# Patient Record
Sex: MALE | Race: WHITE | NOT HISPANIC OR LATINO | Employment: FULL TIME | URBAN - METROPOLITAN AREA
[De-identification: names, ages, dates, MRNs, and addresses within clinical notes are randomized per-mention and may not be internally consistent; named-entity substitution may affect disease eponyms.]

---

## 2018-07-18 ENCOUNTER — TRANSCRIBE ORDERS (OUTPATIENT)
Dept: PHYSICAL THERAPY | Facility: REHABILITATION | Age: 48
End: 2018-07-18

## 2018-07-18 ENCOUNTER — EVALUATION (OUTPATIENT)
Dept: PHYSICAL THERAPY | Facility: REHABILITATION | Age: 48
End: 2018-07-18
Payer: OTHER MISCELLANEOUS

## 2018-07-18 DIAGNOSIS — S93.492D SPRAIN OF ANTERIOR TALOFIBULAR LIGAMENT OF LEFT ANKLE, SUBSEQUENT ENCOUNTER: ICD-10-CM

## 2018-07-18 DIAGNOSIS — M75.102 ROTATOR CUFF SYNDROME OF LEFT SHOULDER: Primary | ICD-10-CM

## 2018-07-18 PROCEDURE — 97110 THERAPEUTIC EXERCISES: CPT | Performed by: PHYSICAL THERAPIST

## 2018-07-18 PROCEDURE — G8979 MOBILITY GOAL STATUS: HCPCS | Performed by: PHYSICAL THERAPIST

## 2018-07-18 PROCEDURE — G8985 CARRY GOAL STATUS: HCPCS | Performed by: PHYSICAL THERAPIST

## 2018-07-18 PROCEDURE — G8978 MOBILITY CURRENT STATUS: HCPCS | Performed by: PHYSICAL THERAPIST

## 2018-07-18 PROCEDURE — G8984 CARRY CURRENT STATUS: HCPCS | Performed by: PHYSICAL THERAPIST

## 2018-07-18 PROCEDURE — 97162 PT EVAL MOD COMPLEX 30 MIN: CPT | Performed by: PHYSICAL THERAPIST

## 2018-07-18 NOTE — PROGRESS NOTES
PT Evaluation     Today's date: 2018  Patient name: Anamaria Caal  : 1970  MRN: [id number]  Referring provider: Dejah Rivers DO  Dx:   Encounter Diagnosis     ICD-10-CM    1  Rotator cuff syndrome of left shoulder M75 102    2  Sprain of anterior talofibular ligament of left ankle, subsequent encounter S93 492D        Start Time: 1030  Stop Time: 1120  Total time in clinic (min): 50 minutes    Assessment  Impairments: abnormal gait, abnormal muscle tone, abnormal or restricted ROM, abnormal movement, activity intolerance, impaired balance, impaired physical strength, lacks appropriate home exercise program, pain with function and weight-bearing intolerance    Assessment details: Anamaria Caal is a 46 y o  male presenting to physical therapy with pain, decreased range of motion, decreased strength, gait/balance dysfunction and decreased activity tolerance  Assessment reveals reduced left ankle AROM and inflammation with painful palpation of ATFL ligament secondary to inversion sprain  Patients left shoulder demonstrates sings/symptoms of mild impingement as per special testing  Secondary to these impairments, patient has increased difficulty performing ADL's, household chores and  work related tasks  Chloe Curet would benefit from skilled PT to address these issues and maximize function  Thank you for the referral     Understanding of Dx/Px/POC: excellent  Goals  STG (4 weeks)  1  Patient will be independent with HEP  2  Decrease pain at worst by 2 points on NPRS  3  Increase left ankle dorsiflexion active range of motion by 5 degrees  4  Patient will demonstrate ability to ambulate without a CAM boot and normalized mechanics  LTG (8 weeks)  1  Decrease pain at worst from 4 points on NPRS  2  Increase left ankle active range of motion to WNL  3  Decrease fig  8 ROM from equal to R LE  4  Patient will demonstrate ability to navigate stairs reciprocally   5   Increase FOTO from 69/100 to > or equal to 85/100 for the ankle  6  Increase FOTO from 75/100 to > or equal to 80/100 for the shoulder    Plan  Patient would benefit from: skilled PT  Planned therapy interventions: joint mobilization, manual therapy, neuromuscular re-education, patient education, strengthening, stretching, therapeutic exercise, home exercise program, ADL training and balance  Frequency: 2x week  Duration in weeks: 6  Treatment plan discussed with: patient        Subjective Evaluation    History of Present Illness  Mechanism of injury: 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   Patient states that on 07/12/2018 he suffering a left ankle and shoulder injury while pulling a rope and stepping into a hole when the rope released  Patient states that the same day of his injury he received x-rays which came back negative his left shoulder and ankle  Patient came to outpatient PT on 07/18/2018 ambulating with a CAM boot for his left ankle  Patient states that his inflammation with associated lateral ankle pain limits his current function but states that his left shoulder has subsided significantly  Patient reports left shoulder end range pain and left ankle pain with ambulation/WBing  Patients goals for PT are to decrease pain, improve ambulation (d/c CAM boot), and return to work     Pain  Current pain rating: 3  At best pain rating: 3  At worst pain rating: 3  Location: Ankle pain: Current - 4/10   Quality: dull ache  Relieving factors: ice, relaxation and rest  Aggravating factors: standing, walking and overhead activity  Progression: improved    Social Support  Steps to enter house: yes  Stairs in house: no   Lives in: [facility]  Lives with: spouse    Employment status: working (Will return to work on 7/23/2018)  Hand dominance: right      Diagnostic Tests  X-ray: normal        Objective     Tenderness   Left Ankle/Foot   Tenderness in the anterior talofibular ligament  No tenderness in the fifth metatarsal base, lateral malleolus, medial malleolus, navicular and posterior talofibular ligament  Cervical/Thoracic Screen   Cervical range of motion within normal limits with the following exceptions: (-) UQS including (-) compression and Spurling B/L    Neurological Testing     Sensation     Shoulder   Left Shoulder   Intact: light touch    Right Shoulder   Intact: light touch    Ankle/Foot   Left Ankle/Foot   Intact: light touch    Right Ankle/Foot   Intact: light touch     Reflexes   Left   Biceps (C5/C6): normal (2+)  Patellar (L4): trace (1+)  Achilles (S1): trace (1+)    Right   Biceps (C5/C6): normal (2+)  Patellar (L4): trace (1+)  Achilles (S1): trace (1+)    Active Range of Motion   Left Shoulder   Flexion: WFL  Abduction: 152 degrees with pain  Left Ankle/Foot   Dorsiflexion (ke): 13 degrees   Plantar flexion: 30 degrees   Inversion: 13 degrees   Eversion: 5 degrees     Right Ankle/Foot   Dorsiflexion (ke): 18 degrees   Plantar flexion: 42 degrees     Strength/Myotome Testing     Left Shoulder     Planes of Motion   Flexion: 4   Abduction: 4   External rotation at 0°: 4-     Right Shoulder     Planes of Motion   Flexion: 4   Abduction: 4   External rotation at 0°: 4     Left Ankle/Foot   Dorsiflexion: 3+  Plantar flexion: 3+    Right Ankle/Foot   Dorsiflexion: 4+  Plantar flexion: 4+    Tests     Left Shoulder   Positive Hawkin's and painful arc  Negative belly press  Left Ankle/Foot   Positive for anterior drawer and navicular drop       Additional Tests Details  (+) Infraspinatus    Swelling   Left Ankle/Foot   Figure 8: 63 cm    Right Ankle/Foot   Figure 8: 62 cm      Flowsheet Rows      Most Recent Value   PT/OT G-Codes   Current Score  75   Projected Score  80   FOTO information reviewed  Yes   Assessment Type  Evaluation   G code set  Carrying, Moving & Handling Objects   Mobility: Walking and Moving Around Current Status ()  CJ   Mobility: Walking and Moving Around Goal Status ()  CI   Carrying, Moving and Handling Objects Current Status ()  CJ   Carrying, Moving and Handling Objects Goal Status ()  CI          Precautions:  Work related injury, Obesity    Daily Treatment Diary     Manual  7/18            Talocrural JM - grade IV Grade V            Subtalar eversion JM - grade IV             Shoulder end range PROM                                           Exercise Diary  7/18            Recumbent bike             Slant board stretch 4x30"            Ankle alphabet x4            Scap retractions BTB  2x10x3"            Serratus wall slides             Fitter board balance                                                                                                                                                                                                       Modalities

## 2018-07-18 NOTE — LETTER
2018    Guanaco Pedraza DO  [address] [facility] [address]    Patient: Darrin Pineda   YOB: 1970   Date of Visit: 2018     Encounter Diagnosis     ICD-10-CM    1  Rotator cuff syndrome of left shoulder M75 102    2  Sprain of anterior talofibular ligament of left ankle, subsequent encounter S93 492D        Dear Dr Cheyenne Montgomery:    Please review the attached Plan of Care from [facility] recent visit  Please verify that you agree therapy should continue by signing the attached document and sending it back to our office  If you have any questions or concerns, please don't hesitate to call  Sincerely,    Alessia Abdi, PT      Referring Provider:      I certify that I have read the below Plan of Care and certify the need for these services furnished under this plan of treatment while under my care  Guanaco Pedraza DO  [address] [place] [address]  VIA Facsimile: [phone]          PT Evaluation     Today's date: 2018  Patient name: Darrin Pineda  : 1970  MRN: [id number]  Referring provider: Ángel Chung DO  Dx:   Encounter Diagnosis     ICD-10-CM    1  Rotator cuff syndrome of left shoulder M75 102    2  Sprain of anterior talofibular ligament of left ankle, subsequent encounter S93 492D        Start Time: 1030  Stop Time: 1120  Total time in clinic (min): 50 minutes    Assessment  Impairments: abnormal gait, abnormal muscle tone, abnormal or restricted ROM, abnormal movement, activity intolerance, impaired balance, impaired physical strength, lacks appropriate home exercise program, pain with function and weight-bearing intolerance    Assessment details: Darrin Pineda is a 46 y o  male presenting to physical therapy with pain, decreased range of motion, decreased strength, gait/balance dysfunction and decreased activity tolerance    Assessment reveals reduced left ankle AROM and inflammation with painful palpation of ATFL ligament secondary to inversion sprain  Patients left shoulder demonstrates sings/symptoms of mild impingement as per special testing  Secondary to these impairments, patient has increased difficulty performing ADL's, household chores and  work related tasks  Maricruz Wolf would benefit from skilled PT to address these issues and maximize function  Thank you for the referral     Understanding of Dx/Px/POC: excellent  Goals  STG (4 weeks)  1  Patient will be independent with HEP  2  Decrease pain at worst by 2 points on NPRS  3  Increase left ankle dorsiflexion active range of motion by 5 degrees  4  Patient will demonstrate ability to ambulate without a CAM boot and normalized mechanics  LTG (8 weeks)  1  Decrease pain at worst from 4 points on NPRS  2  Increase left ankle active range of motion to WNL  3  Decrease fig  8 ROM from equal to R LE  4  Patient will demonstrate ability to navigate stairs reciprocally   5  Increase FOTO from 69/100 to > or equal to 85/100 for the ankle  6  Increase FOTO from 75/100 to > or equal to 80/100 for the shoulder    Plan  Patient would benefit from: skilled PT  Planned therapy interventions: joint mobilization, manual therapy, neuromuscular re-education, patient education, strengthening, stretching, therapeutic exercise, home exercise program, ADL training and balance  Frequency: 2x week  Duration in weeks: 6  Treatment plan discussed with: patient        Subjective Evaluation    History of Present Illness  Mechanism of injury: 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   Patient states that on 07/12/2018 he suffering a left ankle and shoulder injury while pulling a rope and stepping into a hole when the rope released  Patient states that the same day of his injury he received x-rays which came back negative his left shoulder and ankle    Patient came to outpatient PT on 07/18/2018 ambulating with a CAM boot for his left ankle  Patient states that his inflammation with associated lateral ankle pain limits his current function but states that his left shoulder has subsided significantly  Patient reports left shoulder end range pain and left ankle pain with ambulation/WBing  Patients goals for PT are to decrease pain, improve ambulation (d/c CAM boot), and return to work  Pain  Current pain rating: 3  At best pain rating: 3  At worst pain rating: 3  Location: Ankle pain: Current - 4/10   Quality: dull ache  Relieving factors: ice, relaxation and rest  Aggravating factors: standing, walking and overhead activity  Progression: improved    Social Support  Steps to enter house: yes  Stairs in house: no   Lives in: [place] house  Lives with: spouse    Employment status: working (Will return to work on 7/23/2018)  Hand dominance: right      Diagnostic Tests  X-ray: normal        Objective     Tenderness   Left Ankle/Foot   Tenderness in the anterior talofibular ligament  No tenderness in the fifth metatarsal base, lateral malleolus, medial malleolus, navicular and posterior talofibular ligament       Cervical/Thoracic Screen   Cervical range of motion within normal limits with the following exceptions: (-) UQS including (-) compression and Spurling B/L    Neurological Testing     Sensation     Shoulder   Left Shoulder   Intact: light touch    Right Shoulder   Intact: light touch    Ankle/Foot   Left Ankle/Foot   Intact: light touch    Right Ankle/Foot   Intact: light touch     Reflexes   Left   Biceps (C5/C6): normal (2+)  Patellar (L4): trace (1+)  Achilles (S1): trace (1+)    Right   Biceps (C5/C6): normal (2+)  Patellar (L4): trace (1+)  Achilles (S1): trace (1+)    Active Range of Motion   Left Shoulder   Flexion: WFL  Abduction: 152 degrees with pain  Left Ankle/Foot   Dorsiflexion (ke): 13 degrees   Plantar flexion: 30 degrees   Inversion: 13 degrees   Eversion: 5 degrees     Right Ankle/Foot   Dorsiflexion (ke): 18 degrees Plantar flexion: 42 degrees     Strength/Myotome Testing     Left Shoulder     Planes of Motion   Flexion: 4   Abduction: 4   External rotation at 0°:  4-     Right Shoulder     Planes of Motion   Flexion: 4   Abduction: 4   External rotation at 0°:  4     Left Ankle/Foot   Dorsiflexion: 3+  Plantar flexion: 3+    Right Ankle/Foot   Dorsiflexion: 4+  Plantar flexion: 4+    Tests     Left Shoulder   Positive Hawkin's and painful arc  Negative belly press  Left Ankle/Foot   Positive for anterior drawer and navicular drop  Additional Tests Details  (+) Infraspinatus    Swelling   Left Ankle/Foot   Figure 8: 63 cm    Right Ankle/Foot   Figure 8: 62 cm      Flowsheet Rows      Most Recent Value   PT/OT G-Codes   Current Score  75   Projected Score  80   FOTO information reviewed  Yes   Assessment Type  Evaluation   G code set  Carrying, Moving & Handling Objects   Mobility: Walking and Moving Around Current Status ()  CJ   Mobility: Walking and Moving Around Goal Status ()  CI   Carrying, Moving and Handling Objects Current Status ()  CJ   Carrying, Moving and Handling Objects Goal Status ()  CI          Precautions:  Work related injury, Obesity    Daily Treatment Diary     Manual  7/18            Talocrural JM - grade IV Grade V            Subtalar eversion JM - grade IV             Shoulder end range PROM                                           Exercise Diary  7/18            Recumbent bike             Slant board stretch 4x30"            Ankle alphabet x4            Scap retractions BTB  2x10x3"            Serratus wall slides             Fitter board balance                                                                                                                                                                                                       Modalities

## 2018-07-20 ENCOUNTER — OFFICE VISIT (OUTPATIENT)
Dept: PHYSICAL THERAPY | Facility: REHABILITATION | Age: 48
End: 2018-07-20
Payer: OTHER MISCELLANEOUS

## 2018-07-20 DIAGNOSIS — M75.102 ROTATOR CUFF SYNDROME OF LEFT SHOULDER: Primary | ICD-10-CM

## 2018-07-20 DIAGNOSIS — S93.492D SPRAIN OF ANTERIOR TALOFIBULAR LIGAMENT OF LEFT ANKLE, SUBSEQUENT ENCOUNTER: ICD-10-CM

## 2018-07-20 PROCEDURE — 97112 NEUROMUSCULAR REEDUCATION: CPT | Performed by: PHYSICAL THERAPIST

## 2018-07-20 PROCEDURE — 97140 MANUAL THERAPY 1/> REGIONS: CPT | Performed by: PHYSICAL THERAPIST

## 2018-07-20 PROCEDURE — 97110 THERAPEUTIC EXERCISES: CPT | Performed by: PHYSICAL THERAPIST

## 2018-07-20 NOTE — PROGRESS NOTES
Daily Note     Today's date: 2018  Patient name: Lolis Edwards  : 1970  MRN: [id number]  Referring provider: Lizbeth Keith DO  Dx:   Encounter Diagnosis     ICD-10-CM    1  Rotator cuff syndrome of left shoulder M75 102    2  Sprain of anterior talofibular ligament of left ankle, subsequent encounter S93 252D                   Subjective: Patient reports he has returned to walking and is able to do so with minimal pain and swelling in the ankle  Patient reports he is feeling pretty good today and is going to try and workout the shoulder later today  Patient inquired about yard work and was educated regarding load reduction and importance of high top sneakers with ankle support secondary to instability  Objective: See treatment diary below      Assessment: Tolerated treatment well  Patient demonstrated fatigue post treatment and exhibited good technique with therapeutic exercises  Improved DF PROM/AROM but inflammation remains on lateral ankle consistent with ATFL sprain  With shoulder TE patient demonstrated ability to perform without discomfort but weakness present  With fitterboard stability patient also demonstrated continued ankle instability but improved neuro control noted  Plan: Continue per plan of care  Precautions:  Work related injury, Obesity    Daily Treatment Diary     Manual             Talocrural JM - grade IV Grade V Grade IV - 6'           Subtalar eversion JM - grade IV  Grade IV = 4'           Shoulder end range PROM  3'                                         Exercise Diary             Recumbent bike             Slant board stretch 4x30" 4x30"           Ankle alphabet x4            Scap retractions BTB  2x10x3"            Serratus wall slides  YTB  x3 fatigue           Fitter board balance  5' A/P           VG warmup  7' +BTB ext's           KB press  15#  3x8 Modalities

## 2018-07-25 ENCOUNTER — OFFICE VISIT (OUTPATIENT)
Dept: PHYSICAL THERAPY | Facility: REHABILITATION | Age: 48
End: 2018-07-25
Payer: OTHER MISCELLANEOUS

## 2018-07-25 DIAGNOSIS — M75.102 ROTATOR CUFF SYNDROME OF LEFT SHOULDER: Primary | ICD-10-CM

## 2018-07-25 DIAGNOSIS — S93.492D SPRAIN OF ANTERIOR TALOFIBULAR LIGAMENT OF LEFT ANKLE, SUBSEQUENT ENCOUNTER: ICD-10-CM

## 2018-07-25 PROCEDURE — 97112 NEUROMUSCULAR REEDUCATION: CPT | Performed by: PHYSICAL THERAPIST

## 2018-07-25 PROCEDURE — 97140 MANUAL THERAPY 1/> REGIONS: CPT | Performed by: PHYSICAL THERAPIST

## 2018-07-25 PROCEDURE — G8979 MOBILITY GOAL STATUS: HCPCS | Performed by: PHYSICAL THERAPIST

## 2018-07-25 PROCEDURE — 97110 THERAPEUTIC EXERCISES: CPT | Performed by: PHYSICAL THERAPIST

## 2018-07-25 PROCEDURE — G8978 MOBILITY CURRENT STATUS: HCPCS | Performed by: PHYSICAL THERAPIST

## 2018-07-25 NOTE — PROGRESS NOTES
Daily Note     Today's date: 2018  Patient name: Sophia Rm  : 1970  MRN: [id number]  Referring provider: Nohemi Augustin DO  Dx:   Encounter Diagnosis     ICD-10-CM    1  Rotator cuff syndrome of left shoulder M75 102    2  Sprain of anterior talofibular ligament of left ankle, subsequent encounter L74 179D                   Subjective: Patient reports having 0/10 pain pre-tx but states that his ankle was "throbbing" on his way home following last tx session  Patient reports resolution of symptoms since  Objective: See treatment diary below      Assessment: Tolerated treatment well  Patient demonstrated fatigue post treatment and exhibited good technique with therapeutic exercises  Shoulder PROM/AROM currently WNL but weakness remains  Ankle instability also remains present as per balance testing but improved DF noted  Mild inflammation persists over the ATFL  Plan: Continue per plan of care  Precautions: Work related injury, Obesity    Daily Treatment Diary     Manual            Talocrural JM - grade IV Grade V Grade IV - 6' Grade IV - 4'          Subtalar eversion JM - grade IV  Grade IV = 4' Grade IV - 4'          Shoulder end range PROM  3' 3'                                        Exercise Diary            Recumbent bike             Slant board stretch 4x30" 4x30" 4x30"          Ankle alphabet x4            Scap retractions BTB  2x10x3"  BTB  3x10          Serratus wall slides  YTB  x3 fatigue YTB  x3 fatigue          Fitter board balance  5' A/P 5' A/P          VG warmup  7' +BTB ext's 7'  +BTB ext  KB press  15#  3x8 15#3x10          Heel raises/Toe raises   3x12 ea                                                                                                                                                                Modalities

## 2018-07-27 ENCOUNTER — OFFICE VISIT (OUTPATIENT)
Dept: PHYSICAL THERAPY | Facility: REHABILITATION | Age: 48
End: 2018-07-27
Payer: OTHER MISCELLANEOUS

## 2018-07-27 DIAGNOSIS — S93.492D SPRAIN OF ANTERIOR TALOFIBULAR LIGAMENT OF LEFT ANKLE, SUBSEQUENT ENCOUNTER: ICD-10-CM

## 2018-07-27 DIAGNOSIS — M75.102 ROTATOR CUFF SYNDROME OF LEFT SHOULDER: Primary | ICD-10-CM

## 2018-07-27 PROCEDURE — 97112 NEUROMUSCULAR REEDUCATION: CPT | Performed by: PHYSICAL MEDICINE & REHABILITATION

## 2018-07-27 PROCEDURE — 97140 MANUAL THERAPY 1/> REGIONS: CPT | Performed by: PHYSICAL MEDICINE & REHABILITATION

## 2018-07-27 NOTE — PROGRESS NOTES
Daily Note     Today's date: 2018  Patient name: Greg Villarreal  : 1970  MRN: [id number]  Referring provider: Jewel Morton DO  Dx:   Encounter Diagnosis     ICD-10-CM    1  Rotator cuff syndrome of left shoulder M75 102    2  Sprain of anterior talofibular ligament of left ankle, subsequent encounter D51 860Y                   Subjective: Patient Patient presents without complaints of pain, notes some tenderness in ankle following last session but otherwise reports he has returned to full activity without issue  Patient requests treatment of ankle only this session  Objective: See treatment diary below      Assessment: Tolerated treatment well  Mild eversion restriction persists  Patient demonstrated fatigue post treatment and exhibited good technique with therapeutic exercises  Plan: Continue per plan of care  Precautions: Work related injury, Obesity    Daily Treatment Diary     Manual           Talocrural JM - grade IV Grade V Grade IV - 6' Grade IV - 4' LH         Subtalar eversion JM - grade IV  Grade IV = 4' Grade IV - 4' LH         Shoulder end range PROM  3' 3' np                                       Exercise Diary           Recumbent bike             Slant board stretch 4x30" 4x30" 4x30" 4x30"         Ankle alphabet x4            Scap retractions BTB  2x10x3"  BTB  3x10 np         Serratus wall slides  YTB  x3 fatigue YTB  x3 fatigue np         Fitter board balance  5' A/P 5' A/P 5' AP         VG warmup  7' +BTB ext's 7'  +BTB ext  7' BTB ext  KB press  15#  3x8 15#3x10 np         Heel raises/Toe raises   3x12 ea    3x12 ea                                                                                                                                                            Modalities

## 2018-08-29 NOTE — PROGRESS NOTES
Darrin Pineda attended physical therapy from 07/18/2018 until 07/27/2018 for 4 treatment sessions regarding left shoulder and ankle pain  Patient made improvement throughout treatment but am unable to achieve discharge information secondary to patient not returning for follow up appointments  Patient will be discharged from PT at this time  Thank you

## 2019-04-22 ENCOUNTER — APPOINTMENT (EMERGENCY)
Dept: CT IMAGING | Facility: HOSPITAL | Age: 49
DRG: 181 | End: 2019-04-22
Payer: COMMERCIAL

## 2019-04-22 ENCOUNTER — APPOINTMENT (EMERGENCY)
Dept: RADIOLOGY | Facility: HOSPITAL | Age: 49
DRG: 181 | End: 2019-04-22
Payer: COMMERCIAL

## 2019-04-22 ENCOUNTER — HOSPITAL ENCOUNTER (INPATIENT)
Facility: HOSPITAL | Age: 49
LOS: 2 days | Discharge: HOME | DRG: 181 | End: 2019-04-25
Attending: EMERGENCY MEDICINE | Admitting: INTERNAL MEDICINE
Payer: COMMERCIAL

## 2019-04-22 DIAGNOSIS — R55 SYNCOPE: Primary | ICD-10-CM

## 2019-04-22 DIAGNOSIS — J18.9 POSTOBSTRUCTIVE PNEUMONIA: ICD-10-CM

## 2019-04-22 DIAGNOSIS — R91.8 LUNG MASS: ICD-10-CM

## 2019-04-22 DIAGNOSIS — R93.89 ABNORMAL CT OF THE CHEST: ICD-10-CM

## 2019-04-22 LAB
ALBUMIN SERPL BCP-MCNC: 2.9 G/DL (ref 3.5–5)
ALP SERPL-CCNC: 92 U/L (ref 46–116)
ALT SERPL W P-5'-P-CCNC: 41 U/L (ref 12–78)
ANION GAP BLD CALC-SCNC: 18 MMOL/L (ref 4–13)
ANION GAP SERPL CALCULATED.3IONS-SCNC: 11 MMOL/L (ref 4–13)
APTT PPP: 31 SECONDS (ref 26–38)
AST SERPL W P-5'-P-CCNC: 26 U/L (ref 5–45)
BASOPHILS # BLD AUTO: 0.06 THOUSANDS/ΜL (ref 0–0.1)
BASOPHILS NFR BLD AUTO: 1 % (ref 0–1)
BILIRUB SERPL-MCNC: 0.4 MG/DL (ref 0.2–1)
BUN BLD-MCNC: 12 MG/DL (ref 5–25)
BUN SERPL-MCNC: 13 MG/DL (ref 5–25)
CA-I BLD-SCNC: 1.13 MMOL/L (ref 1.12–1.32)
CALCIUM SERPL-MCNC: 8.7 MG/DL (ref 8.3–10.1)
CHLORIDE BLD-SCNC: 105 MMOL/L (ref 100–108)
CHLORIDE SERPL-SCNC: 104 MMOL/L (ref 100–108)
CO2 SERPL-SCNC: 24 MMOL/L (ref 21–32)
CREAT BLD-MCNC: 1 MG/DL (ref 0.6–1.3)
CREAT SERPL-MCNC: 1.21 MG/DL (ref 0.6–1.3)
EOSINOPHIL # BLD AUTO: 0.35 THOUSAND/ΜL (ref 0–0.61)
EOSINOPHIL NFR BLD AUTO: 3 % (ref 0–6)
ERYTHROCYTE [DISTWIDTH] IN BLOOD BY AUTOMATED COUNT: 13 % (ref 11.6–15.1)
GFR SERPL CREATININE-BSD FRML MDRD: 69 ML/MIN/1.73SQ M
GFR SERPL CREATININE-BSD FRML MDRD: 87 ML/MIN/1.73SQ M
GLUCOSE SERPL-MCNC: 128 MG/DL (ref 65–140)
GLUCOSE SERPL-MCNC: 163 MG/DL (ref 65–140)
HCT VFR BLD AUTO: 46.1 % (ref 36.5–49.3)
HCT VFR BLD CALC: 43 % (ref 36.5–49.3)
HGB BLD-MCNC: 15.5 G/DL (ref 12–17)
HGB BLDA-MCNC: 14.6 G/DL (ref 12–17)
IMM GRANULOCYTES # BLD AUTO: 0.03 THOUSAND/UL (ref 0–0.2)
IMM GRANULOCYTES NFR BLD AUTO: 0 % (ref 0–2)
INR PPP: 1.09 (ref 0.86–1.17)
LACTATE SERPL-SCNC: 1.3 MMOL/L (ref 0.5–2)
LACTATE SERPL-SCNC: 2.1 MMOL/L (ref 0.5–2)
LYMPHOCYTES # BLD AUTO: 2.19 THOUSANDS/ΜL (ref 0.6–4.47)
LYMPHOCYTES NFR BLD AUTO: 22 % (ref 14–44)
MCH RBC QN AUTO: 31 PG (ref 26.8–34.3)
MCHC RBC AUTO-ENTMCNC: 33.6 G/DL (ref 31.4–37.4)
MCV RBC AUTO: 92 FL (ref 82–98)
MONOCYTES # BLD AUTO: 0.8 THOUSAND/ΜL (ref 0.17–1.22)
MONOCYTES NFR BLD AUTO: 8 % (ref 4–12)
NEUTROPHILS # BLD AUTO: 6.74 THOUSANDS/ΜL (ref 1.85–7.62)
NEUTS SEG NFR BLD AUTO: 66 % (ref 43–75)
NRBC BLD AUTO-RTO: 0 /100 WBCS
PCO2 BLD: 23 MMOL/L (ref 21–32)
PLATELET # BLD AUTO: 253 THOUSANDS/UL (ref 149–390)
PMV BLD AUTO: 9.3 FL (ref 8.9–12.7)
POTASSIUM BLD-SCNC: 3.4 MMOL/L (ref 3.5–5.3)
POTASSIUM SERPL-SCNC: 3.6 MMOL/L (ref 3.5–5.3)
PROT SERPL-MCNC: 6.9 G/DL (ref 6.4–8.2)
PROTHROMBIN TIME: 13.8 SECONDS (ref 11.8–14.2)
RBC # BLD AUTO: 5 MILLION/UL (ref 3.88–5.62)
SODIUM BLD-SCNC: 141 MMOL/L (ref 136–145)
SODIUM SERPL-SCNC: 139 MMOL/L (ref 136–145)
SPECIMEN SOURCE: ABNORMAL
TROPONIN I SERPL-MCNC: <0.02 NG/ML
WBC # BLD AUTO: 10.17 THOUSAND/UL (ref 4.31–10.16)

## 2019-04-22 PROCEDURE — 71045 X-RAY EXAM CHEST 1 VIEW: CPT

## 2019-04-22 PROCEDURE — 85610 PROTHROMBIN TIME: CPT | Performed by: EMERGENCY MEDICINE

## 2019-04-22 PROCEDURE — 74177 CT ABD & PELVIS W/CONTRAST: CPT

## 2019-04-22 PROCEDURE — 70450 CT HEAD/BRAIN W/O DYE: CPT

## 2019-04-22 PROCEDURE — 80053 COMPREHEN METABOLIC PANEL: CPT | Performed by: EMERGENCY MEDICINE

## 2019-04-22 PROCEDURE — 85014 HEMATOCRIT: CPT

## 2019-04-22 PROCEDURE — 85730 THROMBOPLASTIN TIME PARTIAL: CPT | Performed by: EMERGENCY MEDICINE

## 2019-04-22 PROCEDURE — 85025 COMPLETE CBC W/AUTO DIFF WBC: CPT | Performed by: EMERGENCY MEDICINE

## 2019-04-22 PROCEDURE — 96361 HYDRATE IV INFUSION ADD-ON: CPT

## 2019-04-22 PROCEDURE — 99284 EMERGENCY DEPT VISIT MOD MDM: CPT | Performed by: EMERGENCY MEDICINE

## 2019-04-22 PROCEDURE — 83605 ASSAY OF LACTIC ACID: CPT | Performed by: EMERGENCY MEDICINE

## 2019-04-22 PROCEDURE — 36415 COLL VENOUS BLD VENIPUNCTURE: CPT | Performed by: EMERGENCY MEDICINE

## 2019-04-22 PROCEDURE — 96365 THER/PROPH/DIAG IV INF INIT: CPT

## 2019-04-22 PROCEDURE — 99285 EMERGENCY DEPT VISIT HI MDM: CPT

## 2019-04-22 PROCEDURE — 71260 CT THORAX DX C+: CPT

## 2019-04-22 PROCEDURE — 87040 BLOOD CULTURE FOR BACTERIA: CPT | Performed by: EMERGENCY MEDICINE

## 2019-04-22 PROCEDURE — 84484 ASSAY OF TROPONIN QUANT: CPT | Performed by: EMERGENCY MEDICINE

## 2019-04-22 PROCEDURE — 80047 BASIC METABLC PNL IONIZED CA: CPT

## 2019-04-22 PROCEDURE — 93005 ELECTROCARDIOGRAM TRACING: CPT

## 2019-04-22 PROCEDURE — 84145 PROCALCITONIN (PCT): CPT | Performed by: EMERGENCY MEDICINE

## 2019-04-22 PROCEDURE — 96375 TX/PRO/DX INJ NEW DRUG ADDON: CPT

## 2019-04-22 RX ORDER — ALBUTEROL SULFATE 2.5 MG/3ML
5 SOLUTION RESPIRATORY (INHALATION) ONCE
Status: COMPLETED | OUTPATIENT
Start: 2019-04-22 | End: 2019-04-22

## 2019-04-22 RX ORDER — ONDANSETRON 2 MG/ML
4 INJECTION INTRAMUSCULAR; INTRAVENOUS ONCE
Status: COMPLETED | OUTPATIENT
Start: 2019-04-22 | End: 2019-04-22

## 2019-04-22 RX ORDER — 0.9 % SODIUM CHLORIDE 0.9 %
3 VIAL (ML) INJECTION AS NEEDED
Status: DISCONTINUED | OUTPATIENT
Start: 2019-04-22 | End: 2019-04-25 | Stop reason: HOSPADM

## 2019-04-22 RX ORDER — FENTANYL CITRATE 50 UG/ML
100 INJECTION, SOLUTION INTRAMUSCULAR; INTRAVENOUS ONCE
Status: COMPLETED | OUTPATIENT
Start: 2019-04-22 | End: 2019-04-22

## 2019-04-22 RX ADMIN — ONDANSETRON 4 MG: 2 INJECTION INTRAMUSCULAR; INTRAVENOUS at 21:34

## 2019-04-22 RX ADMIN — IOHEXOL 100 ML: 350 INJECTION, SOLUTION INTRAVENOUS at 22:13

## 2019-04-22 RX ADMIN — SODIUM CHLORIDE 1000 ML: 0.9 INJECTION, SOLUTION INTRAVENOUS at 21:34

## 2019-04-22 RX ADMIN — FENTANYL CITRATE 100 MCG: 50 INJECTION, SOLUTION INTRAMUSCULAR; INTRAVENOUS at 21:35

## 2019-04-22 RX ADMIN — CEFTRIAXONE 1000 MG: 2 INJECTION, POWDER, FOR SOLUTION INTRAMUSCULAR; INTRAVENOUS at 23:28

## 2019-04-22 RX ADMIN — IPRATROPIUM BROMIDE 0.5 MG: 0.5 SOLUTION RESPIRATORY (INHALATION) at 21:40

## 2019-04-22 RX ADMIN — ALBUTEROL SULFATE 5 MG: 2.5 SOLUTION RESPIRATORY (INHALATION) at 21:39

## 2019-04-23 PROBLEM — Z72.0 TOBACCO ABUSE: Status: ACTIVE | Noted: 2019-04-23

## 2019-04-23 PROBLEM — R55 SYNCOPE: Status: ACTIVE | Noted: 2019-04-23

## 2019-04-23 PROBLEM — J18.9 POSTOBSTRUCTIVE PNEUMONIA: Status: ACTIVE | Noted: 2019-04-23

## 2019-04-23 PROBLEM — R91.8 LUNG MASS: Status: ACTIVE | Noted: 2019-04-23

## 2019-04-23 LAB
ANION GAP SERPL CALCULATED.3IONS-SCNC: 11 MMOL/L (ref 4–13)
ATRIAL RATE: 83 BPM
BILIRUB UR QL STRIP: NEGATIVE
BUN SERPL-MCNC: 11 MG/DL (ref 5–25)
CALCIUM SERPL-MCNC: 8.2 MG/DL (ref 8.3–10.1)
CHLORIDE SERPL-SCNC: 105 MMOL/L (ref 100–108)
CLARITY UR: CLEAR
CO2 SERPL-SCNC: 23 MMOL/L (ref 21–32)
COLOR UR: YELLOW
CREAT SERPL-MCNC: 1.03 MG/DL (ref 0.6–1.3)
ERYTHROCYTE [DISTWIDTH] IN BLOOD BY AUTOMATED COUNT: 13.1 % (ref 11.6–15.1)
GFR SERPL CREATININE-BSD FRML MDRD: 84 ML/MIN/1.73SQ M
GLUCOSE SERPL-MCNC: 94 MG/DL (ref 65–140)
GLUCOSE UR STRIP-MCNC: NEGATIVE MG/DL
HCT VFR BLD AUTO: 43.1 % (ref 36.5–49.3)
HGB BLD-MCNC: 14.3 G/DL (ref 12–17)
HGB UR QL STRIP.AUTO: NEGATIVE
KETONES UR STRIP-MCNC: NEGATIVE MG/DL
LEUKOCYTE ESTERASE UR QL STRIP: NEGATIVE
MAGNESIUM SERPL-MCNC: 2.3 MG/DL (ref 1.6–2.6)
MCH RBC QN AUTO: 31 PG (ref 26.8–34.3)
MCHC RBC AUTO-ENTMCNC: 33.2 G/DL (ref 31.4–37.4)
MCV RBC AUTO: 94 FL (ref 82–98)
NITRITE UR QL STRIP: NEGATIVE
P AXIS: 48 DEGREES
PH UR STRIP.AUTO: 7.5 [PH]
PLATELET # BLD AUTO: 229 THOUSANDS/UL (ref 149–390)
PMV BLD AUTO: 9.2 FL (ref 8.9–12.7)
POTASSIUM SERPL-SCNC: 3.7 MMOL/L (ref 3.5–5.3)
PR INTERVAL: 152 MS
PROCALCITONIN SERPL-MCNC: 0.07 NG/ML
PROT UR STRIP-MCNC: NEGATIVE MG/DL
QRS AXIS: 75 DEGREES
QRSD INTERVAL: 82 MS
QT INTERVAL: 358 MS
QTC INTERVAL: 420 MS
RBC # BLD AUTO: 4.61 MILLION/UL (ref 3.88–5.62)
SODIUM SERPL-SCNC: 139 MMOL/L (ref 136–145)
SP GR UR STRIP.AUTO: 1.01 (ref 1–1.03)
T WAVE AXIS: -19 DEGREES
TROPONIN I SERPL-MCNC: <0.02 NG/ML
TROPONIN I SERPL-MCNC: <0.02 NG/ML
UROBILINOGEN UR QL STRIP.AUTO: 1 E.U./DL
VENTRICULAR RATE: 83 BPM
WBC # BLD AUTO: 9.56 THOUSAND/UL (ref 4.31–10.16)

## 2019-04-23 PROCEDURE — 81003 URINALYSIS AUTO W/O SCOPE: CPT | Performed by: PHYSICIAN ASSISTANT

## 2019-04-23 PROCEDURE — 84484 ASSAY OF TROPONIN QUANT: CPT | Performed by: PHYSICIAN ASSISTANT

## 2019-04-23 PROCEDURE — NC001 PR NO CHARGE: Performed by: RADIOLOGY

## 2019-04-23 PROCEDURE — 87205 SMEAR GRAM STAIN: CPT | Performed by: PHYSICIAN ASSISTANT

## 2019-04-23 PROCEDURE — 83735 ASSAY OF MAGNESIUM: CPT | Performed by: PHYSICIAN ASSISTANT

## 2019-04-23 PROCEDURE — 94664 DEMO&/EVAL PT USE INHALER: CPT

## 2019-04-23 PROCEDURE — 85027 COMPLETE CBC AUTOMATED: CPT | Performed by: PHYSICIAN ASSISTANT

## 2019-04-23 PROCEDURE — 93010 ELECTROCARDIOGRAM REPORT: CPT | Performed by: INTERNAL MEDICINE

## 2019-04-23 PROCEDURE — 87070 CULTURE OTHR SPECIMN AEROBIC: CPT | Performed by: PHYSICIAN ASSISTANT

## 2019-04-23 PROCEDURE — 87118 MYCOBACTERIC IDENTIFICATION: CPT | Performed by: NURSE PRACTITIONER

## 2019-04-23 PROCEDURE — 99254 IP/OBS CNSLTJ NEW/EST MOD 60: CPT | Performed by: INTERNAL MEDICINE

## 2019-04-23 PROCEDURE — 94760 N-INVAS EAR/PLS OXIMETRY 1: CPT

## 2019-04-23 PROCEDURE — 87116 MYCOBACTERIA CULTURE: CPT | Performed by: NURSE PRACTITIONER

## 2019-04-23 PROCEDURE — 94640 AIRWAY INHALATION TREATMENT: CPT

## 2019-04-23 PROCEDURE — 87206 SMEAR FLUORESCENT/ACID STAI: CPT | Performed by: NURSE PRACTITIONER

## 2019-04-23 PROCEDURE — 80048 BASIC METABOLIC PNL TOTAL CA: CPT | Performed by: PHYSICIAN ASSISTANT

## 2019-04-23 PROCEDURE — 99223 1ST HOSP IP/OBS HIGH 75: CPT | Performed by: INTERNAL MEDICINE

## 2019-04-23 RX ORDER — ACETAMINOPHEN 325 MG/1
650 TABLET ORAL EVERY 6 HOURS PRN
Status: DISCONTINUED | OUTPATIENT
Start: 2019-04-23 | End: 2019-04-25 | Stop reason: HOSPADM

## 2019-04-23 RX ORDER — NICOTINE 21 MG/24HR
1 PATCH, TRANSDERMAL 24 HOURS TRANSDERMAL DAILY
Status: DISCONTINUED | OUTPATIENT
Start: 2019-04-24 | End: 2019-04-25 | Stop reason: HOSPADM

## 2019-04-23 RX ORDER — NICOTINE 21 MG/24HR
1 PATCH, TRANSDERMAL 24 HOURS TRANSDERMAL DAILY
Status: DISCONTINUED | OUTPATIENT
Start: 2019-04-23 | End: 2019-04-23

## 2019-04-23 RX ORDER — BENZONATATE 100 MG/1
100 CAPSULE ORAL 3 TIMES DAILY PRN
Status: DISCONTINUED | OUTPATIENT
Start: 2019-04-23 | End: 2019-04-25 | Stop reason: HOSPADM

## 2019-04-23 RX ORDER — ONDANSETRON 2 MG/ML
4 INJECTION INTRAMUSCULAR; INTRAVENOUS EVERY 6 HOURS PRN
Status: DISCONTINUED | OUTPATIENT
Start: 2019-04-23 | End: 2019-04-25 | Stop reason: HOSPADM

## 2019-04-23 RX ORDER — GUAIFENESIN 600 MG
1200 TABLET, EXTENDED RELEASE 12 HR ORAL EVERY 12 HOURS SCHEDULED
Status: DISCONTINUED | OUTPATIENT
Start: 2019-04-23 | End: 2019-04-25 | Stop reason: HOSPADM

## 2019-04-23 RX ORDER — SODIUM CHLORIDE FOR INHALATION 0.9 %
3 VIAL, NEBULIZER (ML) INHALATION
Status: DISCONTINUED | OUTPATIENT
Start: 2019-04-23 | End: 2019-04-25 | Stop reason: HOSPADM

## 2019-04-23 RX ORDER — LORAZEPAM 2 MG/ML
1 INJECTION INTRAMUSCULAR ONCE
Status: DISCONTINUED | OUTPATIENT
Start: 2019-04-23 | End: 2019-04-23

## 2019-04-23 RX ORDER — LEVALBUTEROL 1.25 MG/.5ML
1.25 SOLUTION, CONCENTRATE RESPIRATORY (INHALATION)
Status: DISCONTINUED | OUTPATIENT
Start: 2019-04-23 | End: 2019-04-25 | Stop reason: HOSPADM

## 2019-04-23 RX ORDER — NICOTINE 21 MG/24HR
21 PATCH, TRANSDERMAL 24 HOURS TRANSDERMAL ONCE
Status: COMPLETED | OUTPATIENT
Start: 2019-04-23 | End: 2019-04-24

## 2019-04-23 RX ORDER — METRONIDAZOLE 500 MG/1
500 TABLET ORAL EVERY 8 HOURS SCHEDULED
Status: DISCONTINUED | OUTPATIENT
Start: 2019-04-23 | End: 2019-04-25

## 2019-04-23 RX ORDER — LEVALBUTEROL 1.25 MG/.5ML
1.25 SOLUTION, CONCENTRATE RESPIRATORY (INHALATION)
Status: DISCONTINUED | OUTPATIENT
Start: 2019-04-23 | End: 2019-04-23

## 2019-04-23 RX ORDER — SODIUM CHLORIDE 9 MG/ML
125 INJECTION, SOLUTION INTRAVENOUS CONTINUOUS
Status: DISCONTINUED | OUTPATIENT
Start: 2019-04-23 | End: 2019-04-23

## 2019-04-23 RX ORDER — HEPARIN SODIUM 5000 [USP'U]/ML
5000 INJECTION, SOLUTION INTRAVENOUS; SUBCUTANEOUS EVERY 8 HOURS SCHEDULED
Status: DISCONTINUED | OUTPATIENT
Start: 2019-04-23 | End: 2019-04-25 | Stop reason: HOSPADM

## 2019-04-23 RX ADMIN — CEFTRIAXONE 1000 MG: 2 INJECTION, POWDER, FOR SOLUTION INTRAMUSCULAR; INTRAVENOUS at 22:52

## 2019-04-23 RX ADMIN — ISODIUM CHLORIDE 3 ML: 0.03 SOLUTION RESPIRATORY (INHALATION) at 14:05

## 2019-04-23 RX ADMIN — ISODIUM CHLORIDE 3 ML: 0.03 SOLUTION RESPIRATORY (INHALATION) at 19:50

## 2019-04-23 RX ADMIN — NICOTINE 21 MG: 21 PATCH, EXTENDED RELEASE TRANSDERMAL at 01:33

## 2019-04-23 RX ADMIN — LEVALBUTEROL HYDROCHLORIDE 1.25 MG: 1.25 SOLUTION, CONCENTRATE RESPIRATORY (INHALATION) at 14:05

## 2019-04-23 RX ADMIN — SODIUM CHLORIDE 125 ML/HR: 0.9 INJECTION, SOLUTION INTRAVENOUS at 01:34

## 2019-04-23 RX ADMIN — GUAIFENESIN 1200 MG: 600 TABLET, EXTENDED RELEASE ORAL at 22:31

## 2019-04-23 RX ADMIN — GUAIFENESIN 1200 MG: 600 TABLET, EXTENDED RELEASE ORAL at 11:01

## 2019-04-23 RX ADMIN — METRONIDAZOLE 500 MG: 500 TABLET ORAL at 22:33

## 2019-04-23 RX ADMIN — HEPARIN SODIUM 5000 UNITS: 5000 INJECTION INTRAVENOUS; SUBCUTANEOUS at 22:33

## 2019-04-23 RX ADMIN — HEPARIN SODIUM 5000 UNITS: 5000 INJECTION INTRAVENOUS; SUBCUTANEOUS at 06:12

## 2019-04-23 RX ADMIN — METRONIDAZOLE 500 MG: 500 TABLET ORAL at 16:30

## 2019-04-23 RX ADMIN — AZITHROMYCIN MONOHYDRATE 500 MG: 500 INJECTION, POWDER, LYOPHILIZED, FOR SOLUTION INTRAVENOUS at 00:24

## 2019-04-23 RX ADMIN — HEPARIN SODIUM 5000 UNITS: 5000 INJECTION INTRAVENOUS; SUBCUTANEOUS at 13:48

## 2019-04-23 RX ADMIN — LEVALBUTEROL HYDROCHLORIDE 1.25 MG: 1.25 SOLUTION, CONCENTRATE RESPIRATORY (INHALATION) at 19:50

## 2019-04-24 PROCEDURE — 87206 SMEAR FLUORESCENT/ACID STAI: CPT | Performed by: NURSE PRACTITIONER

## 2019-04-24 PROCEDURE — 94640 AIRWAY INHALATION TREATMENT: CPT

## 2019-04-24 PROCEDURE — 87116 MYCOBACTERIA CULTURE: CPT | Performed by: NURSE PRACTITIONER

## 2019-04-24 PROCEDURE — 94760 N-INVAS EAR/PLS OXIMETRY 1: CPT

## 2019-04-24 PROCEDURE — 99232 SBSQ HOSP IP/OBS MODERATE 35: CPT | Performed by: INTERNAL MEDICINE

## 2019-04-24 RX ADMIN — NICOTINE 1 PATCH: 14 PATCH TRANSDERMAL at 09:44

## 2019-04-24 RX ADMIN — NICOTINE 1 PATCH: 14 PATCH TRANSDERMAL at 00:54

## 2019-04-24 RX ADMIN — ISODIUM CHLORIDE 3 ML: 0.03 SOLUTION RESPIRATORY (INHALATION) at 14:25

## 2019-04-24 RX ADMIN — METRONIDAZOLE 500 MG: 500 TABLET ORAL at 14:16

## 2019-04-24 RX ADMIN — AZITHROMYCIN MONOHYDRATE 500 MG: 500 INJECTION, POWDER, LYOPHILIZED, FOR SOLUTION INTRAVENOUS at 23:59

## 2019-04-24 RX ADMIN — GUAIFENESIN 1200 MG: 600 TABLET, EXTENDED RELEASE ORAL at 09:43

## 2019-04-24 RX ADMIN — HEPARIN SODIUM 5000 UNITS: 5000 INJECTION INTRAVENOUS; SUBCUTANEOUS at 14:16

## 2019-04-24 RX ADMIN — ISODIUM CHLORIDE 3 ML: 0.03 SOLUTION RESPIRATORY (INHALATION) at 07:27

## 2019-04-24 RX ADMIN — ISODIUM CHLORIDE 3 ML: 0.03 SOLUTION RESPIRATORY (INHALATION) at 20:01

## 2019-04-24 RX ADMIN — AZITHROMYCIN MONOHYDRATE 500 MG: 500 INJECTION, POWDER, LYOPHILIZED, FOR SOLUTION INTRAVENOUS at 00:43

## 2019-04-24 RX ADMIN — CEFTRIAXONE 1000 MG: 2 INJECTION, POWDER, FOR SOLUTION INTRAMUSCULAR; INTRAVENOUS at 22:48

## 2019-04-24 RX ADMIN — GUAIFENESIN 1200 MG: 600 TABLET, EXTENDED RELEASE ORAL at 20:57

## 2019-04-24 RX ADMIN — LEVALBUTEROL HYDROCHLORIDE 1.25 MG: 1.25 SOLUTION, CONCENTRATE RESPIRATORY (INHALATION) at 20:01

## 2019-04-24 RX ADMIN — LEVALBUTEROL HYDROCHLORIDE 1.25 MG: 1.25 SOLUTION, CONCENTRATE RESPIRATORY (INHALATION) at 14:25

## 2019-04-24 RX ADMIN — METRONIDAZOLE 500 MG: 500 TABLET ORAL at 20:57

## 2019-04-24 RX ADMIN — METRONIDAZOLE 500 MG: 500 TABLET ORAL at 05:48

## 2019-04-24 RX ADMIN — HEPARIN SODIUM 5000 UNITS: 5000 INJECTION INTRAVENOUS; SUBCUTANEOUS at 21:30

## 2019-04-24 RX ADMIN — HEPARIN SODIUM 5000 UNITS: 5000 INJECTION INTRAVENOUS; SUBCUTANEOUS at 05:48

## 2019-04-24 RX ADMIN — LEVALBUTEROL HYDROCHLORIDE 1.25 MG: 1.25 SOLUTION, CONCENTRATE RESPIRATORY (INHALATION) at 07:27

## 2019-04-25 VITALS
SYSTOLIC BLOOD PRESSURE: 118 MMHG | DIASTOLIC BLOOD PRESSURE: 68 MMHG | RESPIRATION RATE: 18 BRPM | WEIGHT: 315 LBS | TEMPERATURE: 98.6 F | OXYGEN SATURATION: 96 % | HEART RATE: 67 BPM

## 2019-04-25 PROBLEM — R55 SYNCOPE: Status: RESOLVED | Noted: 2019-04-23 | Resolved: 2019-04-25

## 2019-04-25 PROBLEM — R93.89 ABNORMAL CT OF THE CHEST: Status: ACTIVE | Noted: 2019-04-23

## 2019-04-25 LAB
ANION GAP SERPL CALCULATED.3IONS-SCNC: 10 MMOL/L (ref 4–13)
BACTERIA SPT RESP CULT: ABNORMAL
BUN SERPL-MCNC: 11 MG/DL (ref 5–25)
CALCIUM SERPL-MCNC: 8.7 MG/DL (ref 8.3–10.1)
CHLORIDE SERPL-SCNC: 105 MMOL/L (ref 100–108)
CO2 SERPL-SCNC: 22 MMOL/L (ref 21–32)
CREAT SERPL-MCNC: 1.04 MG/DL (ref 0.6–1.3)
ERYTHROCYTE [DISTWIDTH] IN BLOOD BY AUTOMATED COUNT: 13.2 % (ref 11.6–15.1)
GFR SERPL CREATININE-BSD FRML MDRD: 83 ML/MIN/1.73SQ M
GLUCOSE SERPL-MCNC: 105 MG/DL (ref 65–140)
GRAM STN SPEC: ABNORMAL
HCT VFR BLD AUTO: 43 % (ref 36.5–49.3)
HGB BLD-MCNC: 14.2 G/DL (ref 12–17)
MAGNESIUM SERPL-MCNC: 2.3 MG/DL (ref 1.6–2.6)
MCH RBC QN AUTO: 30.3 PG (ref 26.8–34.3)
MCHC RBC AUTO-ENTMCNC: 33 G/DL (ref 31.4–37.4)
MCV RBC AUTO: 92 FL (ref 82–98)
PLATELET # BLD AUTO: 261 THOUSANDS/UL (ref 149–390)
PMV BLD AUTO: 9.2 FL (ref 8.9–12.7)
POTASSIUM SERPL-SCNC: 3.8 MMOL/L (ref 3.5–5.3)
PROCALCITONIN SERPL-MCNC: 0.06 NG/ML
RBC # BLD AUTO: 4.69 MILLION/UL (ref 3.88–5.62)
SODIUM SERPL-SCNC: 137 MMOL/L (ref 136–145)
WBC # BLD AUTO: 8.74 THOUSAND/UL (ref 4.31–10.16)

## 2019-04-25 PROCEDURE — 94760 N-INVAS EAR/PLS OXIMETRY 1: CPT

## 2019-04-25 PROCEDURE — 94640 AIRWAY INHALATION TREATMENT: CPT

## 2019-04-25 PROCEDURE — 99239 HOSP IP/OBS DSCHRG MGMT >30: CPT | Performed by: INTERNAL MEDICINE

## 2019-04-25 PROCEDURE — 84145 PROCALCITONIN (PCT): CPT | Performed by: PHYSICIAN ASSISTANT

## 2019-04-25 PROCEDURE — 99231 SBSQ HOSP IP/OBS SF/LOW 25: CPT | Performed by: INTERNAL MEDICINE

## 2019-04-25 PROCEDURE — 80048 BASIC METABOLIC PNL TOTAL CA: CPT | Performed by: PHYSICIAN ASSISTANT

## 2019-04-25 PROCEDURE — 87206 SMEAR FLUORESCENT/ACID STAI: CPT | Performed by: NURSE PRACTITIONER

## 2019-04-25 PROCEDURE — 85027 COMPLETE CBC AUTOMATED: CPT | Performed by: PHYSICIAN ASSISTANT

## 2019-04-25 PROCEDURE — 87116 MYCOBACTERIA CULTURE: CPT | Performed by: NURSE PRACTITIONER

## 2019-04-25 PROCEDURE — 99254 IP/OBS CNSLTJ NEW/EST MOD 60: CPT | Performed by: INTERNAL MEDICINE

## 2019-04-25 PROCEDURE — 83735 ASSAY OF MAGNESIUM: CPT | Performed by: PHYSICIAN ASSISTANT

## 2019-04-25 RX ADMIN — NICOTINE 1 PATCH: 14 PATCH TRANSDERMAL at 10:19

## 2019-04-25 RX ADMIN — LEVALBUTEROL HYDROCHLORIDE 1.25 MG: 1.25 SOLUTION, CONCENTRATE RESPIRATORY (INHALATION) at 07:29

## 2019-04-25 RX ADMIN — GUAIFENESIN 1200 MG: 600 TABLET, EXTENDED RELEASE ORAL at 10:18

## 2019-04-25 RX ADMIN — HEPARIN SODIUM 5000 UNITS: 5000 INJECTION INTRAVENOUS; SUBCUTANEOUS at 05:45

## 2019-04-25 RX ADMIN — ISODIUM CHLORIDE 3 ML: 0.03 SOLUTION RESPIRATORY (INHALATION) at 13:16

## 2019-04-25 RX ADMIN — ISODIUM CHLORIDE 3 ML: 0.03 SOLUTION RESPIRATORY (INHALATION) at 07:29

## 2019-04-25 RX ADMIN — HEPARIN SODIUM 5000 UNITS: 5000 INJECTION INTRAVENOUS; SUBCUTANEOUS at 13:47

## 2019-04-25 RX ADMIN — LEVALBUTEROL HYDROCHLORIDE 1.25 MG: 1.25 SOLUTION, CONCENTRATE RESPIRATORY (INHALATION) at 13:16

## 2019-04-25 RX ADMIN — METRONIDAZOLE 500 MG: 500 TABLET ORAL at 13:47

## 2019-04-25 RX ADMIN — METRONIDAZOLE 500 MG: 500 TABLET ORAL at 05:45

## 2019-04-28 LAB
BACTERIA BLD CULT: NORMAL
BACTERIA BLD CULT: NORMAL

## 2019-05-02 ENCOUNTER — TELEPHONE (OUTPATIENT)
Dept: INFECTIOUS DISEASES | Facility: CLINIC | Age: 49
End: 2019-05-02

## 2019-05-03 LAB — MISCELLANEOUS LAB TEST RESULT: NORMAL

## 2019-05-06 RX ORDER — ALBUTEROL SULFATE 90 UG/1
AEROSOL, METERED RESPIRATORY (INHALATION)
Refills: 0 | COMMUNITY
Start: 2019-04-01 | End: 2021-04-30 | Stop reason: SDUPTHER

## 2019-05-06 RX ORDER — ALBUTEROL SULFATE 2.5 MG/3ML
SOLUTION RESPIRATORY (INHALATION)
Refills: 0 | COMMUNITY
Start: 2019-02-27 | End: 2021-04-30 | Stop reason: SDUPTHER

## 2019-05-06 RX ORDER — DOXYCYCLINE 100 MG/1
CAPSULE ORAL
Refills: 0 | COMMUNITY
Start: 2019-02-27 | End: 2019-05-10 | Stop reason: ALTCHOICE

## 2019-05-06 RX ORDER — CEFUROXIME AXETIL 500 MG/1
500 TABLET ORAL 2 TIMES DAILY
Refills: 0 | COMMUNITY
Start: 2019-04-01 | End: 2019-05-10 | Stop reason: ALTCHOICE

## 2019-05-06 RX ORDER — PREDNISONE 20 MG/1
TABLET ORAL
Refills: 0 | COMMUNITY
Start: 2019-04-01 | End: 2019-05-10 | Stop reason: ALTCHOICE

## 2019-05-10 ENCOUNTER — OFFICE VISIT (OUTPATIENT)
Dept: PULMONOLOGY | Facility: CLINIC | Age: 49
End: 2019-05-10
Payer: COMMERCIAL

## 2019-05-10 VITALS
SYSTOLIC BLOOD PRESSURE: 134 MMHG | WEIGHT: 315 LBS | HEIGHT: 74 IN | OXYGEN SATURATION: 92 % | BODY MASS INDEX: 40.43 KG/M2 | HEART RATE: 73 BPM | DIASTOLIC BLOOD PRESSURE: 94 MMHG | TEMPERATURE: 98 F

## 2019-05-10 DIAGNOSIS — R91.8 LUNG MASS: Primary | ICD-10-CM

## 2019-05-10 DIAGNOSIS — Z72.0 TOBACCO ABUSE: ICD-10-CM

## 2019-05-10 PROCEDURE — 99215 OFFICE O/P EST HI 40 MIN: CPT | Performed by: PHYSICIAN ASSISTANT

## 2019-05-10 PROCEDURE — 1111F DSCHRG MED/CURRENT MED MERGE: CPT | Performed by: PHYSICIAN ASSISTANT

## 2019-05-10 RX ORDER — VARENICLINE TARTRATE 1 MG/1
1 TABLET, FILM COATED ORAL 2 TIMES DAILY
Qty: 60 TABLET | Refills: 2 | Status: SHIPPED | OUTPATIENT
Start: 2019-05-10 | End: 2019-08-30 | Stop reason: ALTCHOICE

## 2019-05-10 RX ORDER — VARENICLINE TARTRATE 25 MG
KIT ORAL
Qty: 53 TABLET | Refills: 0 | Status: SHIPPED | OUTPATIENT
Start: 2019-05-10 | End: 2019-08-30 | Stop reason: ALTCHOICE

## 2019-05-14 ENCOUNTER — OFFICE VISIT (OUTPATIENT)
Dept: INTERNAL MEDICINE CLINIC | Facility: CLINIC | Age: 49
End: 2019-05-14
Payer: COMMERCIAL

## 2019-05-14 ENCOUNTER — TELEPHONE (OUTPATIENT)
Dept: INFECTIOUS DISEASES | Facility: CLINIC | Age: 49
End: 2019-05-14

## 2019-05-14 VITALS
SYSTOLIC BLOOD PRESSURE: 152 MMHG | OXYGEN SATURATION: 98 % | TEMPERATURE: 98.4 F | HEIGHT: 74 IN | DIASTOLIC BLOOD PRESSURE: 88 MMHG | BODY MASS INDEX: 40.43 KG/M2 | RESPIRATION RATE: 18 BRPM | HEART RATE: 78 BPM | WEIGHT: 315 LBS

## 2019-05-14 DIAGNOSIS — Z72.0 TOBACCO USE: ICD-10-CM

## 2019-05-14 DIAGNOSIS — Z13.220 ENCOUNTER FOR LIPID SCREENING FOR CARDIOVASCULAR DISEASE: Primary | ICD-10-CM

## 2019-05-14 DIAGNOSIS — Z13.6 ENCOUNTER FOR LIPID SCREENING FOR CARDIOVASCULAR DISEASE: Primary | ICD-10-CM

## 2019-05-14 LAB
MYCOBACTERIUM SPEC CULT: ABNORMAL
RHODAMINE-AURAMINE STN SPEC: ABNORMAL

## 2019-05-14 PROCEDURE — 99203 OFFICE O/P NEW LOW 30 MIN: CPT | Performed by: INTERNAL MEDICINE

## 2019-05-14 PROCEDURE — 3008F BODY MASS INDEX DOCD: CPT | Performed by: INTERNAL MEDICINE

## 2019-05-16 ENCOUNTER — TELEPHONE (OUTPATIENT)
Dept: PULMONOLOGY | Facility: CLINIC | Age: 49
End: 2019-05-16

## 2019-05-16 ENCOUNTER — HOSPITAL ENCOUNTER (OUTPATIENT)
Dept: RADIOLOGY | Age: 49
Discharge: HOME | End: 2019-05-16
Payer: COMMERCIAL

## 2019-05-16 DIAGNOSIS — R91.8 LUNG MASS: ICD-10-CM

## 2019-05-16 LAB — GLUCOSE SERPL-MCNC: 118 MG/DL (ref 65–140)

## 2019-05-16 PROCEDURE — 78815 PET IMAGE W/CT SKULL-THIGH: CPT

## 2019-05-16 PROCEDURE — 82948 REAGENT STRIP/BLOOD GLUCOSE: CPT

## 2019-05-16 PROCEDURE — A9552 F18 FDG: HCPCS

## 2019-05-17 LAB
MYCOBACTERIUM SPEC CULT: ABNORMAL
MYCOBACTERIUM SPEC CULT: ABNORMAL
RHODAMINE-AURAMINE STN SPEC: ABNORMAL

## 2019-05-20 ENCOUNTER — TELEPHONE (OUTPATIENT)
Dept: OTHER | Facility: HOSPITAL | Age: 49
End: 2019-05-20

## 2019-05-21 ENCOUNTER — TELEPHONE (OUTPATIENT)
Dept: OTHER | Facility: HOSPITAL | Age: 49
End: 2019-05-21

## 2019-05-22 ENCOUNTER — TELEPHONE (OUTPATIENT)
Dept: PULMONOLOGY | Facility: CLINIC | Age: 49
End: 2019-05-22

## 2019-05-23 DIAGNOSIS — R91.8 LUNG MASS: Primary | ICD-10-CM

## 2019-05-28 ENCOUNTER — TELEPHONE (OUTPATIENT)
Dept: PULMONOLOGY | Facility: CLINIC | Age: 49
End: 2019-05-28

## 2019-05-31 ENCOUNTER — HOSPITAL ENCOUNTER (OUTPATIENT)
Dept: RADIOLOGY | Age: 49
Discharge: HOME | End: 2019-05-31
Attending: INTERNAL MEDICINE
Payer: COMMERCIAL

## 2019-05-31 DIAGNOSIS — R91.8 LUNG MASS: ICD-10-CM

## 2019-05-31 PROCEDURE — 71250 CT THORAX DX C-: CPT

## 2019-06-04 ENCOUNTER — ANESTHESIA EVENT (OUTPATIENT)
Dept: PERIOP | Facility: HOSPITAL | Age: 49
End: 2019-06-04

## 2019-06-04 ENCOUNTER — HOSPITAL ENCOUNTER (OUTPATIENT)
Dept: RADIOLOGY | Facility: HOSPITAL | Age: 49
Discharge: HOME | End: 2019-06-04
Attending: INTERNAL MEDICINE
Payer: COMMERCIAL

## 2019-06-04 ENCOUNTER — HOSPITAL ENCOUNTER (OUTPATIENT)
Dept: RADIOLOGY | Facility: HOSPITAL | Age: 49
Discharge: HOME | End: 2019-06-04

## 2019-06-04 ENCOUNTER — ANESTHESIA (OUTPATIENT)
Dept: PERIOP | Facility: HOSPITAL | Age: 49
End: 2019-06-04

## 2019-06-04 ENCOUNTER — HOSPITAL ENCOUNTER (OUTPATIENT)
Dept: PERIOP | Facility: HOSPITAL | Age: 49
Setting detail: OUTPATIENT SURGERY
Discharge: HOME | End: 2019-06-04
Attending: INTERNAL MEDICINE | Admitting: INTERNAL MEDICINE
Payer: COMMERCIAL

## 2019-06-04 VITALS
SYSTOLIC BLOOD PRESSURE: 105 MMHG | TEMPERATURE: 97 F | DIASTOLIC BLOOD PRESSURE: 69 MMHG | OXYGEN SATURATION: 99 % | WEIGHT: 315 LBS | BODY MASS INDEX: 40.43 KG/M2 | RESPIRATION RATE: 16 BRPM | HEIGHT: 74 IN | HEART RATE: 50 BPM

## 2019-06-04 DIAGNOSIS — R91.8 LUNG MASS: ICD-10-CM

## 2019-06-04 LAB
APTT PPP: 28 SECONDS (ref 26–38)
ERYTHROCYTE [DISTWIDTH] IN BLOOD BY AUTOMATED COUNT: 13.5 % (ref 11.6–15.1)
HCT VFR BLD AUTO: 46.9 % (ref 36.5–49.3)
HGB BLD-MCNC: 15.5 G/DL (ref 12–17)
INR PPP: 1.03 (ref 0.86–1.17)
MCH RBC QN AUTO: 30.8 PG (ref 26.8–34.3)
MCHC RBC AUTO-ENTMCNC: 33 G/DL (ref 31.4–37.4)
MCV RBC AUTO: 93 FL (ref 82–98)
PLATELET # BLD AUTO: 198 THOUSANDS/UL (ref 149–390)
PMV BLD AUTO: 9.7 FL (ref 8.9–12.7)
PROTHROMBIN TIME: 13.6 SECONDS (ref 11.8–14.2)
RBC # BLD AUTO: 5.04 MILLION/UL (ref 3.88–5.62)
WBC # BLD AUTO: 8.44 THOUSAND/UL (ref 4.31–10.16)

## 2019-06-04 PROCEDURE — 31625 BRONCHOSCOPY W/BIOPSY(S): CPT | Performed by: INTERNAL MEDICINE

## 2019-06-04 PROCEDURE — 88305 TISSUE EXAM BY PATHOLOGIST: CPT | Performed by: PATHOLOGY

## 2019-06-04 PROCEDURE — 88342 IMHCHEM/IMCYTCHM 1ST ANTB: CPT | Performed by: PATHOLOGY

## 2019-06-04 PROCEDURE — 71045 X-RAY EXAM CHEST 1 VIEW: CPT

## 2019-06-04 PROCEDURE — 31624 DX BRONCHOSCOPE/LAVAGE: CPT | Performed by: INTERNAL MEDICINE

## 2019-06-04 PROCEDURE — 88172 CYTP DX EVAL FNA 1ST EA SITE: CPT | Performed by: PATHOLOGY

## 2019-06-04 PROCEDURE — 85730 THROMBOPLASTIN TIME PARTIAL: CPT | Performed by: INTERNAL MEDICINE

## 2019-06-04 PROCEDURE — 85027 COMPLETE CBC AUTOMATED: CPT | Performed by: INTERNAL MEDICINE

## 2019-06-04 PROCEDURE — 88112 CYTOPATH CELL ENHANCE TECH: CPT | Performed by: PATHOLOGY

## 2019-06-04 PROCEDURE — 88173 CYTOPATH EVAL FNA REPORT: CPT | Performed by: PATHOLOGY

## 2019-06-04 PROCEDURE — 31652 BRONCH EBUS SAMPLNG 1/2 NODE: CPT | Performed by: INTERNAL MEDICINE

## 2019-06-04 PROCEDURE — 85610 PROTHROMBIN TIME: CPT | Performed by: INTERNAL MEDICINE

## 2019-06-04 PROCEDURE — 88341 IMHCHEM/IMCYTCHM EA ADD ANTB: CPT | Performed by: PATHOLOGY

## 2019-06-04 RX ORDER — GLYCOPYRROLATE 0.2 MG/ML
INJECTION INTRAMUSCULAR; INTRAVENOUS AS NEEDED
Status: DISCONTINUED | OUTPATIENT
Start: 2019-06-04 | End: 2019-06-04 | Stop reason: SURG

## 2019-06-04 RX ORDER — MIDAZOLAM HYDROCHLORIDE 1 MG/ML
INJECTION INTRAMUSCULAR; INTRAVENOUS AS NEEDED
Status: DISCONTINUED | OUTPATIENT
Start: 2019-06-04 | End: 2019-06-04 | Stop reason: SURG

## 2019-06-04 RX ORDER — LIDOCAINE HYDROCHLORIDE 20 MG/ML
INJECTION, SOLUTION EPIDURAL; INFILTRATION; INTRACAUDAL; PERINEURAL CODE/TRAUMA/SEDATION MEDICATION
Status: COMPLETED | OUTPATIENT
Start: 2019-06-04 | End: 2019-06-04

## 2019-06-04 RX ORDER — FENTANYL CITRATE/PF 50 MCG/ML
25 SYRINGE (ML) INJECTION
Status: DISCONTINUED | OUTPATIENT
Start: 2019-06-04 | End: 2019-06-04 | Stop reason: HOSPADM

## 2019-06-04 RX ORDER — ALBUTEROL SULFATE 2.5 MG/3ML
2.5 SOLUTION RESPIRATORY (INHALATION) ONCE
Status: DISCONTINUED | OUTPATIENT
Start: 2019-06-04 | End: 2019-06-04 | Stop reason: HOSPADM

## 2019-06-04 RX ORDER — FENTANYL CITRATE 50 UG/ML
INJECTION, SOLUTION INTRAMUSCULAR; INTRAVENOUS AS NEEDED
Status: DISCONTINUED | OUTPATIENT
Start: 2019-06-04 | End: 2019-06-04 | Stop reason: SURG

## 2019-06-04 RX ORDER — PROPOFOL 10 MG/ML
INJECTION, EMULSION INTRAVENOUS AS NEEDED
Status: DISCONTINUED | OUTPATIENT
Start: 2019-06-04 | End: 2019-06-04 | Stop reason: SURG

## 2019-06-04 RX ORDER — SODIUM CHLORIDE, SODIUM LACTATE, POTASSIUM CHLORIDE, CALCIUM CHLORIDE 600; 310; 30; 20 MG/100ML; MG/100ML; MG/100ML; MG/100ML
INJECTION, SOLUTION INTRAVENOUS CONTINUOUS PRN
Status: DISCONTINUED | OUTPATIENT
Start: 2019-06-04 | End: 2019-06-04 | Stop reason: SURG

## 2019-06-04 RX ORDER — ONDANSETRON 2 MG/ML
INJECTION INTRAMUSCULAR; INTRAVENOUS AS NEEDED
Status: DISCONTINUED | OUTPATIENT
Start: 2019-06-04 | End: 2019-06-04 | Stop reason: SURG

## 2019-06-04 RX ORDER — EPHEDRINE SULFATE 50 MG/ML
INJECTION INTRAVENOUS AS NEEDED
Status: DISCONTINUED | OUTPATIENT
Start: 2019-06-04 | End: 2019-06-04 | Stop reason: SURG

## 2019-06-04 RX ORDER — LIDOCAINE HYDROCHLORIDE 10 MG/ML
INJECTION, SOLUTION INFILTRATION; PERINEURAL AS NEEDED
Status: DISCONTINUED | OUTPATIENT
Start: 2019-06-04 | End: 2019-06-04 | Stop reason: SURG

## 2019-06-04 RX ORDER — SUCCINYLCHOLINE/SOD CL,ISO/PF 100 MG/5ML
SYRINGE (ML) INTRAVENOUS AS NEEDED
Status: DISCONTINUED | OUTPATIENT
Start: 2019-06-04 | End: 2019-06-04 | Stop reason: SURG

## 2019-06-04 RX ORDER — ONDANSETRON 2 MG/ML
4 INJECTION INTRAMUSCULAR; INTRAVENOUS ONCE
Status: DISCONTINUED | OUTPATIENT
Start: 2019-06-04 | End: 2019-06-04 | Stop reason: HOSPADM

## 2019-06-04 RX ORDER — PROPOFOL 10 MG/ML
INJECTION, EMULSION INTRAVENOUS CONTINUOUS PRN
Status: DISCONTINUED | OUTPATIENT
Start: 2019-06-04 | End: 2019-06-04 | Stop reason: SURG

## 2019-06-04 RX ORDER — ROCURONIUM BROMIDE 10 MG/ML
INJECTION, SOLUTION INTRAVENOUS AS NEEDED
Status: DISCONTINUED | OUTPATIENT
Start: 2019-06-04 | End: 2019-06-04 | Stop reason: SURG

## 2019-06-04 RX ORDER — DEXAMETHASONE SODIUM PHOSPHATE 10 MG/ML
INJECTION, SOLUTION INTRAMUSCULAR; INTRAVENOUS AS NEEDED
Status: DISCONTINUED | OUTPATIENT
Start: 2019-06-04 | End: 2019-06-04 | Stop reason: SURG

## 2019-06-04 RX ORDER — NEOSTIGMINE METHYLSULFATE 1 MG/ML
INJECTION INTRAVENOUS AS NEEDED
Status: DISCONTINUED | OUTPATIENT
Start: 2019-06-04 | End: 2019-06-04 | Stop reason: SURG

## 2019-06-04 RX ADMIN — EPHEDRINE SULFATE 10 MG: 50 INJECTION, SOLUTION INTRAVENOUS at 10:15

## 2019-06-04 RX ADMIN — FENTANYL CITRATE 50 MCG: 50 INJECTION, SOLUTION INTRAMUSCULAR; INTRAVENOUS at 10:01

## 2019-06-04 RX ADMIN — LIDOCAINE HYDROCHLORIDE 20 ML: 20 INJECTION, SOLUTION EPIDURAL; INFILTRATION; INTRACAUDAL; PERINEURAL at 09:29

## 2019-06-04 RX ADMIN — FENTANYL CITRATE 50 MCG: 50 INJECTION, SOLUTION INTRAMUSCULAR; INTRAVENOUS at 09:45

## 2019-06-04 RX ADMIN — SODIUM CHLORIDE, SODIUM LACTATE, POTASSIUM CHLORIDE, AND CALCIUM CHLORIDE: .6; .31; .03; .02 INJECTION, SOLUTION INTRAVENOUS at 07:45

## 2019-06-04 RX ADMIN — GLYCOPYRROLATE 0.4 MG: 0.2 INJECTION, SOLUTION INTRAMUSCULAR; INTRAVENOUS at 11:15

## 2019-06-04 RX ADMIN — PHENYLEPHRINE HYDROCHLORIDE 100 MCG: 10 INJECTION INTRAVENOUS at 10:45

## 2019-06-04 RX ADMIN — ROCURONIUM BROMIDE 20 MG: 10 INJECTION, SOLUTION INTRAVENOUS at 10:53

## 2019-06-04 RX ADMIN — LIDOCAINE HYDROCHLORIDE 100 MG: 10 INJECTION, SOLUTION INFILTRATION; PERINEURAL at 09:24

## 2019-06-04 RX ADMIN — FENTANYL CITRATE 50 MCG: 50 INJECTION, SOLUTION INTRAMUSCULAR; INTRAVENOUS at 10:36

## 2019-06-04 RX ADMIN — PHENYLEPHRINE HYDROCHLORIDE 100 MCG: 10 INJECTION INTRAVENOUS at 10:04

## 2019-06-04 RX ADMIN — ROCURONIUM BROMIDE 20 MG: 10 INJECTION, SOLUTION INTRAVENOUS at 10:44

## 2019-06-04 RX ADMIN — NEOSTIGMINE METHYLSULFATE 4 MG: 1 INJECTION, SOLUTION INTRAVENOUS at 11:15

## 2019-06-04 RX ADMIN — PHENYLEPHRINE HYDROCHLORIDE 100 MCG: 10 INJECTION INTRAVENOUS at 10:40

## 2019-06-04 RX ADMIN — FENTANYL CITRATE 50 MCG: 50 INJECTION, SOLUTION INTRAMUSCULAR; INTRAVENOUS at 09:31

## 2019-06-04 RX ADMIN — SUGAMMADEX 320 MG: 100 INJECTION, SOLUTION INTRAVENOUS at 11:30

## 2019-06-04 RX ADMIN — GLYCOPYRROLATE 0.1 MG: 0.2 INJECTION, SOLUTION INTRAMUSCULAR; INTRAVENOUS at 08:11

## 2019-06-04 RX ADMIN — PROPOFOL 400 MG: 10 INJECTION, EMULSION INTRAVENOUS at 09:24

## 2019-06-04 RX ADMIN — PHENYLEPHRINE HYDROCHLORIDE 100 MCG: 10 INJECTION INTRAVENOUS at 10:10

## 2019-06-04 RX ADMIN — DEXAMETHASONE SODIUM PHOSPHATE 15 MG: 10 INJECTION, SOLUTION INTRAMUSCULAR; INTRAVENOUS at 10:18

## 2019-06-04 RX ADMIN — ONDANSETRON 4 MG: 2 INJECTION INTRAMUSCULAR; INTRAVENOUS at 11:15

## 2019-06-04 RX ADMIN — PROPOFOL 140 MCG/KG/MIN: 10 INJECTION, EMULSION INTRAVENOUS at 09:24

## 2019-06-04 RX ADMIN — Medication 160 MG: at 10:36

## 2019-06-04 RX ADMIN — PROPOFOL 100 MG: 10 INJECTION, EMULSION INTRAVENOUS at 10:36

## 2019-06-04 RX ADMIN — MIDAZOLAM 2 MG: 1 INJECTION INTRAMUSCULAR; INTRAVENOUS at 08:11

## 2019-06-10 ENCOUNTER — TELEPHONE (OUTPATIENT)
Dept: PULMONOLOGY | Facility: CLINIC | Age: 49
End: 2019-06-10

## 2019-06-10 ENCOUNTER — TRANSCRIBE ORDERS (OUTPATIENT)
Dept: ADMISSIONS | Facility: HOSPITAL | Age: 49
End: 2019-06-10

## 2019-06-10 DIAGNOSIS — R91.8 LUNG MASS: Primary | ICD-10-CM

## 2019-06-11 LAB
MYCOBACTERIUM SPEC CULT: NORMAL
RHODAMINE-AURAMINE STN SPEC: NORMAL

## 2019-07-02 LAB — MYCOBACTERIUM SPEC CULT: NORMAL

## 2019-08-12 ENCOUNTER — HOSPITAL ENCOUNTER (OUTPATIENT)
Dept: CT IMAGING | Facility: HOSPITAL | Age: 49
Discharge: HOME | End: 2019-08-12
Attending: INTERNAL MEDICINE
Payer: COMMERCIAL

## 2019-08-12 DIAGNOSIS — R91.8 LUNG MASS: ICD-10-CM

## 2019-08-12 PROCEDURE — 71250 CT THORAX DX C-: CPT

## 2019-08-15 ENCOUNTER — TELEPHONE (OUTPATIENT)
Dept: PULMONOLOGY | Facility: CLINIC | Age: 49
End: 2019-08-15

## 2019-08-30 ENCOUNTER — OFFICE VISIT (OUTPATIENT)
Dept: INTERNAL MEDICINE CLINIC | Facility: CLINIC | Age: 49
End: 2019-08-30
Payer: COMMERCIAL

## 2019-08-30 VITALS
HEIGHT: 74 IN | DIASTOLIC BLOOD PRESSURE: 70 MMHG | RESPIRATION RATE: 18 BRPM | WEIGHT: 315 LBS | OXYGEN SATURATION: 98 % | TEMPERATURE: 98.1 F | HEART RATE: 70 BPM | BODY MASS INDEX: 40.43 KG/M2 | SYSTOLIC BLOOD PRESSURE: 128 MMHG

## 2019-08-30 DIAGNOSIS — Z00.00 HEALTH MAINTENANCE EXAMINATION: Primary | ICD-10-CM

## 2019-08-30 DIAGNOSIS — Z00.00 LABORATORY EXAMINATION ORDERED AS PART OF A ROUTINE GENERAL MEDICAL EXAMINATION: ICD-10-CM

## 2019-08-30 DIAGNOSIS — E66.01 CLASS 3 SEVERE OBESITY DUE TO EXCESS CALORIES WITHOUT SERIOUS COMORBIDITY WITH BODY MASS INDEX (BMI) OF 40.0 TO 44.9 IN ADULT (HCC): ICD-10-CM

## 2019-08-30 DIAGNOSIS — Z13.220 SCREENING, LIPID: ICD-10-CM

## 2019-08-30 DIAGNOSIS — R91.8 LUNG MASS: ICD-10-CM

## 2019-08-30 PROBLEM — E66.813 CLASS 3 SEVERE OBESITY DUE TO EXCESS CALORIES WITHOUT SERIOUS COMORBIDITY IN ADULT (HCC): Status: ACTIVE | Noted: 2019-08-30

## 2019-08-30 PROBLEM — K21.9 GERD (GASTROESOPHAGEAL REFLUX DISEASE): Status: ACTIVE | Noted: 2019-08-30

## 2019-08-30 PROCEDURE — 99396 PREV VISIT EST AGE 40-64: CPT | Performed by: INTERNAL MEDICINE

## 2019-08-30 NOTE — ASSESSMENT & PLAN NOTE
Follow up with Pulmo next month  Briefly reviewed chest CT scan, lung mass has decreased in size  Tissue biopsy was negative for malignancy

## 2019-08-30 NOTE — PATIENT INSTRUCTIONS
Plan for 1600 to 1800 calories per day  Calorie Counting Diet   WHAT YOU NEED TO KNOW:   What is a calorie counting diet? It is a meal plan based on counting calories each day to reach a healthy body weight  You will need to eat fewer calories if you are trying to lose weight  Weight loss may decrease your risk for certain health problems or improve your health if you have health problems  Some of these health problems include heart disease, high blood pressure, and diabetes  What foods should I avoid? Your dietitian will tell you if you need to avoid certain foods based on your body weight and health condition  You may need to avoid high-fat foods if you are at risk for or have heart disease  You may need to eat fewer foods from the breads and starches food group if you have diabetes  How many calories are in foods? The following is a list of foods and drinks with the approximate number of calories in each  Check the food label to find the exact number of calories  A dietitian can tell you how many calories you should have from each food group each day    · Carbohydrate:      ¨ ½ of a 3-inch bagel, 1 slice of bread, or ½ of a hamburger bun or hot dog bun (80)    ¨ 1 (8-inch) flour tortilla or ½ cup of cooked rice (100)    ¨ 1 (6-inch) corn tortilla (80)    ¨ 1 (6-inch) pancake or 1 cup of bran flakes cereal (110)    ¨ ½ cup of cooked cereal (80)    ¨ ½ cup of cooked pasta (85)    ¨ 1 ounce of pretzels (100)    ¨ 3 cups of air-popped popcorn without butter or oil (80)    · Dairy:      ¨ 1 cup of skim or 1% milk (90)    ¨ 1 cup of 2% milk (120)    ¨ 1 cup of whole milk (160)    ¨ 1 cup of 2% chocolate milk (220)    ¨ 1 ounce of low-fat cheese with 3 grams of fat per ounce (70)    ¨ 1 ounce of cheddar cheese (114)    ¨ ½ cup of 1% fat cottage cheese (80)    ¨ 1 cup of plain or sugar-free, fat-free yogurt (90)    · Protein foods:      ¨ 3 ounces of fish (not breaded or fried) (95)    ¨ 3 ounces of breaded, fried fish (195)    ¨ ¾ cup of tuna canned in water (105)    ¨ 3 ounces of chicken breast without skin (105)    ¨ 1 fried chicken breast with skin (350)    ¨ ¼ cup of fat free egg substitute (40)    ¨ 1 large egg (75)    ¨ 3 ounces of lean beef or pork (165)    ¨ 3 ounces of fried pork chop or ham (185)    ¨ ½ cup of cooked dried beans, such as kidney, magana, lentils, or navy (115)    ¨ 3 ounces of bologna or lunch meat (225)    ¨ 2 links of breakfast sausage (140)    · Vegetables:      ¨ ½ cup of sliced mushrooms (10)    ¨ 1 cup of salad greens, such as lettuce, spinach, or ravinder (15)    ¨ ½ cup of steamed asparagus (20)    ¨ ½ cup of cooked summer squash, zucchini squash, or green or wax beans (25)    ¨ 1 cup of broccoli or cauliflower florets, or 1 medium tomato (25)    ¨ 1 large raw carrot or ½ cup of cooked carrots (40)    ¨ ? of a medium cucumber or 1 stalk of celery (5)    ¨ 1 small baked potato (160)    ¨ 1 cup of breaded, fried vegetables (230)    · Fruit:      ¨ 1 (6-inch) banana (55)     ¨ ½ of a 4-inch grapefruit (55)    ¨ 15 grapes (60)    ¨ 1 medium orange or apple (70)    ¨ 1 large peach (65)    ¨ 1 cup of fresh pineapple chunks (75)    ¨ 1 cup of melon cubes (50)    ¨ 1¼ cups of whole strawberries (45)    ¨ ½ cup of fruit canned in juice (55)    ¨ ½ cup of fruit canned in heavy syrup (110)    ¨ ?  cup of raisins (130)    ¨ ½ cup of unsweetened fruit juice (60)    ¨ ½ cup of grape, cranberry, or prune juice (90)    · Fat:      ¨ 10 peanuts or 2 teaspoons of peanut butter (55)    ¨ 2 tablespoons of avocado or 1 tablespoon of regular salad dressing (45)    ¨ 2 slices of shah (90)    ¨ 1 teaspoon of oil, such as safflower, canola, corn, or olive oil (45)    ¨ 2 teaspoons of low-fat margarine, or 1 tablespoon of low-fat mayonnaise (50)    ¨ 1 teaspoon of regular margarine (40)    ¨ 1 tablespoon of regular mayonnaise (135)    ¨ 1 tablespoon of cream cheese or 2 tablespoons of low-fat cream cheese (45)    ¨ 2 tablespoons of vegetable shortening (215)    · Dessert and sweets:      ¨ 8 animal crackers or 5 vanilla wafers (80)    ¨ 1 frozen fruit juice bar (80)    ¨ ½ cup of ice milk or low-fat frozen yogurt (90)    ¨ ½ cup of sherbet or sorbet (125)    ¨ ½ cup of sugar-free pudding or custard (60)    ¨ ½ cup of ice cream (140)    ¨ ½ cup of pudding or custard (175)    ¨ 1 (2-inch) square chocolate brownie (185)    · Combination foods:      ¨ Bean burrito made with an 8-inch tortilla, without cheese (275)    ¨ Chicken breast sandwich with lettuce and tomato (325)    ¨ 1 cup of chicken noodle soup (60)    ¨ 1 beef taco (175)    ¨ Regular hamburger with lettuce and tomato (310)    ¨ Regular cheeseburger with lettuce and tomato (410)     ¨ ¼ of a 12-inch cheese pizza (280)    ¨ Fried fish sandwich with lettuce and tomato (425)    ¨ Hot dog and bun (275)    ¨ 1½ cups of macaroni and cheese (310)    ¨ Taco salad with a fried tortilla shell (870)    · Low-calorie foods:      ¨ 1 tablespoon of ketchup or 1 tablespoon of fat free sour cream (15)    ¨ 1 teaspoon of mustard (5)    ¨ ¼ cup of salsa (20)    ¨ 1 large dill pickle (15)    ¨ 1 tablespoon of fat free salad dressing (10)    ¨ 2 teaspoons of low-sugar, light jam or jelly, or 1 tablespoon of sugar-free syrup (15)    ¨ 1 sugar-free popsicle (15)    ¨ 1 cup of club soda, seltzer water, or diet soda (0)  CARE AGREEMENT:   You have the right to help plan your care  Discuss treatment options with your caregivers to decide what care you want to receive  You always have the right to refuse treatment  The above information is an  only  It is not intended as medical advice for individual conditions or treatments  Talk to your doctor, nurse or pharmacist before following any medical regimen to see if it is safe and effective for you    © 2017 [employer] Information is for End User's use only and may not be sold, redistributed or otherwise used for commercial purposes  All illustrations and images included in CareNotes® are the copyrighted property of A D A M , Inc  or Alejandro Clark

## 2019-08-30 NOTE — PROGRESS NOTES
Assessment/Plan:    Lung mass  Follow up with Pulmo next month  Briefly reviewed chest CT scan, lung mass has decreased in size  Tissue biopsy was negative for malignancy  Tobacco abuse  Congratulated with complete smoking cessation, took Chantix for a week only  Class 3 severe obesity due to excess calories without serious comorbidity in adult ([facility])  Gained 16 lbs since 3 months ago, attributes this to smoking cessation  Discussed diet, portion control  Plan for 1600 to 1800 calorie diet  He has started going to the gym  GERD (gastroesophageal reflux disease)  Symptoms recur with certain foods only  Instructed to take ranitidine prn, previously on Nexium  Diagnoses and all orders for this visit:    Health maintenance examination  Comments:  Eye exam updated, will do colonoscopy in the future  Due for routine labs  Orders:  -     Multiple Vitamins-Minerals (MULTIVITAMIN MEN 50+) TABS; Take 1 tablet by mouth daily    Lung mass    Class 3 severe obesity due to excess calories without serious comorbidity with body mass index (BMI) of 40 0 to 44 9 in adult [facility])    Screening, lipid  -     Lipid panel    Laboratory examination ordered as part of a routine general medical examination  -     Comprehensive metabolic panel  -     Lipid panel  -     TSH, 3rd generation with Free T4 reflex      Follow up in 4 months or as needed  Subjective:      Patient ID: Fidel Reece is a 44 y o  male  Mr  Michael Stallings is feeling well  He quit smoking after taking Chantix for a week  He reports he in his wife quit smoking together but has gained weight since  They started going to the gym, has a competition to lose weight the most   He only eats 2 meals a day usually  His heaviest meal is in the evening before he goes to work  He has an appointment with pulmonology to discuss plan for the lung mass found a few months ago  She denies any shortness of breath, wheezing, chest pain or other symptoms      She reports experiencing reflux symptoms occasionally, usually if he eats tomatoes  He used to take Nexium daily  He started taking vitamin D and a multivitamin recently, takes vitamin-C every winter  He reports his work as change, now works 12 hours regularly  The following portions of the patient's history were reviewed and updated as appropriate: allergies, current medications, past medical history, past social history and problem list     Review of Systems   Constitutional: Negative for activity change, appetite change and fatigue  HENT: Negative for congestion  Eyes: Negative  Negative for visual disturbance  Respiratory: Negative for cough, chest tightness and shortness of breath  Cardiovascular: Negative for chest pain, palpitations and leg swelling  Gastrointestinal: Negative for abdominal pain and constipation  Genitourinary: Negative for dysuria and frequency  Musculoskeletal: Negative for arthralgias  Skin: Negative for rash and wound  Neurological: Negative for dizziness and headaches  Psychiatric/Behavioral: Negative for sleep disturbance  The patient is not nervous/anxious  Objective:      /70   Pulse 70   Temp 98 1 °F (36 7 °C)   Resp 18   Ht 6' 2" (1 88 m)   Wt (!) 166 kg (366 lb 9 6 oz)   SpO2 98%   BMI 47 07 kg/m²          Physical Exam   Constitutional: He is oriented to person, place, and time  He appears well-developed and well-nourished  HENT:   Head: Normocephalic and atraumatic  Mouth/Throat: Mucous membranes are normal    Eyes: Pupils are equal, round, and reactive to light  Conjunctivae are normal    Neck: Neck supple  Cardiovascular: Normal rate, regular rhythm and normal heart sounds  Pulmonary/Chest: Effort normal  He has no wheezes  He has no rhonchi  Abdominal: Soft  Bowel sounds are normal    Musculoskeletal: He exhibits no edema  Neurological: He is alert and oriented to person, place, and time  Skin: Skin is warm   No rash noted  Psychiatric: He has a normal mood and affect  His behavior is normal    Nursing note and vitals reviewed  Lab &/or imaging results reviewed with patient  BMI Counseling: Body mass index is 47 07 kg/m²  Discussed the patient's BMI with him  The BMI is above average  BMI counseling and education was provided to the patient  Nutrition recommendations include reducing portion sizes, decreasing overall calorie intake, 3-5 servings of fruits/vegetables daily and increasing intake of lean protein  Exercise recommendations include moderate aerobic physical activity for 150 minutes/week

## 2019-08-30 NOTE — ASSESSMENT & PLAN NOTE
Gained 16 lbs since 3 months ago, attributes this to smoking cessation  Discussed diet, portion control  Plan for 1600 to 1800 calorie diet  He has started going to the gym

## 2019-09-17 ENCOUNTER — OFFICE VISIT (OUTPATIENT)
Dept: PULMONOLOGY | Facility: CLINIC | Age: 49
End: 2019-09-17
Payer: COMMERCIAL

## 2019-09-17 VITALS
HEART RATE: 75 BPM | TEMPERATURE: 97.8 F | RESPIRATION RATE: 18 BRPM | OXYGEN SATURATION: 95 % | WEIGHT: 315 LBS | HEIGHT: 74 IN | SYSTOLIC BLOOD PRESSURE: 136 MMHG | BODY MASS INDEX: 40.43 KG/M2 | DIASTOLIC BLOOD PRESSURE: 84 MMHG

## 2019-09-17 DIAGNOSIS — R91.8 LUNG MASS: Primary | ICD-10-CM

## 2019-09-17 DIAGNOSIS — R05.9 COUGH: ICD-10-CM

## 2019-09-17 PROBLEM — Z72.0 TOBACCO ABUSE: Status: RESOLVED | Noted: 2019-04-23 | Resolved: 2019-09-17

## 2019-09-17 PROBLEM — J43.8 OTHER EMPHYSEMA (HCC): Status: ACTIVE | Noted: 2019-09-17

## 2019-09-17 PROCEDURE — 99214 OFFICE O/P EST MOD 30 MIN: CPT | Performed by: INTERNAL MEDICINE

## 2019-09-17 NOTE — ASSESSMENT & PLAN NOTE
The right upper lobe lung mass continues to show improvement  I suspect this is resolving area of infection in pre-existing cavity  Given his prior longstanding smoking history, we will continue to monitor closely  Repeat chest CT in December

## 2019-09-17 NOTE — PROGRESS NOTES
Pulmonary Follow Up Note   Lolis Edwards 44 y o  male MRN: [id number]  9/17/2019      Assessment/Plan:     Lung mass    The right upper lobe lung mass continues to show improvement  I suspect this is resolving area of infection in pre-existing cavity  Given his prior longstanding smoking history, we will continue to monitor closely  Repeat chest CT in December  Other emphysema ([facility] [address] )   Patient has a 100+  Pack year smoking history  There are emphysematous changes on CT  He has a chronic, daily cough and would likely benefit from daily inhaler use  I would like him to have PFTs and will discuss whether he would be open to using inhalers  For now, he declines  He also declines vaccinations  Visit orders:    Diagnoses and all orders for this visit:    Lung mass  -     CT chest without contrast; Future    Cough  -     Complete pulmonary function test; Future        No follow-ups on file  History of Present Illness   HPI:  Lolis Edwards is a 44 y o  male who  Is here today for follow-up regarding abnormal chest CT findings  He remains abstinent from cigarettes  After he quit smoking 5 months ago, he had significant congestion which ultimately cleared up  Over the past few weeks, he is again coughing up brownish gray sputum  He denies hemoptysis  He denies fevers or chills  He denies shortness of breath  He denies wheezing  He is not using any inhalers  He never had PFTs done as ordered  Review of Systems   Constitutional: Negative for chills, fever and unexpected weight change  HENT: Negative for postnasal drip and sore throat  Eyes: Negative for visual disturbance  Respiratory:        As noted in HPI   Cardiovascular: Negative for chest pain  Gastrointestinal: Negative for abdominal pain, diarrhea and vomiting  Genitourinary: Negative for difficulty urinating  Skin: Negative for rash  Neurological: Negative for headaches  Hematological: Negative for adenopathy  Psychiatric/Behavioral: Negative  All other systems reviewed and are negative  Historical Information   Past Medical History:   Diagnosis Date    GERD (gastroesophageal reflux disease)     Lung mass     Obesity      No past surgical history on file  Family History   Problem Relation Age of Onset    Tuberculosis Father     Atrial fibrillation Father 72    Lung cancer Paternal Uncle     Alcohol abuse Neg Hx     Substance Abuse Neg Hx     Mental illness Neg Hx     Depression Neg Hx        Social History     Tobacco Use   Smoking Status Former Smoker    Packs/day: 3 00    Years: 40 00    Pack years: 120 00    Types: Cigarettes    Start date:     Last attempt to quit: 2019    Years since quittin 3   Smokeless Tobacco Never Used         Meds/Allergies     Current Outpatient Medications:     Multiple Vitamins-Minerals (MULTIVITAMIN MEN 50+) TABS, Take 1 tablet by mouth daily, Disp: 90 tablet, Rfl: 0    albuterol (2 5 mg/3 mL) 0 083 % nebulizer solution, INHALE CONTENTS OF 1 VIAL EVERY 6 HOURS AS NEEDED FOR COUGH, WHEEZING, OR SHORTNESS OF BREATH, Disp: , Rfl: 0    albuterol (PROVENTIL HFA,VENTOLIN HFA) 90 mcg/act inhaler, INHALE 2 PUFFS EVERY 4-6 HOURS AS NEEDED FOR COUGH, SHORTNESS OF BREATH,OR WHEEZING, Disp: , Rfl: 0  No Known Allergies    Vitals: Blood pressure 136/84, pulse 75, temperature 97 8 °F (36 6 °C), temperature source Tympanic, resp  rate 18, height 6' 2" (1 88 m), weight (!) 166 kg (366 lb), SpO2 95 %  Body mass index is 46 99 kg/m²  Oxygen Therapy  SpO2: 95 %      Physical Exam   Constitutional: He is oriented to person, place, and time  No distress  HENT:   Head: Normocephalic  Mouth/Throat: No oropharyngeal exudate  Eyes: Pupils are equal, round, and reactive to light  No scleral icterus  Neck: Neck supple  No JVD present  Cardiovascular: Normal rate and regular rhythm  Pulmonary/Chest: He has no wheezes  He has no rales  Abdominal: Soft   There is no tenderness  Musculoskeletal: He exhibits no edema  Lymphadenopathy:     He has no cervical adenopathy  Neurological: He is alert and oriented to person, place, and time  Skin: Skin is warm and dry  Psychiatric: He has a normal mood and affect  Labs: I have personally reviewed pertinent lab results  Lab Results   Component Value Date    WBC 8 44 06/04/2019    HGB 15 5 06/04/2019    HCT 46 9 06/04/2019    MCV 93 06/04/2019     06/04/2019     Lab Results   Component Value Date    GLUCOSE 163 (H) 04/22/2019    CALCIUM 8 7 04/25/2019    K 3 8 04/25/2019    CO2 22 04/25/2019     04/25/2019    BUN 11 04/25/2019    CREATININE 1 04 04/25/2019     No results found for: IGE  Lab Results   Component Value Date    ALT 41 04/22/2019    AST 26 04/22/2019    ALKPHOS 92 04/22/2019       Imaging and other studies: I have personally reviewed pertinent reports  and I have personally reviewed pertinent films in PACS   Chest CT from 8/12/19 shows right upper lobe opacity showing interval decrease in size  Currently measures 1 8 x 1 8 cm  There are mild emphysematous changes noted in the lung fields    =

## 2019-09-17 NOTE — ASSESSMENT & PLAN NOTE
Patient has a 100+  Pack year smoking history  There are emphysematous changes on CT  He has a chronic, daily cough and would likely benefit from daily inhaler use  I would like him to have PFTs and will discuss whether he would be open to using inhalers  For now, he declines  He also declines vaccinations

## 2019-09-24 ENCOUNTER — HOSPITAL ENCOUNTER (OUTPATIENT)
Dept: PULMONOLOGY | Facility: HOSPITAL | Age: 49
Discharge: HOME | End: 2019-09-24
Attending: INTERNAL MEDICINE
Payer: COMMERCIAL

## 2019-09-24 DIAGNOSIS — R05.9 COUGH: ICD-10-CM

## 2019-09-24 PROCEDURE — 94726 PLETHYSMOGRAPHY LUNG VOLUMES: CPT | Performed by: INTERNAL MEDICINE

## 2019-09-24 PROCEDURE — 94060 EVALUATION OF WHEEZING: CPT

## 2019-09-24 PROCEDURE — 94729 DIFFUSING CAPACITY: CPT | Performed by: INTERNAL MEDICINE

## 2019-09-24 PROCEDURE — 94060 EVALUATION OF WHEEZING: CPT | Performed by: INTERNAL MEDICINE

## 2019-09-24 PROCEDURE — 94726 PLETHYSMOGRAPHY LUNG VOLUMES: CPT

## 2019-09-24 PROCEDURE — 94729 DIFFUSING CAPACITY: CPT

## 2019-09-24 PROCEDURE — 94760 N-INVAS EAR/PLS OXIMETRY 1: CPT

## 2019-09-24 RX ORDER — ALBUTEROL SULFATE 2.5 MG/3ML
2.5 SOLUTION RESPIRATORY (INHALATION) ONCE
Status: COMPLETED | OUTPATIENT
Start: 2019-09-24 | End: 2019-09-24

## 2019-09-24 RX ADMIN — ALBUTEROL SULFATE 2.5 MG: 2.5 SOLUTION RESPIRATORY (INHALATION) at 08:44

## 2019-11-11 ENCOUNTER — TELEPHONE (OUTPATIENT)
Dept: PULMONOLOGY | Facility: CLINIC | Age: 49
End: 2019-11-11

## 2019-11-11 NOTE — TELEPHONE ENCOUNTER
Spoke with patient and reviewed PFT results with him  Spirometry was normal as well as lung volumes and DLCO  He expressed understanding  He will follow-up as scheduled in January with a CT scan prior  All his questions were answered  He was instructed to call the office with any questions he may have or if he develops any changes in his breathing

## 2019-12-17 ENCOUNTER — HOSPITAL ENCOUNTER (OUTPATIENT)
Dept: CT IMAGING | Facility: HOSPITAL | Age: 49
Discharge: HOME | End: 2019-12-17
Attending: INTERNAL MEDICINE
Payer: COMMERCIAL

## 2019-12-17 DIAGNOSIS — R91.8 LUNG MASS: ICD-10-CM

## 2019-12-17 PROCEDURE — 71250 CT THORAX DX C-: CPT

## 2020-01-06 ENCOUNTER — OFFICE VISIT (OUTPATIENT)
Dept: INTERNAL MEDICINE CLINIC | Facility: CLINIC | Age: 50
End: 2020-01-06
Payer: COMMERCIAL

## 2020-01-06 ENCOUNTER — APPOINTMENT (OUTPATIENT)
Dept: LAB | Facility: CLINIC | Age: 50
End: 2020-01-06
Payer: COMMERCIAL

## 2020-01-06 VITALS
TEMPERATURE: 97.6 F | HEIGHT: 74 IN | DIASTOLIC BLOOD PRESSURE: 84 MMHG | WEIGHT: 315 LBS | RESPIRATION RATE: 18 BRPM | BODY MASS INDEX: 40.43 KG/M2 | HEART RATE: 109 BPM | SYSTOLIC BLOOD PRESSURE: 130 MMHG | OXYGEN SATURATION: 100 %

## 2020-01-06 DIAGNOSIS — Z71.9 HEALTH COUNSELING: ICD-10-CM

## 2020-01-06 DIAGNOSIS — J43.8 OTHER EMPHYSEMA (HCC): ICD-10-CM

## 2020-01-06 DIAGNOSIS — Z12.11 SCREENING FOR COLON CANCER: ICD-10-CM

## 2020-01-06 DIAGNOSIS — E66.01 CLASS 3 SEVERE OBESITY DUE TO EXCESS CALORIES WITHOUT SERIOUS COMORBIDITY WITH BODY MASS INDEX (BMI) OF 45.0 TO 49.9 IN ADULT (HCC): ICD-10-CM

## 2020-01-06 DIAGNOSIS — R91.8 LUNG MASS: Primary | ICD-10-CM

## 2020-01-06 PROBLEM — R73.01 ABNORMAL FASTING GLUCOSE: Status: ACTIVE | Noted: 2020-01-06

## 2020-01-06 PROBLEM — R55 SYNCOPE: Status: RESOLVED | Noted: 2019-04-23 | Resolved: 2020-01-06

## 2020-01-06 LAB
ALBUMIN SERPL BCP-MCNC: 3.5 G/DL (ref 3.5–5)
ALP SERPL-CCNC: 106 U/L (ref 46–116)
ALT SERPL W P-5'-P-CCNC: 55 U/L (ref 12–78)
ANION GAP SERPL CALCULATED.3IONS-SCNC: 9 MMOL/L (ref 4–13)
AST SERPL W P-5'-P-CCNC: 27 U/L (ref 5–45)
BILIRUB SERPL-MCNC: 0.53 MG/DL (ref 0.2–1)
BUN SERPL-MCNC: 15 MG/DL (ref 5–25)
CALCIUM SERPL-MCNC: 8.6 MG/DL (ref 8.3–10.1)
CHLORIDE SERPL-SCNC: 106 MMOL/L (ref 100–108)
CHOLEST SERPL-MCNC: 196 MG/DL (ref 50–200)
CO2 SERPL-SCNC: 25 MMOL/L (ref 21–32)
CREAT SERPL-MCNC: 0.83 MG/DL (ref 0.6–1.3)
GFR SERPL CREATININE-BSD FRML MDRD: 102 ML/MIN/1.73SQ M
GLUCOSE P FAST SERPL-MCNC: 108 MG/DL (ref 65–99)
HDLC SERPL-MCNC: 31 MG/DL
LDLC SERPL CALC-MCNC: 133 MG/DL (ref 0–100)
NONHDLC SERPL-MCNC: 165 MG/DL
POTASSIUM SERPL-SCNC: 4 MMOL/L (ref 3.5–5.3)
PROT SERPL-MCNC: 7.2 G/DL (ref 6.4–8.2)
SODIUM SERPL-SCNC: 140 MMOL/L (ref 136–145)
TRIGL SERPL-MCNC: 160 MG/DL
TSH SERPL DL<=0.05 MIU/L-ACNC: 2.52 UIU/ML (ref 0.36–3.74)

## 2020-01-06 PROCEDURE — 84443 ASSAY THYROID STIM HORMONE: CPT | Performed by: INTERNAL MEDICINE

## 2020-01-06 PROCEDURE — 80053 COMPREHEN METABOLIC PANEL: CPT | Performed by: INTERNAL MEDICINE

## 2020-01-06 PROCEDURE — 99214 OFFICE O/P EST MOD 30 MIN: CPT | Performed by: INTERNAL MEDICINE

## 2020-01-06 PROCEDURE — 80061 LIPID PANEL: CPT | Performed by: INTERNAL MEDICINE

## 2020-01-06 PROCEDURE — 1036F TOBACCO NON-USER: CPT | Performed by: INTERNAL MEDICINE

## 2020-01-06 PROCEDURE — 36415 COLL VENOUS BLD VENIPUNCTURE: CPT | Performed by: INTERNAL MEDICINE

## 2020-01-06 PROCEDURE — 3008F BODY MASS INDEX DOCD: CPT | Performed by: INTERNAL MEDICINE

## 2020-01-06 NOTE — PROGRESS NOTES
Assessment/Plan:    Lung mass  Lung mass continues to decrease in size  Follow up with Pulmo as scheduled  Class 3 severe obesity due to excess calories without serious comorbidity in adult ([facility])  Gained 20 lbs since last visit  He reports losing 40 lbs but gained 60 lbs during the holiday season  Resume regular aerobic exercise, he will start going to the gym again  He reports Cayetano Abraham has worked for him before, not interested in seeing a dietician at this time  Other emphysema ([facility])  No symptoms, not using any inhaler  GERD (gastroesophageal reflux disease)  Minimal symptoms  Diagnoses and all orders for this visit:    Lung mass    Other emphysema ([address] )    Class 3 severe obesity due to excess calories without serious comorbidity with body mass index (BMI) of 45 0 to 49 9 in adult [facility])    Screening for colon cancer  -     Occult Blood, Fecal Immunochemical; Future    Health counseling  Comments:  Declined vaccinations  Agrees to do stool test       Follow up in 3 months or as needed  Subjective:      Patient ID: Marcelino Chen is a 44 y o  male  Mr Venice Coyle feels well  He reports that he lost 40 lb before the holidays  He then stopped going to the gym and had frequent Trevon party is at work  He has gained 60 lb since 4 months ago  He is motivated to lose weight  He will start going to the gym again, will start [employer] which has worked for him in the past   He admits that portion control is the main problem  He denies any chest pain, shortness of breath, cough or wheezing  He reports that he had a bad cold of during the holidays, went to an urgent care center and was given antibiotics  Symptoms have since completely resolved  He sleeps well, does not think he snores  He feels well rested when he wakes up in the morning  He normally works night shift      The following portions of the patient's history were reviewed and updated as appropriate: allergies, current medications, past medical history, past social history and problem list     Review of Systems   Constitutional: Positive for activity change  Negative for appetite change and fatigue  HENT: Negative for congestion  Eyes: Negative  Respiratory: Negative for cough, chest tightness, shortness of breath and wheezing  Cardiovascular: Negative for chest pain, palpitations and leg swelling  Gastrointestinal: Negative for abdominal pain and constipation  Genitourinary: Negative for dysuria and frequency  Musculoskeletal: Negative for arthralgias  Skin: Negative for rash and wound  Neurological: Negative for dizziness and headaches  Psychiatric/Behavioral: Negative for sleep disturbance  Objective:      /84   Pulse (!) 109   Temp 97 6 °F (36 4 °C) (Oral)   Resp 18   Ht 6' 2" (1 88 m)   Wt (!) 174 kg (384 lb)   SpO2 100%   BMI 49 30 kg/m²          Physical Exam   Constitutional: He is oriented to person, place, and time  He appears well-developed and well-nourished  HENT:   Head: Normocephalic and atraumatic  Mouth/Throat: Mucous membranes are normal    Eyes: Pupils are equal, round, and reactive to light  Conjunctivae are normal    Cardiovascular: Normal rate, regular rhythm and normal heart sounds  Pulmonary/Chest: Effort normal  He has no wheezes  He has no rhonchi  Abdominal: Soft  Bowel sounds are normal    Musculoskeletal: He exhibits no edema  Neurological: He is alert and oriented to person, place, and time  Skin: Skin is warm  Psychiatric: He has a normal mood and affect  His behavior is normal    Nursing note and vitals reviewed  Lab &/or imaging results reviewed with patient

## 2020-01-06 NOTE — ASSESSMENT & PLAN NOTE
Gained 20 lbs since last visit  He reports losing 40 lbs but gained 60 lbs during the holiday season  Resume regular aerobic exercise, he will start going to the gym again  He reports Beth Spaulding has worked for him before, not interested in seeing a dietician at this time

## 2020-05-21 ENCOUNTER — TELEPHONE (OUTPATIENT)
Dept: INTERNAL MEDICINE CLINIC | Facility: CLINIC | Age: 50
End: 2020-05-21

## 2020-05-29 ENCOUNTER — OFFICE VISIT (OUTPATIENT)
Dept: INTERNAL MEDICINE CLINIC | Facility: CLINIC | Age: 50
End: 2020-05-29
Payer: COMMERCIAL

## 2020-05-29 VITALS
OXYGEN SATURATION: 95 % | SYSTOLIC BLOOD PRESSURE: 124 MMHG | HEART RATE: 70 BPM | TEMPERATURE: 97 F | DIASTOLIC BLOOD PRESSURE: 74 MMHG | BODY MASS INDEX: 41.75 KG/M2 | WEIGHT: 315 LBS | HEIGHT: 73 IN

## 2020-05-29 DIAGNOSIS — K21.9 GASTROESOPHAGEAL REFLUX DISEASE, ESOPHAGITIS PRESENCE NOT SPECIFIED: ICD-10-CM

## 2020-05-29 DIAGNOSIS — J43.8 OTHER EMPHYSEMA (HCC): ICD-10-CM

## 2020-05-29 DIAGNOSIS — R73.01 ABNORMAL FASTING GLUCOSE: ICD-10-CM

## 2020-05-29 DIAGNOSIS — E66.01 CLASS 3 SEVERE OBESITY DUE TO EXCESS CALORIES WITHOUT SERIOUS COMORBIDITY WITH BODY MASS INDEX (BMI) OF 50.0 TO 59.9 IN ADULT (HCC): ICD-10-CM

## 2020-05-29 DIAGNOSIS — B35.1 ONYCHOMYCOSIS OF GREAT TOE: ICD-10-CM

## 2020-05-29 DIAGNOSIS — Z71.9 HEALTH COUNSELING: ICD-10-CM

## 2020-05-29 DIAGNOSIS — R91.8 LUNG MASS: Primary | ICD-10-CM

## 2020-05-29 DIAGNOSIS — E78.49 OTHER HYPERLIPIDEMIA: ICD-10-CM

## 2020-05-29 PROCEDURE — 3008F BODY MASS INDEX DOCD: CPT | Performed by: INTERNAL MEDICINE

## 2020-05-29 PROCEDURE — 99214 OFFICE O/P EST MOD 30 MIN: CPT | Performed by: INTERNAL MEDICINE

## 2020-05-29 PROCEDURE — 1036F TOBACCO NON-USER: CPT | Performed by: INTERNAL MEDICINE

## 2021-01-28 ENCOUNTER — TELEPHONE (OUTPATIENT)
Dept: FAMILY MEDICINE CLINIC | Facility: CLINIC | Age: 51
End: 2021-01-28

## 2021-01-28 NOTE — TELEPHONE ENCOUNTER
Patient wife Lucila Heck) called stating that she just found their grandson is positive for COVID  Last time she or her  were in contact with him was 1/15 or 1/16  Patient asking if she should be tested  [employer] that he has to be tested because he called out of work today due to this and he needs a negative test to return  Please advise

## 2021-02-11 ENCOUNTER — TELEPHONE (OUTPATIENT)
Dept: INTERNAL MEDICINE CLINIC | Facility: CLINIC | Age: 51
End: 2021-02-11

## 2021-02-11 ENCOUNTER — OFFICE VISIT (OUTPATIENT)
Dept: INTERNAL MEDICINE CLINIC | Facility: CLINIC | Age: 51
End: 2021-02-11
Payer: COMMERCIAL

## 2021-02-11 ENCOUNTER — TELEPHONE (OUTPATIENT)
Dept: OTHER | Facility: OTHER | Age: 51
End: 2021-02-11

## 2021-02-11 ENCOUNTER — APPOINTMENT (OUTPATIENT)
Dept: LAB | Facility: CLINIC | Age: 51
End: 2021-02-11
Payer: COMMERCIAL

## 2021-02-11 VITALS
BODY MASS INDEX: 41.75 KG/M2 | DIASTOLIC BLOOD PRESSURE: 84 MMHG | HEART RATE: 102 BPM | HEIGHT: 73 IN | SYSTOLIC BLOOD PRESSURE: 122 MMHG | WEIGHT: 315 LBS | TEMPERATURE: 96.7 F | OXYGEN SATURATION: 97 %

## 2021-02-11 DIAGNOSIS — E78.49 OTHER HYPERLIPIDEMIA: ICD-10-CM

## 2021-02-11 DIAGNOSIS — R91.8 LUNG MASS: ICD-10-CM

## 2021-02-11 DIAGNOSIS — E66.01 CLASS 3 SEVERE OBESITY DUE TO EXCESS CALORIES WITHOUT SERIOUS COMORBIDITY WITH BODY MASS INDEX (BMI) OF 50.0 TO 59.9 IN ADULT (HCC): ICD-10-CM

## 2021-02-11 DIAGNOSIS — E66.01 CLASS 3 SEVERE OBESITY DUE TO EXCESS CALORIES WITHOUT SERIOUS COMORBIDITY WITH BODY MASS INDEX (BMI) OF 50.0 TO 59.9 IN ADULT (HCC): Primary | ICD-10-CM

## 2021-02-11 DIAGNOSIS — Z00.00 LABORATORY EXAMINATION ORDERED AS PART OF A ROUTINE GENERAL MEDICAL EXAMINATION: ICD-10-CM

## 2021-02-11 DIAGNOSIS — R73.01 ABNORMAL FASTING GLUCOSE: ICD-10-CM

## 2021-02-11 DIAGNOSIS — J43.8 OTHER EMPHYSEMA (HCC): ICD-10-CM

## 2021-02-11 DIAGNOSIS — R93.89 ABNORMAL CT OF THE CHEST: Primary | ICD-10-CM

## 2021-02-11 PROBLEM — B35.1 ONYCHOMYCOSIS OF GREAT TOE: Status: RESOLVED | Noted: 2020-05-29 | Resolved: 2021-02-11

## 2021-02-11 LAB
ALBUMIN SERPL BCP-MCNC: 3.7 G/DL (ref 3.5–5)
ALP SERPL-CCNC: 103 U/L (ref 46–116)
ALT SERPL W P-5'-P-CCNC: 60 U/L (ref 12–78)
ANION GAP SERPL CALCULATED.3IONS-SCNC: 9 MMOL/L (ref 4–13)
AST SERPL W P-5'-P-CCNC: 26 U/L (ref 5–45)
BASOPHILS # BLD AUTO: 0.05 THOUSANDS/ΜL (ref 0–0.1)
BASOPHILS NFR BLD AUTO: 1 % (ref 0–1)
BILIRUB SERPL-MCNC: 0.39 MG/DL (ref 0.2–1)
BUN SERPL-MCNC: 20 MG/DL (ref 5–25)
CALCIUM SERPL-MCNC: 8.7 MG/DL (ref 8.3–10.1)
CHLORIDE SERPL-SCNC: 108 MMOL/L (ref 100–108)
CHOLEST SERPL-MCNC: 174 MG/DL (ref 50–200)
CO2 SERPL-SCNC: 24 MMOL/L (ref 21–32)
CREAT SERPL-MCNC: 1.01 MG/DL (ref 0.6–1.3)
EOSINOPHIL # BLD AUTO: 0.3 THOUSAND/ΜL (ref 0–0.61)
EOSINOPHIL NFR BLD AUTO: 4 % (ref 0–6)
ERYTHROCYTE [DISTWIDTH] IN BLOOD BY AUTOMATED COUNT: 13.5 % (ref 11.6–15.1)
EST. AVERAGE GLUCOSE BLD GHB EST-MCNC: 114 MG/DL
GFR SERPL CREATININE-BSD FRML MDRD: 85 ML/MIN/1.73SQ M
GLUCOSE P FAST SERPL-MCNC: 90 MG/DL (ref 65–99)
HBA1C MFR BLD: 5.6 %
HCT VFR BLD AUTO: 50.2 % (ref 36.5–49.3)
HDLC SERPL-MCNC: 33 MG/DL
HGB BLD-MCNC: 16.3 G/DL (ref 12–17)
IMM GRANULOCYTES # BLD AUTO: 0.03 THOUSAND/UL (ref 0–0.2)
IMM GRANULOCYTES NFR BLD AUTO: 0 % (ref 0–2)
LDLC SERPL CALC-MCNC: 113 MG/DL (ref 0–100)
LYMPHOCYTES # BLD AUTO: 2.37 THOUSANDS/ΜL (ref 0.6–4.47)
LYMPHOCYTES NFR BLD AUTO: 30 % (ref 14–44)
MCH RBC QN AUTO: 30.6 PG (ref 26.8–34.3)
MCHC RBC AUTO-ENTMCNC: 32.5 G/DL (ref 31.4–37.4)
MCV RBC AUTO: 94 FL (ref 82–98)
MONOCYTES # BLD AUTO: 0.67 THOUSAND/ΜL (ref 0.17–1.22)
MONOCYTES NFR BLD AUTO: 9 % (ref 4–12)
NEUTROPHILS # BLD AUTO: 4.44 THOUSANDS/ΜL (ref 1.85–7.62)
NEUTS SEG NFR BLD AUTO: 56 % (ref 43–75)
NONHDLC SERPL-MCNC: 141 MG/DL
NRBC BLD AUTO-RTO: 0 /100 WBCS
PLATELET # BLD AUTO: 214 THOUSANDS/UL (ref 149–390)
PMV BLD AUTO: 9.8 FL (ref 8.9–12.7)
POTASSIUM SERPL-SCNC: 4.3 MMOL/L (ref 3.5–5.3)
PROT SERPL-MCNC: 7.4 G/DL (ref 6.4–8.2)
RBC # BLD AUTO: 5.32 MILLION/UL (ref 3.88–5.62)
SODIUM SERPL-SCNC: 141 MMOL/L (ref 136–145)
T4 FREE SERPL-MCNC: 0.96 NG/DL (ref 0.76–1.46)
TRIGL SERPL-MCNC: 140 MG/DL
TSH SERPL DL<=0.05 MIU/L-ACNC: 4.36 UIU/ML (ref 0.36–3.74)
WBC # BLD AUTO: 7.86 THOUSAND/UL (ref 4.31–10.16)

## 2021-02-11 PROCEDURE — 85025 COMPLETE CBC W/AUTO DIFF WBC: CPT | Performed by: INTERNAL MEDICINE

## 2021-02-11 PROCEDURE — 83036 HEMOGLOBIN GLYCOSYLATED A1C: CPT | Performed by: INTERNAL MEDICINE

## 2021-02-11 PROCEDURE — 84439 ASSAY OF FREE THYROXINE: CPT

## 2021-02-11 PROCEDURE — 99396 PREV VISIT EST AGE 40-64: CPT | Performed by: INTERNAL MEDICINE

## 2021-02-11 PROCEDURE — 80061 LIPID PANEL: CPT | Performed by: INTERNAL MEDICINE

## 2021-02-11 PROCEDURE — 84443 ASSAY THYROID STIM HORMONE: CPT

## 2021-02-11 PROCEDURE — 36415 COLL VENOUS BLD VENIPUNCTURE: CPT

## 2021-02-11 PROCEDURE — 3008F BODY MASS INDEX DOCD: CPT | Performed by: INTERNAL MEDICINE

## 2021-02-11 PROCEDURE — 1036F TOBACCO NON-USER: CPT | Performed by: INTERNAL MEDICINE

## 2021-02-11 PROCEDURE — 80053 COMPREHEN METABOLIC PANEL: CPT | Performed by: INTERNAL MEDICINE

## 2021-02-11 PROCEDURE — 3725F SCREEN DEPRESSION PERFORMED: CPT | Performed by: INTERNAL MEDICINE

## 2021-02-11 NOTE — TELEPHONE ENCOUNTER
Patient wants to do [place]  Please send to [facility]

## 2021-02-11 NOTE — PROGRESS NOTES
Assessment/Plan:    Lung mass  Schedule CT, reschedule with Pulmo  Class 3 severe obesity due to excess calories without serious comorbidity in adult ([facility] [address] )  Stable weight, started going to the gym again  Discussed portion control, increase fruits and vegetables  Discussed Alfred, he will consider this  Abnormal fasting glucose  Check A1c  Other hyperlipidemia  Lipids due  Other [facility] )  Asymptomatic  Congratulated with continued smoking cessation  GERD (gastroesophageal reflux disease)  No recent issues  Diagnoses and all orders for this visit:    Abnormal CT of the chest  -     CT chest wo contrast; Future    Class 3 severe obesity due to excess calories without serious comorbidity with body mass index (BMI) of 50 0 to 59 9 in adult (HCC)    Other emphysema (HCC)  -     CBC and differential    Other hyperlipidemia  -     Comprehensive metabolic panel  -     Lipid panel  -     TSH, 3rd generation with Free T4 reflex; Future    Abnormal fasting glucose  -     Hemoglobin A1C    Laboratory examination ordered as part of a routine general medical examination  -     CBC and differential  -     Comprehensive metabolic panel  -     Hemoglobin A1C  -     Lipid panel  -     TSH, 3rd generation with Free T4 reflex; Future    Lung mass      Follow up in 4 months or as needed  Subjective:      Patient ID: Gilberto Castro is a 44 y o  male for a physical     He feels well, has no complaints  He started going to the gym again, has lost over 10 lb the last few weeks  He started eating healthier also  He does eat about 1 to 2 lbs of chicken sausage a day  Denies any cough, wheezing or shortness of breath  No chest pains  The following portions of the patient's history were reviewed and updated as appropriate: allergies, current medications, past medical history, past social history and problem list     Review of Systems   Constitutional: Negative for appetite change and fatigue     HENT: Negative for congestion  Eyes: Negative  Negative for visual disturbance  Respiratory: Negative for cough, chest tightness, shortness of breath and wheezing  Cardiovascular: Negative for chest pain, palpitations and leg swelling  Gastrointestinal: Negative for abdominal pain and constipation  Genitourinary: Negative for dysuria and frequency  Musculoskeletal: Negative for arthralgias  Skin: Negative for rash and wound  Neurological: Negative for dizziness and headaches  Hematological: Does not bruise/bleed easily  Psychiatric/Behavioral: Negative for sleep disturbance  The patient is not nervous/anxious  Objective:      /84   Pulse 102   Temp (!) 96 7 °F (35 9 °C)   Ht 6' 1" (1 854 m)   Wt (!) 177 kg (391 lb)   SpO2 97%   BMI 51 59 kg/m²          Physical Exam  Vitals signs and nursing note reviewed  Constitutional:       Appearance: He is well-developed  HENT:      Head: Normocephalic and atraumatic  Eyes:      Conjunctiva/sclera: Conjunctivae normal       Pupils: Pupils are equal, round, and reactive to light  Neck:      Musculoskeletal: Neck supple  Cardiovascular:      Rate and Rhythm: Normal rate and regular rhythm  Heart sounds: Normal heart sounds  Pulmonary:      Effort: Pulmonary effort is normal       Breath sounds: No wheezing or rhonchi  Abdominal:      General: Bowel sounds are normal       Palpations: Abdomen is soft  Musculoskeletal:         General: No swelling  Skin:     General: Skin is warm  Findings: No rash  Neurological:      General: No focal deficit present  Mental Status: He is alert and oriented to person, place, and time  Psychiatric:         Mood and Affect: Mood normal          Behavior: Behavior normal            Labs & imaging results reviewed with patient  BMI Counseling: Body mass index is 51 59 kg/m²   The BMI is above normal  Nutrition recommendations include reducing portion sizes and 3-5 servings of fruits/vegetables daily  Exercise recommendations include moderate aerobic physical activity for 150 minutes/week

## 2021-02-11 NOTE — ASSESSMENT & PLAN NOTE
Stable weight, started going to the gym again  Discussed portion control, increase fruits and vegetables  Discussed Alfred, he will consider this

## 2021-02-11 NOTE — TELEPHONE ENCOUNTER
Lab results:    Cholesterol has improved  Your sugars are in prediabetic range, continue low carb diet  Thyroid test slightly abnormal, will just monitor this  The rest of your labs were normal     Saxenda sent to the pharmacy

## 2021-02-12 ENCOUNTER — TELEPHONE (OUTPATIENT)
Dept: INTERNAL MEDICINE CLINIC | Facility: CLINIC | Age: 51
End: 2021-02-12

## 2021-02-12 DIAGNOSIS — E66.01 CLASS 3 SEVERE OBESITY DUE TO EXCESS CALORIES WITHOUT SERIOUS COMORBIDITY WITH BODY MASS INDEX (BMI) OF 50.0 TO 59.9 IN ADULT (HCC): Primary | ICD-10-CM

## 2021-02-12 DIAGNOSIS — R73.03 PREDIABETES: ICD-10-CM

## 2021-02-12 NOTE — TELEPHONE ENCOUNTER
Patient called Jadiel James would be $1400 and patient can not afford this  Can we do a Prior Auth or change medication to something less expensive?     Advised Pt PCP out of office will reach out Monday

## 2021-02-14 PROBLEM — R73.03 PREDIABETES: Status: ACTIVE | Noted: 2020-01-06

## 2021-02-14 NOTE — TELEPHONE ENCOUNTER
Sent another medication which works similarly, [address]   Check with pharmacy the cost

## 2021-02-17 ENCOUNTER — TELEPHONE (OUTPATIENT)
Dept: INTERNAL MEDICINE CLINIC | Facility: CLINIC | Age: 51
End: 2021-02-17

## 2021-03-12 ENCOUNTER — HOSPITAL ENCOUNTER (OUTPATIENT)
Dept: CT IMAGING | Facility: HOSPITAL | Age: 51
Discharge: HOME | End: 2021-03-12
Payer: COMMERCIAL

## 2021-03-12 DIAGNOSIS — R93.89 ABNORMAL CT OF THE CHEST: ICD-10-CM

## 2021-03-12 PROCEDURE — 71250 CT THORAX DX C-: CPT

## 2021-03-12 PROCEDURE — G1004 CDSM NDSC: HCPCS

## 2021-03-17 ENCOUNTER — TELEPHONE (OUTPATIENT)
Dept: INTERNAL MEDICINE CLINIC | Facility: CLINIC | Age: 51
End: 2021-03-17

## 2021-03-17 NOTE — TELEPHONE ENCOUNTER
CT scan results: The lung nodules continue to decrease in size  Please call Pulmonary to schedule an appointment, if they still want to see you since CT better

## 2021-03-25 ENCOUNTER — TELEPHONE (OUTPATIENT)
Dept: INTERNAL MEDICINE CLINIC | Facility: CLINIC | Age: 51
End: 2021-03-25

## 2021-03-25 DIAGNOSIS — E66.01 CLASS 3 SEVERE OBESITY DUE TO EXCESS CALORIES WITHOUT SERIOUS COMORBIDITY WITH BODY MASS INDEX (BMI) OF 50.0 TO 59.9 IN ADULT (HCC): ICD-10-CM

## 2021-03-25 DIAGNOSIS — R73.03 PREDIABETES: ICD-10-CM

## 2021-04-30 ENCOUNTER — TELEMEDICINE (OUTPATIENT)
Dept: PULMONOLOGY | Facility: CLINIC | Age: 51
End: 2021-04-30
Payer: COMMERCIAL

## 2021-04-30 VITALS
HEART RATE: 79 BPM | SYSTOLIC BLOOD PRESSURE: 120 MMHG | BODY MASS INDEX: 40.43 KG/M2 | HEIGHT: 74 IN | DIASTOLIC BLOOD PRESSURE: 80 MMHG | WEIGHT: 315 LBS

## 2021-04-30 DIAGNOSIS — J43.8 OTHER EMPHYSEMA (HCC): ICD-10-CM

## 2021-04-30 DIAGNOSIS — F17.211 CIGARETTE NICOTINE DEPENDENCE IN REMISSION: ICD-10-CM

## 2021-04-30 DIAGNOSIS — R91.8 PULMONARY NODULES: Primary | ICD-10-CM

## 2021-04-30 PROCEDURE — 99213 OFFICE O/P EST LOW 20 MIN: CPT | Performed by: INTERNAL MEDICINE

## 2021-04-30 RX ORDER — ALBUTEROL SULFATE 2.5 MG/3ML
2.5 SOLUTION RESPIRATORY (INHALATION) EVERY 4 HOURS PRN
Qty: 270 ML | Refills: 5 | Status: SHIPPED | OUTPATIENT
Start: 2021-04-30

## 2021-04-30 RX ORDER — ALBUTEROL SULFATE 90 UG/1
2 AEROSOL, METERED RESPIRATORY (INHALATION) EVERY 4 HOURS PRN
Qty: 8.5 G | Refills: 3 | Status: SHIPPED | OUTPATIENT
Start: 2021-04-30

## 2021-04-30 NOTE — ASSESSMENT & PLAN NOTE
I reviewed his most recent chest CT which shows ongoing improvement in dominant right upper lobe nodule and a satellite nodule that is pleural based  Patient underwent bronchoscopy with endobronchial ultrasound in 2019 which showed no evidence of malignant process  I suspect this is resolving inflammation  We can follow-up at yearly intervals  Given his prior longstanding smoking history, he is certainly at risk for developing lung cancer in the future

## 2021-04-30 NOTE — PROGRESS NOTES
Virtual Regular Visit - Pulmonary Follow up    Assessment/Plan:    Problem List Items Addressed This Visit        Respiratory    Other emphysema (HCC)    Relevant Medications    albuterol (PROVENTIL HFA,VENTOLIN HFA) 90 mcg/act inhaler    albuterol (2 5 mg/3 mL) 0 083 % nebulizer solution       Other    Pulmonary nodules - Primary       I reviewed his most recent chest CT which shows ongoing improvement in dominant right upper lobe nodule and a satellite nodule that is pleural based  Patient underwent bronchoscopy with endobronchial ultrasound in 2019 which showed no evidence of malignant process  I suspect this is resolving inflammation  We can follow-up at yearly intervals  Given his prior longstanding smoking history, he is certainly at risk for developing lung cancer in the future  Cigarette nicotine dependence in remission       Patient has a 120 pack-year smoking history and quit in April 2019  Continue with annual chest CT  Despite his longstanding smoking history, he has no evidence of obstruction on PFTs from 2019  He does use albuterol intermittently for episodes of bronchitis  Reason for visit is   Chief Complaint   Patient presents with    Virtual Regular Visit        Encounter provider Jovany Bailey DO    Provider located at [address] [id number]      Recent Visits  No visits were found meeting these conditions  Showing recent visits within past 7 days and meeting all other requirements     Today's Visits  Date Type Provider Dept   04/30/21 Telemedicine Jovany Bailey DO [facility]   Showing today's visits and meeting all other requirements     Future Appointments  No visits were found meeting these conditions  Showing future appointments within next 150 days and meeting all other requirements        The patient was identified by name and date of birth  Koffi Augustin was informed that this is a telemedicine visit and that the visit is being conducted through [facility] and patient was informed that this is a secure, HIPAA-compliant platform  He agrees to proceed     My office door was closed  No one else was in the room  He acknowledged consent and understanding of privacy and security of the video platform  The patient has agreed to participate and understands they can discontinue the visit at any time  Patient is aware this is a billable service  Subjective  Koffi Augustin is a 46 y o  male   With history of right upper lobe lung nodule, status post biopsy in 2019, negative for malignancy  Subsequent imaging showed improvement in nodular opacities  Patient quit smoking in April 2019, but does have a 120 pack-year history of smoking  He has episodes of bronchitis and finds that he uses his albuterol nebulizer and inhaler, usually in the spring time  He does not use any other inhalers  He denies wheezing  His weight is stable  Is vaccinated for COVID  No recent ER visits or hospitalizations  Past Medical History:   Diagnosis Date    GERD (gastroesophageal reflux disease)     Lung mass     Obesity        History reviewed  No pertinent surgical history  Current Outpatient Medications   Medication Sig Dispense Refill    albuterol (2 5 mg/3 mL) 0 083 % nebulizer solution Take 1 vial (2 5 mg total) by nebulization every 4 (four) hours as needed for wheezing or shortness of breath 270 mL 5    albuterol (PROVENTIL HFA,VENTOLIN HFA) 90 mcg/act inhaler Inhale 2 puffs every 4 (four) hours as needed for wheezing 8 5 g 3    Dulaglutide 0 75 MG/0 5ML SOPN Inject 0 5 mL (0 75 mg total) under the skin once a week 2 mL 1    Multiple Vitamins-Minerals (MULTIVITAMIN MEN 50+) TABS Take 1 tablet by mouth daily 90 tablet 0     No current facility-administered medications for this visit           No Known Allergies    Review of Systems   Constitutional: Negative for chills, fever and unexpected weight change  HENT: Negative for postnasal drip and sore throat  Eyes: Negative for visual disturbance  Respiratory:        As noted in HPI   Cardiovascular: Negative for chest pain  Gastrointestinal: Negative for abdominal pain, diarrhea and vomiting  Musculoskeletal: Negative for arthralgias  Skin: Negative for rash  Neurological: Negative for headaches  Hematological: Negative for adenopathy  Psychiatric/Behavioral: Negative  All other systems reviewed and are negative  Video Exam    Vitals:    04/30/21 0842   BP: 120/80   BP Location: Left arm   Patient Position: Sitting   Cuff Size: Standard   Pulse: 79   Weight: (!) 172 kg (379 lb)   Height: 6' 2" (1 88 m)       Physical Exam  Constitutional:       Appearance: Normal appearance  He is not ill-appearing  HENT:      Head: Normocephalic  Mouth/Throat:      Mouth: Mucous membranes are moist    Eyes:      General: No scleral icterus  Pulmonary:      Effort: Pulmonary effort is normal    Skin:     Coloration: Skin is not jaundiced  Neurological:      Mental Status: He is alert and oriented to person, place, and time  Psychiatric:         Mood and Affect: Mood normal          Behavior: Behavior normal           Data reviewed for this visit:      PFTs from September 2019 show a ratio of 70%, FEV1 84% predicted, FVC 84% of predicted  Normal lung volumes and normal diffusion capacity  Chest CT from 3/12/21 is personally reviewed  There is a nodule in the right upper lobe currently measuring 1 3 x 1 cm, previously 1 6 x 1 4 cm  There is an additional subpleural nodule, also decreased in size, currently measuring 1 2 by 0 5 x 1 5 cm  I spent 15 minutes directly with the patient during this visit      [address] [place] [address] acknowledges that he has consented to an online visit or consultation   He understands that the online visit is based solely on information provided by him, and that, in the absence of a face-to-face physical evaluation by the physician, the diagnosis he receives is both limited and provisional in terms of accuracy and completeness  This is not intended to replace a full medical face-to-face evaluation by the physician  Steffen Retana understands and accepts these terms

## 2021-04-30 NOTE — ASSESSMENT & PLAN NOTE
Patient has a 120 pack-year smoking history and quit in April 2019  Continue with annual chest CT  Despite his longstanding smoking history, he has no evidence of obstruction on PFTs from 2019  He does use albuterol intermittently for episodes of bronchitis

## 2021-05-19 DIAGNOSIS — R73.03 PREDIABETES: ICD-10-CM

## 2021-05-19 DIAGNOSIS — E66.01 CLASS 3 SEVERE OBESITY DUE TO EXCESS CALORIES WITHOUT SERIOUS COMORBIDITY WITH BODY MASS INDEX (BMI) OF 50.0 TO 59.9 IN ADULT (HCC): ICD-10-CM

## 2021-06-16 ENCOUNTER — OFFICE VISIT (OUTPATIENT)
Dept: INTERNAL MEDICINE CLINIC | Facility: CLINIC | Age: 51
End: 2021-06-16
Payer: COMMERCIAL

## 2021-06-16 VITALS
DIASTOLIC BLOOD PRESSURE: 82 MMHG | BODY MASS INDEX: 40.43 KG/M2 | HEIGHT: 74 IN | TEMPERATURE: 97.3 F | OXYGEN SATURATION: 98 % | HEART RATE: 85 BPM | SYSTOLIC BLOOD PRESSURE: 132 MMHG | WEIGHT: 315 LBS

## 2021-06-16 DIAGNOSIS — E78.49 OTHER HYPERLIPIDEMIA: ICD-10-CM

## 2021-06-16 DIAGNOSIS — Z71.9 HEALTH COUNSELING: ICD-10-CM

## 2021-06-16 DIAGNOSIS — R91.8 PULMONARY NODULES: ICD-10-CM

## 2021-06-16 DIAGNOSIS — R73.03 PREDIABETES: ICD-10-CM

## 2021-06-16 DIAGNOSIS — Z12.11 SCREENING FOR COLON CANCER: ICD-10-CM

## 2021-06-16 DIAGNOSIS — E66.01 CLASS 3 SEVERE OBESITY DUE TO EXCESS CALORIES WITHOUT SERIOUS COMORBIDITY WITH BODY MASS INDEX (BMI) OF 50.0 TO 59.9 IN ADULT (HCC): ICD-10-CM

## 2021-06-16 DIAGNOSIS — M54.50 ACUTE RIGHT-SIDED LOW BACK PAIN WITHOUT SCIATICA: Primary | ICD-10-CM

## 2021-06-16 PROCEDURE — 1036F TOBACCO NON-USER: CPT | Performed by: INTERNAL MEDICINE

## 2021-06-16 PROCEDURE — 99214 OFFICE O/P EST MOD 30 MIN: CPT | Performed by: INTERNAL MEDICINE

## 2021-06-16 NOTE — ASSESSMENT & PLAN NOTE
Repeat CT scan next year, lung mass thought to be inflammatory  Saw Lara recently  Plan for annual CT for lung cancer screening

## 2021-06-16 NOTE — ASSESSMENT & PLAN NOTE
Started Trulicity a few weeks ago, no issues  Resume regular aerobic exercise  Discussed portion control, healthy snacks

## 2021-06-16 NOTE — PROGRESS NOTES
Assessment/Plan:    Pulmonary nodules  Repeat CT scan next year, lung mass thought to be inflammatory  Saw Pulmo recently  Plan for annual CT for lung cancer screening  Prediabetes  Recheck B2P, on Trulicity  Class 3 severe obesity due to excess calories without serious comorbidity in adult [facility])  Started Trulicity a few weeks ago, no issues  Resume regular aerobic exercise  Discussed portion control, healthy snacks  Other emphysema (HCC)  Minimal symptoms, uses albuterol prn  Other hyperlipidemia  Repeat lipids next visit  Diagnoses and all orders for this visit:    Acute right-sided low back pain without sciatica  Comments:  May continue with ointment, start stretching exercises  Prediabetes  -     Hemoglobin A1C; Future  -     CBC and differential; Future  -     Dulaglutide 1 5 MG/0 5ML SOPN; Inject 0 5 mL (1 5 mg total) under the skin once a week    Other hyperlipidemia  -     Comprehensive metabolic panel; Future  -     Lipid panel; Future  -     TSH, 3rd generation with Free T4 reflex; Future    Class 3 severe obesity due to excess calories without serious comorbidity with body mass index (BMI) of 50 0 to 59 9 in adult (HCC)  -     Dulaglutide 1 5 MG/0 5ML SOPN; Inject 0 5 mL (1 5 mg total) under the skin once a week    Pulmonary nodules    Screening for colon cancer  -     Cologuard; Future    Health counseling  Comments:  Received COVID vaccine  Follow up in 4 months or as needed  Subjective:      Patient ID: Luis Antonio Easton is a 46 y o  male here for a follow up  He reports that he hurt his back about 3 weeks ago  He thinks he may have pulled the garden hose to hard  He reports pain intermittent and nonradiating  He started to use his friends cream the past few days and reports it has been better  He is not going to the gym during this time  He has been using the Trulicity once a week    He noticed that he has decreased appetite after dinner, has found that he has not been snacking as often  He plans to go back to the gym  He did get his COVID vaccine  The following portions of the patient's history were reviewed and updated as appropriate: allergies, current medications, past medical history, past social history and problem list     Review of Systems   Constitutional: Negative for appetite change and fatigue  HENT: Negative for congestion, hearing loss and postnasal drip  Eyes: Negative  Respiratory: Negative for cough, chest tightness and shortness of breath  Cardiovascular: Negative for chest pain, palpitations and leg swelling  Gastrointestinal: Negative for abdominal pain and constipation  Genitourinary: Negative for dysuria, frequency and urgency  Musculoskeletal: Negative for arthralgias  Neurological: Negative for dizziness, numbness and headaches  Psychiatric/Behavioral: Negative for sleep disturbance  The patient is not nervous/anxious  Objective:      /82   Pulse 85   Temp (!) 97 3 °F (36 3 °C)   Ht 6' 2" (1 88 m)   Wt (!) 177 kg (391 lb)   SpO2 98%   BMI 50 20 kg/m²          Physical Exam  Vitals and nursing note reviewed  Constitutional:       Appearance: He is well-developed  HENT:      Head: Normocephalic and atraumatic  Eyes:      Conjunctiva/sclera: Conjunctivae normal       Pupils: Pupils are equal, round, and reactive to light  Cardiovascular:      Rate and Rhythm: Normal rate and regular rhythm  Heart sounds: Normal heart sounds  Pulmonary:      Effort: Pulmonary effort is normal       Breath sounds: No wheezing or rhonchi  Abdominal:      General: Bowel sounds are normal       Palpations: Abdomen is soft  Musculoskeletal:         General: No swelling  Cervical back: Neck supple  Lumbar back: Spasms present  No tenderness or bony tenderness  Normal range of motion  Skin:     General: Skin is warm  Findings: No rash     Neurological:      General: No focal deficit present  Mental Status: He is alert and oriented to person, place, and time  Psychiatric:         Mood and Affect: Mood normal          Behavior: Behavior normal            Labs & imaging results reviewed with patient

## 2021-06-16 NOTE — PATIENT INSTRUCTIONS
Lower Back Exercises   WHAT YOU NEED TO KNOW:   What do I need to know about lower back exercises? Lower back exercises help heal and strengthen your back muscles to prevent another injury  Ask your healthcare provider if you need to see a physical therapist for more advanced exercises  · Do the exercises on a mat or firm surface  (not on a bed) to support your spine and prevent low back pain  · Move slowly and smoothly  Avoid fast or jerky motions  · Breathe normally  Do not hold your breath  · Stop if you feel pain  It is normal to feel some discomfort at first  Regular exercise will help decrease your discomfort over time  How do I perform lower back exercises safely? Your healthcare provider may recommend that you do back exercises 10 to 30 minutes each day  He may also recommend that you do exercises 1 to 3 times each day  Ask your healthcare provider which exercises are best for you and how often to do them  · Ankle pumps:  Lie on your back  Move your foot up (with your toes pointing toward your head)  Then, move your foot down (with your toes pointing away from you)  Repeat this exercise 10 times on each side  · Heel slides:  Lie on your back  Slowly bend one leg and then straighten it  Next, bend the other leg and then straighten it  Repeat 10 times on each side  · Pelvic tilt:  Lie on your back with your knees bent and feet flat on the floor  Place your arms in a relaxed position beside your body  Tighten the muscles of your abdomen and flatten your back against the floor  Hold for 5 seconds  Repeat 5 times  · Back stretch:  Lie on your back with your hands behind your head  Bend your knees and turn the lower half of your body to one side  Hold this position for 10 seconds  Repeat 3 times on each side  · Straight leg raises:  Lie on your back with one leg straight  Bend the other knee   Tighten your abdomen and then slowly lift the straight leg up about 6 to 12 inches off the floor  Hold for 1 to 5 seconds  Lower your leg slowly  Repeat 10 times on each leg  · Knee-to-chest:  Lie on your back with your knees bent and feet flat on the floor  Pull one of your knees toward your chest and hold it there for 5 seconds  Return your leg to the starting position  Lift the other knee toward your chest and hold for 5 seconds  Do this 5 times on each side  · Cat and camel:  Place your hands and knees on the floor  Arch your back upward toward the ceiling and lower your head  Round out your spine as much as you can  Hold for 5 seconds  Lift your head upward and push your chest downward toward the floor  Hold for 5 seconds  Do 3 sets or as directed  · Wall squats:  Stand with your back against a wall  Tighten the muscles of your abdomen  Slowly lower your body until your knees are bent at a 45 degree angle  Hold this position for 5 seconds  Slowly move back up to a standing position  Repeat 10 times  · Curl up:  Lie on your back with your knees bent and feet flat on the floor  Place your hands, palms down, underneath the curve in your lower back  Next, with your elbows on the floor, lift your shoulders and chest 2 to 3 inches  Keep your head in line with your shoulders  Hold this position for 5 seconds  When you can do this exercise without pain for 10 to 15 seconds, you may add a rotation  While your shoulders and chest are lifted off the ground, turn slightly to the left and hold  Repeat on the other side  · Bird dog:  Place your hands and knees on the floor  Keep your wrists directly below your shoulders and your knees directly below your hips  Pull your belly button in toward your spine  Do not flatten or arch your back  Tighten your abdominal muscles  Raise one arm straight out so that it is aligned with your head  Next, raise the leg opposite your arm  Hold this position for 15 seconds  Lower your arm and leg slowly and change sides  Do 5 sets  When should I seek immediate care? · You have severe pain that prevents you from moving  When should I contact my healthcare provider? · Your pain becomes worse  · You have new pain  · You have questions or concerns about your condition or care  CARE AGREEMENT:   You have the right to help plan your care  Learn about your health condition and how it may be treated  Discuss treatment options with your healthcare providers to decide what care you want to receive  You always have the right to refuse treatment  The above information is an  only  It is not intended as medical advice for individual conditions or treatments  Talk to your doctor, nurse or pharmacist before following any medical regimen to see if it is safe and effective for you  © Copyright [address] Information is for End User's use only and may not be sold, redistributed or otherwise used for commercial purposes   All illustrations and images included in CareNotes® are the copyrighted property of [employer]  or [address] [place] [address] [facility]

## 2021-10-12 ENCOUNTER — DOCUMENTATION (OUTPATIENT)
Dept: URGENT CARE | Facility: CLINIC | Age: 51
End: 2021-10-12

## 2021-12-06 ENCOUNTER — APPOINTMENT (EMERGENCY)
Dept: RADIOLOGY | Facility: HOSPITAL | Age: 51
End: 2021-12-06
Payer: COMMERCIAL

## 2021-12-06 ENCOUNTER — APPOINTMENT (EMERGENCY)
Dept: CT IMAGING | Facility: HOSPITAL | Age: 51
End: 2021-12-06
Payer: COMMERCIAL

## 2021-12-06 ENCOUNTER — HOSPITAL ENCOUNTER (EMERGENCY)
Facility: HOSPITAL | Age: 51
Discharge: HOME | End: 2021-12-06
Attending: EMERGENCY MEDICINE
Payer: COMMERCIAL

## 2021-12-06 VITALS
DIASTOLIC BLOOD PRESSURE: 80 MMHG | SYSTOLIC BLOOD PRESSURE: 136 MMHG | RESPIRATION RATE: 16 BRPM | BODY MASS INDEX: 40.43 KG/M2 | HEIGHT: 74 IN | TEMPERATURE: 97.7 F | HEART RATE: 88 BPM | WEIGHT: 315 LBS | OXYGEN SATURATION: 97 %

## 2021-12-06 DIAGNOSIS — M25.562 ACUTE PAIN OF LEFT KNEE: ICD-10-CM

## 2021-12-06 DIAGNOSIS — M25.512 ACUTE PAIN OF LEFT SHOULDER: ICD-10-CM
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
## 2021-12-06 DIAGNOSIS — S09.90XA INJURY OF HEAD, INITIAL ENCOUNTER: ICD-10-CM

## 2021-12-06 PROCEDURE — 70450 CT HEAD/BRAIN W/O DYE: CPT

## 2021-12-06 PROCEDURE — 72125 CT NECK SPINE W/O DYE: CPT

## 2021-12-06 PROCEDURE — 73564 X-RAY EXAM KNEE 4 OR MORE: CPT

## 2021-12-06 PROCEDURE — 99284 EMERGENCY DEPT VISIT MOD MDM: CPT

## 2021-12-06 PROCEDURE — 99284 EMERGENCY DEPT VISIT MOD MDM: CPT | Performed by: EMERGENCY MEDICINE

## 2021-12-06 PROCEDURE — 73030 X-RAY EXAM OF SHOULDER: CPT

## 2021-12-06 RX ORDER — ACETAMINOPHEN 325 MG/1
650 TABLET ORAL ONCE
Status: COMPLETED | OUTPATIENT
Start: 2021-12-06 | End: 2021-12-06

## 2021-12-06 RX ADMIN — ACETAMINOPHEN 650 MG: 325 TABLET, FILM COATED ORAL at 19:13

## 2021-12-10 ENCOUNTER — TELEPHONE (OUTPATIENT)
Dept: INTERNAL MEDICINE CLINIC | Facility: CLINIC | Age: 51
End: 2021-12-10

## 2022-02-09 ENCOUNTER — OFFICE VISIT (OUTPATIENT)
Dept: INTERNAL MEDICINE CLINIC | Facility: CLINIC | Age: 52
End: 2022-02-09
Payer: COMMERCIAL

## 2022-02-09 VITALS
HEART RATE: 100 BPM | SYSTOLIC BLOOD PRESSURE: 128 MMHG | TEMPERATURE: 96 F | BODY MASS INDEX: 40.43 KG/M2 | DIASTOLIC BLOOD PRESSURE: 82 MMHG | WEIGHT: 315 LBS | HEIGHT: 74 IN | OXYGEN SATURATION: 97 %

## 2022-02-09 DIAGNOSIS — R73.03 PREDIABETES: ICD-10-CM

## 2022-02-09 DIAGNOSIS — E78.49 OTHER HYPERLIPIDEMIA: ICD-10-CM

## 2022-02-09 DIAGNOSIS — Z00.00 LABORATORY EXAMINATION ORDERED AS PART OF A ROUTINE GENERAL MEDICAL EXAMINATION: ICD-10-CM

## 2022-02-09 DIAGNOSIS — E66.01 CLASS 3 SEVERE OBESITY DUE TO EXCESS CALORIES WITHOUT SERIOUS COMORBIDITY WITH BODY MASS INDEX (BMI) OF 50.0 TO 59.9 IN ADULT (HCC): ICD-10-CM

## 2022-02-09 DIAGNOSIS — J43.8 OTHER EMPHYSEMA (HCC): ICD-10-CM

## 2022-02-09 DIAGNOSIS — F17.211 CIGARETTE NICOTINE DEPENDENCE IN REMISSION: ICD-10-CM

## 2022-02-09 DIAGNOSIS — R93.89 ABNORMAL CT OF THE CHEST: ICD-10-CM

## 2022-02-09 DIAGNOSIS — Z00.00 HEALTH MAINTENANCE EXAMINATION: Primary | ICD-10-CM

## 2022-02-09 PROCEDURE — 99396 PREV VISIT EST AGE 40-64: CPT | Performed by: INTERNAL MEDICINE

## 2022-02-09 PROCEDURE — 1036F TOBACCO NON-USER: CPT | Performed by: INTERNAL MEDICINE

## 2022-02-09 PROCEDURE — 3008F BODY MASS INDEX DOCD: CPT | Performed by: INTERNAL MEDICINE

## 2022-02-09 NOTE — PROGRESS NOTES
Assessment/Plan:    Prediabetes  Repeat F6E, on Trulicity  Other hyperlipidemia  Lipids due, not on medication  Class 3 severe obesity due to excess calories without serious comorbidity in adult (HCC)  Gained 11 lbs since 10 months ago  He will return to the gym this week  Cigarette nicotine dependence in remission  Congratulated on continued smoking cessation  Due for annual chest CT  Other emphysema ([facility] [address] )  No symptoms, has not needed albuterol  GERD (gastroesophageal reflux disease)  Minimal symptoms  Diagnoses and all orders for this visit:    Health maintenance examination  Comments:  Declied flu vaccine, COVID booster scheduled  Prediabetes  -     CBC and differential  -     Hemoglobin A1C    Class 3 severe obesity due to excess calories without serious comorbidity with body mass index (BMI) of 50 0 to 59 9 in adult (HCC)    Other emphysema (HCC)    Other hyperlipidemia  -     Comprehensive metabolic panel  -     Lipid panel  -     TSH, 3rd generation with Free T4 reflex    Laboratory examination ordered as part of a routine general medical examination  -     CBC and differential  -     Comprehensive metabolic panel  -     Hemoglobin A1C  -     Lipid panel  -     TSH, 3rd generation with Free T4 reflex    Cigarette nicotine dependence in remission  -     CT chest wo contrast; Future    Abnormal CT of the chest  -     CT chest wo contrast; Future      Follow up in 6 months or as needed  Subjective:      Patient ID: Domi Shah is a 46 y o  male here for a physical     He has been doing alright  Denies any cough, chest pain or shortness of breath  He reports that he as not been working regularly for about 2 months due to [employer] exposure  He works in [place] and has [employer]  He has his COVID booster scheduled later this week  He has been caring for his elderly parents, now lives with them   He has been busy taking them to their doctors, they both had COVID a few weeks ago     He has gained weight since he has been at home only  He has not been back to the gym  He started seeing a chiropractor, also does holistic medicine  He started drinking a shake and taking supplements to help with his weight, fatty liver etc     The following portions of the patient's history were reviewed and updated as appropriate: allergies, current medications, past medical history, past social history and problem list     Review of Systems   Constitutional: Negative for appetite change and fatigue  HENT: Negative for congestion, hearing loss and postnasal drip  Eyes: Negative  Respiratory: Negative for cough, chest tightness and shortness of breath  Cardiovascular: Negative for chest pain, palpitations and leg swelling  Gastrointestinal: Negative for abdominal pain and constipation  Genitourinary: Negative for dysuria, frequency and urgency  Musculoskeletal: Negative for arthralgias  Skin: Negative for rash and wound  Neurological: Negative for dizziness, numbness and headaches  Hematological: Negative for adenopathy  Does not bruise/bleed easily  Psychiatric/Behavioral: Negative for sleep disturbance  The patient is not nervous/anxious  Objective:      /82   Pulse 100   Temp (!) 96 °F (35 6 °C)   Ht 6' 2" (1 88 m)   Wt (!) 182 kg (402 lb)   SpO2 97%   BMI 51 61 kg/m²          Physical Exam  Vitals and nursing note reviewed  Constitutional:       Appearance: He is well-developed  HENT:      Head: Normocephalic and atraumatic  Eyes:      Conjunctiva/sclera: Conjunctivae normal       Pupils: Pupils are equal, round, and reactive to light  Cardiovascular:      Rate and Rhythm: Normal rate and regular rhythm  Heart sounds: Normal heart sounds  Pulmonary:      Effort: Pulmonary effort is normal       Breath sounds: No wheezing or rhonchi  Abdominal:      General: Bowel sounds are normal       Palpations: Abdomen is soft     Musculoskeletal: General: No swelling  Cervical back: Neck supple  Right lower leg: No edema  Left lower leg: No edema  Skin:     General: Skin is warm  Findings: No rash  Neurological:      General: No focal deficit present  Mental Status: He is alert and oriented to person, place, and time  Psychiatric:         Mood and Affect: Mood and affect normal          Behavior: Behavior normal          Labs & imaging results reviewed with patient

## 2022-03-11 ENCOUNTER — HOSPITAL ENCOUNTER (OUTPATIENT)
Dept: RADIOLOGY | Facility: HOSPITAL | Age: 52
Discharge: HOME | End: 2022-03-11
Payer: COMMERCIAL

## 2022-03-11 DIAGNOSIS — F17.211 CIGARETTE NICOTINE DEPENDENCE IN REMISSION: ICD-10-CM

## 2022-03-11 DIAGNOSIS — R93.89 ABNORMAL CT OF THE CHEST: ICD-10-CM

## 2022-03-11 PROCEDURE — G1004 CDSM NDSC: HCPCS

## 2022-03-11 PROCEDURE — 71250 CT THORAX DX C-: CPT

## 2022-03-16 ENCOUNTER — TELEPHONE (OUTPATIENT)
Dept: INTERNAL MEDICINE CLINIC | Facility: CLINIC | Age: 52
End: 2022-03-16

## 2022-05-16 ENCOUNTER — APPOINTMENT (OUTPATIENT)
Dept: LAB | Facility: CLINIC | Age: 52
End: 2022-05-16
Payer: COMMERCIAL

## 2022-05-16 LAB
ALBUMIN SERPL BCP-MCNC: 4.1 G/DL (ref 3.5–5)
ALP SERPL-CCNC: 78 U/L (ref 34–104)
ALT SERPL W P-5'-P-CCNC: 49 U/L (ref 7–52)
ANION GAP SERPL CALCULATED.3IONS-SCNC: 6 MMOL/L (ref 4–13)
AST SERPL W P-5'-P-CCNC: 36 U/L (ref 13–39)
BASOPHILS # BLD AUTO: 0.05 THOUSANDS/ΜL (ref 0–0.1)
BASOPHILS NFR BLD AUTO: 1 % (ref 0–1)
BILIRUB SERPL-MCNC: 0.63 MG/DL (ref 0.2–1)
BUN SERPL-MCNC: 16 MG/DL (ref 5–25)
CALCIUM SERPL-MCNC: 9.1 MG/DL (ref 8.4–10.2)
CHLORIDE SERPL-SCNC: 111 MMOL/L (ref 96–108)
CHOLEST SERPL-MCNC: 185 MG/DL
CO2 SERPL-SCNC: 24 MMOL/L (ref 21–32)
CREAT SERPL-MCNC: 0.96 MG/DL (ref 0.6–1.3)
EOSINOPHIL # BLD AUTO: 0.31 THOUSAND/ΜL (ref 0–0.61)
EOSINOPHIL NFR BLD AUTO: 4 % (ref 0–6)
ERYTHROCYTE [DISTWIDTH] IN BLOOD BY AUTOMATED COUNT: 13.8 % (ref 11.6–15.1)
GFR SERPL CREATININE-BSD FRML MDRD: 89 ML/MIN/1.73SQ M
GLUCOSE P FAST SERPL-MCNC: 89 MG/DL (ref 65–99)
HCT VFR BLD AUTO: 49.9 % (ref 36.5–49.3)
HDLC SERPL-MCNC: 35 MG/DL
HGB BLD-MCNC: 16.9 G/DL (ref 12–17)
IMM GRANULOCYTES # BLD AUTO: 0.05 THOUSAND/UL (ref 0–0.2)
IMM GRANULOCYTES NFR BLD AUTO: 1 % (ref 0–2)
LDLC SERPL CALC-MCNC: 130 MG/DL (ref 0–100)
LYMPHOCYTES # BLD AUTO: 2.5 THOUSANDS/ΜL (ref 0.6–4.47)
LYMPHOCYTES NFR BLD AUTO: 28 % (ref 14–44)
MCH RBC QN AUTO: 31 PG (ref 26.8–34.3)
MCHC RBC AUTO-ENTMCNC: 33.9 G/DL (ref 31.4–37.4)
MCV RBC AUTO: 91 FL (ref 82–98)
MONOCYTES # BLD AUTO: 0.6 THOUSAND/ΜL (ref 0.17–1.22)
MONOCYTES NFR BLD AUTO: 7 % (ref 4–12)
NEUTROPHILS # BLD AUTO: 5.35 THOUSANDS/ΜL (ref 1.85–7.62)
NEUTS SEG NFR BLD AUTO: 59 % (ref 43–75)
NONHDLC SERPL-MCNC: 150 MG/DL
NRBC BLD AUTO-RTO: 0 /100 WBCS
PLATELET # BLD AUTO: 238 THOUSANDS/UL (ref 149–390)
PMV BLD AUTO: 9.7 FL (ref 8.9–12.7)
POTASSIUM SERPL-SCNC: 4.9 MMOL/L (ref 3.5–5.3)
PROT SERPL-MCNC: 7.3 G/DL (ref 6.4–8.4)
RBC # BLD AUTO: 5.46 MILLION/UL (ref 3.88–5.62)
SODIUM SERPL-SCNC: 141 MMOL/L (ref 135–147)
TRIGL SERPL-MCNC: 99 MG/DL
TSH SERPL DL<=0.05 MIU/L-ACNC: 2.32 UIU/ML (ref 0.45–4.5)
WBC # BLD AUTO: 8.86 THOUSAND/UL (ref 4.31–10.16)

## 2022-05-16 PROCEDURE — 83036 HEMOGLOBIN GLYCOSYLATED A1C: CPT | Performed by: INTERNAL MEDICINE

## 2022-05-16 PROCEDURE — 80053 COMPREHEN METABOLIC PANEL: CPT | Performed by: INTERNAL MEDICINE

## 2022-05-16 PROCEDURE — 36415 COLL VENOUS BLD VENIPUNCTURE: CPT | Performed by: INTERNAL MEDICINE

## 2022-05-16 PROCEDURE — 84443 ASSAY THYROID STIM HORMONE: CPT | Performed by: INTERNAL MEDICINE

## 2022-05-16 PROCEDURE — 85025 COMPLETE CBC W/AUTO DIFF WBC: CPT | Performed by: INTERNAL MEDICINE

## 2022-05-16 PROCEDURE — 80061 LIPID PANEL: CPT | Performed by: INTERNAL MEDICINE

## 2022-05-17 LAB
EST. AVERAGE GLUCOSE BLD GHB EST-MCNC: 120 MG/DL
HBA1C MFR BLD: 5.8 %

## 2022-05-18 DIAGNOSIS — R73.03 PREDIABETES: ICD-10-CM

## 2022-05-18 DIAGNOSIS — E66.01 CLASS 3 SEVERE OBESITY DUE TO EXCESS CALORIES WITHOUT SERIOUS COMORBIDITY WITH BODY MASS INDEX (BMI) OF 50.0 TO 59.9 IN ADULT (HCC): ICD-10-CM

## 2022-06-20 ENCOUNTER — APPOINTMENT (EMERGENCY)
Dept: RADIOLOGY | Facility: HOSPITAL | Age: 52
End: 2022-06-20
Payer: COMMERCIAL

## 2022-06-20 ENCOUNTER — HOSPITAL ENCOUNTER (EMERGENCY)
Facility: HOSPITAL | Age: 52
Discharge: HOME | End: 2022-06-20
Attending: EMERGENCY MEDICINE | Admitting: EMERGENCY MEDICINE
Payer: COMMERCIAL

## 2022-06-20 VITALS
HEIGHT: 74 IN | TEMPERATURE: 97.7 F | SYSTOLIC BLOOD PRESSURE: 133 MMHG | OXYGEN SATURATION: 97 % | BODY MASS INDEX: 40.43 KG/M2 | RESPIRATION RATE: 17 BRPM | WEIGHT: 315 LBS | DIASTOLIC BLOOD PRESSURE: 90 MMHG | HEART RATE: 88 BPM

## 2022-06-20 DIAGNOSIS — S89.91XA INJURY OF RIGHT KNEE, INITIAL ENCOUNTER: Primary | ICD-10-CM

## 2022-06-20 PROCEDURE — 99282 EMERGENCY DEPT VISIT SF MDM: CPT | Performed by: PHYSICIAN ASSISTANT

## 2022-06-20 PROCEDURE — 99283 EMERGENCY DEPT VISIT LOW MDM: CPT

## 2022-06-20 PROCEDURE — 73564 X-RAY EXAM KNEE 4 OR MORE: CPT

## 2022-06-20 NOTE — ED PROVIDER NOTES
History  Chief Complaint   Patient presents with    Knee Injury     Left knee injury when he got out of a truck and went into a pothole and injured it on Saturday and now has increase pain      This is a 53-year-old male with past medical history significant for elevated BMI presenting to the emergency department today for right-sided knee pain  Two days ago he was attempting to get out of his truck and stepped into a pothole resulting in him twisting his right knee  He is noting pain specifically to the patellar region of his right knee  He is still able to walk without difficulty  He denies any knee swelling  He has no ecchymosis  He is able to bend his knee with some pain  He has been ambulatory without difficulty  No prior surgeries or injury to that knee  No numbness or tingling  No falls, head strike, or pain to any other portion of his lower extremities  The patient denies other complaints at this time  History provided by:  Patient   used: No    Knee Pain  Location:  Knee  Time since incident:  2 days  Injury: yes    Mechanism of injury comment:  "twisted knee"  Knee location:  R knee  Chronicity:  New  Dislocation: no    Tetanus status:  Unknown  Prior injury to area:  No  Relieved by:  None tried  Worsened by:  Bearing weight (movement)  Ineffective treatments:  None tried  Associated symptoms: stiffness    Associated symptoms: no back pain, no decreased ROM, no fatigue, no fever, no itching, no muscle weakness, no neck pain, no numbness, no swelling and no tingling        Prior to Admission Medications   Prescriptions Last Dose Informant Patient Reported? Taking?    Dulaglutide 1 5 MG/0 5ML SOPN 6/20/2022 at Unknown time  No Yes   Sig: Inject 0 5 mL (1 5 mg total) under the skin once a week   albuterol (2 5 mg/3 mL) 0 083 % nebulizer solution Past Week at Unknown time  No Yes   Sig: Take 1 vial (2 5 mg total) by nebulization every 4 (four) hours as needed for wheezing or shortness of breath   albuterol (PROVENTIL HFA,VENTOLIN HFA) 90 mcg/act inhaler Past Week at Unknown time  No Yes   Sig: Inhale 2 puffs every 4 (four) hours as needed for wheezing      Facility-Administered Medications: None       Past Medical History:   Diagnosis Date    GERD (gastroesophageal reflux disease)     Lung mass     Obesity        History reviewed  No pertinent surgical history  Family History   Problem Relation Age of Onset    Tuberculosis Father     Atrial fibrillation Father 72    Lung cancer Paternal Uncle     Alcohol abuse Neg Hx     Substance Abuse Neg Hx     Mental illness Neg Hx     Depression Neg Hx      I have reviewed and agree with the history as documented  E-Cigarette/Vaping    E-Cigarette Use Never User      E-Cigarette/Vaping Substances    Nicotine No     THC No     CBD No     Flavoring No     Other No     Unknown No      Social History     Tobacco Use    Smoking status: Former Smoker     Packs/day: 3 00     Years: 40 00     Pack years: 120 00     Types: Cigarettes     Start date: 1979     Quit date: 4/30/2019     Years since quitting: 3 1    Smokeless tobacco: Never Used   Vaping Use    Vaping Use: Never used   Substance Use Topics    Alcohol use: Not Currently    Drug use: Never       Review of Systems   Constitutional: Negative for appetite change, chills, diaphoresis, fatigue and fever  Eyes: Negative for visual disturbance  Respiratory: Negative for cough, chest tightness, shortness of breath and wheezing  Cardiovascular: Negative for chest pain, palpitations and leg swelling  Gastrointestinal: Negative for abdominal pain, constipation, diarrhea, nausea and vomiting  Genitourinary: Negative for dysuria  Musculoskeletal: Positive for arthralgias (right knee pain) and stiffness  Negative for back pain, gait problem, joint swelling, myalgias, neck pain and neck stiffness  Skin: Negative for itching, rash and wound     Neurological: Negative for dizziness, seizures, syncope, weakness, light-headedness, numbness and headaches  Psychiatric/Behavioral: Negative for confusion  Physical Exam  Physical Exam  Vitals and nursing note reviewed  Constitutional:       General: He is not in acute distress  Appearance: Normal appearance  He is obese  He is not ill-appearing, toxic-appearing or diaphoretic  HENT:      Head: Normocephalic and atraumatic  Nose: Nose normal  No congestion or rhinorrhea  Mouth/Throat:      Mouth: Mucous membranes are moist       Pharynx: No oropharyngeal exudate or posterior oropharyngeal erythema  Eyes:      General: No scleral icterus  Right eye: No discharge  Left eye: No discharge  Conjunctiva/sclera: Conjunctivae normal    Cardiovascular:      Rate and Rhythm: Normal rate and regular rhythm  Pulses: Normal pulses  Heart sounds: Normal heart sounds  No murmur heard  No friction rub  No gallop  Pulmonary:      Effort: Pulmonary effort is normal  No respiratory distress  Breath sounds: Normal breath sounds  No stridor  No wheezing, rhonchi or rales  Chest:      Chest wall: No tenderness  Musculoskeletal:         General: Normal range of motion  Cervical back: Normal range of motion  No rigidity  Right lower leg: No edema  Left lower leg: No edema  Comments: Tenderness to palpation to the right anterior knee without any tenderness to palpation medially, laterally, or posteriorly  Knees without any ecchymosis or swelling  Negative Bonnie sign bilaterally  Normal range of motion to active and passive range of motion of right knee to flexion and extension; the patient does note pain with this  Skin:     General: Skin is warm and dry  Capillary Refill: Capillary refill takes less than 2 seconds  Coloration: Skin is not jaundiced or pale  Neurological:      General: No focal deficit present        Mental Status: He is alert and oriented to person, place, and time  Mental status is at baseline  Comments: 5/5 strength in bilateral lower extremities  Normal sensation of bilateral lower extremities   Psychiatric:         Mood and Affect: Mood normal          Behavior: Behavior normal          Vital Signs  ED Triage Vitals [06/20/22 1001]   Temperature Pulse Respirations Blood Pressure SpO2   97 7 °F (36 5 °C) 88 17 133/90 97 %      Temp Source Heart Rate Source Patient Position - Orthostatic VS BP Location FiO2 (%)   Oral Monitor Sitting Left arm --      Pain Score       10 - Worst Possible Pain           Vitals:    06/20/22 1001   BP: 133/90   Pulse: 88   Patient Position - Orthostatic VS: Sitting         Visual Acuity      ED Medications  Medications - No data to display    Diagnostic Studies  Results Reviewed     None                 XR knee 4+ vw right injury   Final Result by Girish Pérez MD (06/20 1049)   Mild degenerative changes      No acute osseous abnormality  Workstation performed: [id number]                    Procedures  Procedures         ED Course                                             MDM  Number of Diagnoses or Management Options  Injury of right knee, initial encounter: new and requires workup  Diagnosis management comments: This is a 68-year-old male presenting to the emergency department today for right-sided knee pain  Twisted his knee 2 days ago when getting out of a truck  Denies swelling or ecchymosis  He is still ambulatory without difficulty  He has normal range of motion to flexion and extension  Vital signs are stable  No acute osseous abnormality on knee x-ray  The patient was placed in a knee immobilizer and instructed on how to use crutches here  The patient is stable for discharge at this time  Recommend orthopedic follow-up as soon as possible  RICE  Strict return precautions were given  Recommend PCP follow-up as soon as possible   The patient and/or patient's proxy verify their understanding and agree to the plan at this time  All questions answered to the patient and/or their proxy's satisfaction  All labs reviewed and utilized in the medical decision making process  All radiology studies independently viewed by me and interpreted by the radiologist  Portions of the record may have been created with voice recognition software   Occasional wrong word or "sound a like" substitutions may have occurred due to the inherent limitations of voice recognition software   Read the chart carefully and recognize, using context, where substitutions have occurred  Amount and/or Complexity of Data Reviewed  Tests in the radiology section of CPT®: reviewed and ordered  Independent visualization of images, tracings, or specimens: yes (XR Right Knee)    Patient Progress  Patient progress: stable      Disposition  Final diagnoses:   Injury of right knee, initial encounter     Time reflects when diagnosis was documented in both MDM as applicable and the Disposition within this note     Time User Action Codes Description Comment    6/20/2022 10:54 AM Tonja Servin [[id number] [id number]] Injury of right knee, initial encounter       ED Disposition     ED Disposition   Discharge    Condition   Stable    Date/Time   Mon Jun 20, 2022 10:54 AM    Comment   Adithya Low discharge to home/self care                 Follow-up Information     Follow up With Specialties Details Why Contact Info Additional Information    Niki Jean MD Internal Medicine Schedule an appointment as soon as possible for a visit   [facility]  [id number] [place] [address] [phone]       [address] Emergency Department Emergency Medicine Go to  If symptoms worsen [facility],[address] [id number]  [address] [place] [address] Emergency Department, [address], [address] [place] [address] Orthopedic Surgery Schedule an appointment as soon as possible for a visit   [address] [facility] [address] [address] [place] [address], [address]  Use Entrance A           Discharge Medication List as of 6/20/2022 10:57 AM      CONTINUE these medications which have NOT CHANGED    Details   albuterol (2 5 mg/3 mL) 0 083 % nebulizer solution Take 1 vial (2 5 mg total) by nebulization every 4 (four) hours as needed for wheezing or shortness of breath, Starting Fri 4/30/2021, Normal      albuterol (PROVENTIL HFA,VENTOLIN HFA) 90 mcg/act inhaler Inhale 2 puffs every 4 (four) hours as needed for wheezing, Starting Fri 4/30/2021, Normal      Dulaglutide 1 5 MG/0 5ML SOPN Inject 0 5 mL (1 5 mg total) under the skin once a week, Starting Wed 5/18/2022, Normal             No discharge procedures on file      PDMP Review     None          ED Provider  Electronically Signed by           Astrid Landry PA-C  06/20/22 1972

## 2022-06-20 NOTE — DISCHARGE INSTRUCTIONS
Please return to the emergency department for worsening symptoms including chest pain, shortness of breath, dizziness, lightheadedness, fever greater than 103, severe pain, inability to walk, fainting episodes, etc  Please follow-up with your family practice provider as soon as possible  Please continue wearing the knee immobilizer and using the crutches to help immobilize your right knee  Please follow-up with an orthopedist as soon as possible for the injury to your right knee  You may use Tylenol and Motrin for pain relief  Please see the back of the bottle for dosing instructions

## 2022-06-23 ENCOUNTER — TELEPHONE (OUTPATIENT)
Dept: INTERNAL MEDICINE CLINIC | Facility: CLINIC | Age: 52
End: 2022-06-23

## 2022-06-23 NOTE — TELEPHONE ENCOUNTER
Seen in the ER on Monday with injury to right knee  Pain and non weight bearing  Told to f/u with PCP  Has appt with ortho on 6/30  Unable to come into the office   Can he do virtual?

## 2022-06-24 ENCOUNTER — TELEMEDICINE (OUTPATIENT)
Dept: INTERNAL MEDICINE CLINIC | Facility: CLINIC | Age: 52
End: 2022-06-24
Payer: COMMERCIAL

## 2022-06-24 DIAGNOSIS — M25.561 ACUTE PAIN OF RIGHT KNEE: Primary | ICD-10-CM

## 2022-06-24 PROCEDURE — 99213 OFFICE O/P EST LOW 20 MIN: CPT | Performed by: INTERNAL MEDICINE

## 2022-06-24 PROCEDURE — 1036F TOBACCO NON-USER: CPT | Performed by: INTERNAL MEDICINE

## 2022-06-24 NOTE — PATIENT INSTRUCTIONS
You may take acetaminophen  (Tylenol) 500 mg, 2 tablets every 6 hours, no more that that for the next week only  You can alternate with ibuprofen 200 mg 1 to 3 tablets every 8 hours, please take with food  Please call central scheduling  to schedule MRI

## 2022-06-24 NOTE — ASSESSMENT & PLAN NOTE
May alternate Tylenol and ibuprofen  Apply ice  Use crutches, try to move around the house more often  Ordered MRI  Follow up with ortho as scheduled

## 2022-06-24 NOTE — PROGRESS NOTES
Virtual Regular Visit    Verification of patient location:    Patient is located in the following state in which I hold an active license [place]      Assessment/Plan:    Problem List Items Addressed This Visit        Other    Right knee pain - Primary     May alternate Tylenol and ibuprofen  Apply ice  Use crutches, try to move around the house more often  Ordered MRI  Follow up with ortho as scheduled  Relevant Orders    MRI knee right  wo contrast               Reason for visit is   Chief Complaint   Patient presents with    Follow-up     Er f/u, has pain, ER did not give meds   Virtual Regular Visit        Encounter provider Lucila Panda MD    Provider located at [address] [place] [id number]      Recent Visits  Date Type Provider Dept   06/23/22 Telephone MD Denilson Solano [facility]   Showing recent visits within past 7 days and meeting all other requirements  Today's Visits  Date Type Provider Dept   06/24/22 Telemedicine MD Denilson Solano Internal Med   Showing today's visits and meeting all other requirements  Future Appointments  No visits were found meeting these conditions  Showing future appointments within next 150 days and meeting all other requirements       The patient was identified by name and date of birth  Philip Lim was informed that this is a telemedicine visit and that the visit is being conducted through [address] [place] [address] Now and patient was informed that this is a secure, HIPAA-compliant platform  He agrees to proceed     My office door was closed  No one else was in the room  He acknowledged consent and understanding of privacy and security of the video platform  The patient has agreed to participate and understands they can discontinue the visit at any time  Patient is aware this is a billable service       Subjective  Philip Lim is a 45 y o  male right knee pain   He hurt his knee earlier this week  He reports it is still swollen, unable to put weight on it  He has been using crutches, but has been laying in bed most of the time since  He did got to the ER, was told he strained a ligament  He has an appointment with Orthopedics next week  He has been taking Tylenol every 4 hours which has not really helped  He has not applied any ice or heat  Past Medical History:   Diagnosis Date    GERD (gastroesophageal reflux disease)     Lung mass     Obesity        History reviewed  No pertinent surgical history  Current Outpatient Medications   Medication Sig Dispense Refill    albuterol (2 5 mg/3 mL) 0 083 % nebulizer solution Take 1 vial (2 5 mg total) by nebulization every 4 (four) hours as needed for wheezing or shortness of breath 270 mL 5    albuterol (PROVENTIL HFA,VENTOLIN HFA) 90 mcg/act inhaler Inhale 2 puffs every 4 (four) hours as needed for wheezing 8 5 g 3    Dulaglutide 1 5 MG/0 5ML SOPN Inject 0 5 mL (1 5 mg total) under the skin once a week 6 mL 0     No current facility-administered medications for this visit  No Known Allergies    Review of Systems   Constitutional: Positive for activity change  Negative for appetite change  Musculoskeletal: Positive for arthralgias, gait problem and joint swelling  Neurological: Positive for weakness  Negative for headaches  Video Exam    There were no vitals filed for this visit  Physical Exam  Constitutional:       General: He is not in acute distress  Appearance: Normal appearance  He is not ill-appearing  HENT:      Head: Normocephalic and atraumatic  Pulmonary:      Effort: Pulmonary effort is normal  No respiratory distress  Neurological:      Mental Status: He is alert  I spent 9 minutes directly with the patient during this visit    VIRTUAL VISIT Denilson Hugo verbally agrees to participate in [employer]   Pt is aware that [employer] could be limited without vital signs or the ability to perform a full hands-on physical Author Dereck understands he or the provider may request at any time to terminate the video visit and request the patient to seek care or treatment in person

## 2022-06-27 ENCOUNTER — HOSPITAL ENCOUNTER (OUTPATIENT)
Dept: MRI IMAGING | Facility: HOSPITAL | Age: 52
Discharge: HOME | End: 2022-06-27
Payer: COMMERCIAL

## 2022-06-27 DIAGNOSIS — M25.561 ACUTE PAIN OF RIGHT KNEE: ICD-10-CM

## 2022-06-27 PROCEDURE — 73721 MRI JNT OF LWR EXTRE W/O DYE: CPT

## 2022-06-28 ENCOUNTER — TELEPHONE (OUTPATIENT)
Dept: INTERNAL MEDICINE CLINIC | Facility: CLINIC | Age: 52
End: 2022-06-28

## 2022-06-28 DIAGNOSIS — M25.561 ACUTE PAIN OF RIGHT KNEE: Primary | ICD-10-CM

## 2022-06-28 RX ORDER — OXYCODONE HYDROCHLORIDE 10 MG/1
10 TABLET ORAL EVERY 6 HOURS PRN
Qty: 30 TABLET | Refills: 0 | Status: SHIPPED | OUTPATIENT
Start: 2022-06-28 | End: 2022-08-10 | Stop reason: ALTCHOICE

## 2022-06-28 NOTE — TELEPHONE ENCOUNTER
Have you been taking the ibuprofen and Tylenol alternately? I can give you Percocet for the pain  Confirm pharmacy

## 2022-06-28 NOTE — TELEPHONE ENCOUNTER
Pt notified states he is in a lot of pain- has not been taking anything til he heard from PCP , keeping appt with Ortho for Thursday

## 2022-06-28 NOTE — TELEPHONE ENCOUNTER
Yes he has been doing tylenol and ibuprofen     [facility]

## 2022-06-28 NOTE — TELEPHONE ENCOUNTER
MRI showed a torn meniscus and arthritis of your right knee  Use the crutches for mobility  How is the pain? Please keep scheduled appt with Ortho on Thursday

## 2022-06-28 NOTE — TELEPHONE ENCOUNTER
Oxycodone sent to the pharmacy, you can take it up to 4x a day  You may continue taking Tylenol and ibuprofen

## 2022-06-29 NOTE — TELEPHONE ENCOUNTER
Spoke with pharmacist, medication was picked up yesterday     May have needed PA but patient paid for it

## 2022-06-29 NOTE — TELEPHONE ENCOUNTER
Please call pharmacy if PA really needed  Call patient also, to see if he wants to pay out of pocket to get it sooner

## 2022-06-30 ENCOUNTER — OFFICE VISIT (OUTPATIENT)
Dept: OBGYN CLINIC | Facility: CLINIC | Age: 52
End: 2022-06-30
Payer: COMMERCIAL

## 2022-06-30 VITALS — HEART RATE: 113 BPM | HEIGHT: 74 IN | BODY MASS INDEX: 40.43 KG/M2 | OXYGEN SATURATION: 96 % | WEIGHT: 315 LBS

## 2022-06-30 DIAGNOSIS — S83.241A OTHER TEAR OF MEDIAL MENISCUS, CURRENT INJURY, RIGHT KNEE, INITIAL ENCOUNTER: Primary | ICD-10-CM

## 2022-06-30 DIAGNOSIS — M17.11 PRIMARY OSTEOARTHRITIS OF RIGHT KNEE: ICD-10-CM

## 2022-06-30 PROCEDURE — 20610 DRAIN/INJ JOINT/BURSA W/O US: CPT | Performed by: PHYSICIAN ASSISTANT

## 2022-06-30 PROCEDURE — 3008F BODY MASS INDEX DOCD: CPT | Performed by: INTERNAL MEDICINE

## 2022-06-30 PROCEDURE — 99203 OFFICE O/P NEW LOW 30 MIN: CPT | Performed by: PHYSICIAN ASSISTANT

## 2022-06-30 RX ORDER — BUPIVACAINE HYDROCHLORIDE 5 MG/ML
2 INJECTION, SOLUTION EPIDURAL; INTRACAUDAL
Status: COMPLETED | OUTPATIENT
Start: 2022-06-30 | End: 2022-06-30

## 2022-06-30 RX ORDER — LIDOCAINE HYDROCHLORIDE 10 MG/ML
2 INJECTION, SOLUTION INFILTRATION; PERINEURAL
Status: COMPLETED | OUTPATIENT
Start: 2022-06-30 | End: 2022-06-30

## 2022-06-30 RX ORDER — TRIAMCINOLONE ACETONIDE 40 MG/ML
80 INJECTION, SUSPENSION INTRA-ARTICULAR; INTRAMUSCULAR
Status: COMPLETED | OUTPATIENT
Start: 2022-06-30 | End: 2022-06-30

## 2022-06-30 RX ADMIN — TRIAMCINOLONE ACETONIDE 80 MG: 40 INJECTION, SUSPENSION INTRA-ARTICULAR; INTRAMUSCULAR at 08:39

## 2022-06-30 RX ADMIN — BUPIVACAINE HYDROCHLORIDE 2 ML: 5 INJECTION, SOLUTION EPIDURAL; INTRACAUDAL at 08:39

## 2022-06-30 RX ADMIN — LIDOCAINE HYDROCHLORIDE 2 ML: 10 INJECTION, SOLUTION INFILTRATION; PERINEURAL at 08:39

## 2022-06-30 NOTE — PROGRESS NOTES
Assessment/Plan   Diagnoses and all orders for this visit:    Tear of medial meniscus, current injury, right knee, initial encounter    Primary osteoarthritis of right knee    - Cortisone injection today  - Sedentary duty  - Ice as needed  - Start PT  - Follow up with Dr Jacob Caraballo in 2-3 weeks          Subjective   Patient ID: Luis Edwards is a 45 y o  male  Vitals:    06/30/22 0816   Pulse: (!) 113   SpO2: 96%     51yo male comes in for an evaluation of his right knee  He was injured on 6/20/22 when he stepped in a pothole when getting out of his truck and twisted it  His PCP ordered an MRI which showed tricompartmental osteoarthritis and a medial meniscus tear  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  The pain is dull in character, moderate in severity, pain does not radiate and is not associated with numbness  The following portions of the patient's history were reviewed and updated as appropriate: allergies, current medications, past family history, past medical history, past social history, past surgical history and problem list     Review of Systems  Ortho Exam  Past Medical History:   Diagnosis Date    GERD (gastroesophageal reflux disease)     Lung mass     Obesity      History reviewed  No pertinent surgical history    Family History   Problem Relation Age of Onset    Tuberculosis Father     Atrial fibrillation Father 72    Lung cancer Paternal Uncle     Alcohol abuse Neg Hx     Substance Abuse Neg Hx     Mental illness Neg Hx     Depression Neg Hx      Social History     Occupational History    Not on file   Tobacco Use    Smoking status: Former Smoker     Packs/day: 3 00     Years: 40 00     Pack years: 120 00     Types: Cigarettes     Start date: 1979     Quit date: 4/30/2019     Years since quitting: 3 1    Smokeless tobacco: Never Used   Vaping Use    Vaping Use: Never used   Substance and Sexual Activity    Alcohol use: Not Currently    Drug use: Never    Sexual activity: Not Currently       Review of Systems   Constitutional: Negative  HENT: Negative  Eyes: Negative  Respiratory: Negative  Cardiovascular: Negative  Gastrointestinal: Negative  Endocrine: Negative  Genitourinary: Negative  Musculoskeletal: As below      Allergic/Immunologic: Negative  Neurological: Negative  Hematological: Negative  Psychiatric/Behavioral: Negative  Objective   Physical Exam    · Constitutional: Awake, Alert, Oriented  · Eyes: EOMI  · Psych: Mood and affect appropriate  · Heart: regular rate   · Lungs: No audible wheezing  · Abdomen: No guarding  · Lymph: no lymphedema   right Knee:  - Appearance   Swelling: mild  No discoloration  - Effusion   mild  - Palpation   + Tenderness medial joint line , + Tenderness medial collateral ligament and + Tenderness patella  - ROM   Extension: 5 and Flexion: 110  - Special Tests   Anterior Drawer Test negative, Posterior Drawer Test negative, Valgus Stress Test negative and Varus Stress Test negative  - Motor   normal 5/5 in all planes  - NVI distally    I have personally reviewed pertinent films in PACS and my interpretation is medial joint space narrowing on xray  Large joint arthrocentesis: R knee  Universal Protocol:  Consent: Verbal consent obtained  Risks and benefits: risks, benefits and alternatives were discussed  Consent given by: patient  Time out: Immediately prior to procedure a "time out" was called to verify the correct patient, procedure, equipment, support staff and site/side marked as required  Timeout called at: 6/30/2022 8:38 AM   Patient understanding: patient states understanding of the procedure being performed  Site marked: the operative site was marked  Radiology Images displayed and confirmed   If images not available, report reviewed: imaging studies available  Patient identity confirmed: verbally with patient    Supporting Documentation  Indications: pain   Procedure Details  Location: knee - R knee  Needle size: 22 G  Ultrasound guidance: no  Approach: lateral  Medications administered: 2 mL bupivacaine (PF) 0 5 %; 2 mL lidocaine 1 %; 80 mg triamcinolone acetonide 40 mg/mL    Patient tolerance: patient tolerated the procedure well with no immediate complications  Dressing:  Sterile dressing applied

## 2022-06-30 NOTE — LETTER
June 30, 2022     Patient: Teddy Burrell  YOB: 1970  Date of Visit: 6/30/2022      To Whom it May Concern:    Lore Leigh is under my professional care  Fly Paco was seen in my office on 6/30/2022  Fly Paco may return to work with limitations sedentary (sitting) duty  If work is unable to accommodate this, then remain out until the follow up visit  If you have any questions or concerns, please don't hesitate to call  Sincerely,          Nancy Edmonds PA-C        CC: Glenda Bang   [facility]

## 2022-07-05 ENCOUNTER — EVALUATION (OUTPATIENT)
Dept: PHYSICAL THERAPY | Facility: REHABILITATION | Age: 52
End: 2022-07-05
Payer: COMMERCIAL

## 2022-07-05 DIAGNOSIS — M17.11 PRIMARY OSTEOARTHRITIS OF RIGHT KNEE: Primary | ICD-10-CM

## 2022-07-05 DIAGNOSIS — S83.241A OTHER TEAR OF MEDIAL MENISCUS, CURRENT INJURY, RIGHT KNEE, INITIAL ENCOUNTER: ICD-10-CM

## 2022-07-05 PROCEDURE — 97110 THERAPEUTIC EXERCISES: CPT

## 2022-07-05 PROCEDURE — 97161 PT EVAL LOW COMPLEX 20 MIN: CPT

## 2022-07-05 NOTE — PROGRESS NOTES
PT Evaluation     Today's date: 2022  Patient name: Kapil Mayes  : 1970  MRN: [id number]  Referring provider: Lam Ventura  Dx:   Encounter Diagnosis     ICD-10-CM    1  Primary osteoarthritis of right knee  M17 11 Ambulatory Referral to Physical Therapy   2  Other tear of medial meniscus, current injury, right knee, initial encounter  S83 657A Ambulatory Referral to Physical Therapy       Start Time: 730  Stop Time: 0815  Total time in clinic (min): 45 minutes    Assessment  Assessment details: Kapil Mayes is a pleasant 45 y o  male who presents with acute right knee pain  Maria Fernanda Davenport has tibiofemoral joint hypomobility resulting in pathoanatomical symptoms of anterior knee pain resulting in worry over not knowing what's wrong, concern at no signs of improvement, fear of not being able to keep active and future ill health (and wanting to prevent it)  No further referral appears necessary at this time based upon examination results  I expect he will improve in 6-8 weeks  Positive prognostic indicators include positive attitude toward recovery, good understanding of diagnosis and treatment plan options and acuity of symptoms  Negative prognostic indicators include hypertension, high symptom irritability and obesity  Problem List:  1) Pain with ambulation  2) Knee extension weakness    Comparable signs:  1) Pain with STS transfers  2) Pain with knee flexion  Impairments: abnormal gait, abnormal muscle firing, abnormal muscle tone, abnormal or restricted ROM, activity intolerance, impaired balance, impaired physical strength, lacks appropriate home exercise program, pain with function, weight-bearing intolerance and poor body mechanics    Symptom irritability: highUnderstanding of Dx/Px/POC: good   Prognosis: good    Goals  Short Term Goals (Week 4):  1  Decreased pain by 50%  2  Improve ROM by 10 degrees   3  Improve strength by 1/2 measure  4   Resume full exercise routine at gym without being limited by pain      Long Term Goals (8 weeks):  1  <2/10 pain with functional activities like squatting, descending stairs, and lunging by 8 weeks  2  Exceed FOTO predicted outcome score  3  Fully independent with HEP by discharge  4  Patient will be able to manage symptoms independently  Plan  Patient would benefit from: skilled physical therapy  Planned therapy interventions: home exercise program, graded activity, gait training, flexibility, functional ROM exercises, strengthening, stretching, therapeutic activities, therapeutic exercise, patient education, neuromuscular re-education, joint mobilization, manual therapy, activity modification, balance/weight bearing training and behavior modification  Frequency: 2x week  Duration in weeks: 6  Treatment plan discussed with: patient        Subjective Evaluation    History of Present Illness  Date of onset: 2022  Mechanism of injury: Patient reports stepping down out of a truck reports he twisted his ankle and knee before falling onto the knee  Patient reports some locking in the knee  States anterior and lateral knee pain when moving it around  Slight improvements in pain with CSI  Patient reports improvements with advil, ice, and heat  Pain is aggravated with walking, STS transfers, and mobility  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  Reports he walks 5 miles a day and has to be up and down ladders frequently  Patient reports swelling at times  Patient reports no light duty at work  Pain  Current pain ratin  At worst pain ratin      Diagnostic Tests  X-ray: abnormal  MRI studies: abnormal  Patient Goals  Patient goals for therapy: decreased pain, return to work, increased strength, return to sport/leisure activities, independence with ADLs/IADLs and increased motion  Patient goal: fully weight-bearing, returning to work, lifting leg up and over motorcycle           Objective     Tenderness     Additional Tenderness Details  TTP all around patella, medial joint line, and medial thigh    Passive Range of Motion   Left Knee   Normal passive range of motion    Right Knee   Flexion: 90 degrees with pain  Extension: 0 degrees with pain    Additional Passive Range of Motion Details  Pain at end range with both flexion and extension    Mobility   Patellar Mobility:   Left Knee   Hypomobile: left medial, left lateral, left superior and left inferior    Right Knee   WFL: medial, lateral, superior and inferior    Strength/Myotome Testing     Left Knee   Flexion: 5  Extension: 5  Quadriceps contraction: good    Right Knee   Flexion: 4-  Extension: 3+  Quadriceps contraction: fair             Precautions: standard      Manuals 7/5            Patellar Mobs nv            ISTM/Laser nv                                      Neuro Re-Ed                                                                                                        Ther Ex             LAQ HEP            SLR Flexion HEP            Heel Slides HEP            Minisquats nv            Step Ups nv                         VG nv            Bike nv            Ther Activity                                       Gait Training                                       Modalities

## 2022-07-07 ENCOUNTER — OFFICE VISIT (OUTPATIENT)
Dept: PHYSICAL THERAPY | Facility: REHABILITATION | Age: 52
End: 2022-07-07
Payer: COMMERCIAL

## 2022-07-07 DIAGNOSIS — M17.11 PRIMARY OSTEOARTHRITIS OF RIGHT KNEE: ICD-10-CM

## 2022-07-07 DIAGNOSIS — S83.241A OTHER TEAR OF MEDIAL MENISCUS, CURRENT INJURY, RIGHT KNEE, INITIAL ENCOUNTER: Primary | ICD-10-CM

## 2022-07-07 PROCEDURE — 97140 MANUAL THERAPY 1/> REGIONS: CPT

## 2022-07-07 PROCEDURE — 97110 THERAPEUTIC EXERCISES: CPT

## 2022-07-07 NOTE — PROGRESS NOTES
Daily Note     Today's date: 2022  Patient name: Mart Whittaker  : 1970  MRN: [id number]  Referring provider: Dana Dubois  Dx:   Encounter Diagnosis     ICD-10-CM    1  Other tear of medial meniscus, current injury, right knee, initial encounter  S83 241A    2  Primary osteoarthritis of right knee  M17 11        Start Time: 1530  Stop Time: 1615  Total time in clinic (min): 45 minutes    Subjective: Patient reports compliance with HEP  States no worsening of symptoms since  Objective: See treatment diary below      Assessment: Patient demonstrated high irritability with patellar mobilizations which improved with manuals  Restricted patellar mobility in all directions which improved with patellar mobs  Good response to patellar taping  Will re-assess patient symptoms next visit  Slight improvements in knee pain end of session  Patient would benefit from continued PT      Plan: Continue per plan of care        Precautions: standard      Manuals            Patellar Mobs nv PRR Gr1-3 all directions           ISTM/Laser nv            Medial glide patellar taping  WA                        Neuro Re-Ed                                                                                                        Ther Ex             LAQ HEP            SLR Flexion HEP            Heel Slides HEP            Minisquats nv            Step Ups nv                         VG nv L5 5'           Bike nv 10' recumbent no resistance           Ther Activity                                       Gait Training                                       Modalities

## 2022-07-11 ENCOUNTER — OFFICE VISIT (OUTPATIENT)
Dept: PHYSICAL THERAPY | Facility: REHABILITATION | Age: 52
End: 2022-07-11
Payer: COMMERCIAL

## 2022-07-11 DIAGNOSIS — M17.11 PRIMARY OSTEOARTHRITIS OF RIGHT KNEE: ICD-10-CM

## 2022-07-11 DIAGNOSIS — S83.241A OTHER TEAR OF MEDIAL MENISCUS, CURRENT INJURY, RIGHT KNEE, INITIAL ENCOUNTER: Primary | ICD-10-CM

## 2022-07-11 PROCEDURE — 97110 THERAPEUTIC EXERCISES: CPT

## 2022-07-11 NOTE — PROGRESS NOTES
Daily Note     Today's date: 2022  Patient name: Camilo Clancy  : 1970  MRN: [id number]  Referring provider: Orquidea Sow  Dx:   Encounter Diagnosis     ICD-10-CM    1  Other tear of medial meniscus, current injury, right knee, initial encounter  S83 241A    2  Primary osteoarthritis of right knee  M17 11        Start Time: 0800  Stop Time: 0830  Total time in clinic (min): 30 minutes    Subjective: Patient reports a 25% improvement so far in his symptoms since start of PT  Patient reports medial glide taping of the patella improved his pain levels and tolerance for being on the knee  Objective: See treatment diary below      Assessment: Patient has attended PT for 3 visits and made a 25% improvement in his right knee pain and function  Symptoms are localized to the lateral and anterior patella  Patient does demonstrate TTP at medial joint line  Patient wished to end session early today for personal reasons  Patient has f/u with new ortho , will await the results of this f/u to determine any changes in POC  Patient would benefit from continued PT      Plan: Continue per plan of care        Precautions: standard      Manuals           Patellar Mobs nv PRR Gr1-3 all directions PRR Gr1-3 all directions          ISTM/Laser nv            Medial glide patellar taping  DE DE                       Neuro Re-Ed                                                                                                        Ther Ex             LAQ HEP            SLR Flexion HEP            Heel Slides HEP            Minisquats nv            Step Ups nv                         VG nv L5 5' L7 5'          Bike nv 10' recumbent no resistance 7' recumbent no resist          Ther Activity                                       Gait Training                                       Modalities

## 2022-07-14 ENCOUNTER — APPOINTMENT (OUTPATIENT)
Dept: RADIOLOGY | Facility: CLINIC | Age: 52
End: 2022-07-14
Payer: COMMERCIAL

## 2022-07-14 ENCOUNTER — OFFICE VISIT (OUTPATIENT)
Dept: OBGYN CLINIC | Facility: CLINIC | Age: 52
End: 2022-07-14
Payer: COMMERCIAL

## 2022-07-14 ENCOUNTER — OFFICE VISIT (OUTPATIENT)
Dept: PHYSICAL THERAPY | Facility: REHABILITATION | Age: 52
End: 2022-07-14
Payer: COMMERCIAL

## 2022-07-14 VITALS
SYSTOLIC BLOOD PRESSURE: 144 MMHG | HEIGHT: 74 IN | DIASTOLIC BLOOD PRESSURE: 86 MMHG | BODY MASS INDEX: 40.43 KG/M2 | WEIGHT: 315 LBS

## 2022-07-14 DIAGNOSIS — S83.241A OTHER TEAR OF MEDIAL MENISCUS, CURRENT INJURY, RIGHT KNEE, INITIAL ENCOUNTER: Primary | ICD-10-CM

## 2022-07-14 DIAGNOSIS — M17.11 PRIMARY OSTEOARTHRITIS OF RIGHT KNEE: Primary | ICD-10-CM

## 2022-07-14 DIAGNOSIS — M17.11 PRIMARY OSTEOARTHRITIS OF RIGHT KNEE: ICD-10-CM

## 2022-07-14 DIAGNOSIS — M25.561 RIGHT KNEE PAIN, UNSPECIFIED CHRONICITY: ICD-10-CM

## 2022-07-14 PROCEDURE — 73560 X-RAY EXAM OF KNEE 1 OR 2: CPT

## 2022-07-14 PROCEDURE — 97110 THERAPEUTIC EXERCISES: CPT

## 2022-07-14 PROCEDURE — 99213 OFFICE O/P EST LOW 20 MIN: CPT | Performed by: ORTHOPAEDIC SURGERY

## 2022-07-14 NOTE — LETTER
July 14, 2022     Patient: Camilo Clancy  YOB: 1970  Date of Visit: 7/14/2022      To Whom it May Concern:    Eufemia Argueta is under my professional care  Tammy Rico was seen in my office on 7/14/2022  The patient should nt bend, climb or squat  He can work sedentary desk duty only  If you have any questions or concerns, please don't hesitate to call           Sincerely,          Diana Bhandari MD        CC: No Recipients

## 2022-07-14 NOTE — LETTER
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

## 2022-07-14 NOTE — PROGRESS NOTES
Assessment:     1  Primary osteoarthritis of right knee    2  Right knee pain, unspecified chronicity        Plan:     Problem List Items Addressed This Visit        Musculoskeletal and Integument    Primary osteoarthritis of right knee - Primary       Other    Right knee pain    Relevant Orders    XR knee 1 or 2 vw right       Finding consistent with right knee osteoarthritis  I reviewed patient's right knee x-ray and MRI with him  I discussed prognosis of his knee condition  Patient's symptom is mostly related to the arthritis in his patellofemoral joint  He has no mechanical symptoms from the possible medial meniscus tear on the MRI  Patient will continue physical therapy  He can continue over-the-counter NSAID for pain  He is provided with work restrictions of no bending, squatting or lifting yet can work sedentary duty only  I advised patient to avoid high impact activities, repetitive squatting, kneeling, and climbing  Low-impact exercises with stationary bike or swimming would be beneficial   He should follow up in 6 weeks  We will continue to observe his knee if he has increased mechanical symptoms from the meniscal pathology then he may require surgical interventions  All patient's questions were answered to his satisfaction  This note is created using dictation transcription  It may contain typographical errors, grammatical errors, improperly dictated words, background noise and other errors  Subjective:     Patient ID: Ksenia Galarza is a 45 y o  male  Chief Complaint:  The patient presents for initial evaluation of right knee  He did fall 6/20/2022 while getting out of his truck with immediate pain  He was seen at the ED  Today he complains of right anterior central and anterolateral knee pain  He denies mechanical symptoms  He rates his pain a 0/10 and 10/10 at its worse  Increased activity and prolonged walking aggravates while rest alleviates    He does use heat and ice with benefit  He does use percocet, Aleve and ASA with benefit  He is currently in physical therapy  He did have a steroid injection with [facility] with benefit on 6/30/22  He denies past knee surgery  Information on patient's intake form was reviewed  Allergy:  No Known Allergies  Medications:  all current active meds have been reviewed  Past Medical History:  Past Medical History:   Diagnosis Date    GERD (gastroesophageal reflux disease)     Lung mass     Obesity      Past Surgical History:  History reviewed  No pertinent surgical history  Family History:  Family History   Problem Relation Age of Onset    Tuberculosis Father     Atrial fibrillation Father 72    Lung cancer Paternal Uncle     Alcohol abuse Neg Hx     Substance Abuse Neg Hx     Mental illness Neg Hx     Depression Neg Hx      Social History:  Social History     Substance and Sexual Activity   Alcohol Use Not Currently     Social History     Substance and Sexual Activity   Drug Use Never     Social History     Tobacco Use   Smoking Status Former Smoker    Packs/day: 3 00    Years: 40 00    Pack years: 120 00    Types: Cigarettes    Start date: 5    Quit date: 4/30/2019    Years since quitting: 3 2   Smokeless Tobacco Never Used     Review of Systems   Constitutional: Negative for chills, fever and unexpected weight change  HENT: Negative for hearing loss, nosebleeds and sore throat  Eyes: Negative for pain, redness and visual disturbance  Respiratory: Negative for cough, shortness of breath and wheezing  Cardiovascular: Negative for chest pain, palpitations and leg swelling  Gastrointestinal: Negative for abdominal pain, nausea and vomiting  Endocrine: Negative for polydipsia and polyuria  Genitourinary: Negative for difficulty urinating and hematuria  Musculoskeletal: Positive for arthralgias (right knee)  Skin: Negative for rash and wound  Neurological: Negative      Psychiatric/Behavioral: Negative for decreased concentration and suicidal ideas  The patient is not nervous/anxious  Objective:  BP Readings from Last 1 Encounters:   07/14/22 144/86      Wt Readings from Last 1 Encounters:   07/14/22 (!) 184 kg (405 lb)      BMI:   Estimated body mass index is 52 kg/m² as calculated from the following:    Height as of this encounter: 6' 2" (1 88 m)  Weight as of this encounter: 184 kg (405 lb)  BSA:   Estimated body surface area is 2 94 meters squared as calculated from the following:    Height as of this encounter: 6' 2" (1 88 m)  Weight as of this encounter: 184 kg (405 lb)  Physical Exam  Vitals and nursing note reviewed  Constitutional:       Appearance: Normal appearance  He is well-developed  HENT:      Head: Normocephalic and atraumatic  Right Ear: External ear normal       Left Ear: External ear normal       Nose: Nose normal    Eyes:      Extraocular Movements: Extraocular movements intact  Conjunctiva/sclera: Conjunctivae normal    Pulmonary:      Effort: Pulmonary effort is normal    Musculoskeletal:      Cervical back: Neck supple  Right knee: No effusion  Instability Tests: Medial Nicole test negative and lateral Nicole test negative  Skin:     General: Skin is warm and dry  Neurological:      Mental Status: He is alert and oriented to person, place, and time  Psychiatric:         Mood and Affect: Mood normal          Behavior: Behavior normal        Right Knee Exam     Muscle Strength   The patient has normal right knee strength  Tenderness   Right knee tenderness location: Lateral and patellofemoral     Range of Motion   The patient has normal right knee ROM      Tests   Nicole:  Medial - negative Lateral - negative  Varus: negative Valgus: negative  Patellar apprehension: negative    Other   Erythema: absent  Scars: absent  Sensation: normal  Pulse: present  Swelling: none  Effusion: no effusion present    Comments:  Patellofemoral joint crepitation with knee motion            I have personally reviewed pertinent films in PACS and my interpretation is Right knee x-ray:  Mild-moderate medial and moderate-severe patellofemoral arthritic changes with decreased joint space, subchondral sclerosis and osteophyte formation       Scribe Attestation    I,:  Misael Mendoza am acting as a scribe while in the presence of the attending physician :       I,:  Yumi Mccray MD personally performed the services described in this documentation    as scribed in my presence :

## 2022-07-14 NOTE — PROGRESS NOTES
Daily Note     Today's date: 2022  Patient name: Caroline Perkins  : 1970  MRN: [id number]  Referring provider: Liat Ortiz  Dx:   Encounter Diagnosis     ICD-10-CM    1  Other tear of medial meniscus, current injury, right knee, initial encounter  S83 241A    2  Primary osteoarthritis of right knee  M17 11        Start Time: 1715  Stop Time: 1745  Total time in clinic (min): 30 minutes    Subjective: Patient reports he saw Ortho earlier today, they recommended continuing PT for the next 6 weeks  Patient wished to defer treatment today, states he wanted to rest and ice knee tonight  Objective: See treatment diary below      Assessment: Educated patient on proper return to biking and treadmill walking today  Patient will be resuming gym routine starting tomorrow, discussed with patient he should not perform any activities in gym until cleared by PT, patient verbalized understanding  Will resume program next visit and re-assess symptoms following patient returning the gym next visit  Patient would benefit from continued PT      Plan: Continue per plan of care        Precautions: standard      Manuals          Patellar Mobs nv PRR Gr1-3 all directions PRR Gr1-3 all directions resume nv         ISTM/Laser nv            Medial glide patellar taping  NM NM resume nv         Re-Assessment, Patient Edu, POC     30'         Neuro Re-Ed                                                                                                        Ther Ex             LAQ HEP            SLR Flexion HEP            Heel Slides HEP            Minisquats nv            Step Ups nv                         VG nv L5 5' L7 5' resume nv         Bike nv 10' recumbent no resistance 7' recumbent no resist resume nv         Ther Activity                                       Gait Training                                       Modalities

## 2022-07-18 ENCOUNTER — OFFICE VISIT (OUTPATIENT)
Dept: PHYSICAL THERAPY | Facility: REHABILITATION | Age: 52
End: 2022-07-18
Payer: COMMERCIAL

## 2022-07-18 DIAGNOSIS — M17.11 PRIMARY OSTEOARTHRITIS OF RIGHT KNEE: ICD-10-CM

## 2022-07-18 DIAGNOSIS — S83.241A OTHER TEAR OF MEDIAL MENISCUS, CURRENT INJURY, RIGHT KNEE, INITIAL ENCOUNTER: Primary | ICD-10-CM

## 2022-07-18 PROCEDURE — 97140 MANUAL THERAPY 1/> REGIONS: CPT

## 2022-07-18 PROCEDURE — 97110 THERAPEUTIC EXERCISES: CPT

## 2022-07-18 NOTE — PROGRESS NOTES
Daily Note     Today's date: 2022  Patient name: Esha Castro  : 1970  MRN: [id number]  Referring provider: Hang Suarez  Dx:   Encounter Diagnosis     ICD-10-CM    1  Other tear of medial meniscus, current injury, right knee, initial encounter  S83 241A    2  Primary osteoarthritis of right knee  M17 11                   Subjective: Pt reports he has increased his activity levels since his LV  Has returned to gym riding recumbent bike and walking on treadmill  Has also gone on walks with his dog  Can almost bend knee enough to get socks on; hopes to return to wearing sneakers soon  Objective: See treatment diary below      Assessment: Tolerated treatment well  Continues to present with TTP along lateral joint line of R knee and along lateral border of patella  Increased tone present with soreness present along R ITB that did begin to resolve with TpR and foam rolling to this musculature  Responded well to LAQ with weighted resistance  Patient would benefit from continued PT to further improve strength, decrease pain, and maximize overall function  Plan: Continue per plan of care        Precautions: standard      Manuals         Patellar Mobs nv PRR Gr1-3 all directions PRR Gr1-3 all directions resume nv Prom [employer]        ISTM/Laser nv    foam roll R ITB AFB        Medial glide patellar taping  ND ND resume nv AFB        Re-Assessment, Patient Edu, POC     30'         Neuro Re-Ed             QS     5"x20                                                                                      Ther Ex             LAQ HEP    0#  5"x10    3#  5"x10        SLR Flexion HEP    1x10        Heel Slides HEP            Minisquats nv            Step Ups nv                         VG nv L5 5' L7 5' resume nv resume nv        Bike nv 10' recumbent no resistance 7' recumbent no resist resume nv 8' recumbent no resist        Ther Activity Gait Training                                       Modalities

## 2022-07-21 ENCOUNTER — TELEPHONE (OUTPATIENT)
Dept: INTERNAL MEDICINE CLINIC | Facility: CLINIC | Age: 52
End: 2022-07-21

## 2022-07-21 ENCOUNTER — OFFICE VISIT (OUTPATIENT)
Dept: PHYSICAL THERAPY | Facility: REHABILITATION | Age: 52
End: 2022-07-21
Payer: COMMERCIAL

## 2022-07-21 DIAGNOSIS — S83.241A OTHER TEAR OF MEDIAL MENISCUS, CURRENT INJURY, RIGHT KNEE, INITIAL ENCOUNTER: Primary | ICD-10-CM

## 2022-07-21 DIAGNOSIS — M17.11 PRIMARY OSTEOARTHRITIS OF RIGHT KNEE: ICD-10-CM

## 2022-07-21 PROBLEM — S83.241S: Status: ACTIVE | Noted: 2022-07-21

## 2022-07-21 PROCEDURE — 97110 THERAPEUTIC EXERCISES: CPT

## 2022-07-21 PROCEDURE — 97140 MANUAL THERAPY 1/> REGIONS: CPT

## 2022-07-21 NOTE — PROGRESS NOTES
Daily Note     Today's date: 2022  Patient name: Amy Ordonez  : 1970  MRN: [id number]  Referring provider: Anna Pena  Dx:   Encounter Diagnosis     ICD-10-CM    1  Other tear of medial meniscus, current injury, right knee, initial encounter  S83 241A    2  Primary osteoarthritis of right knee  M17 11        Start Time: 1600  Stop Time: 1650  Total time in clinic (min): 50 minutes    Subjective: Patient reports his knee is doing well  States it feels good after doing exercises in the pool  Patient reports he wants to look into getting a [employer]/gym membership for the next month  Objective: See treatment diary below      Assessment: TTP in superolateral and lateral knee which improved with IASTM  Patient tolerated light strengthening without pain, slight pulling sensation in patella  Instructed patient he may imitate strengthening at gym but he is not to exceed what is performed in PT  Patient would benefit from continued PT      Plan: Continue per plan of care        Precautions: standard      Manuals        Patellar Mobs nv PRR Gr1-3 all directions PRR Gr1-3 all directions resume nv Prom [employer]        ISTM/Laser nv    foam roll R ITB [place]ASTM [place]        Medial glide patellar taping  [place] nv [place]        Re-Assessment, Patient Edu, POC     30'         Neuro Re-Ed             QS     5"x20        Leg Press      60# 3x10  SL       Knee Extension      20# 3x10 SL                                                           Ther Ex             LAQ HEP    0#  5"x10    3#  5"x10        SLR Flexion HEP    1x10        Heel Slides HEP            Minisquats nv            Step Ups nv            Treadmill      1 8 mph 10'       VG nv L5 5' L7 5' resume nv resume nv L7 5'       Bike nv 10' recumbent no resistance 7' recumbent no resist resume nv 8' recumbent no resist 7' L1       Ther Activity                                       Gait Training Modalities

## 2022-07-21 NOTE — TELEPHONE ENCOUNTER
Spoke with patient   Injury did not happen at work     He has not returned because they do not have light duty  As far as return date it is up to Ortho and the surgeon currently in PT

## 2022-07-21 NOTE — TELEPHONE ENCOUNTER
Please call patient  Completing the form he dropped off  Ask if injury occurred at work  Have you returned to work? If not, when do you plan to return?

## 2022-07-22 ENCOUNTER — TELEPHONE (OUTPATIENT)
Dept: INTERNAL MEDICINE CLINIC | Facility: CLINIC | Age: 52
End: 2022-07-22

## 2022-07-22 NOTE — TELEPHONE ENCOUNTER
Pt will no longer be using [facility] in [facility]     All prescriptions will now go to [facility] in [place]

## 2022-07-25 ENCOUNTER — OFFICE VISIT (OUTPATIENT)
Dept: PHYSICAL THERAPY | Facility: REHABILITATION | Age: 52
End: 2022-07-25
Payer: COMMERCIAL

## 2022-07-25 DIAGNOSIS — S83.241A OTHER TEAR OF MEDIAL MENISCUS, CURRENT INJURY, RIGHT KNEE, INITIAL ENCOUNTER: Primary | ICD-10-CM

## 2022-07-25 DIAGNOSIS — M17.11 PRIMARY OSTEOARTHRITIS OF RIGHT KNEE: ICD-10-CM

## 2022-07-25 PROCEDURE — 97140 MANUAL THERAPY 1/> REGIONS: CPT

## 2022-07-25 PROCEDURE — 97110 THERAPEUTIC EXERCISES: CPT

## 2022-07-25 NOTE — PROGRESS NOTES
Daily Note     Today's date: 2022  Patient name: Steffen Retana  : 1970  MRN: [id number]  Referring provider: Igor Adamson  Dx:   Encounter Diagnosis     ICD-10-CM    1  Other tear of medial meniscus, current injury, right knee, initial encounter  S83 241A    2  Primary osteoarthritis of right knee  M17 11        Start Time: 1615  Stop Time: 1700  Total time in clinic (min): 45 minutes    Subjective: Patient reports being sore after last visit, but states ice cleared up the soreness  Patient reports knee is improving with each week  Objective: See treatment diary below      Assessment: Patient making steady progress overall  Patient still demonstrates moderate TTP in superolateral patella that improves with IASTM  Patient tolerating controlled strengthening of quads well  Continue to progress each visit  Patient would benefit from continued PT      Plan: Continue per plan of care        Precautions: standard      Manuals       Patellar Mobs nv PRR Gr1-3 all directions PRR Gr1-3 all directions resume nv Prom [employer]        ISTM/Laser nv    foam roll R ITB AFB IASTM [place]  IASTM [place]      Medial glide patellar taping  [place] resume nv AFB        Re-Assessment, Patient Edu, POC     30'         FOTO       perf      Neuro Re-Ed             QS     5"x20        Leg Press      60# 3x10  SL 80# 3x10 SL      Knee Extension      20# 3x10 SL 20# 3x10 SL                                                          Ther Ex             LAQ HEP    0#  5"x10    3#  5"x10        SLR Flexion HEP    1x10        Heel Slides HEP            Minisquats nv            Step Ups nv            Treadmill      1 8 mph 10' np resume      VG nv L5 5' L7 5' resume nv resume nv L7 5' L7 5'      Bike nv 10' recumbent no resistance 7' recumbent no resist resume nv 8' recumbent no resist 7' L1 10' L1      Ther Activity                                       Gait Training Modalities

## 2022-07-29 ENCOUNTER — OFFICE VISIT (OUTPATIENT)
Dept: PHYSICAL THERAPY | Facility: REHABILITATION | Age: 52
End: 2022-07-29
Payer: COMMERCIAL

## 2022-07-29 DIAGNOSIS — S83.241A OTHER TEAR OF MEDIAL MENISCUS, CURRENT INJURY, RIGHT KNEE, INITIAL ENCOUNTER: Primary | ICD-10-CM

## 2022-07-29 DIAGNOSIS — M17.11 PRIMARY OSTEOARTHRITIS OF RIGHT KNEE: ICD-10-CM

## 2022-07-29 PROCEDURE — 97112 NEUROMUSCULAR REEDUCATION: CPT

## 2022-07-29 PROCEDURE — 97110 THERAPEUTIC EXERCISES: CPT

## 2022-07-29 PROCEDURE — 97140 MANUAL THERAPY 1/> REGIONS: CPT

## 2022-07-29 NOTE — PROGRESS NOTES
Daily Note     Today's date: 2022  Patient name: Gabby Haro  : 1970  MRN: [id number]  Referring provider: Padilla Blackwood  Dx:   Encounter Diagnosis     ICD-10-CM    1  Other tear of medial meniscus, current injury, right knee, initial encounter  S83 241A    2  Primary osteoarthritis of right knee  M17 11                   Subjective: Pt reports he is very sore yet today  States he rode his recumbent bike 10 miles yesterday, which is likely cause for soreness today  Objective: See treatment diary below  Slight swelling present along superior lateral aspect of R patella  Advised pt to reduce volume and intensity of HEP as needed, in effort to avoid further aggravation of symptoms  Also advised to continue with ice to help manage pain and swelling  Assessment: Tolerated treatment well  Program modified today d/t soreness reported at start of treatment  Moderate to significant and TTP along distal quad today during STM/IASTM  Was able to complete all assigned exercise with no significant increase in symptoms  Patient would benefit from continued PT  Plan: Continue per plan of care  Continue to monitor symptoms NV         Precautions: standard      Manuals      Patellar Mobs nv PRR Gr1-3 all directions PRR Gr1-3 all directions resume nv Prom [employer]        ISTM/Laser nv    foam roll R ITB AFB IASTM [place]  IASTM AK STM/IASTM AFB     Medial glide patellar taping  [place] resume nv AFB        Re-Assessment, Patient Edu, POC     30'         FOTO       perf      Neuro Re-Ed             QS     5"x20        Leg Press      60# 3x10  SL 80# 3x10 SL 70# 2x10 SL     Knee Extension      20# 3x10 SL 20# 3x10 SL 35# 2x10DL                                                         Ther Ex             LAQ HEP    0#  5"x10    3#  5"x10        SLR Flexion HEP    1x10        Heel Slides HEP            Minisquats nv            Step Ups nv            Treadmill 1 8 mph 10' np resume np resume     VG nv L5 5' L7 5' resume nv resume nv L7 5' L7 5' L6 - 5'     Bike nv 10' recumbent no resistance 7' recumbent no resist resume nv 8' recumbent no resist 7' L1 10' L1 10' L1     Ther Activity                                       Gait Training                                       Modalities

## 2022-08-01 ENCOUNTER — APPOINTMENT (OUTPATIENT)
Dept: PHYSICAL THERAPY | Facility: REHABILITATION | Age: 52
End: 2022-08-01
Payer: COMMERCIAL

## 2022-08-02 ENCOUNTER — OFFICE VISIT (OUTPATIENT)
Dept: PHYSICAL THERAPY | Facility: REHABILITATION | Age: 52
End: 2022-08-02
Payer: COMMERCIAL

## 2022-08-02 DIAGNOSIS — S83.241A OTHER TEAR OF MEDIAL MENISCUS, CURRENT INJURY, RIGHT KNEE, INITIAL ENCOUNTER: Primary | ICD-10-CM

## 2022-08-02 DIAGNOSIS — M17.11 PRIMARY OSTEOARTHRITIS OF RIGHT KNEE: ICD-10-CM

## 2022-08-02 PROCEDURE — 97140 MANUAL THERAPY 1/> REGIONS: CPT

## 2022-08-02 PROCEDURE — 97112 NEUROMUSCULAR REEDUCATION: CPT

## 2022-08-02 PROCEDURE — 97110 THERAPEUTIC EXERCISES: CPT

## 2022-08-02 NOTE — PROGRESS NOTES
Daily Note     Today's date: 2022  Patient name: Sandeep Isabel  : 1970  MRN: [id number]  Referring provider: Elinor Conklin  Dx:   Encounter Diagnosis     ICD-10-CM    1  Other tear of medial meniscus, current injury, right knee, initial encounter  S83 241A    2  Primary osteoarthritis of right knee  M17 11        Start Time: 0900  Stop Time: 09  Total time in clinic (min): 46 minutes    Subjective: Patient reports soreness in his knee cap over the weekend  Objective: See treatment diary below      Assessment: Increased pain/soreness in superolateral patella today and through the weekend, likely related to increased activity on Friday  Educated patient any modifying activities over the next few days  Patient will apply heat and ice at home as needed as he responded well to LASER today  Patient had improved pain end of session  Patient would benefit from continued PT      Plan: Continue per plan of care        Precautions: standard      Manuals     Patellar Mobs nv PRR Gr1-3 all directions PRR Gr1-3 all directions resume nv Prom [employer]        ISTM/Laser nv    foam roll R ITB AFB IASTM NV  IASTM NV STM/IASTM AFB 14W DC 4'    Medial glide patellar taping  NV NV resume nv AFB    NV    Re-Assessment, Patient Edu, POC     30'         FOTO       perf      Neuro Re-Ed             QS     5"x20        Leg Press      60# 3x10  SL 80# 3x10 SL 70# 2x10 SL 80# 3x10 SL    Knee Extension      20# 3x10 SL 20# 3x10 SL 35# 2x10DL 20# 3x10DL                                                        Ther Ex             LAQ HEP    0#  5"x10    3#  5"x10        SLR Flexion HEP    1x10        Heel Slides HEP            Minisquats nv            Step Ups nv            Treadmill      1 8 mph 10' np resume np resume     VG nv L5 5' L7 5' resume nv resume nv L7 5' L7 5' L6 - 5' L7 5'    Bike nv 10' recumbent no resistance 7' recumbent no resist resume nv 8' recumbent no resist 7' L1 10' L1 10' L1 7' L1    Ther Activity                                       Gait Training                                       Modalities

## 2022-08-05 ENCOUNTER — OFFICE VISIT (OUTPATIENT)
Dept: PHYSICAL THERAPY | Facility: REHABILITATION | Age: 52
End: 2022-08-05
Payer: COMMERCIAL

## 2022-08-05 DIAGNOSIS — M17.11 PRIMARY OSTEOARTHRITIS OF RIGHT KNEE: ICD-10-CM

## 2022-08-05 DIAGNOSIS — S83.241A OTHER TEAR OF MEDIAL MENISCUS, CURRENT INJURY, RIGHT KNEE, INITIAL ENCOUNTER: Primary | ICD-10-CM

## 2022-08-05 PROCEDURE — 97140 MANUAL THERAPY 1/> REGIONS: CPT

## 2022-08-05 PROCEDURE — 97110 THERAPEUTIC EXERCISES: CPT

## 2022-08-05 NOTE — PROGRESS NOTES
Daily Note     Today's date: 2022  Patient name: Liliya Lazo  : 1970  MRN: [id number]  Referring provider: Ila Rao  Dx:   Encounter Diagnosis     ICD-10-CM    1  Other tear of medial meniscus, current injury, right knee, initial encounter  S83 241A    2  Primary osteoarthritis of right knee  M17 11        Start Time: 1238  Stop Time: 1315  Total time in clinic (min): 37 minutes    Subjective: Patient reports improvements in knee pain over last few days with inclusion of heat  Objective: See treatment diary below      Assessment: Patient advised to hold on lower extremity strengthening at gym at home  Patient likely developed a tendonitis due to overuse from all the activities he is performing, patient verbalized understanding  Plan to progress strengthening activities next visit if symptoms continue to reduce  Patient would benefit from continued PT      Plan: Continue per plan of care        Precautions: standard      Manuals    Patellar Mobs nv PRR Gr1-3 all directions PRR Gr1-3 all directions resume nv Prom [employer]        ISTM/Laser nv    foam roll R ITB AFB IASTM NE  IASTM NE STM/IASTM AFB 14W DC 4' 16W DC 4'   Medial glide patellar taping  NE NE resume nv AFB    NE    Re-Assessment, Patient Edu, POC     30'         FOTO       perf      Neuro Re-Ed             QS     5"x20        Leg Press      60# 3x10  SL 80# 3x10 SL 70# 2x10 SL 80# 3x10 SL 80# 3x10 SL   Knee Extension      20# 3x10 SL 20# 3x10 SL 35# 2x10DL 20# 3x10DL 20# 3x10 SL                                                       Ther Ex             LAQ HEP    0#  5"x10    3#  5"x10        SLR Flexion HEP    1x10        Heel Slides HEP            Minisquats nv            Step Ups nv         6" step x10    Treadmill      1 8 mph 10' np resume np resume     VG nv L5 5' L7 5' resume nv resume nv L7 5' L7 5' L6 - 5' L7 5' L7 5'   Bike nv 10' recumbent no resistance 7' recumbent no resist resume nv 8' recumbent no resist 7' L1 10' L1 10' L1 7' L1 L1 10'   Ther Activity                                       Gait Training                                       Modalities

## 2022-08-08 ENCOUNTER — OFFICE VISIT (OUTPATIENT)
Dept: PHYSICAL THERAPY | Facility: REHABILITATION | Age: 52
End: 2022-08-08
Payer: COMMERCIAL

## 2022-08-08 DIAGNOSIS — M17.11 PRIMARY OSTEOARTHRITIS OF RIGHT KNEE: ICD-10-CM

## 2022-08-08 DIAGNOSIS — S83.241A OTHER TEAR OF MEDIAL MENISCUS, CURRENT INJURY, RIGHT KNEE, INITIAL ENCOUNTER: Primary | ICD-10-CM

## 2022-08-08 PROCEDURE — 97140 MANUAL THERAPY 1/> REGIONS: CPT

## 2022-08-08 PROCEDURE — 97112 NEUROMUSCULAR REEDUCATION: CPT

## 2022-08-08 PROCEDURE — 97110 THERAPEUTIC EXERCISES: CPT

## 2022-08-08 NOTE — PROGRESS NOTES
Daily Note     Today's date: 2022  Patient name: Estefania Celeste  : 1970  MRN: [id number]  Referring provider: Britney Olivera  Dx:   Encounter Diagnosis     ICD-10-CM    1  Other tear of medial meniscus, current injury, right knee, initial encounter  S83 241A    2  Primary osteoarthritis of right knee  M17 11        Start Time: 1615  Stop Time: 1655  Total time in clinic (min): 40 minutes    Subjective: Patient reports knee is doing much better than last week  Patient reports he didn't need to take any NSAIDs for pain free while walking over the weekend  Patient reports he still has sensitivity in the right knee  Objective: See treatment diary below      Assessment: Patient continues to have very good response to LASER  Symptoms mildly increased end of session, symptoms most increased by leg extension machine  Patient would benefit from continued PT      Plan: Continue per plan of care        Precautions: standard      Manuals    Patellar Mobs    resume nv Prom [employer]B        ISTM/Laser 16W DC 4 5'    foam roll R ITB AFB IASTM MD  IASTM MD STM/IASTM AFB 14W DC 4' 16W DC 4'   Medial glide patellar taping    resume nv AFB    MD    Re-Assessment, Patient Edu, POC     30'         FOTO       perf      Neuro Re-Ed             QS     5"x20        Leg Press 100# 3x10 SL     60# 3x10  SL 80# 3x10 SL 70# 2x10 SL 80# 3x10 SL 80# 3x10 SL   Knee Extension 20# 1x10 25#  2x10  SL     20# 3x10 SL 20# 3x10 SL 35# 2x10DL 20# 3x10DL 20# 3x10 SL                                                       Ther Ex             LAQ     0#  5"x10    3#  5"x10        SLR Flexion     1x10        Heel Slides             Minisquats             Step Ups 8" step x5         6" step x10    Treadmill      1 8 mph 10' np resume np resume     VG L7 5'   resume nv resume nv L7 5' L7 5' L6 - 5' L7 5' L7 5'   Bike L1 10'   resume nv 8' recumbent no resist 7' L1 10' L1 10' L1 7' L1 L1 10'   Ther Activity Gait Training                                       Modalities

## 2022-08-10 ENCOUNTER — OFFICE VISIT (OUTPATIENT)
Dept: INTERNAL MEDICINE CLINIC | Facility: CLINIC | Age: 52
End: 2022-08-10
Payer: COMMERCIAL

## 2022-08-10 VITALS
BODY MASS INDEX: 40.43 KG/M2 | HEART RATE: 123 BPM | SYSTOLIC BLOOD PRESSURE: 122 MMHG | DIASTOLIC BLOOD PRESSURE: 84 MMHG | HEIGHT: 74 IN | TEMPERATURE: 96.5 F | OXYGEN SATURATION: 96 % | WEIGHT: 315 LBS

## 2022-08-10 DIAGNOSIS — R73.03 PREDIABETES: ICD-10-CM

## 2022-08-10 DIAGNOSIS — Z79.899 ENCOUNTER FOR LONG-TERM CURRENT USE OF MEDICATION: ICD-10-CM

## 2022-08-10 DIAGNOSIS — E78.49 OTHER HYPERLIPIDEMIA: Primary | ICD-10-CM

## 2022-08-10 DIAGNOSIS — F17.211 CIGARETTE NICOTINE DEPENDENCE IN REMISSION: ICD-10-CM

## 2022-08-10 DIAGNOSIS — S83.241S OTHER TEAR OF MEDIAL MENISCUS, CURRENT INJURY, RIGHT KNEE, SEQUELA: ICD-10-CM

## 2022-08-10 DIAGNOSIS — E66.01 CLASS 3 SEVERE OBESITY DUE TO EXCESS CALORIES WITHOUT SERIOUS COMORBIDITY WITH BODY MASS INDEX (BMI) OF 50.0 TO 59.9 IN ADULT (HCC): ICD-10-CM

## 2022-08-10 PROBLEM — M25.561 RIGHT KNEE PAIN: Status: RESOLVED | Noted: 2022-06-24 | Resolved: 2022-08-10

## 2022-08-10 PROCEDURE — 3725F SCREEN DEPRESSION PERFORMED: CPT | Performed by: INTERNAL MEDICINE

## 2022-08-10 PROCEDURE — 99214 OFFICE O/P EST MOD 30 MIN: CPT | Performed by: INTERNAL MEDICINE

## 2022-08-10 NOTE — ASSESSMENT & PLAN NOTE
No weight change even with recent injury  On Trulicity  Restarted shakes  Returned to the gym, also started swimming  Long discussion regarding healthy options, snacks and importance of portion control

## 2022-08-10 NOTE — PROGRESS NOTES
Assessment/Plan:    Other tear of medial meniscus, current injury, right knee, sequela  Ongoing physical therapy  Keep appointment with Ortho  Still not working since no light duty  Prediabetes  Recent A1c was 5 8%  On Trulicity  Class 3 severe obesity due to excess calories without serious comorbidity in adult ([facility] [address] )  No weight change even with recent injury  On Trulicity  Restarted shakes  Returned to the gym, also started swimming  Long discussion regarding healthy options, snacks and importance of portion control  Cigarette nicotine dependence in remission  Still not smoking  Chest CT updated  [facility]  No symptoms, has not used albuterol inhaler/nebulizer  GERD (gastroesophageal reflux disease)  No recent symptoms  Other hyperlipidemia  Lipids worse, not on statin  Discussed low fat diet, limit red meat  Diagnoses and all orders for this visit:    Other hyperlipidemia  -     Comprehensive metabolic panel; Future  -     Lipid panel; Future  -     TSH, 3rd generation with Free T4 reflex; Future    Prediabetes  -     Dulaglutide 1 5 MG/0 5ML SOPN; Inject 0 5 mL (1 5 mg total) under the skin once a week  -     CBC and differential; Future  -     Hemoglobin A1C; Future    Class 3 severe obesity due to excess calories without serious comorbidity with body mass index (BMI) of 50 0 to 59 9 in adult ([facility])  -     Dulaglutide 1 5 MG/0 5ML SOPN; Inject 0 5 mL (1 5 mg total) under the skin once a week    Other tear of medial meniscus, current injury, right knee, sequela    Cigarette nicotine dependence in remission    Encounter for long-term current use of medication  -     Vitamin B12; Future    Follow up in 6 months or as needed  Subjective:      Patient ID: Gabby Haro is a 45 y o  male here for a follow up  His knee is doing better  He has a little bump on the top of his knee, was told it is because his knee cap is starting to move   He is able to put weight on it   He returned to the gym, limiting the treadmill and bicycle as instructed  He started swimming also  He started drinking his shakes again, says he lost 15 lbs when he was drinking it regularly  He is still using Trulicity weekly  Denies any shortness of breath, chest pain or other symptoms  He is still not smoking  The following portions of the patient's history were reviewed and updated as appropriate: allergies, current medications, past medical history, past social history and problem list     Review of Systems   Constitutional: Positive for activity change  Negative for appetite change and fatigue  HENT: Negative for congestion  Eyes: Negative  Negative for visual disturbance  Respiratory: Negative for cough, chest tightness and shortness of breath  Cardiovascular: Negative for chest pain, palpitations and leg swelling  Gastrointestinal: Negative for abdominal pain and constipation  Genitourinary: Negative for dysuria and frequency  Musculoskeletal: Positive for arthralgias  Negative for gait problem and joint swelling  Skin: Negative for wound  Neurological: Negative for dizziness and headaches  Psychiatric/Behavioral: Negative for sleep disturbance  Objective:      /84   Pulse (!) 123   Temp (!) 96 5 °F (35 8 °C)   Ht 6' 2" (1 88 m)   Wt (!) 183 kg (403 lb)   SpO2 96%   BMI 51 74 kg/m²          Physical Exam  Vitals and nursing note reviewed  Constitutional:       Appearance: He is well-developed  HENT:      Head: Normocephalic and atraumatic  Eyes:      Conjunctiva/sclera: Conjunctivae normal       Pupils: Pupils are equal, round, and reactive to light  Cardiovascular:      Rate and Rhythm: Normal rate and regular rhythm  Heart sounds: Normal heart sounds  Pulmonary:      Effort: Pulmonary effort is normal       Breath sounds: No wheezing or rhonchi  Abdominal:      General: Bowel sounds are normal       Palpations: Abdomen is soft  Musculoskeletal:         General: No swelling  Cervical back: Neck supple  Right knee: No swelling or deformity  Right lower leg: No edema  Left lower leg: No edema  Comments: Normal gait   Skin:     General: Skin is warm  Findings: No rash  Neurological:      General: No focal deficit present  Mental Status: He is alert and oriented to person, place, and time  Psychiatric:         Mood and Affect: Mood and affect normal          Behavior: Behavior normal            Lab results reviewed with patient  BMI Counseling: Body mass index is 51 74 kg/m²  The BMI is above normal  Nutrition recommendations include reducing portion sizes, decreasing overall calorie intake, 3-5 servings of fruits/vegetables daily, increasing intake of lean protein and reducing intake of cholesterol  Exercise recommendations include moderate aerobic physical activity for 150 minutes/week and strength training exercises  Pharmacotherapy was ordered for patient to aid in weight loss

## 2022-08-12 ENCOUNTER — OFFICE VISIT (OUTPATIENT)
Dept: PHYSICAL THERAPY | Facility: REHABILITATION | Age: 52
End: 2022-08-12
Payer: COMMERCIAL

## 2022-08-12 DIAGNOSIS — M17.11 PRIMARY OSTEOARTHRITIS OF RIGHT KNEE: Primary | ICD-10-CM

## 2022-08-12 DIAGNOSIS — S83.241A OTHER TEAR OF MEDIAL MENISCUS, CURRENT INJURY, RIGHT KNEE, INITIAL ENCOUNTER: ICD-10-CM

## 2022-08-12 PROCEDURE — 97140 MANUAL THERAPY 1/> REGIONS: CPT

## 2022-08-12 PROCEDURE — 97110 THERAPEUTIC EXERCISES: CPT

## 2022-08-12 PROCEDURE — 97112 NEUROMUSCULAR REEDUCATION: CPT

## 2022-08-12 NOTE — PROGRESS NOTES
Daily Note     Today's date: 2022  Patient name: Itz Hendrickson  : 1970  MRN: [id number]  Referring provider: Mingo Bernabe  Dx:   Encounter Diagnosis     ICD-10-CM    1  Primary osteoarthritis of right knee  M17 11    2  Other tear of medial meniscus, current injury, right knee, initial encounter  S84 875A        Start Time: 1230  Stop Time: 1313  Total time in clinic (min): 43 minutes    Subjective: Patient reports minimal discomfort in lateral knee, states discomfort is still localized to quad tendon      Objective: See treatment diary below      Assessment: Patient had good tolerance for activities today  Leg extension machine still reproduces some symptoms so will continue to avoid for now  Otherwise patient is improving with each week  Patient would benefit from continued PT      Plan: Continue per plan of care        Precautions: standard      Manuals    Patellar Mobs             ISTM/Laser 16W DC 4 5' 16W DC 4 5'      STM/IASTM AFB 14W DC 4' 16W DC 4'   Medial glide patellar taping         ND    Re-Assessment, Patient Edu, POC              FOTO             Neuro Re-Ed             QS             Leg Press 100# 3x10 # 3x10 SL      70# 2x10 SL 80# 3x10 SL 80# 3x10 SL   Knee Extension 20# 1x10 25#  2x10  SL 20# 1x10 25#  2x10  SL      35# 2x10DL 20# 3x10DL 20# 3x10 SL                                                       Ther Ex             LAQ             SLR Flexion             Heel Slides             Minisquats             Step Ups 8" step x5 8" step x5        6" step x10    Treadmill        np resume     VG L7 5' L7 5'      L6 - 5' L7 5' L7 5'   Bike L1 10' L1 10'      10' L1 7' L1 L1 10'   Ther Activity                                       Gait Training                                       Modalities

## 2022-08-15 ENCOUNTER — APPOINTMENT (OUTPATIENT)
Dept: PHYSICAL THERAPY | Facility: REHABILITATION | Age: 52
End: 2022-08-15
Payer: COMMERCIAL

## 2022-08-19 ENCOUNTER — OFFICE VISIT (OUTPATIENT)
Dept: PHYSICAL THERAPY | Facility: REHABILITATION | Age: 52
End: 2022-08-19
Payer: COMMERCIAL

## 2022-08-19 DIAGNOSIS — M17.11 PRIMARY OSTEOARTHRITIS OF RIGHT KNEE: Primary | ICD-10-CM

## 2022-08-19 DIAGNOSIS — S83.241A OTHER TEAR OF MEDIAL MENISCUS, CURRENT INJURY, RIGHT KNEE, INITIAL ENCOUNTER: ICD-10-CM

## 2022-08-19 PROCEDURE — 97112 NEUROMUSCULAR REEDUCATION: CPT

## 2022-08-19 PROCEDURE — 97140 MANUAL THERAPY 1/> REGIONS: CPT

## 2022-08-19 NOTE — PROGRESS NOTES
Daily Note     Today's date: 2022  Patient name: Keyur Stern  : 1970  MRN: [id number]  Referring provider: Mari Savage  Dx:   Encounter Diagnosis     ICD-10-CM    1  Primary osteoarthritis of right knee  M17 11    2  Other tear of medial meniscus, current injury, right knee, initial encounter  S82 043A        Start Time: 1230  Stop Time: 1310  Total time in clinic (min): 40 minutes    Subjective: Patient reports he was able to walk 5-7 miles without any discomfort, but reports his knee is sore the next day  Objective: See treatment diary below      Assessment: Patient overall doing well  Anticipate patient will be ready to RTW by his f/u with ortho next week  Will perform re-evaluation next visit  Patient would benefit from continued PT      Plan: Continue per plan of care        Precautions: standard      Manuals    Patellar Mobs   infero-medial LA Gr3-4 3x30          ISTM/Laser 16W DC 4 5' 16W DC 4 5' IASTM distal quad tendon     STM/IASTM AFB 14W DC 4' 16W DC 4'   Medial glide patellar taping         LA    Re-Assessment, Patient Edu, POC              FOTO             Neuro Re-Ed             QS             Leg Press 100# 3x10 # 3x10 SL np resume     70# 2x10 SL 80# 3x10 SL 80# 3x10 SL   Knee Extension 20# 1x10 25#  2x10  SL 20# 1x10 25#  2x10  SL np resume     35# 2x10DL 20# 3x10DL 20# 3x10 SL                                                       Ther Ex             LAQ             SLR Flexion             Heel Slides             Minisquats             Step Ups 8" step x5 8" step x5 8" step x10       6" step x10    Treadmill        np resume     VG L7 5' L7 5' L7 10'     L6 - 5' L7 5' L7 5'   Bike L1 10' L1 10' L1 10'     10' L1 7' L1 L1 10'   Ther Activity                                       Gait Training                                       Modalities

## 2022-08-22 ENCOUNTER — EVALUATION (OUTPATIENT)
Dept: PHYSICAL THERAPY | Facility: REHABILITATION | Age: 52
End: 2022-08-22
Payer: COMMERCIAL

## 2022-08-22 DIAGNOSIS — M17.11 PRIMARY OSTEOARTHRITIS OF RIGHT KNEE: Primary | ICD-10-CM

## 2022-08-22 DIAGNOSIS — S83.241A OTHER TEAR OF MEDIAL MENISCUS, CURRENT INJURY, RIGHT KNEE, INITIAL ENCOUNTER: ICD-10-CM

## 2022-08-22 PROCEDURE — 97110 THERAPEUTIC EXERCISES: CPT

## 2022-08-22 NOTE — PROGRESS NOTES
Re-Evaluation     Today's date: 2022  Patient name: Caroline Perkins  : 1970  MRN: [id number]  Referring provider: Liat Ortiz  Dx:   Encounter Diagnosis     ICD-10-CM    1  Primary osteoarthritis of right knee  M17 11    2  Other tear of medial meniscus, current injury, right knee, initial encounter  S83 531A        Start Time: 1700  Stop Time: 1740  Total time in clinic (min): 40 minutes    Subjective: Patient reports he was able to do ladders at home without issue  Patient feels he is 100% ready for return to work and feels overall 90% improved since start of PT  Pain Levels: 4/10       Objective: See treatment diary below  Strength:   5/5 throughout    ROM:   WNL in all directions    TTP:  Superolateral knee    Assessment: Patient has achieved 10,000 steps in a day without limitation  Patient has been able to perform ladders without limitations  Patient reports a 90% improvement overall with stable and mild pain levels  Patient continues to be compliant with HEP  Patient discussed he feels ready for RTW, advised patient to bring this up during his f/u with ortho 22  Will await result of the ortho f/u to determine further POC  Patient would benefit from continued PT      Short Term Goals (Week 4): met all  1  Decreased pain by 50%  2  Improve ROM by 10 degrees   3  Improve strength by 1/2 measure  4  Resume full exercise routine at gym without being limited by pain      Long Term Goals (8 weeks): met all  1  <2/10 pain with functional activities like squatting, descending stairs, and lunging by 8 weeks  2  Exceed FOTO predicted outcome score  3  Fully independent with HEP by discharge  4  Patient will be able to manage symptoms independently  Plan: Continue per plan of care        Precautions: standard      Manuals    Patellar Mobs   infero-medial AR Gr3-4 3x30          ISTM/Laser 16W DC 4 5' 16W DC 4 5' IASTM distal quad tendon     STM/IASTM AFB 14W DC 4' 16W DC 4'   Medial glide patellar taping         HI    Re-Assessment, Patient Edu, POC     20'         FOTO             Neuro Re-Ed             QS             Leg Press 100# 3x10 # 3x10 SL np resume     70# 2x10 SL 80# 3x10 SL 80# 3x10 SL   Knee Extension 20# 1x10 25#  2x10  SL 20# 1x10 25#  2x10  SL np resume     35# 2x10DL 20# 3x10DL 20# 3x10 SL                                                       Ther Ex             LAQ             SLR Flexion             Heel Slides             Minisquats             Step Ups 8" step x5 8" step x5 8" step x10       6" step x10    Treadmill        np resume     VG L7 5' L7 5' L7 10'     L6 - 5' L7 5' L7 5'   Bike L1 10' L1 10' L1 10' L1 10'    10' L1 7' L1 L1 10'   Ther Activity                                       Gait Training                                       Modalities

## 2022-08-25 ENCOUNTER — OFFICE VISIT (OUTPATIENT)
Dept: OBGYN CLINIC | Facility: CLINIC | Age: 52
End: 2022-08-25
Payer: COMMERCIAL

## 2022-08-25 VITALS
WEIGHT: 315 LBS | BODY MASS INDEX: 40.43 KG/M2 | SYSTOLIC BLOOD PRESSURE: 140 MMHG | HEIGHT: 74 IN | DIASTOLIC BLOOD PRESSURE: 84 MMHG

## 2022-08-25 DIAGNOSIS — M17.11 PRIMARY OSTEOARTHRITIS OF RIGHT KNEE: Primary | ICD-10-CM

## 2022-08-25 PROCEDURE — 99213 OFFICE O/P EST LOW 20 MIN: CPT | Performed by: ORTHOPAEDIC SURGERY

## 2022-08-25 NOTE — PROGRESS NOTES
Assessment:   No diagnosis found  Plan:     Problem List Items Addressed This Visit    None        Subjective:     Patient ID: Juan Lira is a 45 y o  male  Chief Complaint:  HPI   Patient presents today for a follow up from right knee pain  Patient has been completely physical therapy regularly and feels it has been beneficial  Patient states he has seen improvments  Allergy:  No Known Allergies  Medications:  all current active meds have been reviewed  Past Medical History:  Past Medical History:   Diagnosis Date    GERD (gastroesophageal reflux disease)     Lung mass     Obesity      Past Surgical History:  No past surgical history on file  Family History:  Family History   Problem Relation Age of Onset    Tuberculosis Father     Atrial fibrillation Father 72    Lung cancer Paternal Uncle     Alcohol abuse Neg Hx     Substance Abuse Neg Hx     Mental illness Neg Hx     Depression Neg Hx      Social History:  Social History     Substance and Sexual Activity   Alcohol Use Not Currently     Social History     Substance and Sexual Activity   Drug Use Never     Social History     Tobacco Use   Smoking Status Former Smoker    Packs/day: 3 00    Years: 40 00    Pack years: 120 00    Types: Cigarettes    Start date: 5    Quit date: 4/30/2019    Years since quitting: 3 3   Smokeless Tobacco Never Used     Review of Systems      Objective:  BP Readings from Last 1 Encounters:   08/10/22 122/84      Wt Readings from Last 1 Encounters:   08/10/22 (!) 183 kg (403 lb)      BMI:   Estimated body mass index is 51 74 kg/m² as calculated from the following:    Height as of 8/10/22: 6' 2" (1 88 m)  Weight as of 8/10/22: 183 kg (403 lb)  BSA:   Estimated body surface area is 2 93 meters squared as calculated from the following:    Height as of 8/10/22: 6' 2" (1 88 m)  Weight as of 8/10/22: 183 kg (403 lb)     Physical Exam  Ortho Exam    {Imaging Review Statement:[id number]}     Scribe Attestation    I,:   am acting as a scribe while in the presence of the attending physician :       I,:   personally performed the services described in this documentation    as scribed in my presence :

## 2022-08-25 NOTE — ASSESSMENT & PLAN NOTE
Findings consistent with right knee osteoarthritis affecting patellofemoral joint with possible medial meniscus tear  Findings and treatment options were discussed with the patient  He is not having any mechanical symptoms  He has done very well with formal physical therapy  The only discomfort occurs with leg extension exercises at the gym  Advised patient to stop doing that specific exercise  Continue the other low-impact exercises  He is given a note to return to work on August 29, 2022 with no restrictions  Follow-up as needed if symptoms worsen  All questions were answered to patient's satisfaction

## 2022-08-25 NOTE — LETTER
August 25, 2022     Patient: Steffen Retana  YOB: 1970  Date of Visit: 8/25/2022      To Whom it May Concern:    Laura Mcclendon is under my professional care  Candelaria Mark was seen in my office on 8/25/2022  Candelaria Mark may return to full duty on August 29, 2022  If you have any questions or concerns, please don't hesitate to call           Sincerely,          Yumi Mccray MD        CC: No Recipients

## 2022-08-25 NOTE — PROGRESS NOTES
Assessment:     1  Primary osteoarthritis of right knee        Plan:     Problem List Items Addressed This Visit        Musculoskeletal and Integument    Primary osteoarthritis of right knee - Primary     Findings consistent with right knee osteoarthritis affecting patellofemoral joint with possible medial meniscus tear  Findings and treatment options were discussed with the patient  He is not having any mechanical symptoms  He has done very well with formal physical therapy  The only discomfort occurs with leg extension exercises at the gym  Advised patient to stop doing that specific exercise  Continue the other low-impact exercises  He is given a note to return to work on August 29, 2022 with no restrictions  Follow-up as needed if symptoms worsen  All questions were answered to patient's satisfaction  Subjective:     Patient ID: Gabby Haro is a 45 y o  male  Chief Complaint: This is a 47-year-old white male following up for right knee osteoarthritis affecting the patellofemoral joint with possible medial meniscus tear  He has continue to attend physical therapy since last visit  He denies any mechanical symptoms  The only discomfort he feels is over his distal quadriceps when doing leg extension exercises at the gym  No increased pain with the other exercises  He has been using ladders with no increased pain  He would like to return to work with no restrictions next week  Allergy:  No Known Allergies  Medications:  all current active meds have been reviewed  Past Medical History:  Past Medical History:   Diagnosis Date    GERD (gastroesophageal reflux disease)     Lung mass     Obesity      Past Surgical History:  History reviewed  No pertinent surgical history    Family History:  Family History   Problem Relation Age of Onset    Tuberculosis Father     Atrial fibrillation Father 72    Lung cancer Paternal Uncle     Alcohol abuse Neg Hx     Substance Abuse Neg Hx     Mental illness Neg Hx     Depression Neg Hx      Social History:  Social History     Substance and Sexual Activity   Alcohol Use Not Currently     Social History     Substance and Sexual Activity   Drug Use Never     Social History     Tobacco Use   Smoking Status Former Smoker    Packs/day: 3 00    Years: 40 00    Pack years: 120 00    Types: Cigarettes    Start date: 5    Quit date: 4/30/2019    Years since quitting: 3 3   Smokeless Tobacco Never Used     Review of Systems   Constitutional: Negative for chills, fever and unexpected weight change  HENT: Negative for hearing loss, nosebleeds and sore throat  Eyes: Negative for pain, redness and visual disturbance  Respiratory: Negative for cough, shortness of breath and wheezing  Cardiovascular: Negative for chest pain, palpitations and leg swelling  Gastrointestinal: Negative for abdominal pain, nausea and vomiting  Endocrine: Negative for polydipsia and polyuria  Genitourinary: Negative for difficulty urinating and hematuria  Musculoskeletal: Negative for arthralgias (right knee), gait problem and joint swelling  Skin: Negative for rash and wound  Neurological: Negative  Psychiatric/Behavioral: Negative for decreased concentration and suicidal ideas  The patient is not nervous/anxious  Objective:  BP Readings from Last 1 Encounters:   08/25/22 140/84      Wt Readings from Last 1 Encounters:   08/25/22 (!) 186 kg (410 lb)      BMI:   Estimated body mass index is 52 64 kg/m² as calculated from the following:    Height as of this encounter: 6' 2" (1 88 m)  Weight as of this encounter: 186 kg (410 lb)  BSA:   Estimated body surface area is 2 95 meters squared as calculated from the following:    Height as of this encounter: 6' 2" (1 88 m)  Weight as of this encounter: 186 kg (410 lb)  Physical Exam  Vitals and nursing note reviewed  Constitutional:       Appearance: Normal appearance  He is well-developed  HENT:      Head: Normocephalic and atraumatic  Right Ear: External ear normal       Left Ear: External ear normal       Nose: Nose normal    Eyes:      Extraocular Movements: Extraocular movements intact  Conjunctiva/sclera: Conjunctivae normal    Pulmonary:      Effort: Pulmonary effort is normal    Musculoskeletal:      Cervical back: Neck supple  Right knee: No effusion  Instability Tests: Medial Nicole test negative and lateral Nicole test negative  Skin:     General: Skin is warm and dry  Neurological:      Mental Status: He is alert and oriented to person, place, and time  Psychiatric:         Mood and Affect: Mood normal          Behavior: Behavior normal        Right Knee Exam     Muscle Strength   The patient has normal right knee strength  Tenderness   The patient is experiencing no tenderness  Range of Motion   The patient has normal right knee ROM  Tests   Nicole:  Medial - negative Lateral - negative  Varus: negative Valgus: negative  Patellar apprehension: negative    Other   Erythema: absent  Scars: absent  Sensation: normal  Pulse: present  Swelling: none  Effusion: no effusion present    Comments:  Patellofemoral joint crepitation with knee motion            No new imaging       Scribe Attestation    I,:  Patrick Ozuna PA-C am acting as a scribe while in the presence of the attending physician :       I,:  Sho Caballero MD personally performed the services described in this documentation    as scribed in my presence :

## 2022-10-03 NOTE — PROGRESS NOTES
PT Discharge   Patient has not returned or communicated intent to return to physical therapy for greater than 30 days  POC will be discharged at this time

## 2022-10-06 ENCOUNTER — TELEPHONE (OUTPATIENT)
Dept: INTERNAL MEDICINE CLINIC | Facility: CLINIC | Age: 52
End: 2022-10-06

## 2022-10-06 NOTE — TELEPHONE ENCOUNTER
Recommend to try using Trulicity again next week (skip for 2 weeks)  If it happens again, we will change it

## 2022-10-06 NOTE — TELEPHONE ENCOUNTER
Over the past two to three weeks about an hour after his Trulicity injection he breaks out in itchy hives on both arms  No change in laundry detergent  No injection this week and no hives  He's not sure what's going on since has been on Trulicity for a while

## 2022-10-13 ENCOUNTER — TELEPHONE (OUTPATIENT)
Dept: INTERNAL MEDICINE CLINIC | Facility: CLINIC | Age: 52
End: 2022-10-13

## 2022-10-13 DIAGNOSIS — R06.83 SNORING: Primary | ICD-10-CM

## 2022-10-13 NOTE — TELEPHONE ENCOUNTER
Would you like to schedule a nurse visit to recheck your BP? It was probably high because of the Red Bull  Please avoid energy drinks  Ordered home sleep study, you can call to schedule

## 2022-10-13 NOTE — TELEPHONE ENCOUNTER
Pt  States he would rater have sleep study at a facility and not a home sleep study  Would like order to be changed  Please call pt  back when this is changed

## 2022-10-13 NOTE — TELEPHONE ENCOUNTER
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

## 2022-11-01 ENCOUNTER — TELEPHONE (OUTPATIENT)
Dept: SLEEP CENTER | Facility: CLINIC | Age: 52
End: 2022-11-01

## 2022-11-01 NOTE — TELEPHONE ENCOUNTER
----- Message from Olivia Mcfarland MD sent at 11/1/2022  1:24 PM EDT -----  Approved  ----- Message -----  From: Megan Roth  Sent: 10/17/2022  11:08 AM EDT  To: Sleep Medicine Daniel Provider    This Diagnostic sleep study needs approval      If approved please sign and return to clerical pool  If denied please include reasons why  Also provide alternative testing if warranted  Please sign and return to clerical pool

## 2023-01-09 ENCOUNTER — APPOINTMENT (EMERGENCY)
Dept: RADIOLOGY | Facility: HOSPITAL | Age: 53
End: 2023-01-09

## 2023-01-09 ENCOUNTER — HOSPITAL ENCOUNTER (EMERGENCY)
Facility: HOSPITAL | Age: 53
Discharge: HOME | End: 2023-01-09
Attending: EMERGENCY MEDICINE

## 2023-01-09 VITALS
SYSTOLIC BLOOD PRESSURE: 180 MMHG | RESPIRATION RATE: 20 BRPM | OXYGEN SATURATION: 97 % | TEMPERATURE: 97.8 F | HEART RATE: 94 BPM | DIASTOLIC BLOOD PRESSURE: 112 MMHG

## 2023-01-09 DIAGNOSIS — S89.92XA INJURY OF LEFT KNEE, INITIAL ENCOUNTER: ICD-10-CM
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
## 2023-01-09 RX ORDER — KETOROLAC TROMETHAMINE 30 MG/ML
15 INJECTION, SOLUTION INTRAMUSCULAR; INTRAVENOUS ONCE
Status: COMPLETED | OUTPATIENT
Start: 2023-01-09 | End: 2023-01-09

## 2023-01-09 RX ORDER — KETOROLAC TROMETHAMINE 30 MG/ML
15 INJECTION, SOLUTION INTRAMUSCULAR; INTRAVENOUS ONCE
Status: DISCONTINUED | OUTPATIENT
Start: 2023-01-09 | End: 2023-01-09

## 2023-01-09 RX ADMIN — KETOROLAC TROMETHAMINE 15 MG: 30 INJECTION, SOLUTION INTRAMUSCULAR; INTRAVENOUS at 12:41

## 2023-01-09 NOTE — ED PROVIDER NOTES
History  Chief Complaint   Patient presents with   • Knee Injury     Pt to er with complaints of left knee pain  States he stepped off a stoop at work Saturday morning and he is now having a hard time walking on it  38-year-old male presents for evaluation of left knee pain which began after stepping off of a stool approximately 1 foot high 2 days ago  He states that he felt that his knee twisted  He does not believe dislocated, but is not certain  Pain has been gradually worsening since the incident and he is having difficulty bearing weight today  Patient took NSAIDs for the pain with the last dose taken at 3 AM           Prior to Admission Medications   Prescriptions Last Dose Informant Patient Reported? Taking? Dulaglutide 1 5 MG/0 5ML SOPN   No No   Sig: Inject 0 5 mL (1 5 mg total) under the skin once a week   albuterol (2 5 mg/3 mL) 0 083 % nebulizer solution   No No   Sig: Take 1 vial (2 5 mg total) by nebulization every 4 (four) hours as needed for wheezing or shortness of breath   albuterol (PROVENTIL HFA,VENTOLIN HFA) 90 mcg/act inhaler   No No   Sig: Inhale 2 puffs every 4 (four) hours as needed for wheezing      Facility-Administered Medications: None       Past Medical History:   Diagnosis Date   • GERD (gastroesophageal reflux disease)    • Lung mass    • Obesity        History reviewed  No pertinent surgical history  Family History   Problem Relation Age of Onset   • Tuberculosis Father    • Atrial fibrillation Father 72   • Lung cancer Paternal Uncle    • Alcohol abuse Neg Hx    • Substance Abuse Neg Hx    • Mental illness Neg Hx    • Depression Neg Hx      I have reviewed and agree with the history as documented      E-Cigarette/Vaping   • E-Cigarette Use Never User      E-Cigarette/Vaping Substances   • Nicotine No    • THC No    • CBD No    • Flavoring No    • Other No    • Unknown No      Social History     Tobacco Use   • Smoking status: Former     Packs/day: 3 00     Years: 40 00 Pack years: 120 00     Types: Cigarettes     Start date: 5     Quit date: 4/30/2019     Years since quitting: 3 6   • Smokeless tobacco: Never   Vaping Use   • Vaping Use: Never used   Substance Use Topics   • Alcohol use: Not Currently   • Drug use: Never       Review of Systems    Physical Exam  Physical Exam  Vitals and nursing note reviewed  HENT:      Head: Normocephalic and atraumatic  Eyes:      Conjunctiva/sclera: Conjunctivae normal    Cardiovascular:      Rate and Rhythm: Normal rate and regular rhythm  Pulses:           Dorsalis pedis pulses are 2+ on the left side  Posterior tibial pulses are 1+ on the left side  Pulmonary:      Effort: No respiratory distress  Musculoskeletal:      Comments: Tenderness medial, lateral and anterior left knee  Left hip and ankle WNL  Skin:     General: Skin is warm and dry  Neurological:      Mental Status: He is alert  Vital Signs  ED Triage Vitals   Temperature Pulse Respirations Blood Pressure SpO2   01/09/23 1152 01/09/23 1152 01/09/23 1152 01/09/23 1152 01/09/23 1152   97 8 °F (36 6 °C) 94 20 (!) 180/112 97 %      Temp Source Heart Rate Source Patient Position - Orthostatic VS BP Location FiO2 (%)   01/09/23 1152 01/09/23 1152 01/09/23 1152 01/09/23 1152 --   Temporal Monitor (S) Standing Left arm       Pain Score       01/09/23 1151       10 - Worst Possible Pain           Vitals:    01/09/23 1152   BP: (!) 180/112   Pulse: 94   Patient Position - Orthostatic VS: (S) Standing         Visual Acuity      ED Medications  Medications   ketorolac (TORADOL) injection 15 mg (15 mg Intramuscular Given 1/9/23 1241)       Diagnostic Studies  Results Reviewed     None                 XR knee 4+ views left injury   Final Result by Kit Duarte MD (01/09 1307)      No acute osseous abnormality              Workstation performed: [id number]                    Procedures  Procedures         ED Course  ED Course as of 01/09/23 1404   Mon Jan 09, 2023   1209 NERISSA 1 04                                             Medical Decision Making  49-year-old male presents for evaluation of left knee injury  NERISSA 1 04; therefore, low likelihood of vascular injury  X-ray unremarkable  Cannot exclude ligamentous injury  Knee immobilizer  Crutches  Occupational medicine follow-up as this is a work-related injury  Ortho follow-up for reevaluation for possible ligamentous injury  Return precautions provided  Injury of left knee, initial encounter: acute illness or injury  Work related injury: acute illness or injury  Amount and/or Complexity of Data Reviewed  Radiology: ordered  Risk  Prescription drug management  Disposition  Final diagnoses:   Work related injury   Injury of left knee, initial encounter     Time reflects when diagnosis was documented in both MDM as applicable and the Disposition within this note     Time User Action Codes Description Comment    1/9/2023  1:52 PM Azeb Guerrero Add [Y99 0] Work related injury     1/9/2023  1:52 PM Cassie Arzate Add [G78 [id number]] Injury of left knee, initial encounter       ED Disposition     ED Disposition   Discharge    Condition   Stable    Date/Time   Mon Jan 9, 2023  1:52 PM    Tay Barry discharge to home/self care  Follow-up Information     Follow up With Specialties Details Why Contact Info Additional Information    [facility] Occupational Medicine [place]  Schedule an appointment as soon as possible for a visit in 2 days for re-evaluation of your work related injury [id number] [facility] [id number]  [address] [place] [address] Specialists [facility] Orthopedic Surgery In 1 week for further evaluation of your left knee pain [id number] [facility] [id number]  [address] [place] [address] Specialists [facility], [address] [place] [address] [address] Emergency Department Emergency Medicine Go to  If symptoms worsen,  numbness or discoloration of your foot [address] [place] [address] [phone] [address] Emergency Department, [address] [place] [address] , Umberto Feliz [address]          Patient's Medications   Discharge Prescriptions    No medications on file           PDMP Review       Value Time User    PDMP Reviewed  Yes 6/28/2022  2:25 PM Julianne Staton MD          ED Provider  Electronically Signed by           Belia Loyd MD  01/09/23 6039

## 2023-01-09 NOTE — Clinical Note
Roc Wilcox was seen and treated in our emergency department on 1/9/2023             no bearing weight on left leg until cleared by occupational medicine or othopedics    Diagnosis:     Stevne Sanderson    He may return on this date: 01/12/2023         If you have any questions or concerns, please don't hesitate to call        Shaheed Mcfarland MD    ______________________________           _______________          _______________  Hospital Representative                              Date                                Time

## 2023-01-10 ENCOUNTER — APPOINTMENT (OUTPATIENT)
Dept: URGENT CARE | Facility: CLINIC | Age: 53
End: 2023-01-10

## 2023-01-20 ENCOUNTER — OFFICE VISIT (OUTPATIENT)
Dept: OBGYN CLINIC | Facility: CLINIC | Age: 53
End: 2023-01-20

## 2023-01-20 VITALS
BODY MASS INDEX: 40.43 KG/M2 | HEART RATE: 96 BPM | SYSTOLIC BLOOD PRESSURE: 116 MMHG | WEIGHT: 315 LBS | DIASTOLIC BLOOD PRESSURE: 82 MMHG | HEIGHT: 74 IN

## 2023-01-20 DIAGNOSIS — M25.462 EFFUSION OF LEFT KNEE: ICD-10-CM

## 2023-01-20 DIAGNOSIS — S89.92XA INJURY OF LEFT KNEE, INITIAL ENCOUNTER: ICD-10-CM

## 2023-01-20 DIAGNOSIS — M23.92 INTERNAL DERANGEMENT OF LEFT KNEE: Primary | ICD-10-CM

## 2023-01-20 RX ORDER — DICLOFENAC SODIUM 75 MG/1
75 TABLET, DELAYED RELEASE ORAL 2 TIMES DAILY
Qty: 60 TABLET | Refills: 1 | Status: SHIPPED | OUTPATIENT
Start: 2023-01-20

## 2023-01-20 NOTE — LETTER
January 20, 2023     Patient: Katelyn Moran  YOB: 1970  Date of Visit: 1/20/2023      To Whom it May Concern:    Brady Rivera is under my professional care  Jan Olivier was seen in my office on 1/20/2023  Jan Olivier may return to work with limitations No kneeling, stooping, squatting  MRI being performed early next week and will review with patient next Friday at his follow up appointment       If you have any questions or concerns, please don't hesitate to call           Sincerely,          Fred Simon DO        CC: No Recipients

## 2023-01-20 NOTE — PROGRESS NOTES
Knee New Office Note    Assessment:     1  Internal derangement of left knee    2  Injury of left knee, initial encounter    3  Effusion of left knee        Plan:     Problem List Items Addressed This Visit    None  Visit Diagnoses     Internal derangement of left knee    -  Primary    Relevant Medications    diclofenac (VOLTAREN) 75 mg EC tablet    Injury of left knee, initial encounter        Relevant Medications    diclofenac (VOLTAREN) 75 mg EC tablet    Effusion of left knee        Relevant Medications    diclofenac (VOLTAREN) 75 mg EC tablet          44 y/o male with left knee pain after a work related injury 2 weeks ago  He has significant pain diffusely about the knee with an effusion  His knee is locked in extension and is unable to flex the knee  His symptoms are consistent with a potentially flipped bucket-handle meniscal tear  Discussed pain medication regimen including Diclofenac twice daily as well as tylenol 1000mg three times a day  Continue ice/heat  He has an MRI ordered and scheduled for early next week  We will see him back next Friday to discuss results of the MRI  Subjective:     Patient ID: Donna Finney is a 45 y o  male  Chief Complaint:  HPI:  Patient is a 44 y/o male who presents today for an evaluation of his LEFT knee  He states that 2 weeks ago he sustained a work related injury  He was stepping down from something and when he stepped down he twisted the left knee  He woke up the next morning with increased pain in the knee, and the following day he noticed the knee was very swollen  He was seen in the ER where xrays were performed and he was given shots for pain  He was then advised to see work comp who ordered him an MRI which is being done early next week  He continues with pain and swelling and the inability to bear weight on the knee  He states that his knee is locked and he is unable to flex the knee    He has been taking tylenol and aleve for pain without relief  He is ambulating with crutches  Allergy:  No Known Allergies  Medications:  all current active meds have been reviewed  Past Medical History:  Past Medical History:   Diagnosis Date   • GERD (gastroesophageal reflux disease)    • Lung mass    • Obesity      Past Surgical History:  History reviewed  No pertinent surgical history  Family History:  Family History   Problem Relation Age of Onset   • Tuberculosis Father    • Atrial fibrillation Father 72   • Lung cancer Paternal Uncle    • Alcohol abuse Neg Hx    • Substance Abuse Neg Hx    • Mental illness Neg Hx    • Depression Neg Hx      Social History:  Social History     Substance and Sexual Activity   Alcohol Use Not Currently     Social History     Substance and Sexual Activity   Drug Use Never     Social History     Tobacco Use   Smoking Status Former   • Packs/day: 3 00   • Years: 40 00   • Pack years: 120 00   • Types: Cigarettes   • Start date: 5   • Quit date: 4/30/2019   • Years since quitting: 3 7   Smokeless Tobacco Never         ROS:  General: Per HPI  Skin: Negative, except if noted below  HEENT: Negative  Respiratory: Negative  Cardiovascular: Negative  Gastrointestinal: Negative  Urinary: Negative  Vascular: Negative  Musculoskeletal: Positive per HPI   Neurologic: Positive per HPI  Endocrine: Negative    Objective:  BP Readings from Last 1 Encounters:   01/20/23 116/82      Wt Readings from Last 1 Encounters:   01/20/23 (!) 189 kg (416 lb)        Respiratory:   non-labored respirations    Lymphatics:  no palpable lymph nodes    Gait:   Antalgic, with use of crutches  Neurologic:   Alert and oriented times 3  Patient with normal sensation except as noted below  Deep tendon reflexes 2+ except as noted in MSK exam    Bilateral Lower Extremity:  Left Knee: Inspection:  Skin intact    Overall limb alignment normal    Effusion: moderate    ROM:  Knee locked in extension, unable to flex knee      Palpation: diffuse tenderness to palpation    M/L stability in full extension stable, difficult to assess due to guarding    M/L stability in midflexion stable, difficult to assess due to guarding    Motor: 5/5 IP/Q/HS/TA/GS    Pulses: 2+ DP / 2+ PT    SILT DP/SP/S/S/TN      Imaging:  My interpretation XR AP bilateral knee/lateral left knee: mild joint space narrowing, subchondral sclerosis, subchondral cysts, osteophyte formation  No fracture or dislocation  BMI:   Estimated body mass index is 53 41 kg/m² as calculated from the following:    Height as of this encounter: 6' 2" (1 88 m)  Weight as of this encounter: 189 kg (416 lb)  BSA:   Estimated body surface area is 2 97 meters squared as calculated from the following:    Height as of this encounter: 6' 2" (1 88 m)  Weight as of this encounter: 189 kg (416 lb)             Scribe Attestation    I,:  Demarcus Walter PA-C am acting as a scribe while in the presence of the attending physician :       I,:  Kaitlyn Calloway DO personally performed the services described in this documentation    as scribed in my presence :

## 2023-01-23 ENCOUNTER — TELEPHONE (OUTPATIENT)
Dept: OBGYN CLINIC | Facility: CLINIC | Age: 53
End: 2023-01-23

## 2023-01-23 NOTE — TELEPHONE ENCOUNTER
Reunion from [facility] [employer], [employer], requested Ortho  OVS be faxed to [phone]   Receipt confirmed

## 2023-01-27 ENCOUNTER — OFFICE VISIT (OUTPATIENT)
Dept: OBGYN CLINIC | Facility: CLINIC | Age: 53
End: 2023-01-27

## 2023-01-27 VITALS
DIASTOLIC BLOOD PRESSURE: 92 MMHG | BODY MASS INDEX: 40.43 KG/M2 | HEIGHT: 74 IN | WEIGHT: 315 LBS | SYSTOLIC BLOOD PRESSURE: 150 MMHG

## 2023-01-27 DIAGNOSIS — S83.412A SPRAIN OF MEDIAL COLLATERAL LIGAMENT OF LEFT KNEE, INITIAL ENCOUNTER: Primary | ICD-10-CM

## 2023-01-27 DIAGNOSIS — M70.52 PES ANSERINUS BURSITIS OF LEFT KNEE: ICD-10-CM

## 2023-01-27 DIAGNOSIS — M25.562 ACUTE PAIN OF LEFT KNEE: ICD-10-CM

## 2023-01-27 NOTE — LETTER
January 27, 2023     Patient: Maria Alejandra Posadas  YOB: 1970  Date of Visit: 1/27/2023      To Whom it May Concern:    Richard Gandhi is under my professional care  Saidabaljit Bello was seen in my office on 1/27/2023  Saida Bello may not return to work for 4 weeks  We will see him back in 4 weeks  If you have any questions or concerns, please don't hesitate to call           Sincerely,          Zoe Yip DO        CC: No Recipients

## 2023-01-27 NOTE — PROGRESS NOTES
Knee Follow up Office Note    Assessment:     1  Sprain of medial collateral ligament of left knee, initial encounter    2  Pes anserinus bursitis of left knee    3  Acute pain of left knee        Plan:     Problem List Items Addressed This Visit    None  Visit Diagnoses     Sprain of medial collateral ligament of left knee, initial encounter    -  Primary    Relevant Orders    Brace    Pes anserinus bursitis of left knee        Acute pain of left knee              46 y/o male with left knee pain after a work related injury 3 weeks ago  MRI report of the left knee was reviewed today showing that there is a sprain of the MCL, but no tearing of the menisci  He can continue pain medication regimen including Diclofenac twice daily as well as tylenol 1000mg three times a day  Continue ice/heat  He is also tender over the pes bursa today on exam   Cortisone injection performed into the left knee pes bursa today  Hinged knee brace provided today to allow for the MCL to scar down and heal   Will maintain work restrictions for the next 4 weeks  Follow up in 4 weeks  Subjective:     Patient ID: Aaliyah Storm is a 45 y o  male  Chief Complaint:  HPI:  Patient is a 46 y/o male who presents today for a follow up evaluation of his LEFT knee  He states that 3 weeks ago he sustained a work related injury  He was stepping down from something and when he stepped down he twisted the left knee  He woke up the next morning with increased pain in the knee, and the following day he noticed the knee was very swollen  He was seen in the ER where xrays were performed and he was given shots for pain  He was then advised to see work comp who ordered him an MRI which is being done early next week  He was seen 1 week ago complaining of pain, swelling, locking, and the inability to bear weight on the knee  He has been taking tylenol and aleve for pain without relief  He is ambulating with crutches again today    He states that the pain in the left knee is improving and he is able to flex the knee more than last visit  He had an MRI performed earlier this week and brought the report with him  Allergy:  No Known Allergies  Medications:  all current active meds have been reviewed  Past Medical History:  Past Medical History:   Diagnosis Date   • GERD (gastroesophageal reflux disease)    • Lung mass    • Obesity      Past Surgical History:  History reviewed  No pertinent surgical history  Family History:  Family History   Problem Relation Age of Onset   • Tuberculosis Father    • Atrial fibrillation Father 72   • Lung cancer Paternal Uncle    • Alcohol abuse Neg Hx    • Substance Abuse Neg Hx    • Mental illness Neg Hx    • Depression Neg Hx      Social History:  Social History     Substance and Sexual Activity   Alcohol Use Not Currently     Social History     Substance and Sexual Activity   Drug Use Never     Social History     Tobacco Use   Smoking Status Former   • Packs/day: 3 00   • Years: 40 00   • Pack years: 120 00   • Types: Cigarettes   • Start date: 5   • Quit date: 4/30/2019   • Years since quitting: 3 7   Smokeless Tobacco Never         ROS:  General: Per HPI  Skin: Negative, except if noted below  HEENT: Negative  Respiratory: Negative  Cardiovascular: Negative  Gastrointestinal: Negative  Urinary: Negative  Vascular: Negative  Musculoskeletal: Positive per HPI   Neurologic: Positive per HPI  Endocrine: Negative    Objective:  BP Readings from Last 1 Encounters:   01/27/23 150/92      Wt Readings from Last 1 Encounters:   01/27/23 (!) 189 kg (416 lb)        Respiratory:   non-labored respirations    Lymphatics:  no palpable lymph nodes    Gait:   Antalgic, with use of crutches  Neurologic:   Alert and oriented times 3  Patient with normal sensation except as noted below  Deep tendon reflexes 2+ except as noted in MSK exam    Bilateral Lower Extremity:  Left Knee:       Inspection:  Skin intact    Overall limb alignment normal    Effusion: moderate    ROM:  0-90*    Palpation: TTP MCL and pes bursa  M/L stability in full extension stable, pain at [facility] with valgus stress    M/L stability in midflexion stable, pain at [facility] with valgus stress    Motor: 5/5 IP/Q/HS/TA/GS    Pulses: 2+ DP / 2+ PT    SILT DP/SP/S/S/TN      Imaging:  MRI report:   Mild MCL sprain, PF arthritis  No meniscal tears  BMI:   Estimated body mass index is 53 41 kg/m² as calculated from the following:    Height as of this encounter: 6' 2" (1 88 m)  Weight as of this encounter: 189 kg (416 lb)  BSA:   Estimated body surface area is 2 97 meters squared as calculated from the following:    Height as of this encounter: 6' 2" (1 88 m)  Weight as of this encounter: 189 kg (416 lb)             Scribe Attestation    I,:  Kia Caballero PA-C am acting as a scribe while in the presence of the attending physician :       I,:  Shreyas Sánchez DO personally performed the services described in this documentation    as scribed in my presence :

## 2023-02-10 ENCOUNTER — APPOINTMENT (OUTPATIENT)
Dept: LAB | Facility: CLINIC | Age: 53
End: 2023-02-10

## 2023-02-10 ENCOUNTER — OFFICE VISIT (OUTPATIENT)
Dept: INTERNAL MEDICINE CLINIC | Facility: CLINIC | Age: 53
End: 2023-02-10

## 2023-02-10 VITALS
BODY MASS INDEX: 40.43 KG/M2 | TEMPERATURE: 96.2 F | HEART RATE: 105 BPM | OXYGEN SATURATION: 97 % | SYSTOLIC BLOOD PRESSURE: 116 MMHG | WEIGHT: 315 LBS | HEIGHT: 74 IN | DIASTOLIC BLOOD PRESSURE: 80 MMHG

## 2023-02-10 DIAGNOSIS — S83.412S SPRAIN OF MEDIAL COLLATERAL LIGAMENT OF LEFT KNEE, SEQUELA: ICD-10-CM

## 2023-02-10 DIAGNOSIS — Z79.899 ENCOUNTER FOR LONG-TERM CURRENT USE OF MEDICATION: ICD-10-CM

## 2023-02-10 DIAGNOSIS — E78.49 OTHER HYPERLIPIDEMIA: ICD-10-CM

## 2023-02-10 DIAGNOSIS — R73.03 PREDIABETES: ICD-10-CM

## 2023-02-10 DIAGNOSIS — J43.8 OTHER EMPHYSEMA (HCC): ICD-10-CM

## 2023-02-10 DIAGNOSIS — R91.8 PULMONARY NODULES: ICD-10-CM

## 2023-02-10 DIAGNOSIS — E66.01 CLASS 3 SEVERE OBESITY DUE TO EXCESS CALORIES WITHOUT SERIOUS COMORBIDITY WITH BODY MASS INDEX (BMI) OF 50.0 TO 59.9 IN ADULT (HCC): ICD-10-CM

## 2023-02-10 DIAGNOSIS — Z00.00 HEALTH MAINTENANCE EXAMINATION: Primary | ICD-10-CM

## 2023-02-10 PROBLEM — S83.412A SPRAIN OF MEDIAL COLLATERAL LIGAMENT OF LEFT KNEE: Status: ACTIVE | Noted: 2023-02-10

## 2023-02-10 PROBLEM — S83.241S: Status: RESOLVED | Noted: 2022-07-21 | Resolved: 2023-02-10

## 2023-02-10 LAB
ALBUMIN SERPL BCP-MCNC: 4.4 G/DL (ref 3.5–5)
ALP SERPL-CCNC: 71 U/L (ref 34–104)
ALT SERPL W P-5'-P-CCNC: 45 U/L (ref 7–52)
ANION GAP SERPL CALCULATED.3IONS-SCNC: 9 MMOL/L (ref 4–13)
AST SERPL W P-5'-P-CCNC: 23 U/L (ref 13–39)
BASOPHILS # BLD AUTO: 0.06 THOUSANDS/ÂΜL (ref 0–0.1)
BASOPHILS NFR BLD AUTO: 1 % (ref 0–1)
BILIRUB SERPL-MCNC: 1.01 MG/DL (ref 0.2–1)
BUN SERPL-MCNC: 18 MG/DL (ref 5–25)
CALCIUM SERPL-MCNC: 9.4 MG/DL (ref 8.4–10.2)
CHLORIDE SERPL-SCNC: 105 MMOL/L (ref 96–108)
CHOLEST SERPL-MCNC: 169 MG/DL
CO2 SERPL-SCNC: 24 MMOL/L (ref 21–32)
CREAT SERPL-MCNC: 0.98 MG/DL (ref 0.6–1.3)
EOSINOPHIL # BLD AUTO: 0.21 THOUSAND/ÂΜL (ref 0–0.61)
EOSINOPHIL NFR BLD AUTO: 3 % (ref 0–6)
ERYTHROCYTE [DISTWIDTH] IN BLOOD BY AUTOMATED COUNT: 13.2 % (ref 11.6–15.1)
GFR SERPL CREATININE-BSD FRML MDRD: 87 ML/MIN/1.73SQ M
GLUCOSE P FAST SERPL-MCNC: 92 MG/DL (ref 65–99)
HCT VFR BLD AUTO: 51 % (ref 36.5–49.3)
HDLC SERPL-MCNC: 29 MG/DL
HGB BLD-MCNC: 17.1 G/DL (ref 12–17)
IMM GRANULOCYTES # BLD AUTO: 0.02 THOUSAND/UL (ref 0–0.2)
IMM GRANULOCYTES NFR BLD AUTO: 0 % (ref 0–2)
LDLC SERPL CALC-MCNC: 116 MG/DL (ref 0–100)
LYMPHOCYTES # BLD AUTO: 2.33 THOUSANDS/ÂΜL (ref 0.6–4.47)
LYMPHOCYTES NFR BLD AUTO: 30 % (ref 14–44)
MCH RBC QN AUTO: 30.6 PG (ref 26.8–34.3)
MCHC RBC AUTO-ENTMCNC: 33.5 G/DL (ref 31.4–37.4)
MCV RBC AUTO: 91 FL (ref 82–98)
MONOCYTES # BLD AUTO: 0.55 THOUSAND/ÂΜL (ref 0.17–1.22)
MONOCYTES NFR BLD AUTO: 7 % (ref 4–12)
NEUTROPHILS # BLD AUTO: 4.58 THOUSANDS/ÂΜL (ref 1.85–7.62)
NEUTS SEG NFR BLD AUTO: 59 % (ref 43–75)
NONHDLC SERPL-MCNC: 140 MG/DL
NRBC BLD AUTO-RTO: 0 /100 WBCS
PLATELET # BLD AUTO: 187 THOUSANDS/UL (ref 149–390)
PMV BLD AUTO: 9.9 FL (ref 8.9–12.7)
POTASSIUM SERPL-SCNC: 4.6 MMOL/L (ref 3.5–5.3)
PROT SERPL-MCNC: 7.3 G/DL (ref 6.4–8.4)
RBC # BLD AUTO: 5.58 MILLION/UL (ref 3.88–5.62)
SODIUM SERPL-SCNC: 138 MMOL/L (ref 135–147)
TRIGL SERPL-MCNC: 121 MG/DL
TSH SERPL DL<=0.05 MIU/L-ACNC: 2.86 UIU/ML (ref 0.45–4.5)
VIT B12 SERPL-MCNC: 510 PG/ML (ref 100–900)
WBC # BLD AUTO: 7.75 THOUSAND/UL (ref 4.31–10.16)

## 2023-02-10 NOTE — PROGRESS NOTES
Assessment/Plan:    Sprain of medial collateral ligament of left knee  S/p injury  Followed by Ortho  Other tear of medial meniscus, current injury, right knee, sequela  Resolved  Other hyperlipidemia  Lipids due, not on statin  Prediabetes  Repeat U9U, on Trulicity  Class 3 severe obesity due to excess calories without serious comorbidity in adult ([facility])  Weight loss of 14 lbs since last visit 6 months ago  Pulmonary nodules  Repeat CT due  Other emphysema ([facility] [address] )  No symptoms  Diagnoses and all orders for this visit:    Health maintenance examination  Comments:  Vaccinations updated  Pulmonary nodules  -     CT lung nodule follow-up; Future    Other hyperlipidemia    Class 3 severe obesity due to excess calories without serious comorbidity with body mass index (BMI) of 50 0 to 59 9 in adult [facility])    Sprain of medial collateral ligament of left knee, sequela    Other emphysema ([facility] [address] )    Follow up in 6 months or as needed  Subjective:      Patient ID: Raya Palmer is a 45 y o  male here for a follow up  He is feeling well, no complaints  He reports left knee pain after an injury  He has been off work for 2 months  He sees Ortho  He started seeing a weight loss specialist  He has lost 30 lbs for the past 2 weeks  He was guaranteed to lose 60 lbs in 4 months  His wife has been measuring his food, he is eating much less  He is not exercising, no allowed yet  He was told his Trulicity may be stopped if he loses weight  He takes supplements which he takes under his tongue  He is not smoking  Denies any shortness of breath, cough, chest pain or palpitations  The following portions of the patient's history were reviewed and updated as appropriate: allergies, current medications, past medical history, past social history and problem list     Review of Systems   Constitutional: Negative for appetite change and fatigue     HENT: Negative for congestion, hearing loss and postnasal drip     Eyes: Negative  Respiratory: Negative for cough, chest tightness and shortness of breath  Cardiovascular: Negative for chest pain, palpitations and leg swelling  Gastrointestinal: Negative for abdominal pain and constipation  Genitourinary: Negative for dysuria, frequency and urgency  Musculoskeletal: Positive for arthralgias and gait problem  Skin: Negative for rash and wound  Neurological: Negative for dizziness, numbness and headaches  Hematological: Negative for adenopathy  Does not bruise/bleed easily  Psychiatric/Behavioral: Negative for sleep disturbance  The patient is not nervous/anxious  Objective:      /80   Pulse 105   Temp (!) 96 2 °F (35 7 °C)   Ht 6' 2" (1 88 m)   Wt (!) 176 kg (389 lb)   SpO2 97%   BMI 49 94 kg/m²          Physical Exam  Vitals and nursing note reviewed  Constitutional:       Appearance: He is well-developed  HENT:      Head: Normocephalic and atraumatic  Eyes:      Conjunctiva/sclera: Conjunctivae normal       Pupils: Pupils are equal, round, and reactive to light  Cardiovascular:      Rate and Rhythm: Normal rate and regular rhythm  Heart sounds: Normal heart sounds  Pulmonary:      Effort: Pulmonary effort is normal       Breath sounds: No wheezing or rhonchi  Abdominal:      General: Bowel sounds are normal       Palpations: Abdomen is soft  Musculoskeletal:         General: No swelling  Cervical back: Neck supple  Left knee: Tenderness present over the medial joint line  Right lower leg: No edema  Left lower leg: No edema  Skin:     General: Skin is warm  Findings: No rash  Neurological:      General: No focal deficit present  Mental Status: He is alert and oriented to person, place, and time     Psychiatric:         Mood and Affect: Mood and affect normal          Behavior: Behavior normal

## 2023-02-11 LAB
EST. AVERAGE GLUCOSE BLD GHB EST-MCNC: 120 MG/DL
HBA1C MFR BLD: 5.8 %

## 2023-02-12 ENCOUNTER — TELEPHONE (OUTPATIENT)
Dept: INTERNAL MEDICINE CLINIC | Facility: CLINIC | Age: 53
End: 2023-02-12

## 2023-02-12 NOTE — TELEPHONE ENCOUNTER
Lab results:    Sugars still show prediabetes  Please continue current dose of Trulicity  Cholesterol has improved  Your hemoglobin was a bit high, I am not sure if this is because you did not drink enough water before blood work  Bilirubin a bit high  We will just monitor both of this      The rest of your labs were normal

## 2023-02-24 ENCOUNTER — PATIENT MESSAGE (OUTPATIENT)
Dept: INTERNAL MEDICINE CLINIC | Facility: CLINIC | Age: 53
End: 2023-02-24

## 2023-02-24 DIAGNOSIS — R73.03 PREDIABETES: ICD-10-CM

## 2023-02-24 DIAGNOSIS — E66.01 CLASS 3 SEVERE OBESITY DUE TO EXCESS CALORIES WITHOUT SERIOUS COMORBIDITY WITH BODY MASS INDEX (BMI) OF 50.0 TO 59.9 IN ADULT (HCC): ICD-10-CM

## 2023-02-27 ENCOUNTER — HOSPITAL ENCOUNTER (OUTPATIENT)
Dept: RADIOLOGY | Age: 53
Discharge: HOME | End: 2023-02-27

## 2023-02-27 DIAGNOSIS — R91.8 PULMONARY NODULES: ICD-10-CM

## 2023-03-03 ENCOUNTER — OFFICE VISIT (OUTPATIENT)
Dept: OBGYN CLINIC | Facility: CLINIC | Age: 53
End: 2023-03-03

## 2023-03-03 VITALS
DIASTOLIC BLOOD PRESSURE: 80 MMHG | BODY MASS INDEX: 40.43 KG/M2 | HEIGHT: 74 IN | HEART RATE: 73 BPM | SYSTOLIC BLOOD PRESSURE: 114 MMHG | WEIGHT: 315 LBS

## 2023-03-03 DIAGNOSIS — S83.412A SPRAIN OF MEDIAL COLLATERAL LIGAMENT OF LEFT KNEE, INITIAL ENCOUNTER: Primary | ICD-10-CM

## 2023-03-03 DIAGNOSIS — M70.52 PES ANSERINUS BURSITIS OF LEFT KNEE: ICD-10-CM

## 2023-03-03 NOTE — LETTER
March 3, 2023     Patient: Phu Agosto  YOB: 1970  Date of Visit: 3/3/2023      To Whom it May Concern:    Delmi Dietrcih is under my professional care  Jovany Mayfield was seen in my office on 3/3/2023  Jovany Mayfield may return to work on 3/20/2023  If you have any questions or concerns, please don't hesitate to call           Sincerely,          Joey Wu DO        CC: No Recipients

## 2023-03-03 NOTE — PROGRESS NOTES
Knee New Office Note    Assessment:     1  Sprain of medial collateral ligament of left knee, initial encounter    2  Pes anserinus bursitis of left knee        Plan:  Findings today are consistent with left knee MCL sprain and pes bursitis  Imaging and prognosis was reviewed with the patient today  Patient has seen significant improvements since last visit with conservative treatments  Continue with low impact exercises and at home exercise plan  Continue with tylenol as needed for pain  Wear short hinge knee brace as needed  Work note was provided for patient to return to work in 3/20/2023  Follow up as needed     Problem List Items Addressed This Visit        Musculoskeletal and Integument    Sprain of medial collateral ligament of left knee - Primary   Other Visit Diagnoses     Pes anserinus bursitis of left knee             Subjective:     Patient ID: Jayden Zapata is a 45 y o  male  Chief Complaint:  HPI:  45year old male patient presents for a follow up of left knee pain- MCL sprain and pes bursitis  Patient notes about 75% improvements since last visit  He states that the pain is present over the MCL  He has been able to start low impact exercise and a home exercise plan 2-3 times a week  He is taking tylenol as needed for pain and uses ice regularly  At last visit patient received a CSI to the pes bursa  He notes significant relief from the injection  Allergy:  No Known Allergies  Medications:  all current active meds have been reviewed  Past Medical History:  Past Medical History:   Diagnosis Date   • GERD (gastroesophageal reflux disease)    • Lung mass    • Obesity      Past Surgical History:  History reviewed  No pertinent surgical history    Family History:  Family History   Problem Relation Age of Onset   • Tuberculosis Father    • Atrial fibrillation Father 72   • Lung cancer Paternal Uncle    • Alcohol abuse Neg Hx    • Substance Abuse Neg Hx    • Mental illness Neg Hx    • Depression Neg Hx Social History:  Social History     Substance and Sexual Activity   Alcohol Use Not Currently     Social History     Substance and Sexual Activity   Drug Use Never     Social History     Tobacco Use   Smoking Status Former   • Packs/day: 3 00   • Years: 40 00   • Pack years: 120 00   • Types: Cigarettes   • Start date: 5   • Quit date: 4/30/2019   • Years since quitting: 3 8   Smokeless Tobacco Never           ROS:  General: Per HPI  Skin: Negative, except if noted below  HEENT: Negative  Respiratory: Negative  Cardiovascular: Negative  Gastrointestinal: Negative  Urinary: Negative  Vascular: Negative  Musculoskeletal: Positive per HPI   Neurologic: Positive per HPI  Endocrine: Negative    Objective:  BP Readings from Last 1 Encounters:   03/03/23 114/80      Wt Readings from Last 1 Encounters:   03/03/23 (!) 170 kg (375 lb 6 4 oz)        Respiratory:   non-labored respirations    Lymphatics:  no palpable lymph nodes    Gait:   altered    Neurologic:   Alert and oriented times 3  Patient with normal sensation except as noted below  Deep tendon reflexes 2+ except as noted in MSK exam    Bilateral Lower Extremity:  Left Knee: Inspection:  Skin intact    Overall limb alignment normal    Effusion: none    ROM 0-100 w/o pain    Extensor Lag: none    Palpation: TTP MCL    AP Stability at 90 deg stable    M/L stability in full extension stable    M/L stability in midflexion stable    Motor: 5/5 IP/Q/HS/TA/GS    Pulses: 2+ DP / 2+ PT    SILT DP/SP/S/S/TN    Imaging:  No new imaging was obtained today      BMI:   Estimated body mass index is 48 2 kg/m² as calculated from the following:    Height as of this encounter: 6' 2" (1 88 m)  Weight as of this encounter: 170 kg (375 lb 6 4 oz)  BSA:   Estimated body surface area is 2 84 meters squared as calculated from the following:    Height as of this encounter: 6' 2" (1 88 m)  Weight as of this encounter: 170 kg (375 lb 6 4 oz)             Scribe Attestation I,:   am acting as a scribe while in the presence of the attending physician :       I,:   personally performed the services described in this documentation    as scribed in my presence :

## 2023-03-05 ENCOUNTER — HOSPITAL ENCOUNTER (EMERGENCY)
Facility: HOSPITAL | Age: 53
Discharge: HOME | End: 2023-03-05
Attending: EMERGENCY MEDICINE

## 2023-03-05 VITALS
OXYGEN SATURATION: 96 % | HEART RATE: 98 BPM | TEMPERATURE: 97.7 F | SYSTOLIC BLOOD PRESSURE: 133 MMHG | RESPIRATION RATE: 16 BRPM | HEIGHT: 74 IN | BODY MASS INDEX: 48.2 KG/M2 | DIASTOLIC BLOOD PRESSURE: 83 MMHG

## 2023-03-05 DIAGNOSIS — K64.4 BLEEDING EXTERNAL HEMORRHOIDS: Primary | ICD-10-CM

## 2023-03-05 RX ORDER — TRANEXAMIC ACID 100 MG/ML
1000 INJECTION, SOLUTION INTRAVENOUS ONCE
Status: COMPLETED | OUTPATIENT
Start: 2023-03-05 | End: 2023-03-05

## 2023-03-05 RX ADMIN — TRANEXAMIC ACID 1000 MG: 1 INJECTION, SOLUTION INTRAVENOUS at 14:43

## 2023-03-05 NOTE — ED PROVIDER NOTES
History  Chief Complaint   Patient presents with   • Rectal Bleeding     Pt reports rectal bleeding starting today after going to the gym, states had two occurrences, denies any abd pain or dizziness  Does not take any thinners -clots      This is a 45 y o  old male who presents to the ED for evaluation of rectal bleeding  Since yesterday  Sat on his couch and noted there was blood on the couch, checked himself in the restroom and confirmed it was rectal  No pain at rest or with defecation  No changes in stools  No abd pain, nausea or vomiting  Does have a history of hemorrhoids in the past         Prior to Admission Medications   Prescriptions Last Dose Informant Patient Reported? Taking? albuterol (2 5 mg/3 mL) 0 083 % nebulizer solution   No No   Sig: Take 1 vial (2 5 mg total) by nebulization every 4 (four) hours as needed for wheezing or shortness of breath   albuterol (PROVENTIL HFA,VENTOLIN HFA) 90 mcg/act inhaler   No No   Sig: Inhale 2 puffs every 4 (four) hours as needed for wheezing   diclofenac (VOLTAREN) 75 mg EC tablet   No No   Sig: Take 1 tablet (75 mg total) by mouth 2 (two) times a day   dulaglutide (Trulicity) 1 5 BX/5 0NS injection   No No   Sig: Inject 0 5 mL (1 5 mg total) under the skin once a week      Facility-Administered Medications: None     Past Medical History:   Diagnosis Date   • GERD (gastroesophageal reflux disease)    • Lung mass    • Obesity      History reviewed  No pertinent surgical history  Family History   Problem Relation Age of Onset   • Tuberculosis Father    • Atrial fibrillation Father 72   • Lung cancer Paternal Uncle    • Alcohol abuse Neg Hx    • Substance Abuse Neg Hx    • Mental illness Neg Hx    • Depression Neg Hx      I have reviewed and agree with the history as documented      E-Cigarette/Vaping   • E-Cigarette Use Never User      E-Cigarette/Vaping Substances   • Nicotine No    • THC No    • CBD No    • Flavoring No    • Other No    • Unknown No Social History     Tobacco Use   • Smoking status: Former     Packs/day: 3 00     Years: 40 00     Pack years: 120 00     Types: Cigarettes     Start date: 1979     Quit date: 4/30/2019     Years since quitting: 3 8   • Smokeless tobacco: Never   Vaping Use   • Vaping Use: Never used   Substance Use Topics   • Alcohol use: Not Currently   • Drug use: Never     Review of Systems   Constitutional: Negative for chills, fatigue, fever and unexpected weight change  HENT: Negative for congestion, rhinorrhea and sore throat  Eyes: Negative for redness and visual disturbance  Respiratory: Negative for cough and shortness of breath  Cardiovascular: Negative for chest pain and leg swelling  Gastrointestinal: Negative for abdominal pain, constipation, diarrhea, nausea, rectal pain and vomiting  Endocrine: Negative for cold intolerance and heat intolerance  Genitourinary: Negative for dysuria, frequency and urgency  Musculoskeletal: Negative for back pain  Skin: Negative for rash  Neurological: Negative for dizziness, syncope and numbness  All other systems reviewed and are negative  Physical Exam  Physical Exam  Vitals and nursing note reviewed  Constitutional:       General: He is not in acute distress  Appearance: He is well-developed  He is not diaphoretic  HENT:      Head: Normocephalic and atraumatic  Right Ear: External ear normal       Left Ear: External ear normal       Nose: Nose normal    Cardiovascular:      Rate and Rhythm: Normal rate  Pulmonary:      Effort: Pulmonary effort is normal  No respiratory distress  Breath sounds: No stridor  Abdominal:      General: There is no distension  Palpations: Abdomen is soft  Tenderness: There is no abdominal tenderness  There is no guarding or rebound  Comments: obese   Genitourinary:      Musculoskeletal:         General: No deformity  Normal range of motion  Cervical back: Normal range of motion  Skin:     General: Skin is warm and dry  Coloration: Skin is not jaundiced  Findings: No rash  Neurological:      Mental Status: He is alert and oriented to person, place, and time  Gait: Gait normal       Comments: Moving all extremities equally       Vital Signs  ED Triage Vitals [03/05/23 1414]   Temperature Pulse Respirations Blood Pressure SpO2   97 7 °F (36 5 °C) 98 16 133/83 96 %      Temp Source Heart Rate Source Patient Position - Orthostatic VS BP Location FiO2 (%)   Oral Monitor Sitting Right arm --      Pain Score       No Pain         ED Medications  Medications   tranexamic acid 100mg/mL (for epistaxis) 1,000 mg (1,000 mg Nasal Given 3/5/23 1443)     Diagnostic Studies  Results Reviewed     None        No orders to display     Procedures  Procedures    ED Course       A/P: This is a 45 y o  male who presents to the ED for evaluation of rectal bleeding  A clear site of bleeding is seen and it is hemostatic at this time  I suspect this is the cause  No evidence of fissure  Deferred internal exam  Needs CRS follow up  Will pack area with TXA soaked gauze  Patient care remove tomorrow  I personally discussed return precautions with this patient  I provided the patient with written discharge instructions and particularly highlighted specific areas of interest to this patient, including but not limited to: medications for symptom managment, follow up recommendations, and return precautions  Patient is in agreement with this plan as outlined above      MDM    Disposition  Final diagnoses:   Bleeding external hemorrhoids     Time reflects when diagnosis was documented in both MDM as applicable and the Disposition within this note     Time User Action Codes Description Comment    3/5/2023  2:50 PM Sylvia Payton Add [K64 4] Bleeding external hemorrhoids       ED Disposition     ED Disposition   Discharge    Condition   Stable    Date/Time   Sun Mar 5, 2023  2:50 PM    Comment   [place] [facility] discharge to home/self care  Follow-up Information     Follow up With Specialties Details Why Contact Info    Sarika Albarado MD Internal Medicine Call   [address]  [address] [address]      Karly Johnson MD Colon and Rectal Surgery Call  for follow up of bleeding hemorrhoids [address]    [address]          Patient's Medications   Discharge Prescriptions    No medications on file     PDMP Review       Value Time User    PDMP Reviewed  Yes 6/28/2022  2:25 PM Sarika Albarado MD        ED Provider  Electronically Signed by         Kiet Sandy MD  03/05/23 8581

## 2023-03-06 ENCOUNTER — TELEPHONE (OUTPATIENT)
Dept: INTERNAL MEDICINE CLINIC | Facility: CLINIC | Age: 53
End: 2023-03-06

## 2023-03-06 ENCOUNTER — TELEPHONE (OUTPATIENT)
Dept: OTHER | Facility: OTHER | Age: 53
End: 2023-03-06

## 2023-03-06 NOTE — TELEPHONE ENCOUNTER
Chest CT scan was stable  No further imaging recommended  Please schedule appt with Colorectal for your hemorrhoids  Avoid straining / pushing  Make sure you eat enough fruits and veggies daily

## 2023-03-08 ENCOUNTER — TELEPHONE (OUTPATIENT)
Dept: OBGYN CLINIC | Facility: CLINIC | Age: 53
End: 2023-03-08

## 2023-03-08 NOTE — TELEPHONE ENCOUNTER
Destinee perdomo [facility] requested office notes and work letter from visit on 3/3/23  I emailed directly from fax machine to [email or web address]  Their fax number and phone # are the same and the fax would not go through  Hodgkin lymphoma, unspecified Hodgkin lymphoma type, unspecified body region Fever Left flank pain

## 2023-06-18 DIAGNOSIS — R73.03 PREDIABETES: ICD-10-CM

## 2023-06-18 DIAGNOSIS — E78.49 OTHER HYPERLIPIDEMIA: Primary | ICD-10-CM

## 2023-08-10 ENCOUNTER — OFFICE VISIT (OUTPATIENT)
Dept: INTERNAL MEDICINE CLINIC | Facility: CLINIC | Age: 53
End: 2023-08-10
Payer: COMMERCIAL

## 2023-08-10 VITALS
HEIGHT: 74 IN | OXYGEN SATURATION: 99 % | TEMPERATURE: 95.8 F | HEART RATE: 98 BPM | BODY MASS INDEX: 40.43 KG/M2 | WEIGHT: 315 LBS | SYSTOLIC BLOOD PRESSURE: 108 MMHG | DIASTOLIC BLOOD PRESSURE: 78 MMHG

## 2023-08-10 DIAGNOSIS — E66.01 CLASS 3 SEVERE OBESITY DUE TO EXCESS CALORIES WITHOUT SERIOUS COMORBIDITY WITH BODY MASS INDEX (BMI) OF 50.0 TO 59.9 IN ADULT (HCC): ICD-10-CM

## 2023-08-10 DIAGNOSIS — F43.21 GRIEF: Primary | ICD-10-CM

## 2023-08-10 DIAGNOSIS — R91.8 PULMONARY NODULES: ICD-10-CM

## 2023-08-10 DIAGNOSIS — R73.03 PREDIABETES: ICD-10-CM

## 2023-08-10 DIAGNOSIS — J43.8 OTHER EMPHYSEMA (HCC): ICD-10-CM

## 2023-08-10 DIAGNOSIS — M10.071 ACUTE IDIOPATHIC GOUT OF RIGHT FOOT: ICD-10-CM

## 2023-08-10 PROCEDURE — 99214 OFFICE O/P EST MOD 30 MIN: CPT | Performed by: INTERNAL MEDICINE

## 2023-08-10 RX ORDER — ALLOPURINOL 100 MG/1
100 TABLET ORAL DAILY
Qty: 90 TABLET | Refills: 0 | Status: SHIPPED | OUTPATIENT
Start: 2023-08-10

## 2023-08-10 RX ORDER — NAPROXEN 500 MG/1
500 TABLET ORAL 2 TIMES DAILY WITH MEALS
Qty: 60 TABLET | Refills: 0 | Status: SHIPPED | OUTPATIENT
Start: 2023-08-10

## 2023-08-10 RX ORDER — ALBUTEROL SULFATE 90 UG/1
2 AEROSOL, METERED RESPIRATORY (INHALATION) EVERY 4 HOURS PRN
Qty: 8.5 G | Refills: 3 | Status: SHIPPED | OUTPATIENT
Start: 2023-08-10

## 2023-08-10 NOTE — PATIENT INSTRUCTIONS
Low Purine Diet   WHAT YOU NEED TO KNOW:   A low-purine diet is a meal plan based on foods that are low in purine content. Purine is a substance that is found in foods and is produced naturally by the body. Purines are broken down by the body and changed to uric acid. The kidneys normally filter the uric acid, and it leaves the body through the urine. However, people with gout sometimes have a buildup of uric acid in the blood. This buildup of uric acid can cause swelling and pain (a gout attack). A low-purine diet may help to treat and prevent gout attacks. Foods to include: The following foods are low in purine. Eggs, nuts, and peanut butter    Low-fat and fat-free cheese and ice cream    Skim or 1% milk    Soup made without meat extract or broth    Vegetables that are not on the medium-purine list below    All fruit and fruit juice    Bread, pasta, rice, cakes, cornbread, and popcorn    Water, soda, tea, coffee, and cocoa    Sugar, sweets, and gelatin    Fat and oil    Foods to limit:   Medium-purine foods:     Meats:  Limit the following to 4 to 6 ounces each day. Meat and poultry     Crab, lobster, oysters, and shrimp    Vegetables:  Limit the following vegetables to ½ cup each day. Asparagus    Cauliflower    Spinach    Mushrooms    Green peas    Beans, peas, and lentils (limit to 1 cup each day)    Oats and oatmeal (limit to ? cup uncooked each day)    Wheat germ and bran (limit to ¼ cup each day)    High-purine foods:  Limit or avoid foods high in purine. Anchovies, sardines, scallops, and mussels    [place], codfish, herring, and [employer], like goose and duck    Organ meats, such as brains, heart, kidney, liver, and sweetbreads    Gravies and sauces made with meat    Yeast extracts taken in the form of a supplement    Other guidelines to follow:   Increase liquid intake. Drink 8 to 16 (eight-ounce) cups of liquid each day. At least half of the liquid you drink should be water. Liquid can help your body get rid of extra uric acid. Limit or avoid alcohol. Alcohol (especially beer) increases your risk of a gout attack. Beer contains a high amount of purine. Maintain a healthy weight. If you are overweight, you should lose weight slowly. Weight loss can help decrease the amount of stress on your joints. Regular exercise can help you lose weight if you are overweight, or maintain your weight if you are at a normal weight. Talk to your healthcare provider before you begin an exercise program.    © Copyright Josr Fischer 2022 Information is for End User's use only and may not be sold, redistributed or otherwise used for commercial purposes. The above information is an  only. It is not intended as medical advice for individual conditions or treatments. Talk to your doctor, nurse or pharmacist before following any medical regimen to see if it is safe and effective for you.

## 2023-08-10 NOTE — PROGRESS NOTES
Assessment/Plan:    Class 3 severe obesity due to excess calories without serious comorbidity in adult ([facility])  Weight loss of 75 lbs since last visit. Continue portion control. Other emphysema ([address])  No issues. Not using nebulizer/inhaler. Other hyperlipidemia  Repeat lipids due. Pulmonary nodules  Stable. Recommend yearly chest CT due to smoking history. Prediabetes  Repeat A1c. Due to recent weight loss, discussed stopping Trulicity. Sprain of medial collateral ligament of left knee  Resolved. Acute idiopathic gout of right foot  Reviewed diet. Start naproxen bid prn. Start allopurinol. Diagnoses and all orders for this visit:    Grief  Comments:  Good family support. Acute idiopathic gout of right foot  -     Uric acid  -     allopurinol (ZYLOPRIM) 100 mg tablet; Take 1 tablet (100 mg total) by mouth daily  -     naproxen (Naprosyn) 500 mg tablet; Take 1 tablet (500 mg total) by mouth 2 (two) times a day with meals Prn for gout    Prediabetes    Class 3 severe obesity due to excess calories without serious comorbidity with body mass index (BMI) of 50.0 to 59.9 in adult ([facility])    Other emphysema ([facility])  -     albuterol (PROVENTIL HFA,VENTOLIN HFA) 90 mcg/act inhaler; Inhale 2 puffs every 4 (four) hours as needed for wheezing    Pulmonary nodules      Follow up in 6 months or as needed. Subjective:      Patient ID: Harish Byrd is a 52 y.o. male here for a follow up. He complains of gout episodes the past few months. [facility] and was told he had gout. He was given small blue pills which she ran out of. He is requesting a refill. He reports also taking some gout pills that his uncle had. He does not know what it is. His gout is usually in his right big toe, and his right ankle. He reports it can get very painful and swollen, unable to wear shoes. He reports current gout episode started 4 days ago.     He has been eating a lot of meat, turkey and cauliflower the past few days. His mother who was in hospice the past few months recently passed away. He has been very busy the past week. He had lost about 100 lbs since he started seeing someone. He was given some drops to take. He is carefully watching his portions. The following portions of the patient's history were reviewed and updated as appropriate: allergies, current medications, past medical history, past social history and problem list.    Review of Systems   Constitutional: Positive for appetite change. Negative for fatigue. HENT: Negative for congestion, hearing loss and postnasal drip. Eyes: Negative. Negative for visual disturbance. Respiratory: Negative for cough, chest tightness and shortness of breath. Cardiovascular: Negative for chest pain, palpitations and leg swelling. Gastrointestinal: Negative for abdominal pain and constipation. Genitourinary: Negative for dysuria and frequency. Musculoskeletal: Positive for arthralgias and joint swelling. Skin: Negative for rash and wound. Neurological: Negative for dizziness, numbness and headaches. Psychiatric/Behavioral: Negative for sleep disturbance. The patient is not nervous/anxious. Objective:      /78   Pulse 98   Temp (!) 95.8 °F (35.4 °C)   Ht 6' 2" (1.88 m)   Wt (!) 143 kg (315 lb)   SpO2 99%   BMI 40.44 kg/m²          Physical Exam  Vitals and nursing note reviewed. Constitutional:       Appearance: He is well-developed. HENT:      Head: Normocephalic and atraumatic. Eyes:      Conjunctiva/sclera: Conjunctivae normal.      Pupils: Pupils are equal, round, and reactive to light. Cardiovascular:      Rate and Rhythm: Normal rate and regular rhythm. Heart sounds: Normal heart sounds. Pulmonary:      Effort: Pulmonary effort is normal.      Breath sounds: No wheezing or rhonchi. Abdominal:      General: Bowel sounds are normal.      Palpations: Abdomen is soft. Musculoskeletal:         General: No swelling. Cervical back: Neck supple. Right lower leg: No edema. Left lower leg: No edema. Right ankle: Swelling present. Left ankle: Normal.      Right foot: Swelling and tenderness present. Left foot: Normal.      Comments: Right MTP edema and mild tenderness   Skin:     General: Skin is warm. Findings: No rash. Neurological:      General: No focal deficit present. Mental Status: He is alert and oriented to person, place, and time. Psychiatric:         Mood and Affect: Mood and affect normal.         Behavior: Behavior normal.           Lab results reviewed with patient. BMI Counseling: Body mass index is 40.44 kg/m². The BMI is above normal. Nutrition recommendations include reducing portion sizes, decreasing overall calorie intake, 3-5 servings of fruits/vegetables daily and increasing intake of lean protein. Exercise recommendations include moderate aerobic physical activity for 150 minutes/week.

## 2023-10-14 DIAGNOSIS — E66.01 CLASS 3 SEVERE OBESITY DUE TO EXCESS CALORIES WITHOUT SERIOUS COMORBIDITY WITH BODY MASS INDEX (BMI) OF 50.0 TO 59.9 IN ADULT (HCC): ICD-10-CM

## 2023-10-14 DIAGNOSIS — R73.03 PREDIABETES: ICD-10-CM

## 2023-12-11 DIAGNOSIS — M10.071 ACUTE IDIOPATHIC GOUT OF RIGHT FOOT: ICD-10-CM

## 2023-12-12 RX ORDER — NAPROXEN 500 MG/1
500 TABLET ORAL 2 TIMES DAILY WITH MEALS
Qty: 60 TABLET | Refills: 5 | Status: SHIPPED | OUTPATIENT
Start: 2023-12-12

## 2023-12-12 RX ORDER — ALLOPURINOL 100 MG/1
100 TABLET ORAL DAILY
Qty: 90 TABLET | Refills: 0 | Status: SHIPPED | OUTPATIENT
Start: 2023-12-12

## 2024-01-16 ENCOUNTER — NURSE TRIAGE (OUTPATIENT)
Dept: OTHER | Facility: OTHER | Age: 54
End: 2024-01-16

## 2024-01-16 ENCOUNTER — TELEPHONE (OUTPATIENT)
Age: 54
End: 2024-01-16

## 2024-01-16 ENCOUNTER — PATIENT MESSAGE (OUTPATIENT)
Dept: INTERNAL MEDICINE CLINIC | Facility: CLINIC | Age: 54
End: 2024-01-16

## 2024-01-16 NOTE — TELEPHONE ENCOUNTER
"Pt returned call in regards to messages in chart.  ( I am unable to addend either message).  Message in chart from provider relayed.  Pt stated he fell out side on the sidewalk and hurt his back.  He is unable to come to office or go to urgent care at this time.  He will \"suffer\" with cold sxs for now until he can get out to go to office or urgent care.    "

## 2024-01-16 NOTE — TELEPHONE ENCOUNTER
Patient used  covid test, test showed negative. Patient confirmed an appointment for today 24 at 1445.

## 2024-01-16 NOTE — TELEPHONE ENCOUNTER
"Reason for Disposition  • SEVERE sore throat pain    Answer Assessment - Initial Assessment Questions  1. ONSET: \"When did the throat start hurting?\" (Hours or days ago)       3 days ago   2. SEVERITY: \"How bad is the sore throat?\" (Scale 1-10; mild, moderate or severe)    - MILD (1-3):  doesn't interfere with eating or normal activities    - MODERATE (4-7): interferes with eating some solids and normal activities    - SEVERE (8-10):  excruciating pain, interferes with most normal activities    - SEVERE DYSPHAGIA: can't swallow liquids, drooling      Severe   3. STREP EXPOSURE: \"Has there been any exposure to strep within the past week?\" If Yes, ask: \"What type of contact occurred?\"       Unknown   4.  VIRAL SYMPTOMS: \"Are there any symptoms of a cold, such as a runny nose, cough, hoarse voice or red eyes?\"       Left ear pain   5. FEVER: \"Do you have a fever?\" If Yes, ask: \"What is your temperature, how was it measured, and when did it start?\"      No   6. PUS ON THE TONSILS: \"Is there pus on the tonsils in the back of your throat?\"      Unable to see.   7. OTHER SYMPTOMS: \"Do you have any other symptoms?\" (e.g., difficulty breathing, headache, rash)      Left ear pain    Protocols used: Sore Throat-ADULT-[place]    "

## 2024-01-16 NOTE — TELEPHONE ENCOUNTER
He cancelled scheduled appt, ?fell at home.    Please call, can reschedule with me or Inessa or go to Urgent care today if able.

## 2024-01-17 ENCOUNTER — OFFICE VISIT (OUTPATIENT)
Dept: URGENT CARE | Facility: CLINIC | Age: 54
End: 2024-01-17
Payer: COMMERCIAL

## 2024-01-17 VITALS
OXYGEN SATURATION: 97 % | DIASTOLIC BLOOD PRESSURE: 82 MMHG | BODY MASS INDEX: 40.43 KG/M2 | SYSTOLIC BLOOD PRESSURE: 140 MMHG | HEART RATE: 132 BPM | WEIGHT: 315 LBS | RESPIRATION RATE: 22 BRPM | HEIGHT: 74 IN | TEMPERATURE: 98 F

## 2024-01-17 DIAGNOSIS — H66.90 EAR INFECTION: Primary | ICD-10-CM

## 2024-01-17 DIAGNOSIS — R07.89 CHEST TIGHTNESS: ICD-10-CM

## 2024-01-17 DIAGNOSIS — J06.9 ACUTE URI: ICD-10-CM

## 2024-01-17 PROCEDURE — 99213 OFFICE O/P EST LOW 20 MIN: CPT

## 2024-01-17 RX ORDER — AMOXICILLIN 500 MG/1
500 CAPSULE ORAL EVERY 12 HOURS SCHEDULED
Qty: 10 CAPSULE | Refills: 0 | Status: SHIPPED | OUTPATIENT
Start: 2024-01-17 | End: 2024-01-22

## 2024-01-17 RX ORDER — ALBUTEROL SULFATE 90 UG/1
2 AEROSOL, METERED RESPIRATORY (INHALATION) EVERY 6 HOURS PRN
Qty: 8.5 G | Refills: 0 | Status: SHIPPED | OUTPATIENT
Start: 2024-01-17

## 2024-01-17 RX ORDER — NEOMYCIN SULFATE, POLYMYXIN B SULFATE AND HYDROCORTISONE 10; 3.5; 1 MG/ML; MG/ML; [USP'U]/ML
4 SUSPENSION/ DROPS AURICULAR (OTIC) 4 TIMES DAILY
Qty: 10 ML | Refills: 0 | Status: SHIPPED | OUTPATIENT
Start: 2024-01-17 | End: 2024-01-24

## 2024-01-17 NOTE — LETTER
January 17, 2024     Patient: Luis Fernando Jay   YOB: 1970   Date of Visit: 1/17/2024       To Whom it May Concern:    Luis Fernando Jay was seen in my clinic on 1/17/2024. He may return to work on 1/18/2024 .    If you have any questions or concerns, please don't hesitate to call.         Sincerely,          Eber Mancuso PA-C        CC: No Recipients

## 2024-01-17 NOTE — PROGRESS NOTES
"  [facility]        NAME: Luis Fernando Jay is a 53 y.o. male  : 1970    MRN: [id number]  DATE: 2024  TIME: 10:11 AM    Assessment and Plan   Ear infection [H66.90]  1. Ear infection  amoxicillin (AMOXIL) 500 mg capsule    neomycin-polymyxin-hydrocortisone (CORTISPORIN) 0.35%-10,000 units/mL-1% otic suspension      2. Chest tightness  albuterol (ProAir HFA) 90 mcg/act inhaler      3. Acute URI              Patient Instructions       Follow up with PCP in 3-5 days.  Proceed to  ER if symptoms worsen.    Chief Complaint     Chief Complaint   Patient presents with    Earache     Pt presents with bilatera ear pain, sore throat, cough, and some SOB on exertion x 4 days. Pt states feeling feverish at times but no temp has been taken.           History of Present Illness       54 y/o M with PMHx of emphysema, HLD, and obesity, presents for cold like symptoms x 4 days. Home COVID test negative yesterday. L ear pain so severe unable to put on glasses's. Did notice discharge. Using \"OTC Head and Cold\" medication PRN. No albuterol at home due to running out.         Review of Systems   Review of Systems   Constitutional:  Positive for fever. Negative for chills.   HENT:  Positive for congestion, ear discharge, ear pain and rhinorrhea. Negative for sore throat.    Respiratory:  Positive for cough. Negative for chest tightness and shortness of breath.          Current Medications       Current Outpatient Medications:     albuterol (2.5 mg/3 mL) 0.083 % nebulizer solution, Take 1 vial (2.5 mg total) by nebulization every 4 (four) hours as needed for wheezing or shortness of breath, Disp: 270 mL, Rfl: 5    albuterol (ProAir HFA) 90 mcg/act inhaler, Inhale 2 puffs every 6 (six) hours as needed for wheezing, Disp: 8.5 g, Rfl: 0    albuterol (PROVENTIL HFA,VENTOLIN HFA) 90 mcg/act inhaler, Inhale 2 puffs every 4 (four) hours as needed for wheezing, Disp: 8.5 g, Rfl: 3    allopurinol (ZYLOPRIM) 100 mg tablet, " "Take 1 tablet (100 mg total) by mouth daily, Disp: 90 tablet, Rfl: 0    amoxicillin (AMOXIL) 500 mg capsule, Take 1 capsule (500 mg total) by mouth every 12 (twelve) hours for 5 days, Disp: 10 capsule, Rfl: 0    dulaglutide (Trulicity) 1.5 MG/0.5ML injection, Inject 0.5 mL (1.5 mg total) under the skin once a week, Disp: 6 mL, Rfl: 0    naproxen (Naprosyn) 500 mg tablet, Take 1 tablet (500 mg total) by mouth 2 (two) times a day with meals Prn for gout, Disp: 60 tablet, Rfl: 5    neomycin-polymyxin-hydrocortisone (CORTISPORIN) 0.35%-10,000 units/mL-1% otic suspension, Administer 4 drops into the left ear 4 (four) times a day for 7 days, Disp: 10 mL, Rfl: 0    Current Allergies     Allergies as of 01/17/2024    (No Known Allergies)            The following portions of the patient's history were reviewed and updated as appropriate: allergies, current medications, past family history, past medical history, past social history, past surgical history and problem list.     Past Medical History:   Diagnosis Date    GERD (gastroesophageal reflux disease)     Lung mass     Obesity        History reviewed. No pertinent surgical history.    Family History   Problem Relation Age of Onset    Tuberculosis Father     Atrial fibrillation Father 65    Lung cancer Paternal Uncle     Alcohol abuse Neg Hx     Substance Abuse Neg Hx     Mental illness Neg Hx     Depression Neg Hx          Medications have been verified.        Objective   /82   Pulse (!) 132   Temp 98 °F (36.7 °C)   Resp 22   Ht 6' 2\" (1.88 m)   Wt (!) 180 kg (396 lb)   SpO2 97%   BMI 50.84 kg/m²   No LMP for male patient.       Physical Exam     Physical Exam  Vitals and nursing note reviewed.   Constitutional:       General: He is not in acute distress.     Appearance: He is not toxic-appearing.   HENT:      Head: Normocephalic and atraumatic.      Right Ear: Tympanic membrane, ear canal and external ear normal.      Ears:      Comments: L ear- external " ear tender with manipulation. Pain with otoscope insertion. Canal erythematous and unable to visualize TM secondary to swelling     Nose: Nose normal.      Mouth/Throat:      Mouth: Mucous membranes are moist.      Pharynx: No oropharyngeal exudate or posterior oropharyngeal erythema.   Eyes:      Conjunctiva/sclera: Conjunctivae normal.   Pulmonary:      Effort: Pulmonary effort is normal.      Breath sounds: Normal breath sounds.   Lymphadenopathy:      Cervical: No cervical adenopathy.   Neurological:      Mental Status: He is alert.   Psychiatric:         Mood and Affect: Mood normal.         Behavior: Behavior normal.

## 2024-01-23 NOTE — TELEPHONE ENCOUNTER
Patient states doing better and has improvement   Went to walk in ([place]) and was give antibiotic  and doing good.    Back is improving and has gone back to work, very appreciative of follow up call

## 2024-02-29 ENCOUNTER — HOSPITAL ENCOUNTER (EMERGENCY)
Facility: HOSPITAL | Age: 54
Discharge: HOME | End: 2024-02-29
Attending: EMERGENCY MEDICINE | Admitting: EMERGENCY MEDICINE
Payer: COMMERCIAL

## 2024-02-29 VITALS
HEART RATE: 93 BPM | DIASTOLIC BLOOD PRESSURE: 104 MMHG | TEMPERATURE: 98 F | SYSTOLIC BLOOD PRESSURE: 151 MMHG | RESPIRATION RATE: 18 BRPM | OXYGEN SATURATION: 98 %

## 2024-02-29 DIAGNOSIS — T16.2XXA EAR FOREIGN BODY, LEFT, INITIAL ENCOUNTER: Primary | ICD-10-CM

## 2024-02-29 PROCEDURE — 69200 CLEAR OUTER EAR CANAL: CPT | Performed by: EMERGENCY MEDICINE

## 2024-02-29 PROCEDURE — 99282 EMERGENCY DEPT VISIT SF MDM: CPT

## 2024-02-29 PROCEDURE — 99283 EMERGENCY DEPT VISIT LOW MDM: CPT | Performed by: EMERGENCY MEDICINE

## 2024-02-29 NOTE — ED PROVIDER NOTES
History  Chief Complaint   Patient presents with    Foreign Body in Ear     Patient states he broke off a q tip in his left ear. Denies bleeding or hearing loss.      (Luis Fernando Jay) Luis Fernando Jay is a 53 y.o. male     They presented to the emergency department on February 29, 2024. Patient presents with:  Foreign Body in Ear: Patient states he broke off a q tip in his left ear. Denies bleeding or hearing loss.       The patient states that Chest x-ray his ear was itchy, inserted a Q-tip in order to scratch the itch, however the Q-tip broke and he was unable to remove it even with attempts with needle-nose pliers and came to the emergency department for evaluation.  Patient denies any pain, noting only mild discomfort. Patient denies  any other complaint at this time.              Prior to Admission Medications   Prescriptions Last Dose Informant Patient Reported? Taking?   albuterol (2.5 mg/3 mL) 0.083 % nebulizer solution   No No   Sig: Take 1 vial (2.5 mg total) by nebulization every 4 (four) hours as needed for wheezing or shortness of breath   albuterol (PROVENTIL HFA,VENTOLIN HFA) 90 mcg/act inhaler   No No   Sig: Inhale 2 puffs every 4 (four) hours as needed for wheezing   albuterol (ProAir HFA) 90 mcg/act inhaler   No No   Sig: Inhale 2 puffs every 6 (six) hours as needed for wheezing   allopurinol (ZYLOPRIM) 100 mg tablet   No No   Sig: Take 1 tablet (100 mg total) by mouth daily   dulaglutide (Trulicity) 1.5 MG/0.5ML injection   No No   Sig: Inject 0.5 mL (1.5 mg total) under the skin once a week   naproxen (Naprosyn) 500 mg tablet   No No   Sig: Take 1 tablet (500 mg total) by mouth 2 (two) times a day with meals Prn for gout   neomycin-polymyxin-hydrocortisone (CORTISPORIN) 0.35%-10,000 units/mL-1% otic suspension   No No   Sig: Administer 4 drops into the left ear 4 (four) times a day for 7 days      Facility-Administered Medications: None       Past Medical History:   Diagnosis Date    GERD  (gastroesophageal reflux disease)     Lung mass     Obesity        History reviewed. No pertinent surgical history.    Family History   Problem Relation Age of Onset    Tuberculosis Father     Atrial fibrillation Father 65    Lung cancer Paternal Uncle     Alcohol abuse Neg Hx     Substance Abuse Neg Hx     Mental illness Neg Hx     Depression Neg Hx      I have reviewed and agree with the history as documented.    E-Cigarette/Vaping    E-Cigarette Use Never User      E-Cigarette/Vaping Substances    Nicotine No     THC No     CBD No     Flavoring No     Other No     Unknown No      Social History     Tobacco Use    Smoking status: Former     Current packs/day: 0.00     Average packs/day: 3.0 packs/day for 40.3 years (121.0 ttl pk-yrs)     Types: Cigarettes     Start date:      Quit date: 2019     Years since quittin.8    Smokeless tobacco: Never   Vaping Use    Vaping status: Never Used   Substance Use Topics    Alcohol use: Not Currently    Drug use: Never        Review of Systems   HENT:          Foreign body in left ear   All other systems reviewed and are negative.      Physical Exam  ED Triage Vitals [24 1532]   Temperature Pulse Respirations Blood Pressure SpO2   98 °F (36.7 °C) 93 18 (!) 151/104 98 %      Temp Source Heart Rate Source Patient Position - Orthostatic VS BP Location FiO2 (%)   Oral Monitor Sitting Right arm --      Pain Score       --             Orthostatic Vital Signs  Vitals:    24 1532   BP: (!) 151/104   Pulse: 93   Patient Position - Orthostatic VS: Sitting       Physical Exam  Vitals and nursing note reviewed.   Constitutional:       General: He is not in acute distress.     Appearance: Normal appearance.   HENT:      Head: Normocephalic and atraumatic.      Right Ear: Tympanic membrane, ear canal and external ear normal.      Left Ear: Tympanic membrane, ear canal and external ear normal.      Ears:      Comments: Q-tip with broken shaft within left EAC      Nose: Nose normal.      Mouth/Throat:      Mouth: Mucous membranes are moist.   Eyes:      Conjunctiva/sclera: Conjunctivae normal.   Pulmonary:      Effort: Pulmonary effort is normal. No respiratory distress.   Abdominal:      General: Abdomen is flat.   Musculoskeletal:         General: No deformity.      Cervical back: Normal range of motion.   Skin:     General: Skin is warm and dry.      Capillary Refill: Capillary refill takes less than 2 seconds.   Neurological:      Mental Status: He is alert. Mental status is at baseline.   Psychiatric:         Mood and Affect: Mood normal.         ED Medications  Medications - No data to display    Diagnostic Studies  Results Reviewed       None                   No orders to display         Procedures  Foreign Body - Orifice    Date/Time: 2/29/2024 3:39 PM    Performed by: Yassine Kyle MD  Authorized by: Yassine Kyle MD    Patient location:  ED  Consent:     Consent obtained:  Verbal    Consent given by:  Patient    Risks discussed:  TM perforation  Universal protocol:     Patient identity confirmed:  Verbally with patient  Location:     Location:  Ear    Ear location:  L ear  Pre-procedure details:     Imaging:  None  Anesthesia (see MAR for exact dosages):     Topical anesthetic:  None  Procedure details:     Localization method:  Microscope    Removal mechanism:  Alligator forceps    Procedure complexity:  Simple    Foreign bodies recovered:  1    Description:  Q-tip with broken shaft    Intact foreign body removal: yes    Post-procedure details:     Confirmation:  No additional foreign bodies on visualization    Patient tolerance of procedure:  Tolerated well, no immediate complications        ED Course                                       Medical Decision Making  53-year-old male presents emergency department with foreign body in left ear.  Foreign body removed as described as above.  Patient with no other complaints. Patient appears well, nontoxic,  agrees with plan of care at this time.  Answered all questions.  In light of this, patient would benefit from outpatient follow-up.          Disposition  Final diagnoses:   Ear foreign body, left, initial encounter     Time reflects when diagnosis was documented in both MDM as applicable and the Disposition within this note       Time User Action Codes Description Comment    2/29/2024  3:34 PM Rusty Ibanez Add [T16.2XXA] Ear foreign body, left, initial encounter           ED Disposition       ED Disposition   Discharge    Condition   Stable    Date/Time   Thu Feb 29, 2024  3:34 PM    Comment   Luis Fernando Jay discharge to home/self care.                   Follow-up Information    None         Patient's Medications   Discharge Prescriptions    No medications on file     No discharge procedures on file.    PDMP Review         Value Time User    PDMP Reviewed  Yes 6/28/2022  2:25 PM Serenity Márquez MD             ED Provider  Attending physically available and evaluated Luis Fernando Jay. I managed the patient along with the ED Attending.    Electronically Signed by           Yassine Kyle MD  02/29/24 [id number]

## 2024-02-29 NOTE — ED ATTENDING ATTESTATION
2/29/2024  I, Rusty Ibanez MD, saw and evaluated the patient. I have discussed the patient with the resident/non-physician practitioner and agree with the resident's/non-physician practitioner's findings, Plan of Care, and MDM as documented in the resident's/non-physician practitioner's note, except where noted. All available labs and Radiology studies were reviewed.  I was present for key portions of any procedure(s) performed by the resident/non-physician practitioner and I was immediately available to provide assistance.       At this point I agree with the current assessment done in the Emergency Department.  I have conducted an independent evaluation of this patient a history and physical is as follows:    S:  Chief Complaint   Patient presents with    Foreign Body in Ear     Patient states he broke off a q tip in his left ear. Denies bleeding or hearing loss.      Luis Fernando is a 53 y.o. male who presents with the chief complaint of a partial piece of a q-tip in his left ear.  He reports it broke off earlier today.  He attempted removal with needle nose pliers and forceps without success. No bleeding, mild pain.     O:  ED Triage Vitals [02/29/24 1532]   Temperature Pulse Respirations Blood Pressure SpO2   98 °F (36.7 °C) 93 18 (!) 151/104 98 %      Temp Source Heart Rate Source Patient Position - Orthostatic VS BP Location FiO2 (%)   Oral Monitor Sitting Right arm --      Pain Score       --         Physical Exam  Vitals and nursing note reviewed.   Constitutional:       General: He is in acute distress (mild).      Appearance: He is well-developed.   HENT:      Head: Normocephalic and atraumatic.      Left Ear: A foreign body is present.      Ears:      Comments: Wooden q-tip fragment in left ear  Eyes:      Pupils: Pupils are equal, round, and reactive to light.   Neck:      Vascular: No JVD.   Cardiovascular:      Rate and Rhythm: Normal rate and regular rhythm.      Heart sounds: Normal heart sounds.  No murmur heard.     No friction rub. No gallop.   Pulmonary:      Effort: Pulmonary effort is normal. No respiratory distress.      Breath sounds: Normal breath sounds. No wheezing or rales.   Chest:      Chest wall: No tenderness.   Musculoskeletal:         General: No tenderness. Normal range of motion.      Cervical back: Normal range of motion.   Skin:     General: Skin is warm and dry.   Neurological:      General: No focal deficit present.      Mental Status: He is alert and oriented to person, place, and time.   Psychiatric:         Behavior: Behavior normal.         Thought Content: Thought content normal.         Judgment: Judgment normal.       A/P:  Background: 53 y.o. male presents with left ear foreign body    Plan: Removal with the aid of operating otoscope and alligator forceps.    ED Course     Dr. Lorenzo removed the foreign body.  I examined the ear following this, tm intact, no bleeding    Critical Care Time  Procedures    Time reflects when diagnosis was documented in both MDM as applicable and the Disposition within this note       Time User Action Codes Description Comment    2/29/2024  3:34 PM Rusty Ibanez Add [T16.2XXA] Ear foreign body, left, initial encounter           ED Disposition       ED Disposition   Discharge    Condition   Stable    Date/Time   Thu Feb 29, 2024  3:34 PM    Comment   Luis Fernando Jay discharge to home/self care.                   Follow-up Information    None

## 2024-03-16 DIAGNOSIS — M10.071 ACUTE IDIOPATHIC GOUT OF RIGHT FOOT: ICD-10-CM

## 2024-03-18 RX ORDER — ALLOPURINOL 100 MG/1
100 TABLET ORAL DAILY
Qty: 30 TABLET | Refills: 0 | Status: SHIPPED | OUTPATIENT
Start: 2024-03-18 | End: 2024-03-26

## 2024-03-22 ENCOUNTER — APPOINTMENT (OUTPATIENT)
Dept: LAB | Facility: CLINIC | Age: 54
End: 2024-03-22
Payer: COMMERCIAL

## 2024-03-22 LAB
ALBUMIN SERPL BCP-MCNC: 4 G/DL (ref 3.5–5)
ALP SERPL-CCNC: 75 U/L (ref 34–104)
ALT SERPL W P-5'-P-CCNC: 55 U/L (ref 7–52)
ANION GAP SERPL CALCULATED.3IONS-SCNC: 7 MMOL/L (ref 4–13)
AST SERPL W P-5'-P-CCNC: 33 U/L (ref 13–39)
BASOPHILS # BLD AUTO: 0.06 THOUSANDS/ÂΜL (ref 0–0.1)
BASOPHILS NFR BLD AUTO: 1 % (ref 0–1)
BILIRUB SERPL-MCNC: 0.65 MG/DL (ref 0.2–1)
BUN SERPL-MCNC: 18 MG/DL (ref 5–25)
CALCIUM SERPL-MCNC: 8.8 MG/DL (ref 8.4–10.2)
CHLORIDE SERPL-SCNC: 108 MMOL/L (ref 96–108)
CHOLEST SERPL-MCNC: 170 MG/DL
CO2 SERPL-SCNC: 24 MMOL/L (ref 21–32)
CREAT SERPL-MCNC: 0.8 MG/DL (ref 0.6–1.3)
EOSINOPHIL # BLD AUTO: 0.35 THOUSAND/ÂΜL (ref 0–0.61)
EOSINOPHIL NFR BLD AUTO: 4 % (ref 0–6)
ERYTHROCYTE [DISTWIDTH] IN BLOOD BY AUTOMATED COUNT: 13.6 % (ref 11.6–15.1)
EST. AVERAGE GLUCOSE BLD GHB EST-MCNC: 128 MG/DL
GFR SERPL CREATININE-BSD FRML MDRD: 99 ML/MIN/1.73SQ M
GLUCOSE P FAST SERPL-MCNC: 102 MG/DL (ref 65–99)
HBA1C MFR BLD: 6.1 %
HCT VFR BLD AUTO: 48.3 % (ref 36.5–49.3)
HDLC SERPL-MCNC: 38 MG/DL
HGB BLD-MCNC: 16.3 G/DL (ref 12–17)
IMM GRANULOCYTES # BLD AUTO: 0.04 THOUSAND/UL (ref 0–0.2)
IMM GRANULOCYTES NFR BLD AUTO: 1 % (ref 0–2)
LDLC SERPL CALC-MCNC: 106 MG/DL (ref 0–100)
LYMPHOCYTES # BLD AUTO: 2.32 THOUSANDS/ÂΜL (ref 0.6–4.47)
LYMPHOCYTES NFR BLD AUTO: 28 % (ref 14–44)
MCH RBC QN AUTO: 31.4 PG (ref 26.8–34.3)
MCHC RBC AUTO-ENTMCNC: 33.7 G/DL (ref 31.4–37.4)
MCV RBC AUTO: 93 FL (ref 82–98)
MONOCYTES # BLD AUTO: 0.52 THOUSAND/ÂΜL (ref 0.17–1.22)
MONOCYTES NFR BLD AUTO: 6 % (ref 4–12)
NEUTROPHILS # BLD AUTO: 4.88 THOUSANDS/ÂΜL (ref 1.85–7.62)
NEUTS SEG NFR BLD AUTO: 60 % (ref 43–75)
NONHDLC SERPL-MCNC: 132 MG/DL
NRBC BLD AUTO-RTO: 0 /100 WBCS
PLATELET # BLD AUTO: 177 THOUSANDS/UL (ref 149–390)
PMV BLD AUTO: 9.5 FL (ref 8.9–12.7)
POTASSIUM SERPL-SCNC: 4.5 MMOL/L (ref 3.5–5.3)
PROT SERPL-MCNC: 6.8 G/DL (ref 6.4–8.4)
RBC # BLD AUTO: 5.19 MILLION/UL (ref 3.88–5.62)
SODIUM SERPL-SCNC: 139 MMOL/L (ref 135–147)
TRIGL SERPL-MCNC: 128 MG/DL
URATE SERPL-MCNC: 8.2 MG/DL (ref 3.5–8.5)
WBC # BLD AUTO: 8.17 THOUSAND/UL (ref 4.31–10.16)

## 2024-03-26 ENCOUNTER — OFFICE VISIT (OUTPATIENT)
Dept: INTERNAL MEDICINE CLINIC | Facility: CLINIC | Age: 54
End: 2024-03-26
Payer: COMMERCIAL

## 2024-03-26 VITALS
SYSTOLIC BLOOD PRESSURE: 138 MMHG | DIASTOLIC BLOOD PRESSURE: 86 MMHG | HEART RATE: 113 BPM | HEIGHT: 74 IN | TEMPERATURE: 97.6 F | WEIGHT: 315 LBS | OXYGEN SATURATION: 95 % | BODY MASS INDEX: 40.43 KG/M2

## 2024-03-26 DIAGNOSIS — J43.8 OTHER EMPHYSEMA (HCC): ICD-10-CM

## 2024-03-26 DIAGNOSIS — R91.8 PULMONARY NODULES: ICD-10-CM

## 2024-03-26 DIAGNOSIS — R73.03 PREDIABETES: Primary | ICD-10-CM

## 2024-03-26 DIAGNOSIS — M10.071 ACUTE IDIOPATHIC GOUT OF RIGHT FOOT: ICD-10-CM

## 2024-03-26 DIAGNOSIS — H92.13 EAR DISCHARGE OF BOTH EARS: ICD-10-CM

## 2024-03-26 DIAGNOSIS — Z00.00 HEALTH MAINTENANCE EXAMINATION: ICD-10-CM

## 2024-03-26 DIAGNOSIS — M54.32 SCIATICA, LEFT SIDE: ICD-10-CM

## 2024-03-26 DIAGNOSIS — E66.01 CLASS 3 SEVERE OBESITY DUE TO EXCESS CALORIES WITHOUT SERIOUS COMORBIDITY WITH BODY MASS INDEX (BMI) OF 50.0 TO 59.9 IN ADULT (HCC): ICD-10-CM

## 2024-03-26 DIAGNOSIS — E78.49 OTHER HYPERLIPIDEMIA: ICD-10-CM

## 2024-03-26 PROBLEM — S83.412A SPRAIN OF MEDIAL COLLATERAL LIGAMENT OF LEFT KNEE: Status: RESOLVED | Noted: 2023-02-10 | Resolved: 2024-03-26

## 2024-03-26 PROCEDURE — 99396 PREV VISIT EST AGE 40-64: CPT | Performed by: INTERNAL MEDICINE

## 2024-03-26 PROCEDURE — 99214 OFFICE O/P EST MOD 30 MIN: CPT | Performed by: INTERNAL MEDICINE

## 2024-03-26 RX ORDER — ALLOPURINOL 100 MG/1
200 TABLET ORAL DAILY
Qty: 60 TABLET | Refills: 5 | Status: SHIPPED | OUTPATIENT
Start: 2024-03-26

## 2024-03-26 NOTE — PROGRESS NOTES
Assessment/Plan:    Other emphysema (HCC)  No symptoms.    Class 3 severe obesity due to excess calories without serious comorbidity in adult (HCC)  Gained 100 lbs since 6 months ago.  Resume portion control and diet.  Change Trulicity to Ozempic.    Other hyperlipidemia  LDL remains elevated. Discussed risk.  Not on statin.    Prediabetes  A1c worse at 6.1%.  On low carb diet.  As above.    Acute idiopathic gout of right foot  Uric acid 8.2, no recent flare up.   Increase allopurinol to 200 mg daily.  Instructed to take naproxen prn only.    Pulmonary nodules  Repeat CT due.     Diagnoses and all orders for this visit:    Prediabetes  -     Hemoglobin A1C; Future  -     semaglutide, 0.25 or 0.5 mg/dose, (Ozempic, 0.25 or 0.5 MG/DOSE,) 2 mg/3 mL injection pen; 0.25 mg under the skin every 7 days for 4 doses (28 days), THEN 0.5 mg under the skin every 7 days    Other hyperlipidemia  -     Comprehensive metabolic panel; Future  -     Lipid panel; Future  -     TSH, 3rd generation with Free T4 reflex; Future    Pulmonary nodules  -     CT lung screening program; Future    Class 3 severe obesity due to excess calories without serious comorbidity with body mass index (BMI) of 50.0 to 59.9 in adult (HCC)    Acute idiopathic gout of right foot  -     Uric acid; Future  -     allopurinol (ZYLOPRIM) 100 mg tablet; Take 2 tablets (200 mg total) by mouth daily    Sciatica, left side  -     Ambulatory Referral to Chiropractic; Future    Other emphysema (HCC)    Ear discharge of both ears  Comments:  Refer to ENT.  Orders:  -     Ambulatory Referral to Otolaryngology; Future    Health maintenance examination  Comments:  Recommend dental exam.      Follow up in 6 months or as needed.      Subjective:      Patient ID: Luis Fernando Jay is a 54 y.o. male here for a follow up.    He reports gaining most of the weight that he had lost.  He had stopped his diet about 3 months ago. He is trying to get back to his diet, packing food for  work.    He complains of left low back pain radiating down his left foot.  This started about 3 weeks ago he is asking if he can see a chiropractor for the sciatica pain.  He has taken Tylenol which does not really work.  He had applied ice and heat which did not help either.    About 2 weeks ago, he experienced a flareup of his gout.  He has been taking allopurinol every day, naproxen once a day also.    He is asking about Ozempic.  He was on Trulicity for over a year and felt it did not really help.  He said Ozempic is paid for by his insurance since he knows some coworkers who are on it.    He complains of ongoing ear discharge since about 2 months ago when he had an ear infection was seen at urgent care.  He noticed white discharge from his right ear when he lays down, occurs almost daily.  He reports no hearing loss, does have tinnitus.      The following portions of the patient's history were reviewed and updated as appropriate: allergies, current medications, past medical history, past social history, and problem list.    Review of Systems   Constitutional:  Positive for appetite change. Negative for fatigue.   HENT:  Negative for congestion, hearing loss and postnasal drip.    Eyes: Negative.    Respiratory:  Negative for cough, chest tightness and shortness of breath.    Cardiovascular:  Negative for chest pain, palpitations and leg swelling.   Gastrointestinal:  Negative for abdominal pain and constipation.   Genitourinary:  Negative for dysuria, frequency and urgency.   Musculoskeletal:  Positive for arthralgias and back pain. Negative for gait problem.   Skin:  Negative for rash and wound.   Neurological:  Negative for dizziness, numbness and headaches.   Hematological:  Negative for adenopathy. Does not bruise/bleed easily.   Psychiatric/Behavioral:  Negative for sleep disturbance. The patient is not nervous/anxious.          Objective:      /86   Pulse (!) 113   Temp 97.6 °F (36.4 °C)   Ht 6'  "2\" (1.88 m)   Wt (!) 188 kg (415 lb)   SpO2 95%   BMI 53.28 kg/m²          Physical Exam  Vitals and nursing note reviewed.   Constitutional:       Appearance: He is well-developed.   HENT:      Head: Normocephalic and atraumatic.      Right Ear: External ear normal. No decreased hearing noted. No drainage. A middle ear effusion is present. There is no impacted cerumen.      Left Ear: External ear normal. No decreased hearing noted. No drainage.  No middle ear effusion. There is no impacted cerumen.   Eyes:      Conjunctiva/sclera: Conjunctivae normal.      Pupils: Pupils are equal, round, and reactive to light.   Cardiovascular:      Rate and Rhythm: Normal rate and regular rhythm.      Heart sounds: Normal heart sounds.   Pulmonary:      Effort: Pulmonary effort is normal.      Breath sounds: No wheezing or rhonchi.   Abdominal:      General: Bowel sounds are normal.      Palpations: Abdomen is soft.   Musculoskeletal:         General: No swelling.      Cervical back: Neck supple.      Right lower leg: No edema.      Left lower leg: No edema.   Skin:     General: Skin is warm.      Findings: No rash.   Neurological:      General: No focal deficit present.      Mental Status: He is alert and oriented to person, place, and time.   Psychiatric:         Mood and Affect: Mood and affect normal.         Behavior: Behavior normal.           Labs & imaging results reviewed with patient.    "

## 2024-03-26 NOTE — ASSESSMENT & PLAN NOTE
Uric acid 8.2, no recent flare up.   Increase allopurinol to 200 mg daily.  Instructed to take naproxen prn only.

## 2024-03-26 NOTE — ASSESSMENT & PLAN NOTE
Gained 100 lbs since 6 months ago.  Resume portion control and diet.  Change Trulicity to Ozempic.

## 2024-03-26 NOTE — ASSESSMENT & PLAN NOTE
I called [facility] medical drug review dept at [phone] and per Radhika PEREZ no new [address] is require for patient having treatment done at [facility] today 10/14, she informed that her [address] was approved for the treatment and that it doesn't matter in which [facility] she is having it done  No symptoms.

## 2024-04-05 ENCOUNTER — PATIENT MESSAGE (OUTPATIENT)
Dept: INTERNAL MEDICINE CLINIC | Facility: CLINIC | Age: 54
End: 2024-04-05

## 2024-04-05 ENCOUNTER — TELEPHONE (OUTPATIENT)
Age: 54
End: 2024-04-05

## 2024-05-18 DIAGNOSIS — M10.071 ACUTE IDIOPATHIC GOUT OF RIGHT FOOT: ICD-10-CM

## 2024-05-19 RX ORDER — ALLOPURINOL 100 MG/1
200 TABLET ORAL DAILY
Qty: 180 TABLET | Refills: 1 | Status: SHIPPED | OUTPATIENT
Start: 2024-05-19

## 2024-05-29 ENCOUNTER — TELEPHONE (OUTPATIENT)
Age: 54
End: 2024-05-29

## 2024-05-29 NOTE — TELEPHONE ENCOUNTER
Called patient to schedule colonoscopy with Dr Mcintyre.  Patient states he would prefer a Cologuard test.  He will call his family doctor to order for him.

## 2024-06-11 DIAGNOSIS — R73.03 PREDIABETES: ICD-10-CM

## 2024-06-13 ENCOUNTER — TELEPHONE (OUTPATIENT)
Dept: INTERNAL MEDICINE CLINIC | Facility: CLINIC | Age: 54
End: 2024-06-13

## 2024-06-13 DIAGNOSIS — R73.03 PREDIABETES: ICD-10-CM

## 2024-06-14 ENCOUNTER — TELEPHONE (OUTPATIENT)
Dept: INTERNAL MEDICINE CLINIC | Facility: CLINIC | Age: 54
End: 2024-06-14

## 2024-06-14 NOTE — TELEPHONE ENCOUNTER
Pt called just to clarify he was prescribed the next dose of Ozempic 0.5mg. I advised on 6/12/24 the same dose of 0.25mg was sent in, then canceled and the 0.5mg was sent in on 6/13/24. No further action needed.

## 2024-06-27 ENCOUNTER — TELEPHONE (OUTPATIENT)
Age: 54
End: 2024-06-27

## 2024-06-27 DIAGNOSIS — R73.03 PREDIABETES: ICD-10-CM

## 2024-07-19 ENCOUNTER — TELEPHONE (OUTPATIENT)
Age: 54
End: 2024-07-19

## 2024-07-19 ENCOUNTER — TELEPHONE (OUTPATIENT)
Dept: OTHER | Facility: HOSPITAL | Age: 54
End: 2024-07-19

## 2024-07-19 NOTE — TELEPHONE ENCOUNTER
Reason for call:   [x] Prior Auth  [] Other:     Caller:  [] Patient  [x] Pharmacy [id number] - [place] - [address] [phone]         Ordering Provider:   [] PCP/Provider - Willi  [] Speciality/Provider -     Is the patient's insurance updated in EPIC?   [x] Yes   [] No     Is a copy of the patient's insurance scanned in EPIC?   [x] Yes   [] No

## 2024-07-23 ENCOUNTER — OFFICE VISIT (OUTPATIENT)
Dept: URGENT CARE | Facility: CLINIC | Age: 54
End: 2024-07-23
Payer: COMMERCIAL

## 2024-07-23 VITALS
HEIGHT: 74 IN | HEART RATE: 84 BPM | BODY MASS INDEX: 40.43 KG/M2 | WEIGHT: 315 LBS | TEMPERATURE: 99.7 F | SYSTOLIC BLOOD PRESSURE: 128 MMHG | RESPIRATION RATE: 18 BRPM | DIASTOLIC BLOOD PRESSURE: 74 MMHG | OXYGEN SATURATION: 96 %

## 2024-07-23 DIAGNOSIS — M54.2 CERVICALGIA: Primary | ICD-10-CM

## 2024-07-23 PROCEDURE — 99213 OFFICE O/P EST LOW 20 MIN: CPT | Performed by: NURSE PRACTITIONER

## 2024-07-23 RX ORDER — METHOCARBAMOL 750 MG/1
750 TABLET, FILM COATED ORAL 3 TIMES DAILY PRN
Qty: 20 TABLET | Refills: 0 | Status: SHIPPED | OUTPATIENT
Start: 2024-07-23

## 2024-07-23 RX ORDER — PREDNISONE 50 MG/1
50 TABLET ORAL DAILY
Qty: 5 TABLET | Refills: 0 | Status: SHIPPED | OUTPATIENT
Start: 2024-07-23 | End: 2024-07-28

## 2024-07-23 NOTE — PROGRESS NOTES
[facility]        NAME: Luis Fernando Jay is a 54 y.o. male  : 1970    MRN: [id number]  DATE: 2024  TIME: 9:02 AM    Assessment and Plan   Cervicalgia [M54.2]  1. Cervicalgia  predniSONE 50 mg tablet    methocarbamol (Robaxin-750) 750 mg tablet            Patient Instructions     Take meds as prescribed  Robaxin may cause drowsiness  Prednisone will help with both neck pain and sinuses pressure  Ear pain may be from eustachian tube dysfunction   Excused from work for today   Follow up with PCP in 3-5 days.  Proceed to  ER if symptoms worsen.    If tests have been performed at [facility], our office will contact you with results if changes need to be made to the care plan discussed with you at the visit.  [facility]    Chief Complaint     Chief Complaint   Patient presents with    Neck Pain     Started 2 days ago woke up with neck pain, reports pain on both sides of neck that starts on the face, reports midline tenderness as well, pain worse with any motion    Hearing Problem     Started 2 days ago with b/l difficulty hearing, denies fevers, denies chills         History of Present Illness       HPI  Reports neck pain that started 2 days ago. Says he woke up from sleep with the pain. Denies injury. Says the pain is on both sides of the neck and in the back. Constant. Worse when moving the neck. Also reports muffling of the ears. No pain or injury. Says he feels they are clogged.     Review of Systems   Review of Systems   HENT:  Positive for hearing loss and sinus pressure. Negative for dental problem, ear discharge, facial swelling, postnasal drip and sore throat.    Respiratory:  Negative for shortness of breath.    Musculoskeletal:  Positive for neck pain. Negative for back pain.         Current Medications       Current Outpatient Medications:     methocarbamol (Robaxin-750) 750 mg tablet, Take 1 tablet (750 mg total) by mouth 3 (three) times a  "day as needed for muscle spasms (may cause drowsiness), Disp: 20 tablet, Rfl: 0    predniSONE 50 mg tablet, Take 1 tablet (50 mg total) by mouth daily for 5 days, Disp: 5 tablet, Rfl: 0    albuterol (2.5 mg/3 mL) 0.083 % nebulizer solution, Take 1 vial (2.5 mg total) by nebulization every 4 (four) hours as needed for wheezing or shortness of breath, Disp: 270 mL, Rfl: 5    albuterol (ProAir HFA) 90 mcg/act inhaler, Inhale 2 puffs every 6 (six) hours as needed for wheezing, Disp: 8.5 g, Rfl: 0    albuterol (PROVENTIL HFA,VENTOLIN HFA) 90 mcg/act inhaler, Inhale 2 puffs every 4 (four) hours as needed for wheezing, Disp: 8.5 g, Rfl: 3    allopurinol (ZYLOPRIM) 100 mg tablet, TAKE 2 TABLETS BY MOUTH EVERY DAY, Disp: 180 tablet, Rfl: 1    naproxen (Naprosyn) 500 mg tablet, Take 1 tablet (500 mg total) by mouth 2 (two) times a day with meals Prn for gout, Disp: 60 tablet, Rfl: 5    tirzepatide (Zepbound) 2.5 mg/0.5 mL auto-injector, Inject 0.5 mL (2.5 mg total) under the skin once a week for 28 days, Disp: 2 mL, Rfl: 0    Current Allergies     Allergies as of 07/23/2024    (No Known Allergies)            The following portions of the patient's history were reviewed and updated as appropriate: allergies, current medications, past family history, past medical history, past social history, past surgical history and problem list.     Past Medical History:   Diagnosis Date    GERD (gastroesophageal reflux disease)     Lung mass     Obesity        History reviewed. No pertinent surgical history.    Family History   Problem Relation Age of Onset    Tuberculosis Father     Atrial fibrillation Father 65    Lung cancer Paternal Uncle     Alcohol abuse Neg Hx     Substance Abuse Neg Hx     Mental illness Neg Hx     Depression Neg Hx          Medications have been verified.        Objective   /74 (BP Location: Right arm, Patient Position: Sitting)   Pulse 84   Temp 99.7 °F (37.6 °C) (Tympanic)   Resp 18   Ht 6' 2\" (1.88 m)  "  Wt (!) 190 kg (419 lb 12.8 oz)   SpO2 96%   BMI 53.90 kg/m²   No LMP for male patient.       Physical Exam     Physical Exam  HENT:      Head:      Comments: TTP of the sinuses, mild on the maxillary sinuses, moderate on the frontal and paranasal sinuses.      Right Ear: Tympanic membrane normal.      Left Ear: Tympanic membrane normal.      Ears:      Comments: Pain of the ear ear with manipulation of the earlobe when trying to visually inspect the inner ears.   Cardiovascular:      Rate and Rhythm: Regular rhythm.      Heart sounds: Normal heart sounds.   Pulmonary:      Effort: Pulmonary effort is normal.      Breath sounds: Normal breath sounds.   Musculoskeletal:      Cervical back: Tenderness (with palpation of the bilateral lateral aspects of the neck and cervical spine posteriorly. Very limited ROM of the neck with any movement, secondary to severe pain) present.

## 2024-07-23 NOTE — LETTER
July 23, 2024     Patient: Luis Fernando Jay   YOB: 1970   Date of Visit: 7/23/2024       To Whom it May Concern:    Luis Fernando Jay was seen in my clinic on 7/23/2024. He may return to work on 07/24/2024 .    If you have any questions or concerns, please don't hesitate to call.         Sincerely,          MARISEL Collins        CC: No Recipients

## 2024-08-02 NOTE — TELEPHONE ENCOUNTER
PA for Zepbound 2.5 mg/0.5 ml  SUBMITTED     via    []CMM-KEY   [x][employer]-Case ID # [id number]   []Faxed to plan   []Other website   []Phone call Case ID #     Office notes sent, clinical questions answered. Awaiting determination    Turnaround time for your insurance to make a decision on your Prior Authorization can take 7-21 business days.

## 2024-08-12 ENCOUNTER — OFFICE VISIT (OUTPATIENT)
Dept: URGENT CARE | Facility: CLINIC | Age: 54
End: 2024-08-12
Payer: COMMERCIAL

## 2024-08-12 VITALS
SYSTOLIC BLOOD PRESSURE: 146 MMHG | OXYGEN SATURATION: 94 % | DIASTOLIC BLOOD PRESSURE: 94 MMHG | HEART RATE: 147 BPM | RESPIRATION RATE: 20 BRPM | TEMPERATURE: 99.6 F

## 2024-08-12 DIAGNOSIS — L02.31 ABSCESS OF BUTTOCK: Primary | ICD-10-CM

## 2024-08-12 PROCEDURE — 99213 OFFICE O/P EST LOW 20 MIN: CPT

## 2024-08-12 RX ORDER — DOXYCYCLINE 100 MG/1
100 CAPSULE ORAL 2 TIMES DAILY
Qty: 20 CAPSULE | Refills: 0 | Status: SHIPPED | OUTPATIENT
Start: 2024-08-12 | End: 2024-08-22

## 2024-08-12 NOTE — LETTER
August 12, 2024     Patient: Luis Fernando Jay   YOB: 1970   Date of Visit: 8/12/2024       To Whom it May Concern:    Luis Fernando Jay was seen in my clinic on 8/12/2024.    If you have any questions or concerns, please don't hesitate to call.         Sincerely,          Eber Mancuso PA-C        CC: No Recipients

## 2024-08-12 NOTE — PROGRESS NOTES
[facility]        NAME: Luis Fernando Jay is a 54 y.o. male  : 1970    MRN: [id number]  DATE: 2024  TIME: 7:00 PM    Assessment and Plan   Abscess of buttock [L02.31]  1. Abscess of buttock  doxycycline monohydrate (MONODOX) 100 mg capsule            Patient Instructions       Follow up with PCP in 3-5 days.  Proceed to  ER if symptoms worsen.    If tests have been performed at [facility], our office will contact you with results if changes need to be made to the care plan discussed with you at the visit.  [facility]    Chief Complaint     Chief Complaint   Patient presents with    Abscess     Right lower but cheek. Started 2 days ago. Oozing liquid. Has been taking hot baths to help relieve the pain.          History of Present Illness       54-year-old male presents for a abscess on the right buttocks x 2 days.  Patient admits to purulent drainage.  Denies any fevers.  Did feel chills.  Has been soaking in warm water without relief.    Abscess  Associated symptoms include chills. Pertinent negatives include no fever.       Review of Systems   Review of Systems   Constitutional:  Positive for chills. Negative for fever.   Skin:  Positive for wound.         Current Medications       Current Outpatient Medications:     albuterol (2.5 mg/3 mL) 0.083 % nebulizer solution, Take 1 vial (2.5 mg total) by nebulization every 4 (four) hours as needed for wheezing or shortness of breath, Disp: 270 mL, Rfl: 5    albuterol (ProAir HFA) 90 mcg/act inhaler, Inhale 2 puffs every 6 (six) hours as needed for wheezing, Disp: 8.5 g, Rfl: 0    albuterol (PROVENTIL HFA,VENTOLIN HFA) 90 mcg/act inhaler, Inhale 2 puffs every 4 (four) hours as needed for wheezing, Disp: 8.5 g, Rfl: 3    allopurinol (ZYLOPRIM) 100 mg tablet, TAKE 2 TABLETS BY MOUTH EVERY DAY, Disp: 180 tablet, Rfl: 1    doxycycline monohydrate (MONODOX) 100 mg capsule, Take 1 capsule (100 mg total) by  mouth 2 (two) times a day for 10 days, Disp: 20 capsule, Rfl: 0    methocarbamol (Robaxin-750) 750 mg tablet, Take 1 tablet (750 mg total) by mouth 3 (three) times a day as needed for muscle spasms (may cause drowsiness), Disp: 20 tablet, Rfl: 0    naproxen (Naprosyn) 500 mg tablet, Take 1 tablet (500 mg total) by mouth 2 (two) times a day with meals Prn for gout, Disp: 60 tablet, Rfl: 5    tirzepatide (Zepbound) 2.5 mg/0.5 mL auto-injector, Inject 0.5 mL (2.5 mg total) under the skin once a week for 28 days, Disp: 2 mL, Rfl: 0    Current Allergies     Allergies as of 08/12/2024    (No Known Allergies)            The following portions of the patient's history were reviewed and updated as appropriate: allergies, current medications, past family history, past medical history, past social history, past surgical history and problem list.     Past Medical History:   Diagnosis Date    GERD (gastroesophageal reflux disease)     Lung mass     Obesity        History reviewed. No pertinent surgical history.    Family History   Problem Relation Age of Onset    Tuberculosis Father     Atrial fibrillation Father 65    Lung cancer Paternal Uncle     Alcohol abuse Neg Hx     Substance Abuse Neg Hx     Mental illness Neg Hx     Depression Neg Hx          Medications have been verified.        Objective   /94   Pulse (!) 147   Temp 99.6 °F (37.6 °C) (Tympanic)   Resp 20   SpO2 94%   No LMP for male patient.       Physical Exam     Physical Exam  Vitals and nursing note reviewed.   Constitutional:       General: He is not in acute distress.     Appearance: He is not toxic-appearing.   HENT:      Head: Normocephalic and atraumatic.   Eyes:      Conjunctiva/sclera: Conjunctivae normal.   Pulmonary:      Effort: Pulmonary effort is normal.   Skin:     Comments: Abscess in the right buttocks with purulent drainage   Neurological:      Mental Status: He is alert.   Psychiatric:         Mood and Affect: Mood normal.          Behavior: Behavior normal.

## 2024-08-16 ENCOUNTER — TELEPHONE (OUTPATIENT)
Age: 54
End: 2024-08-16

## 2024-08-16 DIAGNOSIS — E66.01 CLASS 3 SEVERE OBESITY DUE TO EXCESS CALORIES WITHOUT SERIOUS COMORBIDITY WITH BODY MASS INDEX (BMI) OF 50.0 TO 59.9 IN ADULT (HCC): Primary | ICD-10-CM

## 2024-08-16 RX ORDER — SEMAGLUTIDE 0.25 MG/.5ML
INJECTION, SOLUTION SUBCUTANEOUS
Qty: 2 ML | Refills: 0 | Status: SHIPPED | OUTPATIENT
Start: 2024-08-16

## 2024-08-16 NOTE — TELEPHONE ENCOUNTER
Reason for call:   [x] Prior Auth  [] Other:     Caller:  [] Patient  [x] Pharmacy  Name:   Address:   Callback Number:     Medication: Semaglutide-Weight Management       Dose/Frequency: Inject 0.25 mg under the skin weekly     Quantity: 2 mL    Ordering Provider:   [x] PCP/Provider -   [] Speciality/Provider -

## 2024-08-19 NOTE — TELEPHONE ENCOUNTER
PA for zepbound 2.5 mg/0.5 ml cancelled due to     []Approval on file-dates approved   []Medication already on Formulary  []Brand Name Preferred  [x]Medication was switched to Wegovy    []My Chart Message  []Phone call    Message sent to office clinical pool   No      Scanned into Media  no

## 2024-08-27 NOTE — TELEPHONE ENCOUNTER
PA for Wegovy 0.25 mg APPROVED     Date(s) approved July 28, 2024 to February 23, 2025     Case # [id number]     Patient advised by          [x] KIXEYEhart Message  [] Phone call   [x]LMOM  []L/M to call office as no active Communication consent on file  []Unable to leave detailed message as VM not approved on Communication consent       Pharmacy advised by    [x]Fax  []Phone call    Approval letter scanned into Media Yes

## 2024-08-27 NOTE — TELEPHONE ENCOUNTER
PA for Wegovy 0.25 mg SUBMITTED     via    []CMM-KEY:   [x]Eva-Case ID # [id number]   []Faxed to plan   []Other website   []Phone call Case ID #     Office notes sent, clinical questions answered. Awaiting determination    Turnaround time for your insurance to make a decision on your Prior Authorization can take 7-21 business days.

## 2024-09-05 ENCOUNTER — TELEPHONE (OUTPATIENT)
Age: 54
End: 2024-09-05

## 2024-09-05 NOTE — TELEPHONE ENCOUNTER
You can get blood work done at anytime, I changed the date (if using [facility]).    Stay hydrated, sugars may be higher.  You can check which pharmacy has Wegovy and I can send there.

## 2024-09-05 NOTE — TELEPHONE ENCOUNTER
Spoke with patient and relayed the message.  He will continue drinking extra fluids.  Patient said that he got everything figured out with the Wegovy.  The pharmacy ordered the medication and he should have it in the next couple of days. Patient is going to be getting the labs done on Monday.

## 2024-09-05 NOTE — TELEPHONE ENCOUNTER
Please advise  Patient called. Having trouble with getting his medication Wegovy. Been of meds for a couple weeks. He is c/o being very thirsty and urinating a lot. Drinking Propel constantly. Prediabetic per chart. Pharmacy says it isn't going through his Insurance  per patient. Letter in media states it was approved. Called pharmacy . Apparently they were running it through the wrong Insurance, so she ran it correctly and it went through but they do not have it in stock, have to order it. In the meantime patient was going to get blood work Monday however his order is dated 9/16, too soon, with symptoms now, please advise. [place] is in [facility] [place] on the ship. Can come home tomorrow morning to get blood work done if needed.

## 2024-09-09 ENCOUNTER — LAB (OUTPATIENT)
Dept: LAB | Facility: CLINIC | Age: 54
End: 2024-09-09
Payer: COMMERCIAL

## 2024-09-09 DIAGNOSIS — M10.071 ACUTE IDIOPATHIC GOUT OF RIGHT FOOT: ICD-10-CM

## 2024-09-09 DIAGNOSIS — R73.03 PREDIABETES: ICD-10-CM

## 2024-09-09 DIAGNOSIS — E78.49 OTHER HYPERLIPIDEMIA: ICD-10-CM

## 2024-09-09 DIAGNOSIS — R73.03 PREDIABETES: Primary | ICD-10-CM

## 2024-09-09 LAB
ALBUMIN SERPL BCG-MCNC: 4.1 G/DL (ref 3.5–5)
ALP SERPL-CCNC: 120 U/L (ref 34–104)
ALT SERPL W P-5'-P-CCNC: 77 U/L (ref 7–52)
ANION GAP SERPL CALCULATED.3IONS-SCNC: 8 MMOL/L (ref 4–13)
AST SERPL W P-5'-P-CCNC: 42 U/L (ref 13–39)
BILIRUB SERPL-MCNC: 0.97 MG/DL (ref 0.2–1)
BUN SERPL-MCNC: 16 MG/DL (ref 5–25)
CALCIUM SERPL-MCNC: 9 MG/DL (ref 8.4–10.2)
CHLORIDE SERPL-SCNC: 103 MMOL/L (ref 96–108)
CHOLEST SERPL-MCNC: 196 MG/DL
CO2 SERPL-SCNC: 24 MMOL/L (ref 21–32)
CREAT SERPL-MCNC: 0.86 MG/DL (ref 0.6–1.3)
EST. AVERAGE GLUCOSE BLD GHB EST-MCNC: 275 MG/DL
GFR SERPL CREATININE-BSD FRML MDRD: 96 ML/MIN/1.73SQ M
GLUCOSE P FAST SERPL-MCNC: 327 MG/DL (ref 65–99)
HBA1C MFR BLD: 11.2 %
HDLC SERPL-MCNC: 35 MG/DL
LDLC SERPL CALC-MCNC: 109 MG/DL (ref 0–100)
NONHDLC SERPL-MCNC: 161 MG/DL
POTASSIUM SERPL-SCNC: 4.2 MMOL/L (ref 3.5–5.3)
PROT SERPL-MCNC: 6.7 G/DL (ref 6.4–8.4)
SODIUM SERPL-SCNC: 135 MMOL/L (ref 135–147)
TRIGL SERPL-MCNC: 261 MG/DL
TSH SERPL DL<=0.05 MIU/L-ACNC: 2.25 UIU/ML (ref 0.45–4.5)
URATE SERPL-MCNC: 6.7 MG/DL (ref 3.5–8.5)

## 2024-09-09 PROCEDURE — 36415 COLL VENOUS BLD VENIPUNCTURE: CPT

## 2024-09-09 PROCEDURE — 84443 ASSAY THYROID STIM HORMONE: CPT

## 2024-09-09 PROCEDURE — 80053 COMPREHEN METABOLIC PANEL: CPT

## 2024-09-09 PROCEDURE — 83036 HEMOGLOBIN GLYCOSYLATED A1C: CPT

## 2024-09-09 PROCEDURE — 80061 LIPID PANEL: CPT

## 2024-09-09 PROCEDURE — 84550 ASSAY OF BLOOD/URIC ACID: CPT

## 2024-10-05 DIAGNOSIS — E66.813 CLASS 3 SEVERE OBESITY DUE TO EXCESS CALORIES WITHOUT SERIOUS COMORBIDITY WITH BODY MASS INDEX (BMI) OF 50.0 TO 59.9 IN ADULT (HCC): ICD-10-CM

## 2024-10-05 DIAGNOSIS — E66.01 CLASS 3 SEVERE OBESITY DUE TO EXCESS CALORIES WITHOUT SERIOUS COMORBIDITY WITH BODY MASS INDEX (BMI) OF 50.0 TO 59.9 IN ADULT (HCC): ICD-10-CM

## 2024-10-14 DIAGNOSIS — E66.01 CLASS 3 SEVERE OBESITY DUE TO EXCESS CALORIES WITHOUT SERIOUS COMORBIDITY WITH BODY MASS INDEX (BMI) OF 50.0 TO 59.9 IN ADULT (HCC): ICD-10-CM

## 2024-10-14 DIAGNOSIS — R73.03 PREDIABETES: ICD-10-CM

## 2024-10-14 DIAGNOSIS — E66.813 CLASS 3 SEVERE OBESITY DUE TO EXCESS CALORIES WITHOUT SERIOUS COMORBIDITY WITH BODY MASS INDEX (BMI) OF 50.0 TO 59.9 IN ADULT (HCC): ICD-10-CM

## 2024-10-15 ENCOUNTER — OFFICE VISIT (OUTPATIENT)
Dept: INTERNAL MEDICINE CLINIC | Facility: CLINIC | Age: 54
End: 2024-10-15
Payer: COMMERCIAL

## 2024-10-15 VITALS
WEIGHT: 315 LBS | HEART RATE: 129 BPM | TEMPERATURE: 97.6 F | DIASTOLIC BLOOD PRESSURE: 88 MMHG | OXYGEN SATURATION: 98 % | SYSTOLIC BLOOD PRESSURE: 126 MMHG | RESPIRATION RATE: 16 BRPM | HEIGHT: 74 IN | BODY MASS INDEX: 40.43 KG/M2

## 2024-10-15 DIAGNOSIS — E11.9 TYPE 2 DIABETES MELLITUS WITHOUT COMPLICATION, WITHOUT LONG-TERM CURRENT USE OF INSULIN (HCC): Primary | ICD-10-CM

## 2024-10-15 DIAGNOSIS — Z71.9 HEALTH COUNSELING: ICD-10-CM

## 2024-10-15 DIAGNOSIS — M10.071 ACUTE IDIOPATHIC GOUT OF RIGHT FOOT: ICD-10-CM

## 2024-10-15 DIAGNOSIS — E78.49 OTHER HYPERLIPIDEMIA: ICD-10-CM

## 2024-10-15 DIAGNOSIS — R91.8 PULMONARY NODULES: ICD-10-CM

## 2024-10-15 DIAGNOSIS — E66.813 CLASS 3 SEVERE OBESITY DUE TO EXCESS CALORIES WITHOUT SERIOUS COMORBIDITY WITH BODY MASS INDEX (BMI) OF 50.0 TO 59.9 IN ADULT (HCC): ICD-10-CM

## 2024-10-15 DIAGNOSIS — Z12.11 SCREEN FOR COLON CANCER: ICD-10-CM

## 2024-10-15 DIAGNOSIS — E66.01 CLASS 3 SEVERE OBESITY DUE TO EXCESS CALORIES WITHOUT SERIOUS COMORBIDITY WITH BODY MASS INDEX (BMI) OF 50.0 TO 59.9 IN ADULT (HCC): ICD-10-CM

## 2024-10-15 PROCEDURE — 99214 OFFICE O/P EST MOD 30 MIN: CPT | Performed by: INTERNAL MEDICINE

## 2024-10-15 RX ORDER — NAPROXEN 500 MG/1
500 TABLET ORAL 2 TIMES DAILY WITH MEALS
Qty: 60 TABLET | Refills: 0 | Status: SHIPPED | OUTPATIENT
Start: 2024-10-15 | End: 2024-10-16

## 2024-10-15 RX ORDER — ALLOPURINOL 300 MG/1
300 TABLET ORAL DAILY
Qty: 90 TABLET | Refills: 1 | Status: SHIPPED | OUTPATIENT
Start: 2024-10-15

## 2024-10-15 NOTE — ASSESSMENT & PLAN NOTE
Lab Results   Component Value Date    HGBA1C 11.2 (H) 09/09/2024     New diagnosis.  Increase metformin to 1000 mg bid.  Change Wegovy to Ozempic.  Reviewed diet, portion control.  Declined nutrition consult.    Orders:    Albumin / creatinine urine ratio; Future    Hemoglobin A1C; Future    semaglutide, 2 mg/dose, (Ozempic) 8 mg/ mL injection pen; Inject 0.75 mL (2 mg total) under the skin every 7 days    metFORMIN (GLUCOPHAGE) 1000 MG tablet; Take 1 tablet (1,000 mg total) by mouth 2 (two) times a day with meals    CBC and differential; Future

## 2024-10-15 NOTE — ASSESSMENT & PLAN NOTE
Uric acid 6.7. With recent episode. Takes naproxen prn.  Increase allopurinol to 300 mg daily.    Orders:    Uric acid; Future    allopurinol (ZYLOPRIM) 300 mg tablet; Take 1 tablet (300 mg total) by mouth daily    naproxen (Naprosyn) 500 mg tablet; Take 1 tablet (500 mg total) by mouth 2 (two) times a day with meals PRN for gout

## 2024-10-15 NOTE — PROGRESS NOTES
Ambulatory Visit  Name: Luis Fernando Jay      : 1970      MRN: [id number]  Encounter Provider: Serenity Márquez MD  Encounter Date: 10/15/2024   Encounter department: [facility] INTERNAL MEDICINE    Assessment & Plan  Type 2 diabetes mellitus without complication, without long-term current use of insulin ([facility])    Lab Results   Component Value Date    HGBA1C 11.2 (H) 2024     New diagnosis.  Increase metformin to 1000 mg bid.  Change Wegovy to Ozempic.  Reviewed diet, portion control.  Declined nutrition consult.    Orders:    Albumin / creatinine urine ratio; Future    Hemoglobin A1C; Future    semaglutide, 2 mg/dose, (Ozempic) 8 mg/ mL injection pen; Inject 0.75 mL (2 mg total) under the skin every 7 days    metFORMIN (GLUCOPHAGE) 1000 MG tablet; Take 1 tablet (1,000 mg total) by mouth 2 (two) times a day with meals    CBC and differential; Future    Other hyperlipidemia  Declines statin.    Orders:    Comprehensive metabolic panel; Future    Lipid panel; Future    Class 3 severe obesity due to excess calories without serious comorbidity with body mass index (BMI) of 50.0 to 59.9 in adult ([facility])  Started on Wegovy 0.25 mg x 4 weeks. He had lost weight, gained some back.  Lost 10 lbs since 6 months ago.  Ozempic will help with weight loss.         Acute idiopathic gout of right foot  Uric acid 6.7. With recent episode. Takes naproxen prn.  Increase allopurinol to 300 mg daily.    Orders:    Uric acid; Future    allopurinol (ZYLOPRIM) 300 mg tablet; Take 1 tablet (300 mg total) by mouth daily    naproxen (Naprosyn) 500 mg tablet; Take 1 tablet (500 mg total) by mouth 2 (two) times a day with meals PRN for gout    Pulmonary nodules  Schedule repeat CT.         Screen for colon cancer  Agrees to do Cologuard.  Orders:    Cologuard    Health counseling  Declined flu vaccine. Schedule eye exam.        Follow up in 4 months or as needed.    History of Present Illness     He is here today with his  "wife.  About 3 months ago, he was on Ozempic and had a difficult time refilling it. He was out of medication for a few months.  Recently, he started on Wegovy since he was able to get it. He does notice they both curb his appetite. He had lost weight while on Ozempic, gained some back.    He developed a gout episode last week, after he ate paella at work. He had pain and swelling of his right foot and ankle. He takes naproxen as needed. He said it lasted about a week, all better now.    He has been working irregular hours. He takes all his medications in the morning. His wife reminds him if he needs the weekly injection.    He had an abscess near his buttock a few months ago, has not recurred.        Review of Systems   Constitutional:  Positive for appetite change. Negative for activity change and fatigue.   HENT:  Negative for congestion and postnasal drip.    Eyes: Negative.    Respiratory:  Negative for cough, chest tightness and shortness of breath.    Cardiovascular:  Negative for chest pain, palpitations and leg swelling.   Gastrointestinal:  Negative for abdominal pain and constipation.   Genitourinary:  Negative for dysuria and frequency.   Musculoskeletal:  Negative for arthralgias.   Skin:  Negative for rash and wound.   Neurological:  Negative for dizziness and headaches.   Psychiatric/Behavioral:  Negative for sleep disturbance.            Objective     /88 (BP Location: Right arm, Patient Position: Sitting, Cuff Size: Large)   Pulse (!) 129   Temp 97.6 °F (36.4 °C) (Temporal)   Resp 16   Ht 6' 2\" (1.88 m)   Wt (!) 183 kg (404 lb)   SpO2 98%   BMI 51.87 kg/m²     Physical Exam  Vitals and nursing note reviewed.   Constitutional:       General: He is not in acute distress.     Appearance: He is morbidly obese. He is not ill-appearing.   HENT:      Head: Normocephalic and atraumatic.      Mouth/Throat:      Mouth: Mucous membranes are moist.   Eyes:      Conjunctiva/sclera: Conjunctivae " normal.   Cardiovascular:      Rate and Rhythm: Normal rate and regular rhythm.      Heart sounds: No murmur heard.  Pulmonary:      Effort: Pulmonary effort is normal. No respiratory distress.      Breath sounds: Normal breath sounds.   Abdominal:      Palpations: Abdomen is soft.      Tenderness: There is no abdominal tenderness.   Musculoskeletal:         General: No swelling.      Cervical back: Neck supple.   Skin:     General: Skin is warm and dry.   Neurological:      General: No focal deficit present.      Mental Status: He is alert and oriented to person, place, and time.   Psychiatric:         Mood and Affect: Mood normal.         Behavior: Behavior normal.           Labs & imaging results reviewed with patient.

## 2024-10-15 NOTE — ASSESSMENT & PLAN NOTE
Started on Wegovy 0.25 mg x 4 weeks. He had lost weight, gained some back.  Lost 10 lbs since 6 months ago.  Ozempic will help with weight loss.

## 2024-10-16 ENCOUNTER — TELEPHONE (OUTPATIENT)
Age: 54
End: 2024-10-16

## 2024-10-16 RX ORDER — SEMAGLUTIDE 0.25 MG/.5ML
INJECTION, SOLUTION SUBCUTANEOUS
OUTPATIENT
Start: 2024-10-16

## 2024-10-16 RX ORDER — SEMAGLUTIDE 0.25 MG/.5ML
INJECTION, SOLUTION SUBCUTANEOUS
Qty: 2 ML | Refills: 0 | OUTPATIENT
Start: 2024-10-16

## 2024-10-16 RX ORDER — NAPROXEN 500 MG/1
500 TABLET ORAL 2 TIMES DAILY PRN
Qty: 180 TABLET | Refills: 0 | Status: SHIPPED | OUTPATIENT
Start: 2024-10-16

## 2024-10-16 NOTE — TELEPHONE ENCOUNTER
PA for Semaglutide, 2 mg/dose, (Ozempic) 8 mg/ mL injection pen SUBMITTED     via    []CMM-KEY:   [x]Surescripts-Case ID # [id number]  []Availity-Auth ID # NDC #   []Faxed to plan   []Other website   []Phone call Case ID #     Office notes sent, clinical questions answered. Awaiting determination    Turnaround time for your insurance to make a decision on your Prior Authorization can take 7-21 business days.

## 2024-10-17 NOTE — TELEPHONE ENCOUNTER
PA for Semaglutide, 2 mg/dose, (Ozempic) 8 mg/ mL injection pen APPROVED     Date(s) approved 9- - 10-        Patient advised by          [x]MyChart Message  [x]Phone call   []LMOM  []L/M to call office as no active Communication consent on file  []Unable to leave detailed message as VM not approved on Communication consent       Pharmacy advised by    [x]Fax  []Phone call    Approval letter scanned into Media Yes

## 2024-11-04 DIAGNOSIS — K21.9 GASTROESOPHAGEAL REFLUX DISEASE, UNSPECIFIED WHETHER ESOPHAGITIS PRESENT: Primary | ICD-10-CM

## 2024-11-05 ENCOUNTER — HOSPITAL ENCOUNTER (OUTPATIENT)
Facility: HOSPITAL | Age: 54
Setting detail: OBSERVATION
Discharge: HOME | End: 2024-11-07
Attending: EMERGENCY MEDICINE | Admitting: HOSPITALIST
Payer: COMMERCIAL

## 2024-11-05 ENCOUNTER — APPOINTMENT (EMERGENCY)
Dept: RADIOLOGY | Facility: HOSPITAL | Age: 54
End: 2024-11-05
Payer: COMMERCIAL

## 2024-11-05 DIAGNOSIS — H70.90 MASTOIDITIS: ICD-10-CM

## 2024-11-05 DIAGNOSIS — E66.01 CLASS 3 SEVERE OBESITY DUE TO EXCESS CALORIES WITHOUT SERIOUS COMORBIDITY WITH BODY MASS INDEX (BMI) OF 50.0 TO 59.9 IN ADULT (HCC): ICD-10-CM

## 2024-11-05 DIAGNOSIS — R55 SYNCOPE: ICD-10-CM

## 2024-11-05 DIAGNOSIS — I48.91 ATRIAL FIBRILLATION (HCC): Primary | ICD-10-CM

## 2024-11-05 DIAGNOSIS — E66.813 CLASS 3 SEVERE OBESITY DUE TO EXCESS CALORIES WITHOUT SERIOUS COMORBIDITY WITH BODY MASS INDEX (BMI) OF 50.0 TO 59.9 IN ADULT (HCC): ICD-10-CM

## 2024-11-05 DIAGNOSIS — E11.9 TYPE 2 DIABETES MELLITUS WITHOUT COMPLICATION, WITHOUT LONG-TERM CURRENT USE OF INSULIN (HCC): ICD-10-CM

## 2024-11-05 DIAGNOSIS — H92.02 EAR PAIN, LEFT: ICD-10-CM

## 2024-11-05 DIAGNOSIS — I48.91 A-FIB (HCC): ICD-10-CM

## 2024-11-05 DIAGNOSIS — R55 SYNCOPE AND COLLAPSE: ICD-10-CM

## 2024-11-05 LAB
2HR DELTA HS TROPONIN: 0 NG/L
4HR DELTA HS TROPONIN: 0 NG/L
ALBUMIN SERPL BCG-MCNC: 4 G/DL (ref 3.5–5)
ALP SERPL-CCNC: 89 U/L (ref 34–104)
ALT SERPL W P-5'-P-CCNC: 55 U/L (ref 7–52)
ANION GAP SERPL CALCULATED.3IONS-SCNC: 7 MMOL/L (ref 4–13)
APTT PPP: 26 SECONDS (ref 23–34)
AST SERPL W P-5'-P-CCNC: 30 U/L (ref 13–39)
BASOPHILS # BLD AUTO: 0.03 THOUSANDS/ÂΜL (ref 0–0.1)
BASOPHILS NFR BLD AUTO: 0 % (ref 0–1)
BILIRUB SERPL-MCNC: 0.9 MG/DL (ref 0.2–1)
BUN SERPL-MCNC: 10 MG/DL (ref 5–25)
CALCIUM SERPL-MCNC: 9 MG/DL (ref 8.4–10.2)
CARDIAC TROPONIN I PNL SERPL HS: 3 NG/L
CHLORIDE SERPL-SCNC: 107 MMOL/L (ref 96–108)
CO2 SERPL-SCNC: 24 MMOL/L (ref 21–32)
CREAT SERPL-MCNC: 0.83 MG/DL (ref 0.6–1.3)
EOSINOPHIL # BLD AUTO: 0.82 THOUSAND/ÂΜL (ref 0–0.61)
EOSINOPHIL NFR BLD AUTO: 7 % (ref 0–6)
ERYTHROCYTE [DISTWIDTH] IN BLOOD BY AUTOMATED COUNT: 13 % (ref 11.6–15.1)
GFR SERPL CREATININE-BSD FRML MDRD: 98 ML/MIN/1.73SQ M
GLUCOSE SERPL-MCNC: 99 MG/DL (ref 65–140)
HCT VFR BLD AUTO: 47.6 % (ref 36.5–49.3)
HGB BLD-MCNC: 16.1 G/DL (ref 12–17)
IMM GRANULOCYTES # BLD AUTO: 0.06 THOUSAND/UL (ref 0–0.2)
IMM GRANULOCYTES NFR BLD AUTO: 1 % (ref 0–2)
INR PPP: 1.11 (ref 0.85–1.19)
LYMPHOCYTES # BLD AUTO: 2.09 THOUSANDS/ÂΜL (ref 0.6–4.47)
LYMPHOCYTES NFR BLD AUTO: 18 % (ref 14–44)
MCH RBC QN AUTO: 30.8 PG (ref 26.8–34.3)
MCHC RBC AUTO-ENTMCNC: 33.8 G/DL (ref 31.4–37.4)
MCV RBC AUTO: 91 FL (ref 82–98)
MONOCYTES # BLD AUTO: 0.61 THOUSAND/ÂΜL (ref 0.17–1.22)
MONOCYTES NFR BLD AUTO: 5 % (ref 4–12)
NEUTROPHILS # BLD AUTO: 8.17 THOUSANDS/ÂΜL (ref 1.85–7.62)
NEUTS SEG NFR BLD AUTO: 69 % (ref 43–75)
NRBC BLD AUTO-RTO: 0 /100 WBCS
PLATELET # BLD AUTO: 194 THOUSANDS/UL (ref 149–390)
PMV BLD AUTO: 9.4 FL (ref 8.9–12.7)
POTASSIUM SERPL-SCNC: 3.7 MMOL/L (ref 3.5–5.3)
PROT SERPL-MCNC: 6.8 G/DL (ref 6.4–8.4)
PROTHROMBIN TIME: 14.6 SECONDS (ref 12.3–15)
RBC # BLD AUTO: 5.22 MILLION/UL (ref 3.88–5.62)
SODIUM SERPL-SCNC: 138 MMOL/L (ref 135–147)
WBC # BLD AUTO: 11.78 THOUSAND/UL (ref 4.31–10.16)

## 2024-11-05 PROCEDURE — 74177 CT ABD & PELVIS W/CONTRAST: CPT

## 2024-11-05 PROCEDURE — 70450 CT HEAD/BRAIN W/O DYE: CPT

## 2024-11-05 PROCEDURE — 36415 COLL VENOUS BLD VENIPUNCTURE: CPT

## 2024-11-05 PROCEDURE — 99284 EMERGENCY DEPT VISIT MOD MDM: CPT

## 2024-11-05 PROCEDURE — 84484 ASSAY OF TROPONIN QUANT: CPT

## 2024-11-05 PROCEDURE — 85610 PROTHROMBIN TIME: CPT

## 2024-11-05 PROCEDURE — 71260 CT THORAX DX C+: CPT

## 2024-11-05 PROCEDURE — 99285 EMERGENCY DEPT VISIT HI MDM: CPT | Performed by: EMERGENCY MEDICINE

## 2024-11-05 PROCEDURE — 93005 ELECTROCARDIOGRAM TRACING: CPT

## 2024-11-05 PROCEDURE — 85025 COMPLETE CBC W/AUTO DIFF WBC: CPT

## 2024-11-05 PROCEDURE — 96361 HYDRATE IV INFUSION ADD-ON: CPT

## 2024-11-05 PROCEDURE — 96374 THER/PROPH/DIAG INJ IV PUSH: CPT

## 2024-11-05 PROCEDURE — 80053 COMPREHEN METABOLIC PANEL: CPT

## 2024-11-05 PROCEDURE — 85730 THROMBOPLASTIN TIME PARTIAL: CPT

## 2024-11-05 RX ORDER — METOPROLOL TARTRATE 25 MG/1
25 TABLET, FILM COATED ORAL ONCE
Status: COMPLETED | OUTPATIENT
Start: 2024-11-05 | End: 2024-11-05

## 2024-11-05 RX ORDER — KETOROLAC TROMETHAMINE 30 MG/ML
15 INJECTION, SOLUTION INTRAMUSCULAR; INTRAVENOUS ONCE
Status: COMPLETED | OUTPATIENT
Start: 2024-11-05 | End: 2024-11-05

## 2024-11-05 RX ORDER — ACETAMINOPHEN 325 MG/1
650 TABLET ORAL ONCE
Status: COMPLETED | OUTPATIENT
Start: 2024-11-05 | End: 2024-11-05

## 2024-11-05 RX ADMIN — METOPROLOL TARTRATE 25 MG: 25 TABLET, FILM COATED ORAL at 22:39

## 2024-11-05 RX ADMIN — ACETAMINOPHEN 650 MG: 325 TABLET, FILM COATED ORAL at 17:06

## 2024-11-05 RX ADMIN — SODIUM CHLORIDE 1000 ML: 0.9 INJECTION, SOLUTION INTRAVENOUS at 17:08

## 2024-11-05 RX ADMIN — KETOROLAC TROMETHAMINE 15 MG: 30 INJECTION, SOLUTION INTRAMUSCULAR at 17:06

## 2024-11-05 RX ADMIN — IOHEXOL 100 ML: 350 INJECTION, SOLUTION INTRAVENOUS at 17:59

## 2024-11-05 NOTE — ED ATTENDING ATTESTATION
"I, Ashok Maldonado MD, saw and evaluated the patient. I have discussed the patient with the resident and agree with the resident's findings, Plan of Care, and MDM as documented in the resident's note, except where noted. All available labs and Radiology studies were reviewed.  I was present for key portions of any procedure(s) performed by the resident and I was immediately available to provide assistance.    At this point I agree with the current assessment done in the Emergency Department.  I have conducted an independent evaluation of this patient a history and physical is as follows:    53 yo morbidly obese male with a history of GERD, DM, emphysema, gout, and hyperlipidemia presents to the ED for evaluation following a syncopal event earlier today. The patient reports diarrhea x 3 days followed by constipation. Today he was attempting to pass a large bowel movement and \"passed out\", striking his head on the sink then falling into the side of his shower. (+) LOC for an unknown amount of time. He woke up with his dog licking his face. No chest pain, nausea, or vomiting. The patient is now experiencing a severe headache, shortness of breath, left sided rib pain, and left lower abdominal pain. He also reports \"feeling terrible\" since the event --> generalized fatigue and myalgias. No cough, dysuria, fevers, or chills. He does not take a blood thinner. No neck or back pain. No other specific complaints.    ROS: per resident physician note    Gen: NAD, AA&Ox3  HEENT: PERRL, EOMI, no hemotympanum  Neck: supple, no midline bony tenderness  CV: (+) tachycardia, (+) irregularly irregular  Chest: (+) moderate left sided rib tenderness  Lungs: CTA B/L  Abdomen: soft, (+) LLQ tenderness  Ext: no swelling or deformity  Neuro: 5/5 strength all extremities, sensation grossly intact  Skin: no rash    ED Course  The patient is comfortable appearing but with multiple complaints of pain s/p a syncopal event with fall. (+) " Tachycardic and irregularly irregular. No apparent history of atrial fibrillation. Vasovagal event vs new onset atrial fibrillation vs other arrhythmia vs dehydration vs electrolyte disturbance? Will check EKG, basic labs, troponin, and CT head/thorax/abdomen/pelvis. APAP, Toradol, and IVFs ordered. Will continue to monitor in the ED. Disposition per workup and reassessment. The patient will likely require admission to B.      Critical Care Time  Procedures

## 2024-11-05 NOTE — LETTER
[facility]  [address]  [place] [address]  Dept: [phone]    November 7, 2024     Patient: Luis Fernando Jay   YOB: 1970   Date of Visit: 11/5/2024       To Whom it May Concern:    Luis Fernando Jay is under my professional care. He was seen in the hospital from 11/5/2024 to 11/07/24. He may return to work on Tuesday 11/12/2024 without limitations.    If you have any questions or concerns, please don't hesitate to call.         Sincerely,          Joanna Deleon PA-C  [facility] Internal Medicine Hospitalist Service  [phone]

## 2024-11-05 NOTE — ED PROVIDER NOTES
Time reflects when diagnosis was documented in both MDM as applicable and the Disposition within this note       Time User Action Codes Description Comment    11/5/2024  9:19 PM Prasanna Fernandez [I48.91] Atrial fibrillation (HCC)     11/5/2024  9:19 PM Prasanna Fernandez Add [R55] Syncope     11/5/2024 10:29 PM Prasanna Fernandez [H92.02] Ear pain, left     11/6/2024 11:50 AM Mahsa Nolan Add [R55] Syncope and collapse           ED Disposition       ED Disposition   Admit    Condition   Stable    Date/Time   Tue Nov 5, 2024  9:19 PM    Comment                  Assessment & Plan       Medical Decision Making  ASSESSMENT: Patient is a 54 y.o. male who presents with syncopal episode with headache, left sided rib pain, LLQ abdominal pain, shortness of breath.   DDX includes but not limited to: Vasovagal syncope, Atrial Fibrillation, Cardiac dysrhythmia, hypovolemia, hyponatremia, hypokalemia, hypoglycemia, rib contusion, rib fracture, hemothorax, ACS, intracranial bleed vs fracture, diverticulitis, viral gastroenteritis.   PLAN: CBC, CMP, Coags, Tn, ECG, CT Head without contrast, CT Chest abdomen and pelvis with contrast. Treated with Toradol, Tylenol, IV fluids.     ECG: See ED course, new onset atrial fibrillation present with variable rate.    CBC-demonstrates mild leukocytosis likely inflammatory in nature, no sign of anemia  CMP-grossly unremarkable, no sign of QUAN or electrolyte abnormalities  Tn - unremarkable, less concerned for ACS    CT Chest, Abd, Pelvis - no acute pathology identified including no signs of rib fractures    CT Head was negative for fracture or bleed. CT did demonstrate nonspecific subtotal opacification of the left mastoid air cells, concerning for mastoiditis. When patient was asked about his ear pain, patient states he has had ear pain and discharge with tenderness over the left mastoid region over the past 3 months. Patient is a poor historian, but states that he sought out medical evaluation  by a [facility] provider for this infection with antibiotics, however there is no record of this treatment. ENT was consulted per the medicine team for there input on whether or not this patient requires antibiotics for concern of mastoiditis. ENT states they will evaluate the following day and do not recommend starting IV antibiotics at this time.    Patient has remained hemodynamically stable while in the emergency department, his heart rate has been variable between 100 to 140 bpm. Per medicine's recommendation, patient being initiated on Lopressor 25 mg for rate control. Patient being admitted for new onset atrial fibrillation with syncopal episode with cardiology evaluation +/- echo and anticoagulation. Patient is understanding and agreeable to plan.    Amount and/or Complexity of Data Reviewed  Labs: ordered. Decision-making details documented in ED Course.  Radiology: ordered.  ECG/medicine tests:  Decision-making details documented in ED Course.    Risk  OTC drugs.  Prescription drug management.  Decision regarding hospitalization.        ED Course as of 11/06/24 1548   Tue Nov 05, 2024   1656 ECG 12 lead  Procedure Note: EKG  Date/Time: 11/05/24 4:56 PM   Interpreted by:Prasanna Fernandez  Indications / Diagnosis: syncopal event  ECG reviewed by me, the ED Provider: yes   The EKG demonstrates:  Rate: 110-120  Rhythm: irregular  Intervals: no p waves present suggestive of Atrial Fibrillation  Axis: normal  QRS/Blocks: normal  ST Changes: No acute ST Changes, no STD/ROD.  Compared to prior EKG on 04/22/19, changed with presence of Atrial Fibrillation.     1720 WBC(!): 11.78  Mild leukocytosis   1720 Hemoglobin: 16.1  No anemia   1746 hs TnI 0hr: 3  Less concerned for ACS       Medications   albuterol inhalation solution 2.5 mg (has no administration in time range)   allopurinol (ZYLOPRIM) tablet 300 mg (has no administration in time range)   pantoprazole (PROTONIX) EC tablet 40 mg (40 mg Oral Given 11/6/24 0606)    methocarbamol (ROBAXIN) tablet 750 mg (has no administration in time range)   acetaminophen (TYLENOL) tablet 650 mg (has no administration in time range)   ondansetron (ZOFRAN) injection 4 mg (has no administration in time range)   calcium carbonate (TUMS) chewable tablet 1,000 mg (has no administration in time range)   insulin lispro (HumALOG/ADMELOG) 100 units/mL subcutaneous injection 2-12 Units (2 Units Subcutaneous Given 11/6/24 0752)   insulin lispro (HumALOG/ADMELOG) 100 units/mL subcutaneous injection 2-12 Units (has no administration in time range)   ketorolac (TORADOL) injection 15 mg (15 mg Intravenous Given 11/5/24 1706)   acetaminophen (TYLENOL) tablet 650 mg (650 mg Oral Given 11/5/24 1706)   sodium chloride 0.9 % bolus 1,000 mL (0 mL Intravenous Stopped 11/5/24 2034)   iohexol (OMNIPAQUE) 350 MG/ML injection (SINGLE-DOSE) 100 mL (100 mL Intravenous Given 11/5/24 1759)   metoprolol tartrate (LOPRESSOR) tablet 25 mg (25 mg Oral Given 11/5/24 2239)       ED Risk Strat Scores                           SBIRT 20yo+      Flowsheet Row Most Recent Value   Initial Alcohol Screen: US AUDIT-C     1. How often do you have a drink containing alcohol? 0 Filed at: 11/05/2024 1559   2. How many drinks containing alcohol do you have on a typical day you are drinking?  0 Filed at: 11/05/2024 1559   3a. Male UNDER 65: How often do you have five or more drinks on one occasion? 0 Filed at: 11/05/2024 1559   3b. FEMALE Any Age, or MALE 65+: How often do you have 4 or more drinks on one occassion? 0 Filed at: 11/05/2024 1559   Audit-C Score 0 Filed at: 11/05/2024 1559   HIGINIO: How many times in the past year have you...    Used an illegal drug or used a prescription medication for non-medical reasons? Never Filed at: 11/05/2024 1559                            History of Present Illness       Chief Complaint   Patient presents with    Syncope     Pt states has trying to use the bathroom when he had a syncopal episode.  Pt  hit head on sink.  Pt states having left flank pain, headache, and buttocks pain       Past Medical History:   Diagnosis Date    Diabetes mellitus (HCC)     GERD (gastroesophageal reflux disease)     Lung mass     Obesity       History reviewed. No pertinent surgical history.   Family History   Problem Relation Age of Onset    No Known Problems Mother     Tuberculosis Father     Atrial fibrillation Father 65    Lung cancer Paternal Uncle     Alcohol abuse Neg Hx     Substance Abuse Neg Hx     Mental illness Neg Hx     Depression Neg Hx       Social History     Tobacco Use    Smoking status: Former     Current packs/day: 0.00     Average packs/day: 3.0 packs/day for 40.3 years (121.0 ttl pk-yrs)     Types: Cigarettes     Start date:      Quit date: 2019     Years since quittin.5    Smokeless tobacco: Never   Vaping Use    Vaping status: Never Used   Substance Use Topics    Alcohol use: Not Currently    Drug use: Never      E-Cigarette/Vaping    E-Cigarette Use Never User       E-Cigarette/Vaping Substances    Nicotine No     THC No     CBD No     Flavoring No     Other No     Unknown No       I have reviewed and agree with the history as documented.     Patient is a 54-year-old male with past medical history of diabetes, emphysema, pulmonary lung nodule presenting to the emergency department for syncopal episode resulting in head strike, left rib pain.  Patient endorses 3 days of diarrhea which he took Imodium and became constipated.  While attempting to have a bowel movement, patient lost consciousness and syncopized causing him to fall off the toilet seat striking his head on the shower and striking his left ribs on the shower.  Patient is endorsing pain on his left chest wall.  Since the time of the episode, patient states he is generally not feeling well, describing it as a general malaise and fatigue.  He endorses nausea, vomiting, diarrhea.  He endorses headache.  He denies chest pain, endorses  "some mild shortness of breath described as \"unable to catch his breath\".  He denies fevers, chills, vision changes, sore throat, cough, urinary changes, sensorimotor deficits, paresthesias.  He denies being on aspirin, blood thinners.  He denies past history of atrial fibrillation or other cardiac history.  He does endorse decreased p.o. intake due to GI upset.  Patient has taken 2 Tylenol for his pain, last taken at approximately 8 AM.       Syncope      Review of Systems   Cardiovascular:  Positive for syncope.           Objective       ED Triage Vitals [11/05/24 1556]   Temperature Pulse Blood Pressure Respirations SpO2 Patient Position - Orthostatic VS   100.4 °F (38 °C) (!) 120 129/62 22 95 % Sitting      Temp Source Heart Rate Source BP Location FiO2 (%) Pain Score    Tympanic Monitor Left arm -- 10 - Worst Possible Pain      Vitals      Date and Time Temp Pulse SpO2 Resp BP Pain Score FACES Pain Rating User   11/06/24 1511 97.7 °F (36.5 °C) 93 94 % 20 135/71 -- -- DII   11/06/24 0940 -- 90 -- -- 105/70 -- -- SL   11/06/24 0814 -- -- -- -- -- 7 -- GC   11/06/24 0814 97.6 °F (36.4 °C) -- -- 18 -- -- -- PK   11/06/24 0811 -- 89 95 % 18 105/70 -- -- DII   11/06/24 0800 98.4 °F (36.9 °C) -- -- -- -- -- -- GC   11/06/24 0600 -- 90 97 % 18 121/71 -- -- AS   11/06/24 0400 -- 88 97 % 18 116/65 5 -- AS   11/06/24 0300 -- 90 96 % 19 133/74 5 -- AS   11/06/24 0200 -- 91 94 % 20 101/61 -- -- CM   11/06/24 0100 -- 90 95 % 12 115/75 -- -- CM   11/06/24 0000 -- 94 93 % 19 109/72 -- -- CM   11/05/24 2300 -- 89 94 % 21 100/55 -- -- CM   11/05/24 2235 -- 95 94 % 16 115/71 -- --    11/05/24 2030 -- 109 94 % 16 102/62 -- --    11/05/24 1912 -- -- -- -- -- 3 --    11/05/24 1900 -- 102 94 % 18 115/65 -- -- ST   11/05/24 1706 -- -- -- -- -- 10 - Worst Possible Pain --    11/05/24 1600 -- -- -- -- -- 10 - Worst Possible Pain --    11/05/24 1556 100.4 °F (38 °C) 120 95 % 22 129/62 10 - Worst Possible Pain -- TLM      "       Physical Exam  Vitals reviewed.   Constitutional:       Appearance: He is obese. He is not diaphoretic.   HENT:      Head: Normocephalic and atraumatic.      Nose: Nose normal. No congestion or rhinorrhea.      Mouth/Throat:      Mouth: Mucous membranes are moist.      Pharynx: Oropharynx is clear. No oropharyngeal exudate or posterior oropharyngeal erythema.   Eyes:      General: No scleral icterus.     Extraocular Movements: Extraocular movements intact.      Conjunctiva/sclera: Conjunctivae normal.      Pupils: Pupils are equal, round, and reactive to light.   Cardiovascular:      Rate and Rhythm: Tachycardia present. Rhythm irregular.      Pulses: Normal pulses.      Heart sounds: Normal heart sounds. No murmur heard.  Pulmonary:      Effort: Pulmonary effort is normal. No respiratory distress.      Breath sounds: Normal breath sounds. No wheezing or rales.   Chest:      Chest wall: Tenderness (Left sided chest wall tenderness to palpation over costal margin) present.   Abdominal:      General: Bowel sounds are normal. There is no distension.      Palpations: Abdomen is soft.      Tenderness: There is abdominal tenderness (LLQ). There is left CVA tenderness. There is no right CVA tenderness, guarding or rebound.   Musculoskeletal:         General: No tenderness or signs of injury. Normal range of motion.      Cervical back: Normal range of motion. No tenderness.      Right lower leg: No edema.      Left lower leg: No edema.   Skin:     General: Skin is warm and dry.      Capillary Refill: Capillary refill takes less than 2 seconds.      Coloration: Skin is not pale.      Findings: No rash.   Neurological:      General: No focal deficit present.      Mental Status: He is oriented to person, place, and time.      Cranial Nerves: No cranial nerve deficit.      Sensory: No sensory deficit.      Motor: No weakness.   Psychiatric:         Mood and Affect: Mood normal.         Behavior: Behavior normal.          Results Reviewed       Procedure Component Value Units Date/Time    UA w Reflex to Microscopic w Reflex to Culture [[id number]]  (Abnormal) Collected: 11/06/24 1144    Lab Status: Final result Specimen: Urine, Clean Catch Updated: 11/06/24 1154     Color, UA Yellow     Clarity, UA Clear     Specific Gravity, UA 1.021     pH, UA 5.5     Leukocytes, UA Negative     Nitrite, UA Negative     Protein, UA Negative mg/dl      Glucose, UA Negative mg/dl      Ketones, UA Negative mg/dl      Urobilinogen, UA 2.0 mg/dl      Bilirubin, UA Negative     Occult Blood, UA Negative    Fingerstick Glucose (POCT) [[id number]]  (Abnormal) Collected: 11/06/24 0739    Lab Status: Final result Specimen: Blood Updated: 11/06/24 0741     POC Glucose 152 mg/dl     TSH, 3rd generation with Free T4 reflex [[id number]]  (Normal) Collected: 11/06/24 0607    Lab Status: Final result Specimen: Blood from Arm, Left Updated: 11/06/24 0700     TSH 3RD GENERATON 2.224 uIU/mL     Comprehensive metabolic panel [[id number]]  (Abnormal) Collected: 11/06/24 0607    Lab Status: Final result Specimen: Blood from Arm, Left Updated: 11/06/24 0652     Sodium 141 mmol/L      Potassium 3.7 mmol/L      Chloride 110 mmol/L      CO2 23 mmol/L      ANION GAP 8 mmol/L      BUN 10 mg/dL      Creatinine 0.73 mg/dL      Glucose 97 mg/dL      Calcium 8.1 mg/dL      Corrected Calcium 8.6 mg/dL      AST 24 U/L      ALT 45 U/L      Alkaline Phosphatase 79 U/L      Total Protein 5.8 g/dL      Albumin 3.4 g/dL      Total Bilirubin 0.57 mg/dL      eGFR 103 ml/min/1.73sq m     Narrative:      National Kid[employer] Disease Foundation guidelines for Chronic Kidney Disease (CKD):     Stage 1 with normal or high GFR (GFR > 90 mL/min/1.73 square meters)    Stage 2 Mild CKD (GFR = 60-89 mL/min/1.73 square meters)    Stage 3A Moderate CKD (GFR = 45-59 mL/min/1.73 square meters)    Stage 3B Moderate CKD (GFR = 30-44 mL/min/1.73 square meters)    Stage 4 Severe CKD (GFR = 15-29 mL/min/1.73  square meters)    Stage 5 End Stage CKD (GFR <15 mL/min/1.73 square meters)  Note: GFR calculation is accurate only with a steady state creatinine    Magnesium [[id number]]  (Abnormal) Collected: 11/06/24 0607    Lab Status: Final result Specimen: Blood from Arm, Left Updated: 11/06/24 0652     Magnesium 1.8 mg/dL     Phosphorus [[id number]]  (Normal) Collected: 11/06/24 0607    Lab Status: Final result Specimen: Blood from Arm, Left Updated: 11/06/24 0652     Phosphorus 3.7 mg/dL     Lipid Panel with Direct LDL reflex [[id number]]  (Abnormal) Collected: 11/06/24 0607    Lab Status: Final result Specimen: Blood from Arm, Left Updated: 11/06/24 0652     Cholesterol 122 mg/dL      Triglycerides 108 mg/dL      HDL, Direct 28 mg/dL      LDL Calculated 72 mg/dL     Protime-INR [[id number]]  (Abnormal) Collected: 11/06/24 0607    Lab Status: Final result Specimen: Blood from Arm, Left Updated: 11/06/24 0637     Protime 15.2 seconds      INR 1.17    Narrative:      INR Therapeutic Range    Indication                                             INR Range      Atrial Fibrillation                                               2.0-3.0  Hypercoagulable State                                    2.0.2.3  Left Ventricular Asist Device                            2.0-3.0  Mechanical Heart Valve                                  -    Aortic(with afib, MI, embolism, HF, LA enlargement,    and/or coagulopathy)                                     2.0-3.0 (2.5-3.5)     Mitral                                                             2.5-3.5  Prosthetic/Bioprosthetic Heart Valve               2.0-3.0  Venous thromboembolism (VTE: VT, PE        2.0-3.0    APTT [[id number]]  (Normal) Collected: 11/06/24 0607    Lab Status: Final result Specimen: Blood from Arm, Left Updated: 11/06/24 0637     PTT 29 seconds     CBC and differential [[id number]]  (Abnormal) Collected: 11/06/24 0607    Lab Status: Final result Specimen: Blood from Arm, Left  Updated: 11/06/24 0625     WBC 8.61 Thousand/uL      RBC 4.50 Million/uL      Hemoglobin 14.1 g/dL      Hematocrit 42.3 %      MCV 94 fL      MCH 31.3 pg      MCHC 33.3 g/dL      RDW 13.1 %      MPV 9.5 fL      Platelets 172 Thousands/uL      nRBC 0 /100 WBCs      Segmented % 57 %      Immature Grans % 1 %      Lymphocytes % 25 %      Monocytes % 7 %      Eosinophils Relative 10 %      Basophils Relative 0 %      Absolute Neutrophils 4.95 Thousands/µL      Absolute Immature Grans 0.07 Thousand/uL      Absolute Lymphocytes 2.15 Thousands/µL      Absolute Monocytes 0.57 Thousand/µL      Eosinophils Absolute 0.84 Thousand/µL      Basophils Absolute 0.03 Thousands/µL     HS Troponin I 4hr [[id number]]  (Normal) Collected: 11/05/24 2150    Lab Status: Final result Specimen: Blood from Arm, Right Updated: 11/05/24 2220     hs TnI 4hr 3 ng/L      Delta 4hr hsTnI 0 ng/L     HS Troponin I 2hr [[id number]]  (Normal) Collected: 11/05/24 1908    Lab Status: Final result Specimen: Blood from Arm, Right Updated: 11/05/24 1943     hs TnI 2hr 3 ng/L      Delta 2hr hsTnI 0 ng/L     HS Troponin 0hr (reflex protocol) [[id number]]  (Normal) Collected: 11/05/24 1706    Lab Status: Final result Specimen: Blood from Arm, Right Updated: 11/05/24 1744     hs TnI 0hr 3 ng/L     Comprehensive metabolic panel [[id number]]  (Abnormal) Collected: 11/05/24 1706    Lab Status: Final result Specimen: Blood from Arm, Right Updated: 11/05/24 1742     Sodium 138 mmol/L      Potassium 3.7 mmol/L      Chloride 107 mmol/L      CO2 24 mmol/L      ANION GAP 7 mmol/L      BUN 10 mg/dL      Creatinine 0.83 mg/dL      Glucose 99 mg/dL      Calcium 9.0 mg/dL      AST 30 U/L      ALT 55 U/L      Alkaline Phosphatase 89 U/L      Total Protein 6.8 g/dL      Albumin 4.0 g/dL      Total Bilirubin 0.90 mg/dL      eGFR 98 ml/min/1.73sq m     Narrative:      National Kidney Disease Foundation guidelines for Chronic Kidney Disease (CKD):     Stage 1 with normal or high GFR  (GFR > 90 mL/min/1.73 square meters)    Stage 2 Mild CKD (GFR = 60-89 mL/min/1.73 square meters)    Stage 3A Moderate CKD (GFR = 45-59 mL/min/1.73 square meters)    Stage 3B Moderate CKD (GFR = 30-44 mL/min/1.73 square meters)    Stage 4 Severe CKD (GFR = 15-29 mL/min/1.73 square meters)    Stage 5 End Stage CKD (GFR <15 mL/min/1.73 square meters)  Note: GFR calculation is accurate only with a steady state creatinine    Protime-INR [[id number]]  (Normal) Collected: 11/05/24 1706    Lab Status: Final result Specimen: Blood from Arm, Right Updated: 11/05/24 1733     Protime 14.6 seconds      INR 1.11    Narrative:      INR Therapeutic Range    Indication                                             INR Range      Atrial Fibrillation                                               2.0-3.0  Hypercoagulable State                                    2.0.2.3  Left Ventricular Asist Device                            2.0-3.0  Mechanical Heart Valve                                  -    Aortic(with afib, MI, embolism, HF, LA enlargement,    and/or coagulopathy)                                     2.0-3.0 (2.5-3.5)     Mitral                                                             2.5-3.5  Prosthetic/Bioprosthetic Heart Valve               2.0-3.0  Venous thromboembolism (VTE: VT, PE        2.0-3.0    APTT [[id number]]  (Normal) Collected: 11/05/24 1706    Lab Status: Final result Specimen: Blood from Arm, Right Updated: 11/05/24 1733     PTT 26 seconds     CBC and differential [[id number]]  (Abnormal) Collected: 11/05/24 1706    Lab Status: Final result Specimen: Blood from Arm, Right Updated: 11/05/24 1720     WBC 11.78 Thousand/uL      RBC 5.22 Million/uL      Hemoglobin 16.1 g/dL      Hematocrit 47.6 %      MCV 91 fL      MCH 30.8 pg      MCHC 33.8 g/dL      RDW 13.0 %      MPV 9.4 fL      Platelets 194 Thousands/uL      nRBC 0 /100 WBCs      Segmented % 69 %      Immature Grans % 1 %      Lymphocytes % 18 %      Monocytes %  5 %      Eosinophils Relative 7 %      Basophils Relative 0 %      Absolute Neutrophils 8.17 Thousands/µL      Absolute Immature Grans 0.06 Thousand/uL      Absolute Lymphocytes 2.09 Thousands/µL      Absolute Monocytes 0.61 Thousand/µL      Eosinophils Absolute 0.82 Thousand/µL      Basophils Absolute 0.03 Thousands/µL             CT head without contrast   Final Interpretation by Willy Aj MD (11/05 1909)      1. No acute intracranial hemorrhage, mass effect or midline shift.      2. Nonspecific subtotal opacification of the left mastoid air cells, which in the appropriate clinical setting can be seen with mastoiditis.      The study was marked in EPIC for immediate notification.                  Workstation performed: [id number]         CT chest abdomen pelvis w contrast   Final Interpretation by Cliff Krause DO (11/05 1939)      No CT evidence of acute findings.               Workstation performed: [id number]             Procedures    ED Medication and Procedure Management   Prior to Admission Medications   Prescriptions Last Dose Informant Patient Reported? Taking?   albuterol (2.5 mg/3 mL) 0.083 % nebulizer solution  Self No No   Sig: Take 1 vial (2.5 mg total) by nebulization every 4 (four) hours as needed for wheezing or shortness of breath   albuterol (PROVENTIL HFA,VENTOLIN HFA) 90 mcg/act inhaler  Self No No   Sig: Inhale 2 puffs every 4 (four) hours as needed for wheezing   albuterol (ProAir HFA) 90 mcg/act inhaler  Self No No   Sig: Inhale 2 puffs every 6 (six) hours as needed for wheezing   allopurinol (ZYLOPRIM) 300 mg tablet   No No   Sig: Take 1 tablet (300 mg total) by mouth daily   esomeprazole (NexIUM) 20 mg capsule   No No   Sig: Take 1 capsule (20 mg total) by mouth daily in the early morning   metFORMIN (GLUCOPHAGE) 1000 MG tablet   No No   Sig: Take 1 tablet (1,000 mg total) by mouth 2 (two) times a day with meals   methocarbamol (Robaxin-750) 750 mg tablet  Self No No   Sig: Take 1  tablet (750 mg total) by mouth 3 (three) times a day as needed for muscle spasms (may cause drowsiness)   naproxen (NAPROSYN) 500 mg tablet   No No   Sig: Take 1 tablet (500 mg total) by mouth 2 (two) times a day as needed (gout)   semaglutide, 2 mg/dose, (Ozempic) 8 mg/ mL injection pen   No No   Sig: Inject 0.75 mL (2 mg total) under the skin every 7 days      Facility-Administered Medications: None     Current Discharge Medication List        CONTINUE these medications which have NOT CHANGED    Details   albuterol (2.5 mg/3 mL) 0.083 % nebulizer solution Take 1 vial (2.5 mg total) by nebulization every 4 (four) hours as needed for wheezing or shortness of breath  Qty: 270 mL, Refills: 5    Associated Diagnoses: Other emphysema (HCC)      !! albuterol (ProAir HFA) 90 mcg/act inhaler Inhale 2 puffs every 6 (six) hours as needed for wheezing  Qty: 8.5 g, Refills: 0    Comments: Substitution to a formulary equivalent within the same pharmaceutical class is authorized.  Associated Diagnoses: Chest tightness      !! albuterol (PROVENTIL HFA,VENTOLIN HFA) 90 mcg/act inhaler Inhale 2 puffs every 4 (four) hours as needed for wheezing  Qty: 8.5 g, Refills: 3    Comments: Substitution to a formulary equivalent within the same pharmaceutical class is authorized.  Associated Diagnoses: Other emphysema (HCC)      allopurinol (ZYLOPRIM) 300 mg tablet Take 1 tablet (300 mg total) by mouth daily  Qty: 90 tablet, Refills: 1    Associated Diagnoses: Acute idiopathic gout of right foot      esomeprazole (NexIUM) 20 mg capsule Take 1 capsule (20 mg total) by mouth daily in the early morning  Qty: 90 capsule, Refills: 0    Associated Diagnoses: Gastroesophageal reflux disease, unspecified whether esophagitis present      metFORMIN (GLUCOPHAGE) 1000 MG tablet Take 1 tablet (1,000 mg total) by mouth 2 (two) times a day with meals    Associated Diagnoses: Type 2 diabetes mellitus without complication, without long-term current use of  insulin (HCC)      methocarbamol (Robaxin-750) 750 mg tablet Take 1 tablet (750 mg total) by mouth 3 (three) times a day as needed for muscle spasms (may cause drowsiness)  Qty: 20 tablet, Refills: 0    Associated Diagnoses: Cervicalgia      naproxen (NAPROSYN) 500 mg tablet Take 1 tablet (500 mg total) by mouth 2 (two) times a day as needed (gout)  Qty: 180 tablet, Refills: 0    Associated Diagnoses: Acute idiopathic gout of right foot      semaglutide, 2 mg/dose, (Ozempic) 8 mg/ mL injection pen Inject 0.75 mL (2 mg total) under the skin every 7 days  Qty: 9 mL, Refills: 1    Associated Diagnoses: Type 2 diabetes mellitus without complication, without long-term current use of insulin (HCC)       !! - Potential duplicate medications found. Please discuss with provider.        No discharge procedures on file.  ED SEPSIS DOCUMENTATION   Time reflects when diagnosis was documented in both MDM as applicable and the Disposition within this note       Time User Action Codes Description Comment    11/5/2024  9:19 PM Prasanna Fernandez [I48.91] Atrial fibrillation (HCC)     11/5/2024  9:19 PM Prasanna Fernandez [R55] Syncope     11/5/2024 10:29 PM Prasanna Fernandez [H92.02] Ear pain, left     11/6/2024 11:50 AM Mahsa Nolan [R55] Syncope and collapse                  Prasanna Fernandez DO  11/06/24 1548

## 2024-11-06 ENCOUNTER — APPOINTMENT (OUTPATIENT)
Dept: NON INVASIVE DIAGNOSTICS | Facility: HOSPITAL | Age: 54
End: 2024-11-06
Payer: COMMERCIAL

## 2024-11-06 PROBLEM — I48.91 A-FIB (HCC): Status: ACTIVE | Noted: 2024-11-06

## 2024-11-06 PROBLEM — R55 SYNCOPE AND COLLAPSE: Status: ACTIVE | Noted: 2024-11-06

## 2024-11-06 PROBLEM — H70.90 MASTOIDITIS: Status: ACTIVE | Noted: 2024-11-06

## 2024-11-06 LAB
ALBUMIN SERPL BCG-MCNC: 3.4 G/DL (ref 3.5–5)
ALP SERPL-CCNC: 79 U/L (ref 34–104)
ALT SERPL W P-5'-P-CCNC: 45 U/L (ref 7–52)
ANION GAP SERPL CALCULATED.3IONS-SCNC: 8 MMOL/L (ref 4–13)
AORTIC ROOT: 3.9 CM
APICAL FOUR CHAMBER EJECTION FRACTION: 72 %
APTT PPP: 29 SECONDS (ref 23–34)
ASCENDING AORTA: 3.4 CM
AST SERPL W P-5'-P-CCNC: 24 U/L (ref 13–39)
BASOPHILS # BLD AUTO: 0.03 THOUSANDS/ÂΜL (ref 0–0.1)
BASOPHILS NFR BLD AUTO: 0 % (ref 0–1)
BILIRUB SERPL-MCNC: 0.57 MG/DL (ref 0.2–1)
BILIRUB UR QL STRIP: NEGATIVE
BSA FOR ECHO PROCEDURE: 2.92 M2
BUN SERPL-MCNC: 10 MG/DL (ref 5–25)
CALCIUM ALBUM COR SERPL-MCNC: 8.6 MG/DL (ref 8.3–10.1)
CALCIUM SERPL-MCNC: 8.1 MG/DL (ref 8.4–10.2)
CHLORIDE SERPL-SCNC: 110 MMOL/L (ref 96–108)
CHOLEST SERPL-MCNC: 122 MG/DL
CLARITY UR: CLEAR
CO2 SERPL-SCNC: 23 MMOL/L (ref 21–32)
COLOR UR: YELLOW
CREAT SERPL-MCNC: 0.73 MG/DL (ref 0.6–1.3)
E WAVE DECELERATION TIME: 138 MS
E/A RATIO: 95
EOSINOPHIL # BLD AUTO: 0.84 THOUSAND/ÂΜL (ref 0–0.61)
EOSINOPHIL NFR BLD AUTO: 10 % (ref 0–6)
ERYTHROCYTE [DISTWIDTH] IN BLOOD BY AUTOMATED COUNT: 13.1 % (ref 11.6–15.1)
FRACTIONAL SHORTENING: 20 (ref 28–44)
GFR SERPL CREATININE-BSD FRML MDRD: 103 ML/MIN/1.73SQ M
GLUCOSE SERPL-MCNC: 112 MG/DL (ref 65–140)
GLUCOSE SERPL-MCNC: 122 MG/DL (ref 65–140)
GLUCOSE SERPL-MCNC: 123 MG/DL (ref 65–140)
GLUCOSE SERPL-MCNC: 152 MG/DL (ref 65–140)
GLUCOSE SERPL-MCNC: 97 MG/DL (ref 65–140)
GLUCOSE UR STRIP-MCNC: NEGATIVE MG/DL
HCT VFR BLD AUTO: 42.3 % (ref 36.5–49.3)
HDLC SERPL-MCNC: 28 MG/DL
HGB BLD-MCNC: 14.1 G/DL (ref 12–17)
HGB UR QL STRIP.AUTO: NEGATIVE
IMM GRANULOCYTES # BLD AUTO: 0.07 THOUSAND/UL (ref 0–0.2)
IMM GRANULOCYTES NFR BLD AUTO: 1 % (ref 0–2)
INR PPP: 1.17 (ref 0.85–1.19)
INTERVENTRICULAR SEPTUM IN DIASTOLE (PARASTERNAL SHORT AXIS VIEW): 1.4 CM
INTERVENTRICULAR SEPTUM: 1.4 CM (ref 0.6–1.1)
KETONES UR STRIP-MCNC: NEGATIVE MG/DL
LAAS-AP2: 22.6 CM2
LAAS-AP4: 22.1 CM2
LDLC SERPL CALC-MCNC: 72 MG/DL (ref 0–100)
LEFT ATRIUM SIZE: 5.4 CM
LEFT ATRIUM VOLUME (MOD BIPLANE): 73 ML
LEFT ATRIUM VOLUME INDEX (MOD BIPLANE): 25 ML/M2
LEFT INTERNAL DIMENSION IN SYSTOLE: 3.2 CM (ref 2.1–4)
LEFT VENTRICLE DIASTOLIC VOLUME (MOD BIPLANE): 91 ML
LEFT VENTRICLE DIASTOLIC VOLUME INDEX (MOD BIPLANE): 31.2 ML/M2
LEFT VENTRICLE SYSTOLIC VOLUME (MOD BIPLANE): 28 ML
LEFT VENTRICLE SYSTOLIC VOLUME INDEX (MOD BIPLANE): 9.6 ML/M2
LEFT VENTRICULAR INTERNAL DIMENSION IN DIASTOLE: 4 CM (ref 3.5–6)
LEFT VENTRICULAR POSTERIOR WALL IN END DIASTOLE: 1.6 CM
LEFT VENTRICULAR STROKE VOLUME: 29 ML
LEUKOCYTE ESTERASE UR QL STRIP: NEGATIVE
LV EF: 70 %
LVSV (TEICH): 29 ML
LYMPHOCYTES # BLD AUTO: 2.15 THOUSANDS/ÂΜL (ref 0.6–4.47)
LYMPHOCYTES NFR BLD AUTO: 25 % (ref 14–44)
MAGNESIUM SERPL-MCNC: 1.8 MG/DL (ref 1.9–2.7)
MCH RBC QN AUTO: 31.3 PG (ref 26.8–34.3)
MCHC RBC AUTO-ENTMCNC: 33.3 G/DL (ref 31.4–37.4)
MCV RBC AUTO: 94 FL (ref 82–98)
MONOCYTES # BLD AUTO: 0.57 THOUSAND/ÂΜL (ref 0.17–1.22)
MONOCYTES NFR BLD AUTO: 7 % (ref 4–12)
MV E'TISSUE VEL-SEP: 11 CM/S
MV PEAK A VEL: 0.01 M/S
MV PEAK E VEL: 95 CM/S
MV STENOSIS PRESSURE HALF TIME: 40 MS
MV VALVE AREA P 1/2 METHOD: 5.5
NEUTROPHILS # BLD AUTO: 4.95 THOUSANDS/ÂΜL (ref 1.85–7.62)
NEUTS SEG NFR BLD AUTO: 57 % (ref 43–75)
NITRITE UR QL STRIP: NEGATIVE
NRBC BLD AUTO-RTO: 0 /100 WBCS
PH UR STRIP.AUTO: 5.5 [PH]
PHOSPHATE SERPL-MCNC: 3.7 MG/DL (ref 2.7–4.5)
PLATELET # BLD AUTO: 172 THOUSANDS/UL (ref 149–390)
PMV BLD AUTO: 9.5 FL (ref 8.9–12.7)
POTASSIUM SERPL-SCNC: 3.7 MMOL/L (ref 3.5–5.3)
PROT SERPL-MCNC: 5.8 G/DL (ref 6.4–8.4)
PROT UR STRIP-MCNC: NEGATIVE MG/DL
PROTHROMBIN TIME: 15.2 SECONDS (ref 12.3–15)
QRS AXIS: 55 DEGREES
QRSD INTERVAL: 88 MS
QT INTERVAL: 340 MS
QTC INTERVAL: 477 MS
RBC # BLD AUTO: 4.5 MILLION/UL (ref 3.88–5.62)
RIGHT ATRIUM AREA SYSTOLE A4C: 22.4 CM2
RIGHT VENTRICLE ID DIMENSION: 4.9 CM
SL CV LEFT ATRIUM LENGTH A2C: 5.5 CM
SL CV LV EF: 60
SL CV PED ECHO LEFT VENTRICLE DIASTOLIC VOLUME (MOD BIPLANE) 2D: 69 ML
SL CV PED ECHO LEFT VENTRICLE SYSTOLIC VOLUME (MOD BIPLANE) 2D: 40 ML
SODIUM SERPL-SCNC: 141 MMOL/L (ref 135–147)
SP GR UR STRIP.AUTO: 1.02 (ref 1–1.03)
T WAVE AXIS: 36 DEGREES
TR MAX PG: 17 MMHG
TR PEAK VELOCITY: 2 M/S
TRICUSPID ANNULAR PLANE SYSTOLIC EXCURSION: 2 CM
TRICUSPID VALVE PEAK REGURGITATION VELOCITY: 2.04 M/S
TRIGL SERPL-MCNC: 108 MG/DL
TSH SERPL DL<=0.05 MIU/L-ACNC: 2.22 UIU/ML (ref 0.45–4.5)
UROBILINOGEN UR STRIP-ACNC: 2 MG/DL
VENTRICULAR RATE: 118 BPM
WBC # BLD AUTO: 8.61 THOUSAND/UL (ref 4.31–10.16)

## 2024-11-06 PROCEDURE — 99205 OFFICE O/P NEW HI 60 MIN: CPT | Performed by: INTERNAL MEDICINE

## 2024-11-06 PROCEDURE — 83735 ASSAY OF MAGNESIUM: CPT | Performed by: HOSPITALIST

## 2024-11-06 PROCEDURE — RECHECK: Performed by: PHYSICIAN ASSISTANT

## 2024-11-06 PROCEDURE — 36415 COLL VENOUS BLD VENIPUNCTURE: CPT | Performed by: HOSPITALIST

## 2024-11-06 PROCEDURE — 80053 COMPREHEN METABOLIC PANEL: CPT | Performed by: HOSPITALIST

## 2024-11-06 PROCEDURE — 85025 COMPLETE CBC W/AUTO DIFF WBC: CPT | Performed by: HOSPITALIST

## 2024-11-06 PROCEDURE — 85730 THROMBOPLASTIN TIME PARTIAL: CPT | Performed by: HOSPITALIST

## 2024-11-06 PROCEDURE — 84443 ASSAY THYROID STIM HORMONE: CPT | Performed by: HOSPITALIST

## 2024-11-06 PROCEDURE — 85610 PROTHROMBIN TIME: CPT | Performed by: HOSPITALIST

## 2024-11-06 PROCEDURE — 94664 DEMO&/EVAL PT USE INHALER: CPT

## 2024-11-06 PROCEDURE — 84100 ASSAY OF PHOSPHORUS: CPT | Performed by: HOSPITALIST

## 2024-11-06 PROCEDURE — 93306 TTE W/DOPPLER COMPLETE: CPT

## 2024-11-06 PROCEDURE — 93306 TTE W/DOPPLER COMPLETE: CPT | Performed by: STUDENT IN AN ORGANIZED HEALTH CARE EDUCATION/TRAINING PROGRAM

## 2024-11-06 PROCEDURE — 82948 REAGENT STRIP/BLOOD GLUCOSE: CPT

## 2024-11-06 PROCEDURE — 80061 LIPID PANEL: CPT | Performed by: HOSPITALIST

## 2024-11-06 PROCEDURE — 81003 URINALYSIS AUTO W/O SCOPE: CPT | Performed by: HOSPITALIST

## 2024-11-06 PROCEDURE — 93010 ELECTROCARDIOGRAM REPORT: CPT | Performed by: INTERNAL MEDICINE

## 2024-11-06 RX ORDER — METHOCARBAMOL 750 MG/1
750 TABLET, FILM COATED ORAL 3 TIMES DAILY PRN
Status: DISCONTINUED | OUTPATIENT
Start: 2024-11-06 | End: 2024-11-07 | Stop reason: HOSPADM

## 2024-11-06 RX ORDER — CIPROFLOXACIN AND DEXAMETHASONE 3; 1 MG/ML; MG/ML
4 SUSPENSION/ DROPS AURICULAR (OTIC) 2 TIMES DAILY
Status: DISCONTINUED | OUTPATIENT
Start: 2024-11-06 | End: 2024-11-07 | Stop reason: HOSPADM

## 2024-11-06 RX ORDER — ALLOPURINOL 300 MG/1
300 TABLET ORAL DAILY
Status: DISCONTINUED | OUTPATIENT
Start: 2024-11-06 | End: 2024-11-07 | Stop reason: HOSPADM

## 2024-11-06 RX ORDER — INSULIN LISPRO 100 [IU]/ML
2-12 INJECTION, SOLUTION INTRAVENOUS; SUBCUTANEOUS
Status: DISCONTINUED | OUTPATIENT
Start: 2024-11-06 | End: 2024-11-07 | Stop reason: HOSPADM

## 2024-11-06 RX ORDER — HEPARIN SODIUM 5000 [USP'U]/ML
7500 INJECTION, SOLUTION INTRAVENOUS; SUBCUTANEOUS EVERY 12 HOURS SCHEDULED
Status: DISCONTINUED | OUTPATIENT
Start: 2024-11-06 | End: 2024-11-06

## 2024-11-06 RX ORDER — METOPROLOL SUCCINATE 25 MG/1
25 TABLET, EXTENDED RELEASE ORAL 2 TIMES DAILY
Status: DISCONTINUED | OUTPATIENT
Start: 2024-11-06 | End: 2024-11-07 | Stop reason: HOSPADM

## 2024-11-06 RX ORDER — PANTOPRAZOLE SODIUM 40 MG/1
40 TABLET, DELAYED RELEASE ORAL
Status: DISCONTINUED | OUTPATIENT
Start: 2024-11-06 | End: 2024-11-07 | Stop reason: HOSPADM

## 2024-11-06 RX ORDER — ACETAMINOPHEN 325 MG/1
650 TABLET ORAL EVERY 6 HOURS PRN
Status: DISCONTINUED | OUTPATIENT
Start: 2024-11-06 | End: 2024-11-07 | Stop reason: HOSPADM

## 2024-11-06 RX ORDER — ALBUTEROL SULFATE 0.83 MG/ML
2.5 SOLUTION RESPIRATORY (INHALATION) EVERY 4 HOURS PRN
Status: DISCONTINUED | OUTPATIENT
Start: 2024-11-06 | End: 2024-11-07 | Stop reason: HOSPADM

## 2024-11-06 RX ORDER — ONDANSETRON 2 MG/ML
4 INJECTION INTRAMUSCULAR; INTRAVENOUS EVERY 6 HOURS PRN
Status: DISCONTINUED | OUTPATIENT
Start: 2024-11-06 | End: 2024-11-07 | Stop reason: HOSPADM

## 2024-11-06 RX ORDER — CALCIUM CARBONATE 500 MG/1
1000 TABLET, CHEWABLE ORAL DAILY PRN
Status: DISCONTINUED | OUTPATIENT
Start: 2024-11-06 | End: 2024-11-07 | Stop reason: HOSPADM

## 2024-11-06 RX ADMIN — APIXABAN 5 MG: 5 TABLET, FILM COATED ORAL at 17:48

## 2024-11-06 RX ADMIN — ACETAMINOPHEN 650 MG: 325 TABLET, FILM COATED ORAL at 08:14

## 2024-11-06 RX ADMIN — CIPROFLOXACIN AND DEXAMETHASONE 4 DROP: 3; 1 SUSPENSION/ DROPS AURICULAR (OTIC) at 17:48

## 2024-11-06 RX ADMIN — METOPROLOL SUCCINATE 25 MG: 25 TABLET, EXTENDED RELEASE ORAL at 17:48

## 2024-11-06 RX ADMIN — AMOXICILLIN AND CLAVULANATE POTASSIUM 1 TABLET: 875; 125 TABLET, COATED ORAL at 02:55

## 2024-11-06 RX ADMIN — INSULIN LISPRO 2 UNITS: 100 INJECTION, SOLUTION INTRAVENOUS; SUBCUTANEOUS at 07:52

## 2024-11-06 RX ADMIN — HEPARIN SODIUM 7500 UNITS: 5000 INJECTION, SOLUTION INTRAVENOUS; SUBCUTANEOUS at 02:55

## 2024-11-06 RX ADMIN — AMOXICILLIN AND CLAVULANATE POTASSIUM 1 TABLET: 875; 125 TABLET, COATED ORAL at 11:36

## 2024-11-06 RX ADMIN — ALLOPURINOL 300 MG: 300 TABLET ORAL at 08:14

## 2024-11-06 RX ADMIN — PANTOPRAZOLE SODIUM 40 MG: 40 TABLET, DELAYED RELEASE ORAL at 06:06

## 2024-11-06 RX ADMIN — CIPROFLOXACIN AND DEXAMETHASONE 4 DROP: 3; 1 SUSPENSION/ DROPS AURICULAR (OTIC) at 20:58

## 2024-11-06 NOTE — ASSESSMENT & PLAN NOTE
Telemetry  Echo  TSH  Orthostatic Vital signs  If echo doesn't show any contraindications, will need stress test due to increased fatigue with activity and dyspnea on exertion recently as well as new onset afib in a newly diagnosed diabetic with a syncopal episode.

## 2024-11-06 NOTE — ASSESSMENT & PLAN NOTE
"Lab Results   Component Value Date    HGBA1C 11.2 (H) 09/09/2024       No results for input(s): \"POCGLU\" in the last 72 hours.    Blood Sugar Average: Last 72 hrs:    Ozempic due on Friday  ISS  Hold metformin    "

## 2024-11-06 NOTE — CONSULTS
Consultation - Cardiology Team One  Luis Fernando Jay 54 y.o. male MRN: [id number]  Unit/Bed#: [facility] 402-01 Encounter: [id number]    Inpatient consult to Cardiology  Consult performed by: Mike Hugo PA-C  Consult ordered by: Mahsa Nolan PA-C          Physician Requesting Consult: Gaudencio Troncoso MD  Reason for Consult / Principal Problem: Atrial fibrillation    Assessment & Plan  Syncope and collapse  Patient had a syncopal event while trying to have a large bowel movement with head strike that is likely vasovagal  CTH C/A/P with no acute finding  EKG revealed A-fib with RVR  Currently remains in A-fib with rate 80-90s  A-fib (HCC)  New onset, asymptomatic of unknown duration with rates in the 1 teens on admission  Received 25 mg oral Lopressor in the ED  Currently rates in the 80-90s with PVCs  [id number] 1  Other hyperlipidemia  , , HDL 28, LDL 72  Not on a statin at baseline  Mastoiditis  Noted on CTH  ENT consulted by primary team  Type 2 diabetes mellitus without complication, without long-term current use of insulin ([facility])  Lab Results   Component Value Date    HGBA1C 11.2 (H) 09/09/2024   Management per primary team  Class 3 severe obesity due to excess calories without serious comorbidity with body mass index (BMI) of 50.0 to 59.9 in adult ([facility])  Lifestyle modifications recommended    Plan/Recommendations:  Patient's syncope is likely vasovagal  Patient is asymptomatic and therefore will rate control by initiating Toprol-XL 25 mg BID  Start anticoagulation with Eliquis 5 mg BID.  Will defer price evaluation to primary team  Follow-up on echocardiogram to evaluate heart function and valvular status  If no significant cardiomyopathy noted on echocardiogram patient would be stable from a cardiac standpoint for discharge  Strongly recommend outpatient sleep study for presumed sleep apnea  Follow-up with MARISEL Soriano on 11/18/2024  Patient was subsequently followed with Dr. Moreno  Skylar  _____________________________________________________________________    CC: Syncope      History of Present Illness   HPI: Luis Fernando Jay is a 54 y.o. year old male who has GERD, hyperlipidemia, type II DM, emphysema, obesity presented to the emergency room at [facility] on 11/5/2024 with severe wall episode that occurred while he was trying to pass a large bowel movement.  Patient struck his head on the sink and then fell to the side of the shower.  Patient reported headaches, shortness of breath and left-sided rib pain as well as left lower abdominal pain.  Arrival to the ED patient was tachycardic in the 120s with stable BP and oxygen saturation 95% on room air.  EKG revealed A-fib with RVR in the 1 teens.  CT of the head, C/A/P revealed no acute finding.  There was findings consistent with mastoiditis on CT of the head.  Labs revealed stable CMP, negative troponin x 3, WBC 11 otherwise stable CBC, stable TSH.  UA was unremarkable.  An echocardiogram and orthostatic vital signs was ordered.  Patient received 25 mg of oral Lopressor and cardiology has been consulted for new onset atrial fibrillation.    Patient resting in bed during consultation and denies any chest pain, shortness of breath, palpitations, lightheadedness, dizziness.  He has felt fatigued and a bit dyspneic over the last few months.  He has rib pain from when he hit the side of the tub.  He endorses snoring which is confirmed by his wife.    Family history: Father has a history of atrial fibrillation.  Denies any family history of CAD or sudden cardiac death.    Social history: 60-pack-year history of tobacco abuse quit 5 years ago.  Denies any alcohol or illicit drug use.    EKG reviewed personally: 11/5/2024-A-fib with RVR at 118 bpm with nonspecific ST-T wave abnormality.  When compared to the EKG from 4/2019 sinus rhythm was noted.    Telemetry reviewed personally: Atrial fibrillation with rates in the 80-90s with  occasional PVCs.    Review of Systems   Constitutional: Positive for malaise/fatigue. Negative for chills.   Cardiovascular:  Negative for chest pain, dyspnea on exertion, leg swelling, near-syncope, orthopnea, palpitations, paroxysmal nocturnal dyspnea and syncope.   Respiratory:  Positive for shortness of breath. Negative for cough and wheezing.    Endocrine: Negative.    Hematologic/Lymphatic: Negative.    Skin: Negative.    Musculoskeletal: Negative.    Gastrointestinal: Negative.  Negative for diarrhea, nausea and vomiting.   Neurological:  Negative for dizziness, light-headedness and weakness.   Psychiatric/Behavioral: Negative.  Negative for altered mental status.    All other systems reviewed and are negative.    Historical Information   Past Medical History:   Diagnosis Date    Diabetes mellitus (HCC)     GERD (gastroesophageal reflux disease)     Lung mass     Obesity      History reviewed. No pertinent surgical history.  Social History     Substance and Sexual Activity   Alcohol Use Not Currently     Social History     Substance and Sexual Activity   Drug Use Never     Social History     Tobacco Use   Smoking Status Former    Current packs/day: 0.00    Average packs/day: 3.0 packs/day for 40.3 years (121.0 ttl pk-yrs)    Types: Cigarettes    Start date:     Quit date: 2019    Years since quittin.5   Smokeless Tobacco Never     Family History:   Family History   Problem Relation Age of Onset    No Known Problems Mother     Tuberculosis Father     Atrial fibrillation Father 65    Lung cancer Paternal Uncle     Alcohol abuse Neg Hx     Substance Abuse Neg Hx     Mental illness Neg Hx     Depression Neg Hx        Meds/Allergies   all current active meds have been reviewed, current meds:   Current Facility-Administered Medications:     acetaminophen (TYLENOL) tablet 650 mg, Q6H PRN    albuterol inhalation solution 2.5 mg, Q4H PRN    allopurinol (ZYLOPRIM) tablet 300 mg, Daily    calcium carbonate  (TUMS) chewable tablet 1,000 mg, Daily PRN    ciprofloxacin-dexamethasone (CIPRODEX) 0.3-0.1 % otic suspension 4 drop, BID    heparin (porcine) subcutaneous injection 7,500 Units, Q12H ROM    insulin lispro (HumALOG/ADMELOG) 100 units/mL subcutaneous injection 2-12 Units, TID AC **AND** Fingerstick Glucose (POCT), TID AC    insulin lispro (HumALOG/ADMELOG) 100 units/mL subcutaneous injection 2-12 Units, HS    methocarbamol (ROBAXIN) tablet 750 mg, TID PRN    ondansetron (ZOFRAN) injection 4 mg, Q6H PRN    pantoprazole (PROTONIX) EC tablet 40 mg, Early Morning, and PTA meds:   Prior to Admission Medications   Prescriptions Last Dose Informant Patient Reported? Taking?   albuterol (2.5 mg/3 mL) 0.083 % nebulizer solution  Self No No   Sig: Take 1 vial (2.5 mg total) by nebulization every 4 (four) hours as needed for wheezing or shortness of breath   albuterol (PROVENTIL HFA,VENTOLIN HFA) 90 mcg/act inhaler  Self No No   Sig: Inhale 2 puffs every 4 (four) hours as needed for wheezing   albuterol (ProAir HFA) 90 mcg/act inhaler  Self No No   Sig: Inhale 2 puffs every 6 (six) hours as needed for wheezing   allopurinol (ZYLOPRIM) 300 mg tablet   No No   Sig: Take 1 tablet (300 mg total) by mouth daily   esomeprazole (NexIUM) 20 mg capsule   No No   Sig: Take 1 capsule (20 mg total) by mouth daily in the early morning   metFORMIN (GLUCOPHAGE) 1000 MG tablet   No No   Sig: Take 1 tablet (1,000 mg total) by mouth 2 (two) times a day with meals   methocarbamol (Robaxin-750) 750 mg tablet  Self No No   Sig: Take 1 tablet (750 mg total) by mouth 3 (three) times a day as needed for muscle spasms (may cause drowsiness)   naproxen (NAPROSYN) 500 mg tablet   No No   Sig: Take 1 tablet (500 mg total) by mouth 2 (two) times a day as needed (gout)   semaglutide, 2 mg/dose, (Ozempic) 8 mg/ mL injection pen   No No   Sig: Inject 0.75 mL (2 mg total) under the skin every 7 days      Facility-Administered Medications: None          No  "Known Allergies    Objective   Vitals: Blood pressure 105/70, pulse 90, temperature 97.6 °F (36.4 °C), temperature source Oral, resp. rate 18, height 6' 2\" (1.88 m), weight (!) 181 kg (400 lb), SpO2 95%.,     Body mass index is 51.36 kg/m².,     Systolic (24hrs), Av , Min:100 , Max:133     Diastolic (24hrs), Av, Min:55, Max:75    Wt Readings from Last 3 Encounters:   24 (!) 181 kg (400 lb)   10/15/24 (!) 183 kg (404 lb)   24 (!) 190 kg (419 lb 12.8 oz)      Lab Results   Component Value Date    CREATININE 0.73 2024    CREATININE 0.83 2024    CREATININE 0.86 2024         Intake/Output Summary (Last 24 hours) at 2024 1358  Last data filed at 2024 1259  Gross per 24 hour   Intake 240 ml   Output --   Net 240 ml     Weight (last 2 days)       Date/Time Weight    24 0940 181 (400)    24 0600 183 (403.6)    24 1556 181 (400)          Invasive Devices       Peripheral Intravenous Line  Duration             Peripheral IV 24 Right Antecubital <1 day                      Physical Exam  Vitals and nursing note reviewed.   Constitutional:       General: He is not in acute distress.     Appearance: He is well-developed. He is obese.      Comments: On RA in NAD   HENT:      Head: Normocephalic and atraumatic.   Neck:      Comments: Unable to assess JVD due to patient body habitus  Cardiovascular:      Rate and Rhythm: Normal rate. Rhythm irregular.      Heart sounds: Normal heart sounds. No murmur heard.     No friction rub. No gallop.   Pulmonary:      Effort: Pulmonary effort is normal. No respiratory distress.      Breath sounds: Normal breath sounds. No wheezing or rales.   Chest:      Chest wall: No tenderness.   Abdominal:      General: Bowel sounds are normal. There is no distension.      Palpations: Abdomen is soft.      Tenderness: There is no abdominal tenderness.   Musculoskeletal:         General: No tenderness. Normal range of motion.      " "Cervical back: Normal range of motion and neck supple.      Right lower leg: No edema.      Left lower leg: No edema.   Skin:     General: Skin is warm and dry.      Coloration: Skin is not pale.      Findings: No erythema.   Neurological:      Mental Status: He is alert and oriented to person, place, and time.   Psychiatric:         Mood and Affect: Mood normal.         Behavior: Behavior normal.         Thought Content: Thought content normal.         Judgment: Judgment normal.           LABORATORY RESULTS:      CBC with diff:   Results from last 7 days   Lab Units 11/06/24  0607 11/05/24  1706   WBC Thousand/uL 8.61 11.78*   HEMOGLOBIN g/dL 14.1 16.1   HEMATOCRIT % 42.3 47.6   MCV fL 94 91   PLATELETS Thousands/uL 172 194   RBC Million/uL 4.50 5.22   MCH pg 31.3 30.8   MCHC g/dL 33.3 33.8   RDW % 13.1 13.0   MPV fL 9.5 9.4   NRBC AUTO /100 WBCs 0 0       CMP:  Results from last 7 days   Lab Units 11/06/24  0607 11/05/24  1706   POTASSIUM mmol/L 3.7 3.7   CHLORIDE mmol/L 110* 107   CO2 mmol/L 23 24   BUN mg/dL 10 10   CREATININE mg/dL 0.73 0.83   CALCIUM mg/dL 8.1* 9.0   AST U/L 24 30   ALT U/L 45 55*   ALK PHOS U/L 79 89   EGFR ml/min/1.73sq m 103 98       BMP:  Results from last 7 days   Lab Units 11/06/24  0607 11/05/24  1706   POTASSIUM mmol/L 3.7 3.7   CHLORIDE mmol/L 110* 107   CO2 mmol/L 23 24   BUN mg/dL 10 10   CREATININE mg/dL 0.73 0.83   CALCIUM mg/dL 8.1* 9.0          No results found for: \"NTBNP\"         Results from last 7 days   Lab Units 11/06/24  0607   MAGNESIUM mg/dL 1.8*                 Results from last 7 days   Lab Units 11/06/24  0607   TSH 3RD GENERATON uIU/mL 2.224       Results from last 7 days   Lab Units 11/06/24  0607 11/05/24  1706   INR  1.17 1.11     Lipid Profile:   No results found for: \"CHOL\"  Lab Results   Component Value Date    HDL 28 (L) 11/06/2024    HDL 35 (L) 09/09/2024    HDL 38 (L) 03/22/2024     Lab Results   Component Value Date    LDLCALC 72 11/06/2024    LDLCALC 109 " (H) 09/09/2024    LDLCALC 106 (H) 03/22/2024     Lab Results   Component Value Date    TRIG 108 11/06/2024    TRIG 261 (H) 09/09/2024    TRIG 128 03/22/2024       Imaging: Results Review Statement: I reviewed radiology reports from this admission including: CT chest and CT abdomen/pelvis.  CT chest abdomen pelvis w contrast    Result Date: 11/5/2024  Narrative: CT CHEST, ABDOMEN AND PELVIS WITH IV CONTRAST INDICATION: Syncope with possible head strike, rule out bleed, fracture; left costal pain, rule out rib fracture; LLQ pain, rule out diverticulitis. COMPARISON: Low-dose chest CT dated 2/27/2023 TECHNIQUE: CT examination of the chest, abdomen and pelvis was performed. Multiplanar 2D reformatted images were created from the source data. This examination, like all CT scans performed in the [facility], was performed utilizing techniques to minimize radiation dose exposure, including the use of iterative reconstruction and automated exposure control. Radiation dose length product (DLP) for this visit: 2674.1 mGy-cm IV Contrast: 100 mL of iohexol (OMNIPAQUE) Enteric Contrast: Not administered. FINDINGS: CHEST LUNGS: Azygos fissure. PLEURA: Unremarkable. HEART/GREAT VESSELS: Heart is unremarkable for patient's age. No thoracic aortic aneurysm. MEDIASTINUM AND SHWETA: Small hiatal hernia. No mediastinal or hilar lymphadenopathy. CHEST WALL AND LOWER NECK: Unremarkable. ABDOMEN LIVER/BILIARY TREE: Diffuse hepatic steatosis GALLBLADDER: No calcified gallstones. No pericholecystic inflammatory change. SPLEEN: Unremarkable. PANCREAS: Unremarkable. ADRENAL GLANDS: Unremarkable. KIDNEYS/URETERS: Simple renal cyst(s). Punctate nonobstructing left lower pole renal calculus. STOMACH AND BOWEL: Nondilated bowel. Occasional colonic diverticula. APPENDIX: No findings to suggest appendicitis. ABDOMINOPELVIC CAVITY: No ascites. No pneumoperitoneum. No lymphadenopathy. VESSELS: Unremarkable for patient's age. PELVIS  REPRODUCTIVE ORGANS: Unremarkable for patient's age. URINARY BLADDER: Unremarkable. ABDOMINAL WALL/INGUINAL REGIONS: Small fat-containing umbilical hernia BONES: No acute fracture or suspicious osseous lesion.     Impression: No CT evidence of acute findings. Workstation performed: [id number]     CT head without contrast    Result Date: 11/5/2024  Narrative: CT BRAIN - WITHOUT CONTRAST INDICATION:   Syncope with possible head strike, rule out bleed, fracture; left costal pain, rule out rib fracture; LLQ pain, rule out diverticulitis. COMPARISON: CT head 12/6/2021. TECHNIQUE:  CT examination of the brain was performed.  Multiplanar 2D reformatted images were created from the source data. Radiation dose length product (DLP) for this visit:  1172.31 mGy-cm .  This examination, like all CT scans performed in the [facility], was performed utilizing techniques to minimize radiation dose exposure, including the use of iterative reconstruction and automated exposure control. IMAGE QUALITY:  Diagnostic. FINDINGS: PARENCHYMA:No acute intracranial hemorrhage, mass effect or midline shift VENTRICLES AND EXTRA-AXIAL SPACES:  Normal for the patient's age. VISUALIZED ORBITS: No acute abnormality. PARANASAL SINUSES: Mild mucosal thickening of the paranasal sinuses. Nonspecific subtotal opacification of the left mastoid air cells. CALVARIUM AND EXTRACRANIAL SOFT TISSUES: No acute abnormality.     Impression: 1. No acute intracranial hemorrhage, mass effect or midline shift. 2. Nonspecific subtotal opacification of the left mastoid air cells, which in the appropriate clinical setting can be seen with mastoiditis. The study was marked in EPIC for immediate notification. Workstation performed: [id number]         Counseling / Coordination of Care  Total floor / unit time spent today 45 minutes.  Greater than 50% of total time was spent with the patient and / or family counseling and / or coordination of care.  A  description of the counseling / coordination of care: Review of history, current assessment, development of a plan.      Code Status: Level 1 - Full Code    ** Please Note: Dragon 360 Dictation voice to text software may have been used in the creation of this document. **

## 2024-11-06 NOTE — H&P
"H&P - Hospitalist   Name: Luis Fernando Jay 54 y.o. male I MRN: [id number]  Unit/Bed#: [facility] I Date of Admission: 11/5/2024   Date of Service: 11/6/2024 I Hospital Day: 0     Assessment & Plan  Class 3 severe obesity due to excess calories without serious comorbidity with body mass index (BMI) of 50.0 to 59.9 in adult ([facility])  Nutrition consult  Other emphysema ([facility])  Continue Prn albuterol  Type 2 diabetes mellitus without complication, without long-term current use of insulin ([facility])  Lab Results   Component Value Date    HGBA1C 11.2 (H) 09/09/2024       No results for input(s): \"POCGLU\" in the last 72 hours.    Blood Sugar Average: Last 72 hrs:    Ozempic due on Friday  ISS  Hold metformin    Other hyperlipidemia  Not on a statin, check lipid profile  A-fib ([facility])  Started on metoprolol in ED, will continue  Mastoiditis  Augmentin, ENT consult  Syncope and collapse  Telemetry  Echo  TSH  Orthostatic Vital signs  If echo doesn't show any contraindications, will need stress test due to increased fatigue with activity and dyspnea on exertion recently as well as new onset afib in a newly diagnosed diabetic with a syncopal episode.    LEFT FLANK PAIN  Check urinalysis  He does have a punctate non-obstructing left lower pole renal calculus    VTE Pharmacologic Prophylaxis:   Moderate Risk (Score 3-4) - Pharmacological DVT Prophylaxis Ordered: heparin.  Code Status: Full Code  Discussion with family: Patient declined call to .     Anticipated Length of Stay: Patient will be admitted on an observation basis with an anticipated length of stay of less than 2 midnights secondary to syncopal episode.    History of Present Illness   Chief Complaint: syncope    Luis Fernando Jay is a 54 y.o. male with a PMH of significant for DM, obesity and emphysema  who presents with a syncopal episode.    53 yo morbidly obese male with a history of GERD, DM, emphysema, gout, and hyperlipidemia presents to the ED for evaluation following a " "syncopal event earlier today. The patient reports diarrhea x 3 days followed by constipation. Today he was attempting to pass a large bowel movement and \"passed out\", striking his head on the sink then falling into the side of his shower. (+) LOC for an unknown amount of time. He woke up with his dog licking his face. No chest pain, nausea, or vomiting. The patient is now experiencing a severe headache, shortness of breath, left sided rib pain, and left lower abdominal pain. He also reports \"feeling terrible\" since the event --> generalized fatigue and myalgias. No cough, dysuria, fevers, or chills. He does not take a blood thinner. No neck or back pain. No other specific complaints.     Review of Systems   Constitutional:  Positive for diaphoresis and fatigue. Negative for appetite change, chills and fever.   HENT:  Positive for ear pain and rhinorrhea. Negative for sore throat and trouble swallowing.         Left ear ache x months   Eyes:  Positive for visual disturbance.   Respiratory:  Positive for cough and shortness of breath. Negative for wheezing.    Cardiovascular:  Negative for chest pain, palpitations and leg swelling.   Gastrointestinal:  Positive for abdominal pain, diarrhea and nausea. Negative for blood in stool and vomiting.   Endocrine: Positive for cold intolerance, polydipsia, polyphagia and polyuria.   Genitourinary:  Positive for flank pain. Negative for dysuria, frequency and urgency.        Left flank   Musculoskeletal:         Pain from fall but flank pain before the fall too   Skin: Negative.    Neurological:  Positive for syncope, light-headedness and headaches. Negative for dizziness, tremors, seizures, speech difficulty, weakness and numbness.   Hematological:  Negative for adenopathy. Does not bruise/bleed easily.   Psychiatric/Behavioral:  Negative for dysphoric mood. The patient is not nervous/anxious.        Historical Information   Past Medical History:   Diagnosis Date    GERD " (gastroesophageal reflux disease)     Lung mass     Obesity    DM  NO surgeries ever  History reviewed. No pertinent surgical history.  Social History     Tobacco Use    Smoking status: Former     Current packs/day: 0.00     Average packs/day: 3.0 packs/day for 40.3 years (121.0 ttl pk-yrs)     Types: Cigarettes     Start date:      Quit date: 2019     Years since quittin.5    Smokeless tobacco: Never   Vaping Use    Vaping status: Never Used   Substance and Sexual Activity    Alcohol use: Not Currently    Drug use: Never    Sexual activity: Not Currently     E-Cigarette/Vaping    E-Cigarette Use Never User      E-Cigarette/Vaping Substances    Nicotine No     THC No     CBD No     Flavoring No     Other No     Unknown No      Family History   Problem Relation Age of Onset    No Known Problems Mother     Tuberculosis Father     Atrial fibrillation Father 65    Lung cancer Paternal Uncle     Alcohol abuse Neg Hx     Substance Abuse Neg Hx     Mental illness Neg Hx     Depression Neg Hx      Social History:  Marital Status: /Civil Union   Occupation: [employer]  Patient Pre-hospital Living Situation: Home  Patient Pre-hospital Level of Mobility: walks  Patient Pre-hospital Diet Restrictions: None    Meds/Allergies   I have reviewed home medications with patient personally.  Prior to Admission medications    Medication Sig Start Date End Date Taking? Authorizing Provider   albuterol (2.5 mg/3 mL) 0.083 % nebulizer solution Take 1 vial (2.5 mg total) by nebulization every 4 (four) hours as needed for wheezing or shortness of breath 21   Krys Thorpe DO   albuterol (ProAir HFA) 90 mcg/act inhaler Inhale 2 puffs every 6 (six) hours as needed for wheezing 24   Eber Mancuso PA-C   albuterol (PROVENTIL HFA,VENTOLIN HFA) 90 mcg/act inhaler Inhale 2 puffs every 4 (four) hours as needed for wheezing 8/10/23   Serenity Márquez MD   allopurinol (ZYLOPRIM) 300 mg tablet Take 1  tablet (300 mg total) by mouth daily 10/15/24   Serenity Márquez MD   esomeprazole (NexIUM) 20 mg capsule Take 1 capsule (20 mg total) by mouth daily in the early morning 11/4/24   Serenity Márquez MD   metFORMIN (GLUCOPHAGE) 1000 MG tablet Take 1 tablet (1,000 mg total) by mouth 2 (two) times a day with meals 10/15/24   Serenity Márquez MD   methocarbamol (Robaxin-750) 750 mg tablet Take 1 tablet (750 mg total) by mouth 3 (three) times a day as needed for muscle spasms (may cause drowsiness) 7/23/24   MARISEL Collins   naproxen (NAPROSYN) 500 mg tablet Take 1 tablet (500 mg total) by mouth 2 (two) times a day as needed (gout) 10/16/24   Serenity Márquez MD   semaglutide, 2 mg/dose, (Ozempic) 8 mg/ mL injection pen Inject 0.75 mL (2 mg total) under the skin every 7 days 10/15/24   Serenity Márquez MD     No Known Allergies    Objective :  Temp:  [100.4 °F (38 °C)] 100.4 °F (38 °C)  HR:  [] 90  BP: (100-129)/(55-75) 115/75  Resp:  [12-22] 12  SpO2:  [93 %-95 %] 95 %  O2 Device: None (Room air)    Physical Exam  Vitals reviewed.   Constitutional:       General: He is not in acute distress.     Appearance: Normal appearance. He is obese. He is not ill-appearing, toxic-appearing or diaphoretic.   HENT:      Head: Normocephalic and atraumatic.      Mouth/Throat:      Mouth: Mucous membranes are dry.      Pharynx: Oropharynx is clear.   Eyes:      General: No scleral icterus.     Extraocular Movements: Extraocular movements intact.      Pupils: Pupils are equal, round, and reactive to light.   Neck:      Vascular: No carotid bruit.   Cardiovascular:      Rate and Rhythm: Normal rate and regular rhythm.      Pulses: Normal pulses.      Heart sounds: Normal heart sounds. No murmur heard.     No friction rub. No gallop.   Pulmonary:      Effort: Pulmonary effort is normal. No respiratory distress.      Breath sounds: Normal breath sounds. No wheezing, rhonchi or rales.      Comments: Tender to  palpation over left ribs  Chest:      Chest wall: Tenderness present.   Abdominal:      General: Bowel sounds are normal. There is no distension.      Palpations: Abdomen is soft.      Tenderness: There is no abdominal tenderness. There is left CVA tenderness. There is no guarding.   Musculoskeletal:         General: No swelling or tenderness.      Cervical back: Neck supple. No rigidity or tenderness.   Lymphadenopathy:      Cervical: No cervical adenopathy.   Skin:     General: Skin is warm and dry.      Capillary Refill: Capillary refill takes less than 2 seconds.      Coloration: Skin is not jaundiced or pale.      Findings: No bruising or erythema.   Neurological:      General: No focal deficit present.      Mental Status: He is alert and oriented to person, place, and time.      Cranial Nerves: No cranial nerve deficit.      Sensory: No sensory deficit.      Motor: No weakness.   Psychiatric:         Mood and Affect: Mood normal.         Behavior: Behavior normal.         Thought Content: Thought content normal.         Judgment: Judgment normal.                Lab Results: I have reviewed the following results:  Results from last 7 days   Lab Units 11/05/24  1706   WBC Thousand/uL 11.78*   HEMOGLOBIN g/dL 16.1   HEMATOCRIT % 47.6   PLATELETS Thousands/uL 194   SEGS PCT % 69   LYMPHO PCT % 18   MONO PCT % 5   EOS PCT % 7*     Results from last 7 days   Lab Units 11/05/24  1706   SODIUM mmol/L 138   POTASSIUM mmol/L 3.7   CHLORIDE mmol/L 107   CO2 mmol/L 24   BUN mg/dL 10   CREATININE mg/dL 0.83   ANION GAP mmol/L 7   CALCIUM mg/dL 9.0   ALBUMIN g/dL 4.0   TOTAL BILIRUBIN mg/dL 0.90   ALK PHOS U/L 89   ALT U/L 55*   AST U/L 30   GLUCOSE RANDOM mg/dL 99     Results from last 7 days   Lab Units 11/05/24  1706   INR  1.11         Lab Results   Component Value Date    HGBA1C 11.2 (H) 09/09/2024    HGBA1C 6.1 (H) 03/22/2024    HGBA1C 5.8 (H) 02/10/2023           Imaging Results Review: I reviewed radiology reports  from this admission including: CT chest, CT abdomen/pelvis, and CT head.  Other Study Results Review: EKG was reviewed.     Administrative Statements   I have spent a total time of 63 minutes in caring for this patient on the day of the visit/encounter including Diagnostic results, Impressions, Counseling / Coordination of care, Documenting in the medical record, Reviewing / ordering tests, medicine, procedures  , Obtaining or reviewing history  , and Communicating with other healthcare professionals .    ** Please Note: This note has been constructed using a voice recognition system. **

## 2024-11-06 NOTE — ASSESSMENT & PLAN NOTE
Patient had a syncopal event while trying to have a large bowel movement with head strike that is likely vasovagal  CTH C/A/P with no acute finding  EKG revealed A-fib with RVR  Currently remains in A-fib with rate 80-90s

## 2024-11-06 NOTE — UTILIZATION REVIEW
"Initial Clinical Review    Admission: Date/Time/Statement:   Admission Orders (From admission, onward)       Ordered        11/05/24 2238  Place in Observation  Once                          Orders Placed This Encounter   Procedures    Place in Observation     Standing Status:   Standing     Number of Occurrences:   1     Order Specific Question:   Level of Care     Answer:   Med Surg [16]     ED Arrival Information       Expected   -    Arrival   11/5/2024 15:44    Acuity   Emergent              Means of arrival   Walk-In    Escorted by   Family Member    Service   Hospitalist    Admission type   Emergency              Arrival complaint   Dizziness             Chief Complaint   Patient presents with    Syncope     Pt states has trying to use the bathroom when he had a syncopal episode.  Pt hit head on sink.  Pt states having left flank pain, headache, and buttocks pain       Initial Presentation: 54 y.o. male  with a history of GERD, DM, emphysema, gout, and hyperlipidemia presents to the ED for evaluation following a syncopal event earlier today. The patient reports diarrhea x 3 days followed by constipation. Today he was attempting to pass a large bowel movement and \"passed out\", striking his head on the sink then falling into the side of his shower. (+) LOC for an unknown amount of time. He woke up with his dog licking his face. No chest pain, nausea, or vomiting. The patient is now experiencing a severe headache, shortness of breath, left sided rib pain, and left lower abdominal pain. He also reports \"feeling terrible\" since the event --> generalized fatigue and myalgias. No cough, dysuria, fevers, or chills. He does not take a blood thinner. No neck or back pain. No other specific complaints.    ADMIT OBSERVATION STATUS    Anticipated Length of Stay/Certification Statement:      Date:     Day 2:      ED Treatment-Medication Administration from 11/05/2024 1544 to 11/06/2024 0754         Date/Time Order Dose " Route Action     11/05/2024 1706 ketorolac (TORADOL) injection 15 mg 15 mg Intravenous Given     11/05/2024 1706 acetaminophen (TYLENOL) tablet 650 mg 650 mg Oral Given     11/05/2024 1708 sodium chloride 0.9 % bolus 1,000 mL 1,000 mL Intravenous New Bag     11/05/2024 1759 iohexol (OMNIPAQUE) 350 MG/ML injection (SINGLE-DOSE) 100 mL 100 mL Intravenous Given     11/05/2024 2239 metoprolol tartrate (LOPRESSOR) tablet 25 mg 25 mg Oral Given     11/06/2024 0606 pantoprazole (PROTONIX) EC tablet 40 mg 40 mg Oral Given     11/06/2024 0255 heparin (porcine) subcutaneous injection 7,500 Units 7,500 Units Subcutaneous Given     11/06/2024 0752 insulin lispro (HumALOG/ADMELOG) 100 units/mL subcutaneous injection 2-12 Units 2 Units Subcutaneous Given     11/06/2024 0255 amoxicillin-clavulanate (AUGMENTIN) 875-125 mg per tablet 1 tablet 1 tablet Oral Given            Scheduled Medications:  allopurinol, 300 mg, Oral, Daily  amoxicillin-clavulanate, 1 tablet, Oral, Q12H ROM  heparin (porcine), 7,500 Units, Subcutaneous, Q12H ROM  insulin lispro, 2-12 Units, Subcutaneous, TID AC  insulin lispro, 2-12 Units, Subcutaneous, HS  pantoprazole, 40 mg, Oral, Early Morning      Continuous IV Infusions:     PRN Meds:  acetaminophen, 650 mg, Oral, Q6H PRN  albuterol, 2.5 mg, Nebulization, Q4H PRN  calcium carbonate, 1,000 mg, Oral, Daily PRN  methocarbamol, 750 mg, Oral, TID PRN  ondansetron, 4 mg, Intravenous, Q6H PRN      ED Triage Vitals [11/05/24 1556]   Temperature Pulse Respirations Blood Pressure SpO2 Pain Score   100.4 °F (38 °C) (!) 120 22 129/62 95 % 10 - Worst Possible Pain     Weight (last 2 days)       Date/Time Weight    11/05/24 1556 181 (400)            Vital Signs (last 3 days)       Date/Time Temp Pulse Resp BP MAP (mmHg) SpO2 O2 Device Patient Position - Orthostatic VS Lucero Coma Scale Score Pain    11/06/24 0600 -- 90 18 121/71 -- 97 % -- -- -- --    11/06/24 0400 -- 88 18 116/65 84 97 % None (Room air) Lying 15 5     11/06/24 0300 -- 90 19 133/74 -- 96 % None (Room air) -- -- 5    11/06/24 0200 -- 91 20 101/61 76 94 % -- -- -- --    11/06/24 0100 -- 90 12 115/75 90 95 % None (Room air) Lying -- --    11/06/24 0000 -- 94 19 109/72 87 93 % -- -- -- --    11/05/24 2300 -- 89 21 100/55 72 94 % -- -- -- --    11/05/24 2235 -- 95 16 115/71 -- 94 % None (Room air) Lying -- --    11/05/24 2030 -- 109 16 102/62 76 94 % None (Room air) Lying -- --    11/05/24 1912 -- -- -- -- -- -- -- -- 15 3    11/05/24 1900 -- 102 18 115/65 -- 94 % None (Room air) Lying -- --    11/05/24 1706 -- -- -- -- -- -- -- -- -- 10 - Worst Possible Pain    11/05/24 1600 -- -- -- -- -- -- -- -- 15 10 - Worst Possible Pain    11/05/24 1556 100.4 °F (38 °C) 120 22 129/62 -- 95 % None (Room air) Sitting -- 10 - Worst Possible Pain              Pertinent Labs/Diagnostic Test Results:   Radiology:  CT head without contrast   Final Interpretation by Willy Aj MD (11/05 1909)      1. No acute intracranial hemorrhage, mass effect or midline shift.      2. Nonspecific subtotal opacification of the left mastoid air cells, which in the appropriate clinical setting can be seen with mastoiditis.      The study was marked in EPIC for immediate notification.                  Workstation performed: [id number]         CT chest abdomen pelvis w contrast   Final Interpretation by Cliff Krause DO (11/05 1939)      No CT evidence of acute findings.               Workstation performed: [id number]           Cardiology:  No orders to display     GI:  No orders to display           Results from last 7 days   Lab Units 11/06/24  0607 11/05/24  1706   WBC Thousand/uL 8.61 11.78*   HEMOGLOBIN g/dL 14.1 16.1   HEMATOCRIT % 42.3 47.6   PLATELETS Thousands/uL 172 194   TOTAL NEUT ABS Thousands/µL 4.95 8.17*         Results from last 7 days   Lab Units 11/06/24  0607 11/05/24  1706   SODIUM mmol/L 141 138   POTASSIUM mmol/L 3.7 3.7   CHLORIDE mmol/L 110* 107   CO2 mmol/L 23 24   ANION  "GAP mmol/L 8 7   BUN mg/dL 10 10   CREATININE mg/dL 0.73 0.83   EGFR ml/min/1.73sq m 103 98   CALCIUM mg/dL 8.1* 9.0   MAGNESIUM mg/dL 1.8*  --    PHOSPHORUS mg/dL 3.7  --      Results from last 7 days   Lab Units 11/06/24  0607 11/05/24  1706   AST U/L 24 30   ALT U/L 45 55*   ALK PHOS U/L 79 89   TOTAL PROTEIN g/dL 5.8* 6.8   ALBUMIN g/dL 3.4* 4.0   TOTAL BILIRUBIN mg/dL 0.57 0.90     Results from last 7 days   Lab Units 11/06/24  0739   POC GLUCOSE mg/dl 152*     Results from last 7 days   Lab Units 11/06/24  0607 11/05/24  1706   GLUCOSE RANDOM mg/dL 97 99             No results found for: \"BETA-HYDROXYBUTYRATE\"                   Results from last 7 days   Lab Units 11/05/24  2150 11/05/24  1908 11/05/24  1706   HS TNI 0HR ng/L  --   --  3   HS TNI 2HR ng/L  --  3  --    HSTNI D2 ng/L  --  0  --    HS TNI 4HR ng/L 3  --   --    HSTNI D4 ng/L 0  --   --          Results from last 7 days   Lab Units 11/06/24  0607 11/05/24  1706   PROTIME seconds 15.2* 14.6   INR  1.17 1.11   PTT seconds 29 26     Results from last 7 days   Lab Units 11/06/24  0607   TSH 3RD GENERATON uIU/mL 2.224                                                                                                           Past Medical History:   Diagnosis Date    GERD (gastroesophageal reflux disease)     Lung mass     Obesity      Present on Admission:   Other hyperlipidemia   Type 2 diabetes mellitus without complication, without long-term current use of insulin ([facility])   Other emphysema ([facility])   A-fib ([facility])   Syncope and collapse      Admitting Diagnosis: Dizzy [R42]  Age/Sex: 54 y.o. male    Network Utilization Review Department  ATTENTION: Please call with any questions or concerns to [phone] and carefully listen to the prompts so that you are directed to the right person. All voicemails are confidential.   For Discharge needs, contact Care Management DC Support Team at [phone] opt. 2  Send all requests for admission clinical reviews, " approved or denied determinations and any other requests to dedicated fax number below belonging to the campus where the patient is receiving treatment. List of dedicated fax numbers for the Facilities:  FACILITY NAME UR FAX NUMBER   ADMISSION DENIALS (Administrative/Medical Necessity) [phone]   DISCHARGE SUPPORT TEAM (NETWORK) [phone]   PARENT CHILD HEALTH (Maternity/NICU/Pediatrics) [phone]   [facility] [phone]   [facility] [phone]   [facility] [phone]   [facility] [phone]   Count includes the [facility] [phone]   [facility] [phone]   [facility] [phone]   [facility] [phone]   [facility] [phone]   [facility] [phone]   [facility] [phone]   [facility] [phone]

## 2024-11-06 NOTE — ASSESSMENT & PLAN NOTE
"Subjective   Patient ID: Marely Meraz is a 45year old female. 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 Her family history includes Cancer, Breast (age of onset: 79) in her mother; Cancer, Prostate in her father; Patient is unaware of any medical problems in her brother, daughter, and sister; Stroke in her mother. Marely Meraz reports that she has never smoked. She has never used smokeless tobacco. She reports current alcohol use of about 10.0 standard drinks of alcohol per week. She reports current drug use. Drug: Marijuana. Marely Meraz has a current medication list which includes the following prescription(s): hydrocodone-acetaminophen, amlodipine, pantoprazole, skyrizi pen, and nystop. Marely Meraz is allergic to penicillins. Review of Systems   Constitutional:  Negative for fever. HENT:  Negative for ear pain. Eyes:  Negative for pain. Respiratory:  Negative for shortness of breath. Cardiovascular:  Negative for chest pain. Gastrointestinal:  Negative for abdominal pain. Genitourinary:  Negative for hematuria. Allergic/Immunologic: Negative for environmental allergies. Neurological:  Negative for weakness and numbness. Hematological:  Does not bruise/bleed easily. Psychiatric/Behavioral:  Negative for agitation. The patient is not nervous/anxious. Visit Vitals  /80 (BP Location: RUE - Right upper extremity, Patient Position: Sitting, Cuff Size: Large Adult)   Pulse 74   Temp 98.2 Â°F (36.8 Â°C) (Tympanic)   Resp 17   Ht 5' 4"" (1.626 m)   Wt 101.2 kg (223 lb)   LMP 03/16/2020 (Approximate)   SpO2 96%   BMI 38.28 kg/mÂ²         Objective   Physical Exam  Vitals reviewed. Constitutional:       General: She is not in acute distress. Appearance: Normal appearance.  She " Continue Prn albuterol   is well-developed. She is obese. HENT:      Right Ear: Tympanic membrane, ear canal and external ear normal.      Left Ear: Tympanic membrane, ear canal and external ear normal.      Nose: Nose normal.      Mouth/Throat: Tonsils: 0 on the right. 0 on the left. Neck: Neck supple. Eyes:      Conjunctiva/sclera: Conjunctivae normal.      Pupils: Pupils are equal, round, and reactive to light. Neck:      Thyroid: No thyromegaly. Cardiovascular:      Rate and Rhythm: Normal rate and regular rhythm. Pulses:           Posterior tibial pulses are 2+ on the right side and 2+ on the left side. Heart sounds: Normal heart sounds. No murmur heard. Pulmonary:      Effort: Pulmonary effort is normal. No respiratory distress. Breath sounds: Normal breath sounds. No wheezing. Abdominal:      General: There is no distension. Palpations: Abdomen is soft. Tenderness: There is no abdominal tenderness. There is no guarding. Lymphadenopathy:      Head:      Right side of head: No submandibular adenopathy. Left side of head: No submandibular adenopathy. Cervical: No cervical adenopathy. Neurological:      Mental Status: She is alert. Sensory: No sensory deficit. Motor: No abnormal muscle tone.    Psychiatric:         Behavior: Behavior normal.         Assessment   Problem List Items Addressed This Visit          Cardiac and Vasculature    Essential hypertension     controlled            Endocrine and Metabolic    Vitamin D deficiency       Health Encounters    Annual physical exam - Primary     Check labs    Colonoscopy:  up to ate    Covid vaccine refused    Tdap:  refused    Gyn:  up to date per pt    Mammogram:  ordered    Shingles shot:   refused               Hematology and Neoplasia    Anemia due to acute blood loss     Been on the iron  Recheck today            Musculoskeletal and Injuries    Psoriatic arthritis (CMD)     Sees rheum  On meds  Not doing to bad Pap smears and HPV testing as according to ACOG guidelines. Fasting lipid panel and glucose  Continue with yearly breast and pelvic exams.      Health Maintenance Summary       Hepatitis B Vaccine (1 of 3 - 3-dose series)  Overdue - never done    DTaP/Tdap/Td Vaccine (1 - Tdap)  Overdue - never done    Shingles Vaccine (1 of 2)  Overdue - never done    Influenza Vaccine (1)  Overdue since 9/1/2023    COVID-19 Vaccine (3 - 2023-24 season)  Overdue since 9/1/2023    Breast Cancer Screening (Yearly)  Due since 3/13/2024    Depression Screening (Yearly)  Next due on 2/19/2025    Cervical Cancer Screening (HPV/Cotest - Every 5 Years)  Next due on 7/22/2026    Colorectal Cancer Screen- (Colonoscopy - Every 10 Years)  Next due on 2/10/2034    Meningococcal Vaccine   Aged Out    HPV Vaccine   Aged Out    Pneumococcal Vaccine 0-64   Aged Out            Schedule follow up: in a year, at next annual exam

## 2024-11-06 NOTE — CONSULTS
OTOLARYNGOLOGY CONSULT    Date of Service: 11/6/2024    Reason for consult: mastoid effusion on CT head     ASSESSMENT/PLAN:  Luis Fernando Jay is a 54 y.o. male who we are consulted on for left otitis externa, with no clinical signs of mastoiditis on exam.     - ciprodex 4 drops to the left ear BID for 10 days  - follow up outpatient to ensure infection resolution    Please contact ENT Resident role for any questions or concerns.    HPI  53 yo M PMH of obesity, GERD, DM, emphysema, gout, and hyperlipidemia presenting to ED after fall and head strike. PMH of diabetes, A1c of 11.2 in Sept 2024. Diagnosed with new onset a fib in ED. Complaining of left sided ear tenderness and otalgia with some white otorrhea yesterday. WBC 8, afebrile    CURRENT HOSPITAL MEDICATIONS  Current Facility-Administered Medications   Medication Dose Route Frequency Provider Last Rate Last Admin    acetaminophen (TYLENOL) tablet 650 mg  650 mg Oral Q6H PRN Oanh Perales DO        albuterol inhalation solution 2.5 mg  2.5 mg Nebulization Q4H PRN Oanh Perales DO        allopurinol (ZYLOPRIM) tablet 300 mg  300 mg Oral Daily Oanh Perales DO        amoxicillin-clavulanate (AUGMENTIN) 875-125 mg per tablet 1 tablet  1 tablet Oral Q12H [facility] Oanh Perales, DO   1 tablet at 11/06/24 0255    calcium carbonate (TUMS) chewable tablet 1,000 mg  1,000 mg Oral Daily PRN Oahn Perales DO        heparin (porcine) subcutaneous injection 7,500 Units  7,500 Units Subcutaneous Q12H [facility] Oanh Perales DO   7,500 Units at 11/06/24 0255    insulin lispro (HumALOG/ADMELOG) 100 units/mL subcutaneous injection 2-12 Units  2-12 Units Subcutaneous TID AC Oanh Perales DO        insulin lispro (HumALOG/ADMELOG) 100 units/mL subcutaneous injection 2-12 Units  2-12 Units Subcutaneous HS Oanh Perales DO        methocarbamol (ROBAXIN) tablet 750 mg  750 mg Oral TID PRN Oanh Perales DO        ondansetron  (ZOFRAN) injection 4 mg  4 mg Intravenous Q6H PRN Oanh Perales,         pantoprazole (PROTONIX) EC tablet 40 mg  40 mg Oral Early Morning Oanh Perales,          Current Outpatient Medications   Medication Sig Dispense Refill    albuterol (2.5 mg/3 mL) 0.083 % nebulizer solution Take 1 vial (2.5 mg total) by nebulization every 4 (four) hours as needed for wheezing or shortness of breath 270 mL 5    albuterol (ProAir HFA) 90 mcg/act inhaler Inhale 2 puffs every 6 (six) hours as needed for wheezing 8.5 g 0    albuterol (PROVENTIL HFA,VENTOLIN HFA) 90 mcg/act inhaler Inhale 2 puffs every 4 (four) hours as needed for wheezing 8.5 g 3    allopurinol (ZYLOPRIM) 300 mg tablet Take 1 tablet (300 mg total) by mouth daily 90 tablet 1    esomeprazole (NexIUM) 20 mg capsule Take 1 capsule (20 mg total) by mouth daily in the early morning 90 capsule 0    metFORMIN (GLUCOPHAGE) 1000 MG tablet Take 1 tablet (1,000 mg total) by mouth 2 (two) times a day with meals      methocarbamol (Robaxin-750) 750 mg tablet Take 1 tablet (750 mg total) by mouth 3 (three) times a day as needed for muscle spasms (may cause drowsiness) 20 tablet 0    naproxen (NAPROSYN) 500 mg tablet Take 1 tablet (500 mg total) by mouth 2 (two) times a day as needed (gout) 180 tablet 0    semaglutide, 2 mg/dose, (Ozempic) 8 mg/ mL injection pen Inject 0.75 mL (2 mg total) under the skin every 7 days 9 mL 1       REVIEW OF SYSTEMS  As above    HISTORIES  PMH:  Past Medical History:   Diagnosis Date    GERD (gastroesophageal reflux disease)     Lung mass     Obesity        PSH:  History reviewed. No pertinent surgical history.    SocHx:  Social History     Tobacco Use    Smoking status: Former     Current packs/day: 0.00     Average packs/day: 3.0 packs/day for 40.3 years (121.0 ttl pk-yrs)     Types: Cigarettes     Start date:      Quit date: 2019     Years since quittin.5    Smokeless tobacco: Never   Vaping Use    Vaping status:  "Never Used   Substance Use Topics    Alcohol use: Not Currently    Drug use: Never       FH:  Family History   Problem Relation Age of Onset    No Known Problems Mother     Tuberculosis Father     Atrial fibrillation Father 65    Lung cancer Paternal Uncle     Alcohol abuse Neg Hx     Substance Abuse Neg Hx     Mental illness Neg Hx     Depression Neg Hx        ALLERGIES:  No Known Allergies    PHYSICAL EXAM  Visit Vitals  /65   Pulse 88   Temp 100.4 °F (38 °C) (Tympanic)   Resp 18   Ht 6' 2\" (1.88 m)   Wt (!) 181 kg (400 lb)   SpO2 97%   BMI 51.36 kg/m²   Smoking Status Former   BSA 2.92 m²       General: NAD, AOx4  Eyes:  EOMI, PERRL  Ears:  External ears normal in appearance, left ear canal with mild edema and scant white otorrhea present, with left ear tenderness  Nose:  External appearance normal, no septal deviation   Oral cavity:  No trismus, no mass/lesions  Neck: Trachea is midline; no thyroid nodules, Salivary glands symmetrical, no masses/abnormality on palpation  Lymph:  No cervical lymphadenopathy  Skin:  No obvious facial lesions  Neuro: Motor and sensory grossly intact. Face symmetrical, no obvious cranial nerve palsies,motor and sensory grossly intact, no focal deficits.   Lungs:  Normal work of breathing, symmetrical chest expansion  Vascular: Well perfused      LABORATORY  Reviewed    PROCEDURES  none    RADIOLOGY  CT head 11/5/24: 1. No acute intracranial hemorrhage, mass effect or midline shift.     2. Nonspecific subtotal opacification of the left mastoid air cells, which in the appropriate clinical setting can be seen with mastoiditis.    Patient Active Problem List    Diagnosis Date Noted    A-fib (HCC) 11/06/2024    Mastoiditis 11/06/2024    Syncope and collapse 11/06/2024    Acute idiopathic gout of right foot 08/10/2023    Primary osteoarthritis of right knee 07/14/2022    Cigarette nicotine dependence in remission 04/30/2021    Other hyperlipidemia 05/29/2020    Type 2 diabetes " mellitus without complication, without long-term current use of insulin ([facility]) 01/06/2020    Other emphysema ([facility]) 09/17/2019    Class 3 severe obesity due to excess calories without serious comorbidity with body mass index (BMI) of 50.0 to 59.9 in adult ([facility]) 08/30/2019    GERD (gastroesophageal reflux disease) 08/30/2019    Vasovagal syncope 04/23/2019    Pulmonary nodules 04/23/2019    Abnormal CT of the chest 04/23/2019       Silke Alves MD  Otolaryngology--Head and Neck Surgery  [facility]  11/6/2024 5:59 AM

## 2024-11-06 NOTE — ASSESSMENT & PLAN NOTE
New dx for patient.  Unclear if contributed to primary problem   Started on metoprolol in ED  Consult cardiology   CHADVASC 1, not on AC

## 2024-11-06 NOTE — ASSESSMENT & PLAN NOTE
Lab Results   Component Value Date    HGBA1C 11.2 (H) 09/09/2024       Recent Labs     11/06/24  0739 11/06/24  1141   POCGLU 152* 122       Blood Sugar Average: Last 72 hrs:  (P) 137  Ozempic due on Friday.  Of note pt reports to nausea last few days, unclear if ozempic SE?   ISS  Hold metformin while IP

## 2024-11-06 NOTE — ASSESSMENT & PLAN NOTE
Concern for this on CT imaging  ENT evaluated, no clinical evidence of mastoiditis on exam.  Recommend to continue ciprodex 4 drops BID to L ear for OE x 10 days

## 2024-11-06 NOTE — ASSESSMENT & PLAN NOTE
Presented with syncopal episode after trying to have a BM.  Reports episodes of diarrhea, then constipation.  Nausea for a few days prior to this.  No prodrome.  Today he reports various complaints including intermittent nausea, dizziness, headache.    EKG with new a fib (no known hx), remains in a fib on tele   Will consult cardiology in setting of new a fib dx/syncope though sounds vasovagal  Check orthostatics  Follow up echo   Recently dx with DM and started meds about a month ago.  He does not check his blood sugars at home so unsure if glucose was contributing factor to him passing out either.

## 2024-11-06 NOTE — PLAN OF CARE
Problem: PAIN - ADULT  Goal: Verbalizes/displays adequate comfort level or baseline comfort level  Description: Interventions:  - Encourage patient to monitor pain and request assistance  - Assess pain using appropriate pain scale  - Administer analgesics based on type and severity of pain and evaluate response  - Implement non-pharmacological measures as appropriate and evaluate response  - Consider cultural and social influences on pain and pain management  - Notify physician/advanced practitioner if interventions unsuccessful or patient reports new pain  Outcome: Progressing     Problem: INFECTION - ADULT  Goal: Absence or prevention of progression during hospitalization  Description: INTERVENTIONS:  - Assess and monitor for signs and symptoms of infection  - Monitor lab/diagnostic results  - Monitor all insertion sites, i.e. indwelling lines, tubes, and drains  - Monitor endotracheal if appropriate and nasal secretions for changes in amount and color  - Dobson appropriate cooling/warming therapies per order  - Administer medications as ordered  - Instruct and encourage patient and family to use good hand hygiene technique  - Identify and instruct in appropriate isolation precautions for identified infection/condition  Outcome: Progressing  Goal: Absence of fever/infection during neutropenic period  Description: INTERVENTIONS:  - Monitor WBC    Outcome: Progressing     Problem: SAFETY ADULT  Goal: Patient will remain free of falls  Description: INTERVENTIONS:  - Educate patient/family on patient safety including physical limitations  - Instruct patient to call for assistance with activity   - Consult OT/PT to assist with strengthening/mobility   - Keep Call bell within reach  - Keep bed low and locked with side rails adjusted as appropriate  - Keep care items and personal belongings within reach  - Initiate and maintain comfort rounds  - Make Fall Risk Sign visible to staff  - Offer Toileting every  Hours,  in advance of need  - Initiate/Maintain alarm  - Obtain necessary fall risk management equipment:   - Apply yellow socks and bracelet for high fall risk patients  - Consider moving patient to room near nurses station  Outcome: Progressing  Goal: Maintain or return to baseline ADL function  Description: INTERVENTIONS:  -  Assess patient's ability to carry out ADLs; assess patient's baseline for ADL function and identify physical deficits which impact ability to perform ADLs (bathing, care of mouth/teeth, toileting, grooming, dressing, etc.)  - Assess/evaluate cause of self-care deficits   - Assess range of motion  - Assess patient's mobility; develop plan if impaired  - Assess patient's need for assistive devices and provide as appropriate  - Encourage maximum independence but intervene and supervise when necessary  - Involve family in performance of ADLs  - Assess for home care needs following discharge   - Consider OT consult to assist with ADL evaluation and planning for discharge  - Provide patient education as appropriate  Outcome: Progressing  Goal: Maintains/Returns to pre admission functional level  Description: INTERVENTIONS:  - Perform AM-PAC 6 Click Basic Mobility/ Daily Activity assessment daily.  - Set and communicate daily mobility goal to care team and patient/family/caregiver.   - Collaborate with rehabilitation services on mobility goals if consulted  - Perform Range of Motion  times a day.  - Reposition patient every  hours.  - Dangle patient  times a day  - Stand patient  times a day  - Ambulate patient  times a day  - Out of bed to chair  times a day   - Out of bed for meals  times a day  - Out of bed for toileting  - Record patient progress and toleration of activity level   Outcome: Progressing     Problem: DISCHARGE PLANNING  Goal: Discharge to home or other facility with appropriate resources  Description: INTERVENTIONS:  - Identify barriers to discharge w/patient and caregiver  - Arrange for  needed discharge resources and transportation as appropriate  - Identify discharge learning needs (meds, wound care, etc.)  - Arrange for interpretive services to assist at discharge as needed  - Refer to Case Management Department for coordinating discharge planning if the patient needs post-hospital services based on physician/advanced practitioner order or complex needs related to functional status, cognitive ability, or social support system  Outcome: Progressing     Problem: Knowledge Deficit  Goal: Patient/family/caregiver demonstrates understanding of disease process, treatment plan, medications, and discharge instructions  Description: Complete learning assessment and assess knowledge base.  Interventions:  - Provide teaching at level of understanding  - Provide teaching via preferred learning methods  Outcome: Progressing     Problem: CARDIOVASCULAR - ADULT  Goal: Maintains optimal cardiac output and hemodynamic stability  Description: INTERVENTIONS:  - Monitor I/O, vital signs and rhythm  - Monitor for S/S and trends of decreased cardiac output  - Administer and titrate ordered vasoactive medications to optimize hemodynamic stability  - Assess quality of pulses, skin color and temperature  - Assess for signs of decreased coronary artery perfusion  - Instruct patient to report change in severity of symptoms  Outcome: Progressing  Goal: Absence of cardiac dysrhythmias or at baseline rhythm  Description: INTERVENTIONS:  - Continuous cardiac monitoring, vital signs, obtain 12 lead EKG if ordered  - Administer antiarrhythmic and heart rate control medications as ordered  - Monitor electrolytes and administer replacement therapy as ordered  Outcome: Progressing     Problem: RESPIRATORY - ADULT  Goal: Achieves optimal ventilation and oxygenation  Description: INTERVENTIONS:  - Assess for changes in respiratory status  - Assess for changes in mentation and behavior  - Position to facilitate oxygenation and  minimize respiratory effort  - Oxygen administered by appropriate delivery if ordered  - Initiate smoking cessation education as indicated  - Encourage broncho-pulmonary hygiene including cough, deep breathe, Incentive Spirometry  - Assess the need for suctioning and aspirate as needed  - Assess and instruct to report SOB or any respiratory difficulty  - Respiratory Therapy support as indicated  Outcome: Progressing

## 2024-11-06 NOTE — ASSESSMENT & PLAN NOTE
New onset, asymptomatic of unknown duration with rates in the 1 teens on admission  Received 25 mg oral Lopressor in the ED  Currently rates in the 80-90s with PVCs  [id number] 1

## 2024-11-06 NOTE — PROGRESS NOTES
Progress Note - Hospitalist   Name: Luis Fernando Jay 54 y.o. male I MRN: [id number]  Unit/Bed#: [facility] 402-01 I Date of Admission: 11/5/2024   Date of Service: 11/6/2024 I Hospital Day: 0    Assessment & Plan  Syncope and collapse  Presented with syncopal episode after trying to have a BM.  Reports episodes of diarrhea, then constipation.  Nausea for a few days prior to this.  No prodrome.  Today he reports various complaints including intermittent nausea, dizziness, headache.    EKG with new a fib (no known hx), remains in a fib on tele   Will consult cardiology in setting of new a fib dx/syncope though sounds vasovagal  Check orthostatics  Follow up echo   Recently dx with DM and started meds about a month ago.  He does not check his blood sugars at home so unsure if glucose was contributing factor to him passing out either.    A-fib ([facility])  New dx for patient.  Unclear if contributed to primary problem   Started on metoprolol in ED  Consult cardiology   CHADVASC 1, not on AC   Mastoiditis  Concern for this on CT imaging  ENT evaluated, no clinical evidence of mastoiditis on exam.  Recommend to continue ciprodex 4 drops BID to L ear for OE x 10 days   Class 3 severe obesity due to excess calories without serious comorbidity with body mass index (BMI) of 50.0 to 59.9 in adult ([facility])  Nutrition consult  Needs sleep study lachelle with new dx a fib   Other emphysema ([facility])  Continue Prn albuterol  Type 2 diabetes mellitus without complication, without long-term current use of insulin ([facility])  Lab Results   Component Value Date    HGBA1C 11.2 (H) 09/09/2024       Recent Labs     11/06/24  0739 11/06/24  1141   POCGLU 152* 122       Blood Sugar Average: Last 72 hrs:  (P) 137  Ozempic due on Friday.  Of note pt reports to nausea last few days, unclear if ozempic SE?   ISS  Hold metformin while IP   Other hyperlipidemia  Not on a statin, lipid panel review   POST ADMIT CHECK     Notes few days of diarrhea and nausea, then felt  "constipated and passed out on toilet.  He reports he had large passage of loose stool afterward.  He vanessa any sort of prodrome.  He reports he felt \"awful\" when he came to with c/o L sided rib pain from his fall, headaches, dizziness, ear pain.  He is not sure how long he was out for.  He states that he was recently dx with DM and started on meds about a month ago, including ozempic.  He does not check his blood sugars at home.  No sick contacts.      Today he endorses some dizziness with standing to the bathroom.  No recurrence of syncope.  IT is unclear how much his dizziness could be related to his ear infection/ear problems vs a fib and nausea could be from recent start ozempic?    No known hx of cardiac problems/ a fib     D/w wife at bedside   "

## 2024-11-07 ENCOUNTER — TELEPHONE (OUTPATIENT)
Age: 54
End: 2024-11-07

## 2024-11-07 ENCOUNTER — TRANSITIONAL CARE MANAGEMENT (OUTPATIENT)
Dept: INTERNAL MEDICINE CLINIC | Facility: CLINIC | Age: 54
End: 2024-11-07

## 2024-11-07 VITALS
HEIGHT: 74 IN | HEART RATE: 83 BPM | SYSTOLIC BLOOD PRESSURE: 129 MMHG | TEMPERATURE: 97.5 F | OXYGEN SATURATION: 95 % | WEIGHT: 315 LBS | RESPIRATION RATE: 22 BRPM | DIASTOLIC BLOOD PRESSURE: 92 MMHG | BODY MASS INDEX: 40.43 KG/M2

## 2024-11-07 LAB
GLUCOSE SERPL-MCNC: 108 MG/DL (ref 65–140)
GLUCOSE SERPL-MCNC: 112 MG/DL (ref 65–140)

## 2024-11-07 PROCEDURE — 94760 N-INVAS EAR/PLS OXIMETRY 1: CPT

## 2024-11-07 PROCEDURE — 82948 REAGENT STRIP/BLOOD GLUCOSE: CPT

## 2024-11-07 PROCEDURE — 99239 HOSP IP/OBS DSCHRG MGMT >30: CPT | Performed by: INTERNAL MEDICINE

## 2024-11-07 PROCEDURE — 94664 DEMO&/EVAL PT USE INHALER: CPT

## 2024-11-07 RX ORDER — GLUCOSAMINE HCL/CHONDROITIN SU 500-400 MG
CAPSULE ORAL
Qty: 100 EACH | Refills: 0 | Status: SHIPPED | OUTPATIENT
Start: 2024-11-07

## 2024-11-07 RX ORDER — METOPROLOL SUCCINATE 25 MG/1
25 TABLET, EXTENDED RELEASE ORAL 2 TIMES DAILY
Qty: 60 TABLET | Refills: 0 | Status: SHIPPED | OUTPATIENT
Start: 2024-11-07 | End: 2024-11-18 | Stop reason: SDUPTHER

## 2024-11-07 RX ORDER — BLOOD-GLUCOSE METER
KIT MISCELLANEOUS
Qty: 1 KIT | Refills: 0 | Status: SHIPPED | OUTPATIENT
Start: 2024-11-07

## 2024-11-07 RX ORDER — BLOOD SUGAR DIAGNOSTIC
STRIP MISCELLANEOUS
Qty: 100 EACH | Refills: 0 | Status: SHIPPED | OUTPATIENT
Start: 2024-11-07

## 2024-11-07 RX ORDER — LANCETS 33 GAUGE
EACH MISCELLANEOUS
Qty: 100 EACH | Refills: 0 | Status: SHIPPED | OUTPATIENT
Start: 2024-11-07

## 2024-11-07 RX ORDER — CIPROFLOXACIN AND DEXAMETHASONE 3; 1 MG/ML; MG/ML
4 SUSPENSION/ DROPS AURICULAR (OTIC) 2 TIMES DAILY
Qty: 4 ML | Refills: 0 | Status: SHIPPED | OUTPATIENT
Start: 2024-11-07 | End: 2024-11-18 | Stop reason: SDUPTHER

## 2024-11-07 RX ADMIN — METOPROLOL SUCCINATE 25 MG: 25 TABLET, EXTENDED RELEASE ORAL at 09:05

## 2024-11-07 RX ADMIN — CIPROFLOXACIN AND DEXAMETHASONE 4 DROP: 3; 1 SUSPENSION/ DROPS AURICULAR (OTIC) at 09:07

## 2024-11-07 RX ADMIN — APIXABAN 5 MG: 5 TABLET, FILM COATED ORAL at 09:05

## 2024-11-07 RX ADMIN — PANTOPRAZOLE SODIUM 40 MG: 40 TABLET, DELAYED RELEASE ORAL at 05:31

## 2024-11-07 RX ADMIN — ALLOPURINOL 300 MG: 300 TABLET ORAL at 09:05

## 2024-11-07 NOTE — ASSESSMENT & PLAN NOTE
Presented with syncopal episode after trying to have a BM.  Reports episodes of diarrhea, then constipation.  Nausea for a few days prior to this.  No prodrome.  On admission reported various complaints including intermittent nausea, dizziness, headache.    EKG with new afib (no known hx), remains in a fib on tele   Cardiology consulted in setting of new a fib dx/syncope though sounds vasovagal  See related plans below   Orthostatics negative  Echo showed EF 60-65%, unable to assess diastolic dysfunction, moderately dilated RA  Recently dx with DM and started meds about a month ago.  He does not check his blood sugars at home so unsure if glucose was contributing factor to him passing out either.    Provided scripts for glucometer, test strips, lancets and alcohol swabs to check glucose BID

## 2024-11-07 NOTE — PLAN OF CARE
Problem: PAIN - ADULT  Goal: Verbalizes/displays adequate comfort level or baseline comfort level  Description: Interventions:  - Encourage patient to monitor pain and request assistance  - Assess pain using appropriate pain scale  - Administer analgesics based on type and severity of pain and evaluate response  - Implement non-pharmacological measures as appropriate and evaluate response  - Consider cultural and social influences on pain and pain management  - Notify physician/advanced practitioner if interventions unsuccessful or patient reports new pain  Outcome: Progressing     Problem: INFECTION - ADULT  Goal: Absence or prevention of progression during hospitalization  Description: INTERVENTIONS:  - Assess and monitor for signs and symptoms of infection  - Monitor lab/diagnostic results  - Monitor all insertion sites, i.e. indwelling lines, tubes, and drains  - Monitor endotracheal if appropriate and nasal secretions for changes in amount and color  - [place] appropriate cooling/warming therapies per order  - Administer medications as ordered  - Instruct and encourage patient and family to use good hand hygiene technique  - Identify and instruct in appropriate isolation precautions for identified infection/condition  Outcome: Progressing  Goal: Absence of fever/infection during neutropenic period  Description: INTERVENTIONS:  - Monitor WBC    Outcome: Progressing     Problem: SAFETY ADULT  Goal: Patient will remain free of falls  Description: INTERVENTIONS:  - Educate patient/family on patient safety including physical limitations  - Instruct patient to call for assistance with activity   - Consult OT/PT to assist with strengthening/mobility   - Keep Call bell within reach  - Keep bed low and locked with side rails adjusted as appropriate  - Keep care items and personal belongings within reach  - Initiate and maintain comfort rounds  - Make Fall Risk Sign visible to staff  - Offer Toileting every  Hours,  in advance of need  - Initiate/Maintain alarm  - Obtain necessary fall risk management equipment:   - Apply yellow socks and bracelet for high fall risk patients  - Consider moving patient to room near nurses station  Outcome: Progressing  Goal: Maintain or return to baseline ADL function  Description: INTERVENTIONS:  -  Assess patient's ability to carry out ADLs; assess patient's baseline for ADL function and identify physical deficits which impact ability to perform ADLs (bathing, care of mouth/teeth, toileting, grooming, dressing, etc.)  - Assess/evaluate cause of self-care deficits   - Assess range of motion  - Assess patient's mobility; develop plan if impaired  - Assess patient's need for assistive devices and provide as appropriate  - Encourage maximum independence but intervene and supervise when necessary  - Involve family in performance of ADLs  - Assess for home care needs following discharge   - Consider OT consult to assist with ADL evaluation and planning for discharge  - Provide patient education as appropriate  Outcome: Progressing  Goal: Maintains/Returns to pre admission functional level  Description: INTERVENTIONS:  - Perform AM-PAC 6 Click Basic Mobility/ Daily Activity assessment daily.  - Set and communicate daily mobility goal to care team and patient/family/caregiver.   - Collaborate with rehabilitation services on mobility goals if consulted  - Perform Range of Motion  times a day.  - Reposition patient every  hours.  - Dangle patient  times a day  - Stand patient  times a day  - Ambulate patient  times a day  - Out of bed to chair  times a day   - Out of bed for meal times a day  - Out of bed for toileting  - Record patient progress and toleration of activity level   Outcome: Progressing     Problem: DISCHARGE PLANNING  Goal: Discharge to home or other facility with appropriate resources  Description: INTERVENTIONS:  - Identify barriers to discharge w/patient and caregiver  - Arrange for  needed discharge resources and transportation as appropriate  - Identify discharge learning needs (meds, wound care, etc.)  - Arrange for interpretive services to assist at discharge as needed  - Refer to Case Management Department for coordinating discharge planning if the patient needs post-hospital services based on physician/advanced practitioner order or complex needs related to functional status, cognitive ability, or social support system  Outcome: Progressing     Problem: Knowledge Deficit  Goal: Patient/family/caregiver demonstrates understanding of disease process, treatment plan, medications, and discharge instructions  Description: Complete learning assessment and assess knowledge base.  Interventions:  - Provide teaching at level of understanding  - Provide teaching via preferred learning methods  Outcome: Progressing     Problem: CARDIOVASCULAR - ADULT  Goal: Maintains optimal cardiac output and hemodynamic stability  Description: INTERVENTIONS:  - Monitor I/O, vital signs and rhythm  - Monitor for S/S and trends of decreased cardiac output  - Administer and titrate ordered vasoactive medications to optimize hemodynamic stability  - Assess quality of pulses, skin color and temperature  - Assess for signs of decreased coronary artery perfusion  - Instruct patient to report change in severity of symptoms  Outcome: Progressing  Goal: Absence of cardiac dysrhythmias or at baseline rhythm  Description: INTERVENTIONS:  - Continuous cardiac monitoring, vital signs, obtain 12 lead EKG if ordered  - Administer antiarrhythmic and heart rate control medications as ordered  - Monitor electrolytes and administer replacement therapy as ordered  Outcome: Progressing     Problem: RESPIRATORY - ADULT  Goal: Achieves optimal ventilation and oxygenation  Description: INTERVENTIONS:  - Assess for changes in respiratory status  - Assess for changes in mentation and behavior  - Position to facilitate oxygenation and  minimize respiratory effort  - Oxygen administered by appropriate delivery if ordered  - Initiate smoking cessation education as indicated  - Encourage broncho-pulmonary hygiene including cough, deep breathe, Incentive Spirometry  - Assess the need for suctioning and aspirate as needed  - Assess and instruct to report SOB or any respiratory difficulty  - Respiratory Therapy support as indicated  Outcome: Progressing

## 2024-11-07 NOTE — ASSESSMENT & PLAN NOTE
Lab Results   Component Value Date    HGBA1C 11.2 (H) 09/09/2024       Recent Labs     11/06/24  1141 11/06/24  1647 11/06/24 2053 11/07/24  0725   POCGLU 122 123 112 108       Blood Sugar Average: Last 72 hrs:  (P) 123.4  Ozempic due on Friday.  Of note pt reports to nausea last few days, unclear if ozempic SE?   ISS  Hold metformin while IP, can resume at discharge  A1C above goal however glucose acceptable

## 2024-11-07 NOTE — ASSESSMENT & PLAN NOTE
New dx for patient.  Unclear if contributed to primary problem   Started on metoprolol in ED, recommending Toprol XL 25 mg BID at discharge  Eliquis started, price checked  Cards input appreciated  Recommend OP f/u for ASIM evaluation and eventual f/u to discuss possible CV/Rhythm control strategy

## 2024-11-07 NOTE — DISCHARGE SUMMARY
Discharge Summary - Hospitalist   Name: Luis Fernando Jay 54 y.o. male I MRN: [id number]  Unit/Bed#: [facility] 402-01 I Date of Admission: 11/5/2024   Date of Service: 11/7/2024 I Hospital Day: 0     Assessment & Plan  Syncope and collapse  Presented with syncopal episode after trying to have a BM.  Reports episodes of diarrhea, then constipation.  Nausea for a few days prior to this.  No prodrome.  On admission reported various complaints including intermittent nausea, dizziness, headache.    EKG with new afib (no known hx), remains in a fib on tele   Cardiology consulted in setting of new a fib dx/syncope though sounds vasovagal  See related plans below   Orthostatics negative  Echo showed EF 60-65%, unable to assess diastolic dysfunction, moderately dilated RA  Recently dx with DM and started meds about a month ago.  He does not check his blood sugars at home so unsure if glucose was contributing factor to him passing out either.    Provided scripts for glucometer, test strips, lancets and alcohol swabs to check glucose BID  A-fib ([facility])  New dx for patient.  Unclear if contributed to primary problem   Started on metoprolol in ED, recommending Toprol XL 25 mg BID at discharge  Eliquis started, price checked  Cards input appreciated  Recommend OP f/u for ASIM evaluation and eventual f/u to discuss possible CV/Rhythm control strategy  Mastoiditis  Concern for this on CT imaging  ENT evaluated, no clinical evidence of mastoiditis on exam.  Recommend to continue ciprodex 4 drops BID to L ear for OE x 10 days   Class 3 severe obesity due to excess calories without serious comorbidity with body mass index (BMI) of 50.0 to 59.9 in adult ([facility])  Nutrition consult  Needs sleep study lachelle with new dx a fib   Other emphysema ([facility])  Continue Prn albuterol  Type 2 diabetes mellitus without complication, without long-term current use of insulin ([facility])  Lab Results   Component Value Date    HGBA1C 11.2 (H) 09/09/2024       Recent Labs      "11/06/24  1141 11/06/24  1647 11/06/24 2053 11/07/24 0725   POCGLU 122 123 112 108       Blood Sugar Average: Last 72 hrs:  (P) 123.4  Ozempic due on Friday.  Of note pt reports to nausea last few days, unclear if ozempic SE?   ISS  Hold metformin while IP, can resume at discharge  A1C above goal however glucose acceptable   Other hyperlipidemia  Not on a statin, lipid panel review      Discharging Physician / Practitioner: Joanna Deleon PA-C  PCP: Serenity Márquez MD  Admission Date:   Admission Orders (From admission, onward)       Ordered        11/05/24 2238  Place in Observation  Once                          Discharge Date: 11/07/24    Medical Problems       Resolved Problems  Date Reviewed: 11/7/2024   None         Consultations During Hospital Stay:  Cards  ENT    Procedures Performed:   Echo  CT CAP  CT Head     Significant Findings / Test Results:   See above    Incidental Findings:   See above     Test Results Pending at Discharge (will require follow up):   none     Outpatient Tests Requested:  ASIM study    Complications:  none    Reason for Admission: new onset afib, syncope     Hospital Course:     Luis Fernando Jay is a 54 y.o. male patient who originally presented to the hospital on 11/5/2024 due to syncopal episode after trying to have a BM. On admission reported various complaints including intermittent nausea, dizziness, headache. Found to have new onset afib. Orthostatics negative.    Cards consulted, see plans above.    Medically cleared for discharge.   Please see above list of diagnoses and related plan for additional information.     Condition at Discharge: stable     Discharge Day Visit / Exam:     Subjective:  Feels well this AM, not quite baseline but close.   Vitals: Blood Pressure: 129/92 (11/07/24 0724)  Pulse: 83 (11/07/24 0724)  Temperature: 97.5 °F (36.4 °C) (11/07/24 0724)  Temp Source: Oral (11/07/24 0724)  Respirations: 22 (11/07/24 0724)  Height: 6' 2\" (188 cm) (11/06/24 " 1007)  Weight - Scale: (!) 182 kg (401 lb) (11/07/24 0549)  SpO2: 95 % (11/07/24 0724)  Exam:   Physical Exam  Vitals and nursing note reviewed.   Constitutional:       Appearance: He is obese.      Comments: On RA   Cardiovascular:      Rate and Rhythm: Normal rate. Rhythm irregular.   Pulmonary:      Effort: No respiratory distress.   Abdominal:      General: There is distension.      Tenderness: There is no abdominal tenderness.   Musculoskeletal:      Right lower leg: No edema.      Left lower leg: No edema.   Neurological:      Mental Status: He is alert and oriented to person, place, and time.   Psychiatric:         Mood and Affect: Mood normal.       (  Discussion with Family: Declined update to wife    Discharge instructions/Information to patient and family:   See after visit summary for information provided to patient and family.      Provisions for Follow-Up Care:  See after visit summary for information related to follow-up care and any pertinent home health orders.      Disposition:     Home    For Discharges to [facility]:   Not Applicable to this Patient - Not Applicable to this Patient    Planned Readmission: no     Discharge Statement:  I spent 34 minutes discharging the patient. This time was spent on the day of discharge. I had direct contact with the patient on the day of discharge. Greater than 50% of the total time was spent examining patient, answering all patient questions, arranging and discussing plan of care with patient as well as directly providing post-discharge instructions.  Additional time then spent on discharge activities.    Discharge Medications:  See after visit summary for reconciled discharge medications provided to patient and family.      ** Please Note: This note has been constructed using a voice recognition system **

## 2024-11-07 NOTE — TELEPHONE ENCOUNTER
Pt needs to schedule a TCM appt follwing his discharge from [facility] 11/5/2024-11/7/2024

## 2024-11-07 NOTE — CASE MANAGEMENT
Case Management Discharge Planning Note    Patient name Luis Fernando Jay  Location [facility] 402/[facility] 402-01 MRN [id number]  : 1970 Date 2024       Current Admission Date: 2024  Current Admission Diagnosis:Syncope and collapse   Patient Active Problem List    Diagnosis Date Noted Date Diagnosed    A-fib ([facility]) 2024     Mastoiditis 2024     Syncope and collapse 2024     Acute idiopathic gout of right foot 08/10/2023     Primary osteoarthritis of right knee 2022     Cigarette nicotine dependence in remission 2021     Other hyperlipidemia 2020     Type 2 diabetes mellitus without complication, without long-term current use of insulin ([facility]) 2020     Other emphysema ([facility]) 2019     Class 3 severe obesity due to excess calories without serious comorbidity with body mass index (BMI) of 50.0 to 59.9 in adult ([facility]) 2019     GERD (gastroesophageal reflux disease) 2019     Vasovagal syncope 2019     Pulmonary nodules 2019     Abnormal CT of the chest 2019       LOS (days): 0  Geometric Mean LOS (GMLOS) (days):   Days to GMLOS:     OBJECTIVE:            Current admission status: Observation   Preferred Pharmacy:   [facility]/pharmacy #[id number] [address] [address]  Phone: [phone] Fax: [phone]    Primary Care Provider: Serenity Márquez MD    Primary Insurance: [facility]  Secondary Insurance: BLUE CR[employer]    DISCHARGE DETAILS:         Other Referral/Resources/Interventions Provided:  Interventions: Prescription Price Check  Referral Comments: Bay checked Eliquis at pt's home pharmacy ([facility] in [place]); med is $10/month and will be available for pickup this PM. CM relayed message to LOIDA LIAO CM will discuss with pt at bedside.

## 2024-11-14 NOTE — PROGRESS NOTES
Cardiology Follow Up    Luis Fernando Jay  1970  [id number]  [facility]  [address] [place] [id number] [phone]    Assessment & Plan  Atrial fibrillation, unspecified type (HCC)  [id number], continue on Eliquis 5mg BID   Continues on Eliqiuis 5mg BID for stroke prevention  Continue on metoprolol succinate 25 mg twice daily  >30 min  discussion in regards to atrial fibrillation and treatment for atrial fibrillation  Luis Fernando has low healthcare literacy  I have shown him  a video of a cardioversion  He is agreeable  I will order it DCCV to be done in December after 1 month of Eliquis 5 mg twice daily  He is aware not to stop Eliquis or miss a dose for any reason, hold Ozempic 7 days prior to DCCV  Ambulatory referral to electrophysiology Dr. Mckinney for further atrial fibrillation   Vasovagal syncope  Is a vagal syncope during bowel movement, no further testing noted  ASIM (obstructive sleep apnea)  Luis Fernando will follow-up with sleep medicine in the near future rule out sleep apnea  Class 3 severe obesity with serious comorbidity and body mass index (BMI) of 50.0 to 59.9 in adult, unspecified obesity type (HCC)  On Ozempic  Type 2 diabetes mellitus with other specified complication, unspecified whether long term insulin use (HCC)    Lab Results   Component Value Date    HGBA1C 11.2 (H) 09/09/2024   Continue on Ozempic 2 mg every 7 days will hold prior to cardioversion continue metformin 1000 mg twice daily follow-up with PCP        Interval History:   Mr Luis Fernando Jay was admitted to [facility] on 11/05 - 11/07/24 with syncope and collapse.  He presented to [facility] emergency room with an episode of syncope while having a bowel movement.  He struck his head on the sink and fell to the side of the shower.  Reported a headache shortness of breath and left-sided rib pain as well as lower abdominal pain.   Arrival in the emergency room found to be tachycardic heart rate 120s.  EKG showed atrial fibrillation with RVR.  CT of the chest abdomen pelvis showed no acute fracture,  CT showed mastoiditis.  He was started on metoprolol succinate 25 mg twice daily, Eliquis 5 mg twice daily for stroke prevention.  It was recommended he undergo outpatient sleep study to rule out ASIM.  Discussed also cardioversion versus rhythm control.    Luis Fernando to her office for recent hospitalization follow-up visit.  He is accompanied by his wife.  He denies dyspnea with minimal minor exertion chest pain palpitations lightheadedness or dizziness.  He is following up with sleep medicine in the future to rule out sleep apnea.    Medical History   Primary Cardiologist  Hyperlipidemia 11/06/24   HDL 28 LDL 72  DM2 HgbA1C hemoglobin A1c 11.2 on 9/09/2 for  Obesity BMI 50.98       Patient Active Problem List   Diagnosis    Vasovagal syncope    Pulmonary nodules    Abnormal CT of the chest    Class 3 severe obesity due to excess calories without serious comorbidity with body mass index (BMI) of 50.0 to 59.9 in adult (HCC)    GERD (gastroesophageal reflux disease)    Other emphysema (HCC)    Type 2 diabetes mellitus without complication, without long-term current use of insulin (HCC)    Other hyperlipidemia    Cigarette nicotine dependence in remission    Primary osteoarthritis of right knee    Acute idiopathic gout of right foot    A-fib (HCC)    Mastoiditis    Syncope and collapse     Past Medical History:   Diagnosis Date    Diabetes mellitus (HCC)     GERD (gastroesophageal reflux disease)     Lung mass     Obesity      Social History     Socioeconomic History    Marital status: /Civil Union     Spouse name: Not on file    Number of children: Not on file    Years of education: Not on file    Highest education level: Not on file   Occupational History    Not on file   Tobacco Use    Smoking status: Former     Current packs/day:  0.00     Average packs/day: 3.0 packs/day for 40.3 years (121.0 ttl pk-yrs)     Types: Cigarettes     Start date:      Quit date: 2019     Years since quittin.5    Smokeless tobacco: Never   Vaping Use    Vaping status: Never Used   Substance and Sexual Activity    Alcohol use: Not Currently    Drug use: Never    Sexual activity: Not Currently   Other Topics Concern    Not on file   Social History Narrative    Drinks coffee 1 cup per day         Works in [place] - works at the [employer]     Social Drivers of Health     Financial Resource Strain: Not on file   Food Insecurity: No Food Insecurity (2024)    Nursing - Inadequate Food Risk Classification     Worried About Running Out of Food in the Last Year: Not on file     Ran Out of Food in the Last Year: Not on file     Ran Out of Food in the Last Year: 1   Transportation Needs: No Transportation Needs (2024)    Nursing - Transportation Risk Classification     Lack of Transportation: Not on file     Lack of Transportation: 2   Physical Activity: Not on file   Stress: Not on file   Social Connections: Not on file   Intimate Partner Violence: Unknown (2024)    Nursing IPS     Feels Physically and Emotionally Safe: Not on file     Physically Hurt by Someone: Not on file     Humiliated or Emotionally Abused by Someone: Not on file     Physically Hurt by Someone: 2     Hurt or Threatened by Someone: 2   Housing Stability: Unknown (2024)    Nursing: Inadequate Housing Risk Classification     Has Housing: Not on file     Worried About Losing Housing: Not on file     Unable to Get Utilities: Not on file     Unable to Pay for Housing in the Last Year: 2     Has Housin      Family History   Problem Relation Age of Onset    No Known Problems Mother     Tuberculosis Father     Atrial fibrillation Father 65    Lung cancer Paternal Uncle     Alcohol abuse Neg Hx     Substance Abuse Neg Hx     Mental illness Neg Hx      Depression Neg Hx      No past surgical history on file.    Current Outpatient Medications:     albuterol (2.5 mg/3 mL) 0.083 % nebulizer solution, Take 1 vial (2.5 mg total) by nebulization every 4 (four) hours as needed for wheezing or shortness of breath, Disp: 270 mL, Rfl: 5    albuterol (ProAir HFA) 90 mcg/act inhaler, Inhale 2 puffs every 6 (six) hours as needed for wheezing, Disp: 8.5 g, Rfl: 0    albuterol (PROVENTIL HFA,VENTOLIN HFA) 90 mcg/act inhaler, Inhale 2 puffs every 4 (four) hours as needed for wheezing, Disp: 8.5 g, Rfl: 3    Alcohol Swabs 70 % PADS, May substitute brand based on insurance coverage. Check glucose BID., Disp: 100 each, Rfl: 0    allopurinol (ZYLOPRIM) 300 mg tablet, Take 1 tablet (300 mg total) by mouth daily, Disp: 90 tablet, Rfl: 1    apixaban (Eliquis) 5 mg, Take 1 tablet (5 mg total) by mouth 2 (two) times a day, Disp: 60 tablet, Rfl: 0    Blood Glucose Monitoring Suppl (OneTouch Verio Reflect) w/Device KIT, May substitute brand based on insurance coverage. Check glucose BID., Disp: 1 kit, Rfl: 0    ciprofloxacin-dexamethasone (CIPRODEX) otic suspension, Administer 4 drops into the left ear 2 (two) times a day for 18 doses, Disp: 4 mL, Rfl: 0    esomeprazole (NexIUM) 20 mg capsule, Take 1 capsule (20 mg total) by mouth daily in the early morning, Disp: 90 capsule, Rfl: 0    glucose blood (OneTouch Verio) test strip, May substitute brand based on insurance coverage. Check glucose BID., Disp: 100 each, Rfl: 0    metFORMIN (GLUCOPHAGE) 1000 MG tablet, Take 1 tablet (1,000 mg total) by mouth 2 (two) times a day with meals, Disp: , Rfl:     methocarbamol (Robaxin-750) 750 mg tablet, Take 1 tablet (750 mg total) by mouth 3 (three) times a day as needed for muscle spasms (may cause drowsiness), Disp: 20 tablet, Rfl: 0    metoprolol succinate (TOPROL-XL) 25 mg 24 hr tablet, Take 1 tablet (25 mg total) by mouth 2 (two) times a day, Disp: 60 tablet, Rfl: 0    naproxen (NAPROSYN) 500 mg  tablet, Take 1 tablet (500 mg total) by mouth 2 (two) times a day as needed (gout), Disp: 180 tablet, Rfl: 0    OneTouch Delica Lancets 33G MISC, May substitute brand based on insurance coverage. Check glucose BID., Disp: 100 each, Rfl: 0    semaglutide, 2 mg/dose, (Ozempic) 8 mg/ mL injection pen, Inject 0.75 mL (2 mg total) under the skin every 7 days, Disp: 9 mL, Rfl: 1  No Known Allergies    Labs:  Admission on 11/05/2024, Discharged on 11/07/2024   Component Date Value    Ventricular Rate 11/05/2024 118     QRSD Interval 11/05/2024 88     QT Interval 11/05/2024 340     QTC Interval 11/05/2024 477     QRS Axis 11/05/2024 55     T Wave [place] 11/05/2024 36     Sodium 11/05/2024 138     Potassium 11/05/2024 3.7     Chloride 11/05/2024 107     CO2 11/05/2024 24     ANION GAP 11/05/2024 7     BUN 11/05/2024 10     Creatinine 11/05/2024 0.83     Glucose 11/05/2024 99     Calcium 11/05/2024 9.0     AST 11/05/2024 30     ALT 11/05/2024 55 (H)     Alkaline Phosphatase 11/05/2024 89     Total Protein 11/05/2024 6.8     Albumin 11/05/2024 4.0     Total Bilirubin 11/05/2024 0.90     eGFR 11/05/2024 98     WBC 11/05/2024 11.78 (H)     RBC 11/05/2024 5.22     Hemoglobin 11/05/2024 16.1     Hematocrit 11/05/2024 47.6     MCV 11/05/2024 91     MCH 11/05/2024 30.8     MCHC 11/05/2024 33.8     RDW 11/05/2024 13.0     MPV 11/05/2024 9.4     Platelets 11/05/2024 194     nRBC 11/05/2024 0     Segmented % 11/05/2024 69     Immature Grans % 11/05/2024 1     Lymphocytes % 11/05/2024 18     Monocytes % 11/05/2024 5     Eosinophils Relative 11/05/2024 7 (H)     Basophils Relative 11/05/2024 0     Absolute Neutrophils 11/05/2024 8.17 (H)     Absolute Immature Grans 11/05/2024 0.06     Absolute Lymphocytes 11/05/2024 2.09     Absolute Monocytes 11/05/2024 0.61     Eosinophils Absolute 11/05/2024 0.82 (H)     Basophils Absolute 11/05/2024 0.03     Protime 11/05/2024 14.6     INR 11/05/2024 1.11     PTT 11/05/2024 26     hs TnI 0hr 11/05/2024  3     hs TnI 2hr 11/05/2024 3     Delta 2hr hsTnI 11/05/2024 0     hs TnI 4hr 11/05/2024 3     Delta 4hr hsTnI 11/05/2024 0     BSA 11/06/2024 2.92     A4C EF 11/06/2024 72     LV Diastolic Volume (BP) 11/06/2024 91     LV Systolic Volume (BP) 11/06/2024 28     EF 11/06/2024 70     LVIDd 11/06/2024 4.00     LVIDS 11/06/2024 3.20     IVSd 11/06/2024 1.40     LVPWd 11/06/2024 1.60     FS 11/06/2024 20     MV E' Tissue Velocity Se* 11/06/2024 11     LA Volume Index (BP) 11/06/2024 25.0     E/A ratio 11/06/2024 95.00     E wave deceleration time 11/06/2024 138     MV Peak E Asim 11/06/2024 95     MV Peak A Asim 11/06/2024 0.01     RVID d 11/06/2024 4.9     Tricuspid annular plane * 11/06/2024 2.00     LA size 11/06/2024 5.4     LA length (A2C) 11/06/2024 5.50     LA volume (BP) 11/06/2024 73     RAA A4C 11/06/2024 22.4     MV stenosis pressure 1/2* 11/06/2024 40     MV valve area p 1/2 meth* 11/06/2024 5.50     TR Peak Asim 11/06/2024 2.0     Triscuspid Valve Regurgi* 11/06/2024 17.0     Ao root 11/06/2024 3.90     Asc Ao 11/06/2024 3.4     Tricuspid valve peak reg* 11/06/2024 2.04     Left ventricular stroke * 11/06/2024 29.00     IVS 11/06/2024 1.4     LEFT VENTRICLE SYSTOLIC * 11/06/2024 40     LV DIASTOLIC VOLUME (MOD* 11/06/2024 69     Left Atrium Area-systoli* 11/06/2024 22.1     Left Atrium Area-systoli* 11/06/2024 22.6     LVSV, 2D 11/06/2024 29     LV Diastolic Volume Inde* 11/06/2024 31.2     LV Systolic Volume Index* 11/06/2024 9.6     LV EF 11/06/2024 60     Color, UA 11/06/2024 Yellow     Clarity, UA 11/06/2024 Clear     Specific Gravity, UA 11/06/2024 1.021     pH, UA 11/06/2024 5.5     Leukocytes, UA 11/06/2024 Negative     Nitrite, UA 11/06/2024 Negative     Protein, UA 11/06/2024 Negative     Glucose, UA 11/06/2024 Negative     Ketones, UA 11/06/2024 Negative     Urobilinogen, UA 11/06/2024 2.0 (A)     Bilirubin, UA 11/06/2024 Negative     Occult Blood, UA 11/06/2024 Negative     Protime 11/06/2024 15.2  (H)     INR 11/06/2024 1.17     PTT 11/06/2024 29     Sodium 11/06/2024 141     Potassium 11/06/2024 3.7     Chloride 11/06/2024 110 (H)     CO2 11/06/2024 23     ANION GAP 11/06/2024 8     BUN 11/06/2024 10     Creatinine 11/06/2024 0.73     Glucose 11/06/2024 97     Calcium 11/06/2024 8.1 (L)     Corrected Calcium 11/06/2024 8.6     AST 11/06/2024 24     ALT 11/06/2024 45     Alkaline Phosphatase 11/06/2024 79     Total Protein 11/06/2024 5.8 (L)     Albumin 11/06/2024 3.4 (L)     Total Bilirubin 11/06/2024 0.57     eGFR 11/06/2024 103     Magnesium 11/06/2024 1.8 (L)     Phosphorus 11/06/2024 3.7     WBC 11/06/2024 8.61     RBC 11/06/2024 4.50     Hemoglobin 11/06/2024 14.1     Hematocrit 11/06/2024 42.3     MCV 11/06/2024 94     MCH 11/06/2024 31.3     MCHC 11/06/2024 33.3     RDW 11/06/2024 13.1     MPV 11/06/2024 9.5     Platelets 11/06/2024 172     nRBC 11/06/2024 0     Segmented % 11/06/2024 57     Immature Grans % 11/06/2024 1     Lymphocytes % 11/06/2024 25     Monocytes % 11/06/2024 7     Eosinophils Relative 11/06/2024 10 (H)     Basophils Relative 11/06/2024 0     Absolute Neutrophils 11/06/2024 4.95     Absolute Immature Grans 11/06/2024 0.07     Absolute Lymphocytes 11/06/2024 2.15     Absolute Monocytes 11/06/2024 0.57     Eosinophils Absolute 11/06/2024 0.84 (H)     Basophils Absolute 11/06/2024 0.03     TSH 3RD GENERATON 11/06/2024 2.224     Cholesterol 11/06/2024 122     Triglycerides 11/06/2024 108     HDL, Direct 11/06/2024 28 (L)     LDL Calculated 11/06/2024 72     POC Glucose 11/06/2024 152 (H)     POC Glucose 11/06/2024 122     POC Glucose 11/06/2024 123     POC Glucose 11/06/2024 112     POC Glucose 11/07/2024 108     POC Glucose 11/07/2024 112      Imaging: Echo complete w/ contrast if indicated  Result Date: 11/6/2024  Narrative:   Left Ventricle: Left ventricular cavity size is normal. Wall thickness is mildly increased. There is mild concentric hypertrophy. The left ventricular ejection  fraction is 60-65% by visual estimation. Systolic function is normal. Although no diagnostic regional wall motion abnormality was identified, this possibility cannot be completely excluded on the basis of this study. Unable to assess diastolic function due to atrial fibrillation.   Right Ventricle: Right ventricular cavity size is moderately dilated. Systolic function is normal.   Right Atrium: The atrium is moderately dilated. Very technically difficult study.     CT chest abdomen pelvis w contrast  Result Date: 11/5/2024  Narrative: CT CHEST, ABDOMEN AND PELVIS WITH IV CONTRAST INDICATION: Syncope with possible head strike, rule out bleed, fracture; left costal pain, rule out rib fracture; LLQ pain, rule out diverticulitis. COMPARISON: Low-dose chest CT dated 2/27/2023 TECHNIQUE: CT examination of the chest, abdomen and pelvis was performed. Multiplanar 2D reformatted images were created from the source data. This examination, like all CT scans performed in the [facility], was performed utilizing techniques to minimize radiation dose exposure, including the use of iterative reconstruction and automated exposure control. Radiation dose length product (DLP) for this visit: 2674.1 mGy-cm IV Contrast: 100 mL of iohexol (OMNIPAQUE) Enteric Contrast: Not administered. FINDINGS: CHEST LUNGS: Azygos fissure. PLEURA: Unremarkable. HEART/GREAT VESSELS: Heart is unremarkable for patient's age. No thoracic aortic aneurysm. MEDIASTINUM AND SHWETA: Small hiatal hernia. No mediastinal or hilar lymphadenopathy. CHEST WALL AND LOWER NECK: Unremarkable. ABDOMEN LIVER/BILIARY TREE: Diffuse hepatic steatosis GALLBLADDER: No calcified gallstones. No pericholecystic inflammatory change. SPLEEN: Unremarkable. PANCREAS: Unremarkable. ADRENAL GLANDS: Unremarkable. KIDNEYS/URETERS: Simple renal cyst(s). Punctate nonobstructing left lower pole renal calculus. STOMACH AND BOWEL: Nondilated bowel. Occasional colonic  diverticula. APPENDIX: No findings to suggest appendicitis. ABDOMINOPELVIC CAVITY: No ascites. No pneumoperitoneum. No lymphadenopathy. VESSELS: Unremarkable for patient's age. PELVIS REPRODUCTIVE ORGANS: Unremarkable for patient's age. URINARY BLADDER: Unremarkable. ABDOMINAL WALL/INGUINAL REGIONS: Small fat-containing umbilical hernia BONES: No acute fracture or suspicious osseous lesion.     Impression: No CT evidence of acute findings. Workstation performed: [id number]     CT head without contrast  Result Date: 11/5/2024  Narrative: CT BRAIN - WITHOUT CONTRAST INDICATION:   Syncope with possible head strike, rule out bleed, fracture; left costal pain, rule out rib fracture; LLQ pain, rule out diverticulitis. COMPARISON: CT head 12/6/2021. TECHNIQUE:  CT examination of the brain was performed.  Multiplanar 2D reformatted images were created from the source data. Radiation dose length product (DLP) for this visit:  1172.31 mGy-cm .  This examination, like all CT scans performed in the [facility], was performed utilizing techniques to minimize radiation dose exposure, including the use of iterative reconstruction and automated exposure control. IMAGE QUALITY:  Diagnostic. FINDINGS: PARENCHYMA:No acute intracranial hemorrhage, mass effect or midline shift VENTRICLES AND EXTRA-AXIAL SPACES:  Normal for the patient's age. VISUALIZED ORBITS: No acute abnormality. PARANASAL SINUSES: Mild mucosal thickening of the paranasal sinuses. Nonspecific subtotal opacification of the left mastoid air cells. CALVARIUM AND EXTRACRANIAL SOFT TISSUES: No acute abnormality.     Impression: 1. No acute intracranial hemorrhage, mass effect or midline shift. 2. Nonspecific subtotal opacification of the left mastoid air cells, which in the appropriate clinical setting can be seen with mastoiditis. The study was marked in EPIC for immediate notification. Workstation performed: [id number]       Review of Systems:  Review of  Systems   All other systems reviewed and are negative.      Physical Exam:  Physical Exam  Vitals reviewed.   Constitutional:       Appearance: He is obese.   HENT:      Head: Normocephalic.   Cardiovascular:      Rate and Rhythm: Tachycardia present. Rhythm irregular.      Pulses: Normal pulses.      Heart sounds: Normal heart sounds.   Pulmonary:      Effort: Pulmonary effort is normal.      Breath sounds: Normal breath sounds.   Musculoskeletal:      Cervical back: Normal range of motion and neck supple.      Right lower leg: No edema.      Left lower leg: No edema.   Skin:     General: Skin is warm and dry.      Capillary Refill: Capillary refill takes less than 2 seconds.   Neurological:      General: No focal deficit present.      Mental Status: He is alert and oriented to person, place, and time.   Psychiatric:         Mood and Affect: Mood normal.         Behavior: Behavior normal.

## 2024-11-18 ENCOUNTER — OFFICE VISIT (OUTPATIENT)
Dept: CARDIOLOGY CLINIC | Facility: CLINIC | Age: 54
End: 2024-11-18
Payer: COMMERCIAL

## 2024-11-18 ENCOUNTER — TELEPHONE (OUTPATIENT)
Age: 54
End: 2024-11-18

## 2024-11-18 ENCOUNTER — OFFICE VISIT (OUTPATIENT)
Dept: INTERNAL MEDICINE CLINIC | Facility: CLINIC | Age: 54
End: 2024-11-18
Payer: COMMERCIAL

## 2024-11-18 VITALS
SYSTOLIC BLOOD PRESSURE: 132 MMHG | DIASTOLIC BLOOD PRESSURE: 80 MMHG | WEIGHT: 315 LBS | TEMPERATURE: 96.8 F | BODY MASS INDEX: 40.43 KG/M2 | HEART RATE: 105 BPM | OXYGEN SATURATION: 97 % | HEIGHT: 74 IN

## 2024-11-18 VITALS
HEIGHT: 74 IN | HEART RATE: 103 BPM | WEIGHT: 315 LBS | OXYGEN SATURATION: 95 % | BODY MASS INDEX: 40.43 KG/M2 | DIASTOLIC BLOOD PRESSURE: 84 MMHG | SYSTOLIC BLOOD PRESSURE: 122 MMHG

## 2024-11-18 DIAGNOSIS — E66.813 CLASS 3 SEVERE OBESITY WITH SERIOUS COMORBIDITY AND BODY MASS INDEX (BMI) OF 50.0 TO 59.9 IN ADULT, UNSPECIFIED OBESITY TYPE (HCC): ICD-10-CM

## 2024-11-18 DIAGNOSIS — E66.813 CLASS 3 SEVERE OBESITY DUE TO EXCESS CALORIES WITHOUT SERIOUS COMORBIDITY WITH BODY MASS INDEX (BMI) OF 50.0 TO 59.9 IN ADULT (HCC): ICD-10-CM

## 2024-11-18 DIAGNOSIS — I48.91 ATRIAL FIBRILLATION, UNSPECIFIED TYPE (HCC): Primary | ICD-10-CM

## 2024-11-18 DIAGNOSIS — E11.9 TYPE 2 DIABETES MELLITUS WITHOUT COMPLICATION, WITHOUT LONG-TERM CURRENT USE OF INSULIN (HCC): ICD-10-CM

## 2024-11-18 DIAGNOSIS — R91.8 PULMONARY NODULES: ICD-10-CM

## 2024-11-18 DIAGNOSIS — E78.49 OTHER HYPERLIPIDEMIA: ICD-10-CM

## 2024-11-18 DIAGNOSIS — E66.01 CLASS 3 SEVERE OBESITY DUE TO EXCESS CALORIES WITHOUT SERIOUS COMORBIDITY WITH BODY MASS INDEX (BMI) OF 50.0 TO 59.9 IN ADULT (HCC): ICD-10-CM

## 2024-11-18 DIAGNOSIS — H70.90 MASTOIDITIS, UNSPECIFIED LATERALITY: ICD-10-CM

## 2024-11-18 DIAGNOSIS — G47.33 OSA (OBSTRUCTIVE SLEEP APNEA): ICD-10-CM

## 2024-11-18 DIAGNOSIS — R55 SYNCOPE AND COLLAPSE: ICD-10-CM

## 2024-11-18 DIAGNOSIS — E11.69 TYPE 2 DIABETES MELLITUS WITH OTHER SPECIFIED COMPLICATION, UNSPECIFIED WHETHER LONG TERM INSULIN USE (HCC): ICD-10-CM

## 2024-11-18 DIAGNOSIS — R55 VASOVAGAL SYNCOPE: ICD-10-CM

## 2024-11-18 DIAGNOSIS — E66.01 CLASS 3 SEVERE OBESITY WITH SERIOUS COMORBIDITY AND BODY MASS INDEX (BMI) OF 50.0 TO 59.9 IN ADULT, UNSPECIFIED OBESITY TYPE (HCC): ICD-10-CM

## 2024-11-18 PROCEDURE — 93000 ELECTROCARDIOGRAM COMPLETE: CPT | Performed by: NURSE PRACTITIONER

## 2024-11-18 PROCEDURE — 99215 OFFICE O/P EST HI 40 MIN: CPT | Performed by: NURSE PRACTITIONER

## 2024-11-18 PROCEDURE — 99495 TRANSJ CARE MGMT MOD F2F 14D: CPT | Performed by: INTERNAL MEDICINE

## 2024-11-18 RX ORDER — ACYCLOVIR 800 MG/1
1 TABLET ORAL
Qty: 6 EACH | Refills: 1 | Status: SHIPPED | OUTPATIENT
Start: 2024-11-18

## 2024-11-18 RX ORDER — METOPROLOL SUCCINATE 25 MG/1
25 TABLET, EXTENDED RELEASE ORAL 2 TIMES DAILY
Qty: 60 TABLET | Refills: 3 | Status: SHIPPED | OUTPATIENT
Start: 2024-11-18

## 2024-11-18 RX ORDER — KETOROLAC TROMETHAMINE 30 MG/ML
1 INJECTION, SOLUTION INTRAMUSCULAR; INTRAVENOUS ONCE
Qty: 1 EACH | Refills: 0 | Status: SHIPPED | OUTPATIENT
Start: 2024-11-18 | End: 2024-11-18

## 2024-11-18 NOTE — TELEPHONE ENCOUNTER
"  Patient is being sent for a sleep study. Referral is in the chart. Advised patient it’s in \"pending status\". Advised patient he will be contacted by the office after the referral has been reviewed/processed.    Patient understood.    "

## 2024-11-18 NOTE — ASSESSMENT & PLAN NOTE
[id number], continue on Eliquis 5mg BID   Continues on Eliqiuis 5mg BID for stroke prevention  Continue on metoprolol succinate 25 mg twice daily  >30 min  discussion in regards to atrial fibrillation and treatment for atrial fibrillation  Luis Fernando has low healthcare literacy  I have shown him  a video of a cardioversion  He is agreeable  I will order it DCCV to be done in December after 1 month of Eliquis 5 mg twice daily  He is aware not to stop Eliquis or miss a dose for any reason, hold Ozempic 7 days prior to DCCV  Ambulatory referral to electrophysiology Dr. Mckinney for further atrial fibrillation

## 2024-11-18 NOTE — ASSESSMENT & PLAN NOTE
Lab Results   Component Value Date    HGBA1C 11.2 (H) 09/09/2024     On metformin 1000 mg bid, Ozempic 2 mg weekly.  He does not like to check his sugars, requests for CGM.  Reviewed importance of portion control. Reviewed diet with wife since she prepares his food.  Declined referral to diabetes class.    Orders:    Continuous Glucose  (FreeStyle Hali 3 [place]) LIU; Use 1 each once for 1 dose    Continuous Glucose Sensor (FreeStyle Hali 3 Sensor) MISC; Use 1 each every 14 (fourteen) days

## 2024-11-18 NOTE — ASSESSMENT & PLAN NOTE
Taking metoprolol 25 mg bid, may need to increase dose for better HR control.  Started on Eliquis.  Reviewed echo. Schedule home sleep study.  Follow up with cardiology later today.    Orders:    Ambulatory Referral to Sleep Medicine; Future

## 2024-11-18 NOTE — PROGRESS NOTES
Transition of Care Visit  Name: Luis Fernando Jay      : 1970      MRN: [id number]  Encounter Provider: Serenity Márquez MD  Encounter Date: 2024   Encounter department: [facility] INTERNAL MEDICINE    Assessment & Plan  Atrial fibrillation, unspecified type (HCC)  Taking metoprolol 25 mg bid, may need to increase dose for better HR control.  Started on Eliquis.  Reviewed echo. Schedule home sleep study.  Follow up with cardiology later today.    Orders:    Ambulatory Referral to Sleep Medicine; Future    Type 2 diabetes mellitus without complication, without long-term current use of insulin ([facility])    Lab Results   Component Value Date    HGBA1C 11.2 (H) 2024     On metformin 1000 mg bid, Ozempic 2 mg weekly.  He does not like to check his sugars, requests for CGM.  Reviewed importance of portion control. Reviewed diet with wife since she prepares his food.  Declined referral to diabetes class.    Orders:    Continuous Glucose  (FreeStyle Hali 3 [place]) LIU; Use 1 each once for 1 dose    Continuous Glucose Sensor (FreeStyle Hali 3 Sensor) MISC; Use 1 each every 14 (fourteen) days    Syncope and collapse  As above.         Mastoiditis, unspecified laterality  Saw ENT earlier today, started on amoxicillin, continue Ciprodex.         Pulmonary nodules  None seen on recent CT. Repeat next year.         Other hyperlipidemia  LDL within normal.         Class 3 severe obesity due to excess calories without serious comorbidity with body mass index (BMI) of 50.0 to 59.9 in adult ([facility])      Orders:    Ambulatory Referral to Sleep Medicine; Future       Follow up as scheduled or as needed.    History of Present Illness     Transitional Care Management Review:   Luis Fernando Jay is a 54 y.o. male here for TCM follow up.     During the TCM phone call patient stated:  TCM Call       Date and time call was made  2024  1:26 PM    Hospital care reviewed  Records reviewed    Patient was  hospitialized at  [facility]    Date of Admission  11/05/24    Date of discharge  11/07/24    Diagnosis  Syncope and collapse    Disposition  Home    Were the patients medications reviewed and updated  No    Current Symptoms  None          TCM Call       Post hospital issues  None    Scheduled for follow up?  Yes    Patients specialists  Cardiologist    Cardiologist name  Surendra Burnett    Did you obtain your prescribed medications  Yes    Do you need help managing your prescriptions or medications  No    Is transportation to your appointment needed  No    I have advised the patient to call PCP with any new or worsening symptoms  Cristina Ariza    Living Arrangements  Spouse or Significiant other    Support System  Spouse    The type of support provided  Emotional; Physical    Do you have social support  Yes, as much as I need    Are you recieving any outpatient services  No    Are you recieving home care services  No    Are you using any community resources  No    Current waiver services  No    Have you fallen in the last 12 months  No    Interperter language line needed  No    Comments   on  for patient to call to schedule appointment.          He is here with his wife today.  He reports no chest pain, palpitations or chest pressure.  He reports feeling dizzy sometimes, does not feel he is about to pass out.    He saw the ER doctor earlier today.  He was given more antibiotics to take, a pill and eardrops.  He reports no pain or discharge.    He was given supplies to check his sugars.  He is not interested to do this especially when he is at work.  His wife taught him how to do it himself, asking if there is some other way he can check his sugars. He is asking what he should eat. He had half a sandwich yesterday and a donut while at work. He did not have his packed food with him.    He had made an appointment with the sleep doctor not until February.      Review of Systems   Constitutional:  Negative  "for appetite change and fatigue.   HENT:  Negative for congestion, hearing loss and postnasal drip.    Eyes: Negative.    Respiratory:  Negative for cough, chest tightness and shortness of breath.    Cardiovascular:  Negative for chest pain, palpitations and leg swelling.   Gastrointestinal:  Negative for abdominal pain and constipation.   Genitourinary:  Negative for dysuria, frequency and urgency.   Musculoskeletal:  Negative for arthralgias.   Skin:  Negative for rash and wound.   Neurological:  Positive for dizziness. Negative for numbness and headaches.   Hematological:  Negative for adenopathy. Does not bruise/bleed easily.   Psychiatric/Behavioral:  Negative for sleep disturbance. The patient is not nervous/anxious.      Objective   /80   Pulse 105   Temp (!) 96.8 °F (36 °C)   Ht 6' 2\" (1.88 m)   Wt (!) 180 kg (396 lb)   SpO2 97%   BMI 50.84 kg/m²     Physical Exam  Vitals and nursing note reviewed.   Constitutional:       General: He is not in acute distress.     Appearance: He is well-developed.   HENT:      Head: Normocephalic and atraumatic.      Right Ear: External ear normal.      Left Ear: External ear normal.      Mouth/Throat:      Mouth: Mucous membranes are moist.   Eyes:      Conjunctiva/sclera: Conjunctivae normal.   Cardiovascular:      Rate and Rhythm: Normal rate. Rhythm irregularly irregular.      Heart sounds: No murmur heard.  Pulmonary:      Effort: Pulmonary effort is normal. No respiratory distress.      Breath sounds: Normal breath sounds.   Abdominal:      Palpations: Abdomen is soft.      Tenderness: There is no abdominal tenderness.   Musculoskeletal:         General: No swelling.      Cervical back: Neck supple.      Right lower leg: No edema.      Left lower leg: No edema.   Skin:     General: Skin is warm and dry.   Neurological:      General: No focal deficit present.      Mental Status: He is alert and oriented to person, place, and time.   Psychiatric:         Mood " and Affect: Mood normal.         Behavior: Behavior normal.       Medications have been reviewed by provider in current encounter        Labs & imaging results reviewed with patient.

## 2024-11-19 ENCOUNTER — TELEPHONE (OUTPATIENT)
Dept: CARDIOLOGY CLINIC | Facility: CLINIC | Age: 54
End: 2024-11-19

## 2024-11-19 LAB
LEFT EYE DIABETIC RETINOPATHY: NORMAL
RIGHT EYE DIABETIC RETINOPATHY: NORMAL

## 2024-11-19 NOTE — TELEPHONE ENCOUNTER
Patients wife called back to find out the date of the cardioversion. It does not appear to be scheduled yet. Patient would like to know what date to start the hold.

## 2024-11-19 NOTE — TELEPHONE ENCOUNTER
Pt called with questions about hold and procedure. He is very anxious about having this done. He would like to be scheduled today to reduce some anxiety to know what is happening. Please call him as soon as possible

## 2024-11-19 NOTE — TELEPHONE ENCOUNTER
----- Message from MARISEL Soriano sent at 11/18/2024  5:37 PM EST -----  Regarding: ozempic hold  Hi Ugo tomorrow can you please call Luis Fernando's wife and inform her he must hold Ozempic 7 days prior to the cardioversion.  Thank you Scarlett     Called pts wife x 2. Left msg for her , Luis Fernando to hold Ozempic 7 days prior to cardioversion.   29-Jan-2021 03:18

## 2024-11-20 ENCOUNTER — TELEPHONE (OUTPATIENT)
Dept: INTERNAL MEDICINE CLINIC | Facility: CLINIC | Age: 54
End: 2024-11-20

## 2024-11-20 NOTE — TELEPHONE ENCOUNTER
PA FreeStyle Hali 3 [place] SUBMITTED     to [employer]     via    []CMM-KEY:    [x]Surescripts-Case ID # [id number]  []Availity-Auth ID #  NDC #    []Faxed to plan   []Other website    []Phone call Case ID #      []PA sent as URGENT    All office notes, labs and other pertaining documents and studies sent. Clinical questions answered. Awaiting determination from insurance company.     Turnaround time for your insurance to make a decision on your Prior Authorization can take 7-21 business days.

## 2024-11-21 ENCOUNTER — APPOINTMENT (OUTPATIENT)
Dept: RADIOLOGY | Facility: CLINIC | Age: 54
End: 2024-11-21
Payer: OTHER MISCELLANEOUS

## 2024-11-21 ENCOUNTER — OCCMED (OUTPATIENT)
Dept: URGENT CARE | Facility: CLINIC | Age: 54
End: 2024-11-21
Payer: OTHER MISCELLANEOUS

## 2024-11-21 DIAGNOSIS — S89.91XA INJURY OF RIGHT KNEE, INITIAL ENCOUNTER: ICD-10-CM

## 2024-11-21 DIAGNOSIS — S89.91XA INJURY OF RIGHT KNEE, INITIAL ENCOUNTER: Primary | ICD-10-CM

## 2024-11-21 PROCEDURE — 73564 X-RAY EXAM KNEE 4 OR MORE: CPT

## 2024-11-21 NOTE — TELEPHONE ENCOUNTER
PA FreeStyle Hali 3 [place] APPROVED         Patient advised by          [x]MyChart Message  []Phone call   []LMOM  []L/M to call office as no active Communication consent on file  []Unable to leave detailed message as VM not approved on Communication consent       Pharmacy advised by    [x]Fax  []Phone call

## 2024-11-25 ENCOUNTER — APPOINTMENT (EMERGENCY)
Dept: RADIOLOGY | Facility: HOSPITAL | Age: 54
End: 2024-11-25
Payer: OTHER MISCELLANEOUS

## 2024-11-25 ENCOUNTER — HOSPITAL ENCOUNTER (OUTPATIENT)
Dept: NON INVASIVE DIAGNOSTICS | Facility: HOSPITAL | Age: 54
Discharge: HOME | End: 2024-11-25

## 2024-11-25 ENCOUNTER — TELEPHONE (OUTPATIENT)
Dept: OBGYN CLINIC | Facility: HOSPITAL | Age: 54
End: 2024-11-25

## 2024-11-25 ENCOUNTER — HOSPITAL ENCOUNTER (EMERGENCY)
Facility: HOSPITAL | Age: 54
Discharge: HOME | End: 2024-11-25
Attending: EMERGENCY MEDICINE
Payer: OTHER MISCELLANEOUS

## 2024-11-25 VITALS
OXYGEN SATURATION: 95 % | DIASTOLIC BLOOD PRESSURE: 70 MMHG | HEART RATE: 75 BPM | RESPIRATION RATE: 20 BRPM | SYSTOLIC BLOOD PRESSURE: 119 MMHG | TEMPERATURE: 97.6 F

## 2024-11-25 DIAGNOSIS — S93.409A ANKLE SPRAIN: Primary | ICD-10-CM

## 2024-11-25 DIAGNOSIS — M25.569 KNEE PAIN: ICD-10-CM

## 2024-11-25 PROCEDURE — 99283 EMERGENCY DEPT VISIT LOW MDM: CPT

## 2024-11-25 PROCEDURE — 73610 X-RAY EXAM OF ANKLE: CPT

## 2024-11-25 PROCEDURE — 99284 EMERGENCY DEPT VISIT MOD MDM: CPT | Performed by: EMERGENCY MEDICINE

## 2024-11-25 PROCEDURE — 73560 X-RAY EXAM OF KNEE 1 OR 2: CPT

## 2024-11-25 PROCEDURE — 96372 THER/PROPH/DIAG INJ SC/IM: CPT

## 2024-11-25 RX ORDER — KETOROLAC TROMETHAMINE 30 MG/ML
15 INJECTION, SOLUTION INTRAMUSCULAR; INTRAVENOUS ONCE
Status: COMPLETED | OUTPATIENT
Start: 2024-11-25 | End: 2024-11-25

## 2024-11-25 RX ORDER — ACETAMINOPHEN 325 MG/1
975 TABLET ORAL ONCE
Status: COMPLETED | OUTPATIENT
Start: 2024-11-25 | End: 2024-11-25

## 2024-11-25 RX ADMIN — KETOROLAC TROMETHAMINE 15 MG: 30 INJECTION, SOLUTION INTRAMUSCULAR; INTRAVENOUS at 21:33

## 2024-11-25 RX ADMIN — ACETAMINOPHEN 975 MG: 325 TABLET, FILM COATED ORAL at 21:33

## 2024-11-25 NOTE — Clinical Note
Luis Fernando Misty was seen and treated in our emergency department on 11/25/2024.        No work until cleared by Family Doctor/Orthopedics        Diagnosis:     Luis Fernando  .    He may return on this date:          If you have any questions or concerns, please don't hesitate to call.      Valeriano Spain, DO    ______________________________           _______________          _______________  Hospital Representative                              Date                                Time

## 2024-11-25 NOTE — TELEPHONE ENCOUNTER
Hello,    Please advise if a forced appointment can be accommodated for the patient:    Call back #: [phone] Sim Eugene     Insurance: [employer] /      Reason for appointment: B/L knee injury with R fractured knee .     Claim # [id number] -  Sim EUGENE with [employer]   DOI 11/20  [employer]     Imaging in chart. Seen [facility]     Requested doctor and/or location: Flash / Claudia or Jose   Patient was seen by DR Vidales in the past and is requesting to see him again       Thank you.

## 2024-11-26 NOTE — ED PROVIDER NOTES
Time reflects when diagnosis was documented in both MDM as applicable and the Disposition within this note       Time User Action Codes Description Comment    11/25/2024 11:16 PM Valeriano Spain [S93.409A] Ankle sprain     11/25/2024 11:16 PM Valeriano Spain [M25.569] Knee pain           ED Disposition       ED Disposition   Discharge    Condition   Stable    Date/Time   Mon Nov 25, 2024 11:16 PM    Comment   Luis Fernando Jay discharge to home/self care.                   Assessment & Plan       Medical Decision Making  Patient is a 54 y.o. male  who presents to the ED with right knee and ankle pain following fall 5 days prior.    Vital signs remained stable throughout ED course. Exam as listed above    Differential diagnosis includes but is not limited to acute musculoskeletal sprain of ankle and/or knee, soft tissue swelling, vascular congestion, lymphedema, deep vein thrombosis.  Despite patient being told that he had a right patellar fracture and it being documented as such, there is no evidence of patellar fracture or other fracture on any of his x-rays during or after the injury.    Plan: Patient was provided with a ankle stirrup splint and advised to follow-up with Ortho via his Workmen's Comp. referral.    View ED course above for further discussion on patient workup.     All labs reviewed and utilized in the medical decision making process  All radiology studies independently viewed by me and interpreted by the radiologist.  I reviewed all testing with the patient.       Amount and/or Complexity of Data Reviewed  Radiology: ordered and independent interpretation performed.    Risk  OTC drugs.  Prescription drug management.             Medications   acetaminophen (TYLENOL) tablet 975 mg (975 mg Oral Given 11/25/24 2133)   ketorolac (TORADOL) injection 15 mg (15 mg Intramuscular Given 11/25/24 2133)       ED Risk Strat Scores                           SBIRT 22yo+      Flowsheet Row Most Recent Value   Initial  "Alcohol Screen: US AUDIT-C     1. How often do you have a drink containing alcohol? 0 Filed at: 2024   2. How many drinks containing alcohol do you have on a typical day you are drinking?  0 Filed at: 2024   3a. Male UNDER 65: How often do you have five or more drinks on one occasion? 0 Filed at: 2024   Audit-C Score 0 Filed at: 2024   HIGINIO: How many times in the past year have you...    Used an illegal drug or used a prescription medication for non-medical reasons? Never Filed at: 2024                            History of Present Illness       Chief Complaint   Patient presents with    Leg Injury     Pt lorie his leg at work several days ago. Pt states he \"cracked his patella\". Now states his left ankle is more swollen than normal, and reports discoloration.        Past Medical History:   Diagnosis Date    Diabetes mellitus (HCC)     GERD (gastroesophageal reflux disease)     Lung mass     Obesity       History reviewed. No pertinent surgical history.   Family History   Problem Relation Age of Onset    No Known Problems Mother     Tuberculosis Father     Atrial fibrillation Father 65    Lung cancer Paternal Uncle     Alcohol abuse Neg Hx     Substance Abuse Neg Hx     Mental illness Neg Hx     Depression Neg Hx       Social History     Tobacco Use    Smoking status: Former     Current packs/day: 0.00     Average packs/day: 3.0 packs/day for 40.3 years (121.0 ttl pk-yrs)     Types: Cigarettes     Start date:      Quit date: 2019     Years since quittin.5    Smokeless tobacco: Never   Vaping Use    Vaping status: Never Used   Substance Use Topics    Alcohol use: Not Currently    Drug use: Never      E-Cigarette/Vaping    E-Cigarette Use Never User       E-Cigarette/Vaping Substances    Nicotine No     THC No     CBD No     Flavoring No     Other No     Unknown No       I have reviewed and agree with the history as documented.     This is a 54-year-old " "male with past medical history significant for type 2 diabetes, GERD, severe obesity, osteoarthritis, who presents to the emergency room with increased right knee pain and right ankle pain and swelling following fall 5 days prior.  Patient states that he suffered a mechanical fall onto his knee and was told that he had \"a patellar fracture\".  Patient became concerned because during the last 2 days he has not been able to bear weight on the knee and has additionally noticed some swelling and pain localized to the right ankle along with bluish discoloration of the joint.  Denies paresthesias or loss of sensation distal to injury.          Review of Systems   Constitutional:  Negative for chills and fever.   HENT:  Negative for ear pain and sore throat.    Eyes:  Negative for pain and visual disturbance.   Respiratory:  Negative for cough and shortness of breath.    Cardiovascular:  Negative for chest pain and palpitations.   Gastrointestinal:  Negative for abdominal pain and vomiting.   Genitourinary:  Negative for dysuria and hematuria.   Musculoskeletal:  Positive for arthralgias and joint swelling. Negative for back pain.   Skin:  Negative for color change and rash.   Neurological:  Negative for seizures and syncope.   All other systems reviewed and are negative.          Objective       ED Triage Vitals   Temperature Pulse Blood Pressure Respirations SpO2 Patient Position - Orthostatic VS   11/25/24 2020 11/25/24 2020 11/25/24 2020 11/25/24 2020 11/25/24 2020 11/25/24 2020   97.6 °F (36.4 °C) 75 119/70 20 95 % Sitting      Temp Source Heart Rate Source BP Location FiO2 (%) Pain Score    11/25/24 2020 11/25/24 2020 11/25/24 2020 -- 11/25/24 2133    Tympanic Monitor Left arm  10 - Worst Possible Pain      Vitals      Date and Time Temp Pulse SpO2 Resp BP Pain Score FACES Pain Rating User   11/25/24 2133 -- -- -- -- -- 10 - Worst Possible Pain -- BK   11/25/24 2020 97.6 °F (36.4 °C) 75 95 % 20 119/70 -- -- AM      "       Physical Exam  Vitals and nursing note reviewed.   Constitutional:       General: He is not in acute distress.     Appearance: He is well-developed.   HENT:      Head: Normocephalic and atraumatic.   Eyes:      Conjunctiva/sclera: Conjunctivae normal.   Cardiovascular:      Rate and Rhythm: Normal rate and regular rhythm.      Heart sounds: No murmur heard.  Pulmonary:      Effort: Pulmonary effort is normal. No respiratory distress.      Breath sounds: Normal breath sounds.   Abdominal:      Palpations: Abdomen is soft.      Tenderness: There is no abdominal tenderness.   Musculoskeletal:         General: No swelling.      Cervical back: Neck supple.      Comments: Slight discoloration of right ankle joint along with localized nonpitting swelling confined to the ankle joint.  Diffusely tender upon palpation of ankle.  DP/TP +2 and intact.  Range of motion limited by pain but passively intact.    Abrasion noted overlying patella of right knee.  Patella appears to be stable and not deviated.  No crepitus or bony tenderness elicitable.  Range of motion intact.  Knee joint stable.   Skin:     General: Skin is warm and dry.      Capillary Refill: Capillary refill takes less than 2 seconds.   Neurological:      Mental Status: He is alert.   Psychiatric:         Mood and Affect: Mood normal.         Results Reviewed       None            XR knee 1 or 2 views right   ED Interpretation by Valeriano Davis DO (11/25 2232)   Sunrise view of the right knee patella interpreted me shows some mild degenerative changes but no acute fracture      XR ankle 3+ vw right   ED Interpretation by Valeriano Davis DO (11/25 2232)   Right ankle x-ray interpreted by me shows no fracture, no dislocation, no acute osseous abnormality          Procedures    ED Medication and Procedure Management   Prior to Admission Medications   Prescriptions Last Dose Informant Patient Reported? Taking?   Alcohol Swabs 70 % PADS  Spouse/Significant  Other No No   Sig: May substitute brand based on insurance coverage. Check glucose BID.   Blood Glucose Monitoring Suppl (OneTouch Verio Reflect) w/Device KIT  Spouse/Significant Other No No   Sig: May substitute brand based on insurance coverage. Check glucose BID.   Continuous Glucose Sensor (FreeStyle Hali 3 Sensor) MISC  Spouse/Significant Other No No   Sig: Use 1 each every 14 (fourteen) days   OneTouch Delica Lancets 33G MISC  Spouse/Significant Other No No   Sig: May substitute brand based on insurance coverage. Check glucose BID.   albuterol (2.5 mg/3 mL) 0.083 % nebulizer solution  Spouse/Significant Other No No   Sig: Take 1 vial (2.5 mg total) by nebulization every 4 (four) hours as needed for wheezing or shortness of breath   albuterol (PROVENTIL HFA,VENTOLIN HFA) 90 mcg/act inhaler  Spouse/Significant Other No No   Sig: Inhale 2 puffs every 4 (four) hours as needed for wheezing   albuterol (ProAir HFA) 90 mcg/act inhaler  Spouse/Significant Other No No   Sig: Inhale 2 puffs every 6 (six) hours as needed for wheezing   allopurinol (ZYLOPRIM) 300 mg tablet  Spouse/Significant Other No No   Sig: Take 1 tablet (300 mg total) by mouth daily   amoxicillin (AMOXIL) 875 mg tablet  Spouse/Significant Other No No   Sig: Take 1 tablet (875 mg total) by mouth 2 (two) times a day for 7 days   Patient not taking: Reported on 11/18/2024   apixaban (Eliquis) 5 mg   No No   Sig: Take 1 tablet (5 mg total) by mouth 2 (two) times a day   ciprofloxacin-dexamethasone (CIPRODEX) otic suspension  Spouse/Significant Other No No   Sig: Administer 4 drops into the left ear 2 (two) times a day for 18 doses   esomeprazole (NexIUM) 20 mg capsule  Spouse/Significant Other No No   Sig: Take 1 capsule (20 mg total) by mouth daily in the early morning   glucose blood (OneTouch Verio) test strip  Spouse/Significant Other No No   Sig: May substitute brand based on insurance coverage. Check glucose BID.   metFORMIN (GLUCOPHAGE) 1000 MG  tablet  Spouse/Significant Other No No   Sig: Take 1 tablet (1,000 mg total) by mouth 2 (two) times a day with meals   methocarbamol (Robaxin-750) 750 mg tablet  Spouse/Significant Other No No   Sig: Take 1 tablet (750 mg total) by mouth 3 (three) times a day as needed for muscle spasms (may cause drowsiness)   metoprolol succinate (TOPROL-XL) 25 mg 24 hr tablet   No No   Sig: Take 1 tablet (25 mg total) by mouth 2 (two) times a day   naproxen (NAPROSYN) 500 mg tablet  Spouse/Significant Other No No   Sig: Take 1 tablet (500 mg total) by mouth 2 (two) times a day as needed (gout)   semaglutide, 2 mg/dose, (Ozempic) 8 mg/ mL injection pen  Spouse/Significant Other No No   Sig: Inject 0.75 mL (2 mg total) under the skin every 7 days      Facility-Administered Medications: None     Discharge Medication List as of 11/25/2024 11:22 PM        CONTINUE these medications which have NOT CHANGED    Details   albuterol (2.5 mg/3 mL) 0.083 % nebulizer solution Take 1 vial (2.5 mg total) by nebulization every 4 (four) hours as needed for wheezing or shortness of breath, Starting Fri 4/30/2021, Normal      !! albuterol (ProAir HFA) 90 mcg/act inhaler Inhale 2 puffs every 6 (six) hours as needed for wheezing, Starting Wed 1/17/2024, Normal      !! albuterol (PROVENTIL HFA,VENTOLIN HFA) 90 mcg/act inhaler Inhale 2 puffs every 4 (four) hours as needed for wheezing, Starting Thu 8/10/2023, Normal      Alcohol Swabs 70 % PADS May substitute brand based on insurance coverage. Check glucose BID., Normal      allopurinol (ZYLOPRIM) 300 mg tablet Take 1 tablet (300 mg total) by mouth daily, Starting Tue 10/15/2024, Normal      amoxicillin (AMOXIL) 875 mg tablet Take 1 tablet (875 mg total) by mouth 2 (two) times a day for 7 days, Starting Mon 11/18/2024, Until Mon 11/25/2024, Normal      apixaban (Eliquis) 5 mg Take 1 tablet (5 mg total) by mouth 2 (two) times a day, Starting Mon 11/18/2024, Normal      Blood Glucose Monitoring Suppl  (OneTouch Verio Reflect) w/Device KIT May substitute brand based on insurance coverage. Check glucose BID., Normal      ciprofloxacin-dexamethasone (CIPRODEX) otic suspension Administer 4 drops into the left ear 2 (two) times a day for 18 doses, Starting Mon 11/18/2024, Until Wed 11/27/2024, Normal      Continuous Glucose Sensor (FreeStyle Hali 3 Sensor) MISC Use 1 each every 14 (fourteen) days, Starting Mon 11/18/2024, Normal      esomeprazole (NexIUM) 20 mg capsule Take 1 capsule (20 mg total) by mouth daily in the early morning, Starting Mon 11/4/2024, Normal      glucose blood (OneTouch Verio) test strip May substitute brand based on insurance coverage. Check glucose BID., Normal      metFORMIN (GLUCOPHAGE) 1000 MG tablet Take 1 tablet (1,000 mg total) by mouth 2 (two) times a day with meals, Starting Tue 10/15/2024, No Print      methocarbamol (Robaxin-750) 750 mg tablet Take 1 tablet (750 mg total) by mouth 3 (three) times a day as needed for muscle spasms (may cause drowsiness), Starting Tue 7/23/2024, Normal      metoprolol succinate (TOPROL-XL) 25 mg 24 hr tablet Take 1 tablet (25 mg total) by mouth 2 (two) times a day, Starting Mon 11/18/2024, Normal      naproxen (NAPROSYN) 500 mg tablet Take 1 tablet (500 mg total) by mouth 2 (two) times a day as needed (gout), Starting Wed 10/16/2024, Normal      OneTouch Delica Lancets 33G MISC May substitute brand based on insurance coverage. Check glucose BID., Normal      semaglutide, 2 mg/dose, (Ozempic) 8 mg/ mL injection pen Inject 0.75 mL (2 mg total) under the skin every 7 days, Starting Tue 10/15/2024, Normal       !! - Potential duplicate medications found. Please discuss with provider.        No discharge procedures on file.  ED SEPSIS DOCUMENTATION   Time reflects when diagnosis was documented in both MDM as applicable and the Disposition within this note       Time User Action Codes Description Comment    11/25/2024 11:16 PM Valeriano Spain Add [S93.409A]  Ankle sprain     11/25/2024 11:16 PM Valeriano Spain Add [M25.569] Knee pain                  Valeriano Spain DO  11/26/24 0052

## 2024-11-26 NOTE — ED ATTENDING ATTESTATION
11/25/2024  I, Valeriano Davis DO, saw and evaluated the patient. I have discussed the patient with the resident/non-physician practitioner and agree with the resident's/non-physician practitioner's findings, Plan of Care, and MDM as documented in the resident's/non-physician practitioner's note, except where noted. All available labs and Radiology studies were reviewed.  I was present for key portions of any procedure(s) performed by the resident/non-physician practitioner and I was immediately available to provide assistance.       At this point I agree with the current assessment done in the Emergency Department.  I have conducted an independent evaluation of this patient a history and physical is as follows:    Patient is a 54-year-old male accompanied by his wife.  Past medical history of left meniscal surgery, diabetes, GERD, paroxysmal atrial fibrillation on Eliquis on November 20 he slipped and fell at work, landing on his right knee, may be twisting his right ankle.  He was unable to weight-bear initially, then helped up and able to ambulate, sent home from work, the following day his pain is in the got a bit worse, he was able to work so he went to outpatient urgent care for an occupational medicine evaluation.  He said he was told that his x-ray showed a right patellar fracture, per my review the formal radiology interpretation of that x-ray there was no acute osseous abnormality, no acute fracture.  It did not include a sunrise view.  The patient says he was discharged home, has been using a crutches at home but yesterday and today noticed some increased discomfort and pain in his right ankle making it difficult for him to weight-bear.  He noticed some increased swelling of the ankle, some slight bruising, says he called the nurse line was advised to go to the ED.  The patient is compliant with his Eliquis, no prior right knee injuries but has had a left knee meniscal surgery.  His wife shows me the  occupational medicine paperwork which does mention a closed fracture of the right patella, she says however she was infused because when she reviewed the x-ray interpretation in the patient's chart through [employer] she also saw that it said no fracture.  Patient has crutches from previous injury that he has been using    General:  Patient is well-appearing  Head:  Atraumatic  Eyes:  Conjunctiva pink  ENT:  Mucous membranes are moist  Neck:  Supple  Cardiac:  S1-S2, without murmurs  Lungs:  Clear to auscultation bilaterally  Abdomen:  Soft, nontender, normal bowel sounds, no CVA tenderness, no tympany, no rigidity, no guarding  Extremities: Patient's right leg has a superficial abrasion over the right anterior knee.  His patella is tender, not high riding, he has no warmth and redness to the hip, femur or knee, no pain at the right hip or femur, some mild tenderness to the patella, no tenderness the joint line.  No ligament laxity or instability, negative lever test.  No tenderness to tibia or fibula except at the distal tibia and fibula over the malleoli is tender, there are some mild swelling of the ankle, DP PT pulses are intact, no bony tenderness to the foot.  No instability of the ankle.  He can plantarflex dorsiflex the ankle and toes without difficulty.  There is a scant amount of ecchymosis to the lower aspect of the right calf, there is no calf asymmetry.  Neurologic:  Awake, fluent speech, normal comprehension. AAOx3.   Skin:  Pink warm and dry  Psychiatric:  Alert, pleasant, cooperative    ED Course     XR knee 1 or 2 views right   ED Interpretation   Sunrise view of the right knee patella interpreted me shows some mild degenerative changes but no acute fracture      XR ankle 3+ vw right   ED Interpretation   Right ankle x-ray interpreted by me shows no fracture, no dislocation, no acute osseous abnormality        Patient reassessed, no change to the above findings.  Placed in an Aircast ankle brace for  support, able to ambulate with his crutches.  Is no sign of fracture, no sign of dislocation, no signs of neurovascular compromise.  Believe discharge home with occupational medicine follow-up is appropriate.      DIAGNOSIS:  Acute fall, acute right knee contusion, acute right ankle injury    MEDICAL DECISION MAKING CODING    COLLECTION AND INTERPRETATION OF DATA  I reviewed prior external notes, including November 21, 2024 x-rays as noted above    I ordered each unique test  Tests reviewed personally by me:  Imaging: I independently interpreted the knee and ankle x-ray as noted above.            Critical Care Time  Procedures

## 2024-11-26 NOTE — DISCHARGE INSTRUCTIONS
You have been evaluated for right knee and right ankle pain following a fall on the 20th.  We have evaluated both your current x-rays and the x-rays taken after your initial encounter, and have not found any fracture, dislocation, or other gross abnormality of either joint.  It is likely that you have an ankle sprain based on your physical exam, along with soft tissue swelling underneath the patella.  Follow-up with orthopedics via occupational medicine for continuing management.  Continue to utilize your ankle splint and crutches and gradually increase your weightbearing as tolerated.  You may optionally utilize your knee immobilizer splint as your comfort dictates.  Continue to rest, ice, compress, and elevate the affected joints to speed resolution of your symptoms.  Please return to the emergency room if you should experience loss of sensation, inability to move or flex the joints, severe worsening pain, nausea and vomiting, worsening swelling of the entire calf, sudden onset chest pain or shortness of breath, or any other concerning signs or symptoms.

## 2024-11-29 ENCOUNTER — TRANSCRIBE ORDERS (OUTPATIENT)
Dept: SLEEP CENTER | Facility: CLINIC | Age: 54
End: 2024-11-29

## 2024-11-29 DIAGNOSIS — E66.813 CLASS 3 SEVERE OBESITY DUE TO EXCESS CALORIES WITHOUT SERIOUS COMORBIDITY WITH BODY MASS INDEX (BMI) OF 50.0 TO 59.9 IN ADULT (HCC): ICD-10-CM

## 2024-11-29 DIAGNOSIS — E66.01 CLASS 3 SEVERE OBESITY DUE TO EXCESS CALORIES WITHOUT SERIOUS COMORBIDITY WITH BODY MASS INDEX (BMI) OF 50.0 TO 59.9 IN ADULT (HCC): ICD-10-CM

## 2024-11-29 DIAGNOSIS — I48.91 ATRIAL FIBRILLATION, UNSPECIFIED TYPE (HCC): Primary | ICD-10-CM

## 2024-12-03 ENCOUNTER — OFFICE VISIT (OUTPATIENT)
Age: 54
End: 2024-12-03

## 2024-12-03 VITALS — WEIGHT: 315 LBS | HEIGHT: 74 IN | BODY MASS INDEX: 40.43 KG/M2
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
## 2024-12-03 DIAGNOSIS — S89.91XA INJURY OF RIGHT KNEE, INITIAL ENCOUNTER: Primary | ICD-10-CM

## 2024-12-03 PROCEDURE — 99214 OFFICE O/P EST MOD 30 MIN: CPT | Performed by: STUDENT IN AN ORGANIZED HEALTH CARE EDUCATION/TRAINING PROGRAM

## 2024-12-03 NOTE — PROGRESS NOTES
Knee New Office Note    Assessment:     1. Fall, initial encounter    2. Injury of right knee, initial encounter        Plan:     Problem List Items Addressed This Visit    None  Visit Diagnoses         Fall, initial encounter    -  Primary    Relevant Orders    MRI knee right  wo contrast      Injury of right knee, initial encounter        Relevant Orders    MRI knee right  wo contrast           Luis Fernando presents to the office for evaluation of his right knee s/p fall directly onto the knee while at work around November 19th. X-rays were reviewed demonstrating no acute fracture or dislocation. Luis Fernando does have an effusion as well as significant proximal tibial tenderness, a MRI of the right knee was ordered to further evaluate for an occult fracture due to his inability to bear weight and effusion. In the mean time he was advised to continue to ambulate with the crutches. Work note provided, out of work at this time. I will see him back in the office once the MRI is complete to review results and further discuss treatment options.     Subjective:     Patient ID: Luis Fernando Jay is a 54 y.o. male.  Chief Complaint:  HPI:  54 y.o. male who presents to the office for evaluation of his right knee. He states around November 19th he fell directly onto the right knee at work. He was initially evaluated by Urgent Care and then by the ED. He notes pain under his knee cap. He is ambulating with crutches due to pain. He will take Naproxen for pain control.       Allergy:  No Known Allergies  Medications:  all current active meds have been reviewed  Past Medical History:  Past Medical History:   Diagnosis Date    Diabetes mellitus (HCC)     GERD (gastroesophageal reflux disease)     Lung mass     Obesity      Past Surgical History:  History reviewed. No pertinent surgical history.  Family History:  Family History   Problem Relation Age of Onset    No Known Problems Mother     Tuberculosis Father     Atrial fibrillation Father 65     Lung cancer Paternal Uncle     Alcohol abuse Neg Hx     Substance Abuse Neg Hx     Mental illness Neg Hx     Depression Neg Hx      Social History:  Social History     Substance and Sexual Activity   Alcohol Use Not Currently     Social History     Substance and Sexual Activity   Drug Use Never     Social History     Tobacco Use   Smoking Status Former    Current packs/day: 0.00    Average packs/day: 3.0 packs/day for 40.3 years (121.0 ttl pk-yrs)    Types: Cigarettes    Start date:     Quit date: 2019    Years since quittin.6   Smokeless Tobacco Never           ROS:  General: Per HPI  Skin: Negative, except if noted below  HEENT: Negative  Respiratory: Negative  Cardiovascular: Negative  Gastrointestinal: Negative  Urinary: Negative  Vascular: Negative  Musculoskeletal: Positive per HPI   Neurologic: Positive per HPI  Endocrine: Negative    Objective:  BP Readings from Last 1 Encounters:   24 119/70      Wt Readings from Last 1 Encounters:   24 (!) 180 kg (397 lb)        Respiratory:   non-labored respirations    Lymphatics:  no palpable lymph nodes    Gait:   Antalgic, ambulating with 2 crutches     Neurologic:   Alert and oriented times 3  Patient with normal sensation except as noted below  Deep tendon reflexes 2+ except as noted in MSK exam    Right knee:     Inspection: skin intact, healing anterior abrasion     Overall limb alignment Neutral     Effusion: moderate      ROM limited with pain    Extensor Lag: none     Palpation: No Joint line tenderness to palpation, significant TTP proximal tibial      AP Stability at 90 deg stable     M/L stability in full extension stable    M/L stability in midflexion stable    Motor: 5/5 Q/HS/TA/GS/P    Pulses: 2+ DP / 2+ PT    SILT DP/SP/S/S/TN    Imaging:  My interpretation XR AP scanogram/AP bilateral knee/lateral/cedillo/sunrise right knee knee: mild joint space narrowing, subchondral sclerosis, subchondral cysts, osteophyte formation. No  "fracture or dislocation.     BMI:   Estimated body mass index is 50.97 kg/m² as calculated from the following:    Height as of this encounter: 6' 2\" (1.88 m).    Weight as of this encounter: 180 kg (397 lb).  BSA:   Estimated body surface area is 2.91 meters squared as calculated from the following:    Height as of this encounter: 6' 2\" (1.88 m).    Weight as of this encounter: 180 kg (397 lb).           Scribe Attestation      I,:  Ruby Lim MA am acting as a scribe while in the presence of the attending physician.:       I,:  Eugene Vidales,  personally performed the services described in this documentation    as scribed in my presence.:               "

## 2024-12-03 NOTE — LETTER
December 3, 2024     Patient: Luis Fernando Jay  YOB: 1970  Date of Visit: 12/3/2024      To Whom it May Concern:    Luis Fernando Jay is under my professional care. Luis Fernando was seen in my office on 12/3/2024. Luis Fernando is out of work at this time. He will be re-evaluated in the office after MRI is complete.     If you have any questions or concerns, please don't hesitate to call.         Sincerely,          Eugene Vidales, DO

## 2024-12-09 ENCOUNTER — ANESTHESIA (OUTPATIENT)
Dept: ANESTHESIOLOGY | Facility: HOSPITAL | Age: 54
End: 2024-12-09
Payer: COMMERCIAL

## 2024-12-09 ENCOUNTER — HOSPITAL ENCOUNTER (OUTPATIENT)
Dept: NON INVASIVE DIAGNOSTICS | Facility: HOSPITAL | Age: 54
Discharge: HOME | End: 2024-12-09
Payer: COMMERCIAL

## 2024-12-09 ENCOUNTER — ANESTHESIA EVENT (OUTPATIENT)
Dept: NON INVASIVE DIAGNOSTICS | Facility: HOSPITAL | Age: 54
End: 2024-12-09
Payer: COMMERCIAL

## 2024-12-09 ENCOUNTER — ANESTHESIA (OUTPATIENT)
Dept: NON INVASIVE DIAGNOSTICS | Facility: HOSPITAL | Age: 54
End: 2024-12-09
Payer: COMMERCIAL

## 2024-12-09 ENCOUNTER — ANESTHESIA EVENT (OUTPATIENT)
Dept: ANESTHESIOLOGY | Facility: HOSPITAL | Age: 54
End: 2024-12-09
Payer: COMMERCIAL

## 2024-12-09 VITALS
WEIGHT: 315 LBS | DIASTOLIC BLOOD PRESSURE: 66 MMHG | HEIGHT: 74 IN | SYSTOLIC BLOOD PRESSURE: 97 MMHG | OXYGEN SATURATION: 94 % | BODY MASS INDEX: 40.43 KG/M2 | HEART RATE: 89 BPM

## 2024-12-09 VITALS — OXYGEN SATURATION: 96 % | SYSTOLIC BLOOD PRESSURE: 143 MMHG | HEART RATE: 87 BPM | DIASTOLIC BLOOD PRESSURE: 84 MMHG

## 2024-12-09 DIAGNOSIS — I48.91 ATRIAL FIBRILLATION, UNSPECIFIED TYPE (HCC): ICD-10-CM

## 2024-12-09 LAB
ATRIAL RATE: 90 BPM
GLUCOSE SERPL-MCNC: 102 MG/DL (ref 65–140)
P AXIS: 49 DEGREES
PR INTERVAL: 182 MS
QRS AXIS: 61 DEGREES
QRS AXIS: 73 DEGREES
QRSD INTERVAL: 86 MS
QRSD INTERVAL: 88 MS
QT INTERVAL: 368 MS
QT INTERVAL: 386 MS
QTC INTERVAL: 455 MS
QTC INTERVAL: 472 MS
SL CV LV EF: 55
T WAVE AXIS: 11 DEGREES
T WAVE AXIS: 30 DEGREES
VENTRICULAR RATE: 90 BPM
VENTRICULAR RATE: 92 BPM

## 2024-12-09 PROCEDURE — 93010 ELECTROCARDIOGRAM REPORT: CPT | Performed by: INTERNAL MEDICINE

## 2024-12-09 PROCEDURE — 93320 DOPPLER ECHO COMPLETE: CPT | Performed by: INTERNAL MEDICINE

## 2024-12-09 PROCEDURE — 93312 ECHO TRANSESOPHAGEAL: CPT | Performed by: INTERNAL MEDICINE

## 2024-12-09 PROCEDURE — 92960 CARDIOVERSION ELECTRIC EXT: CPT

## 2024-12-09 PROCEDURE — 93312 ECHO TRANSESOPHAGEAL: CPT

## 2024-12-09 PROCEDURE — 93325 DOPPLER ECHO COLOR FLOW MAPG: CPT | Performed by: INTERNAL MEDICINE

## 2024-12-09 PROCEDURE — 82948 REAGENT STRIP/BLOOD GLUCOSE: CPT

## 2024-12-09 PROCEDURE — 92960 CARDIOVERSION ELECTRIC EXT: CPT | Performed by: INTERNAL MEDICINE

## 2024-12-09 PROCEDURE — 93005 ELECTROCARDIOGRAM TRACING: CPT

## 2024-12-09 RX ORDER — LIDOCAINE HYDROCHLORIDE 10 MG/ML
INJECTION, SOLUTION EPIDURAL; INFILTRATION; INTRACAUDAL; PERINEURAL AS NEEDED
Status: DISCONTINUED | OUTPATIENT
Start: 2024-12-09 | End: 2024-12-09

## 2024-12-09 RX ORDER — PROPOFOL 10 MG/ML
INJECTION, EMULSION INTRAVENOUS CONTINUOUS PRN
Status: DISCONTINUED | OUTPATIENT
Start: 2024-12-09 | End: 2024-12-09

## 2024-12-09 RX ORDER — SODIUM CHLORIDE 9 MG/ML
INJECTION, SOLUTION INTRAVENOUS CONTINUOUS PRN
Status: DISCONTINUED | OUTPATIENT
Start: 2024-12-09 | End: 2024-12-09

## 2024-12-09 RX ORDER — PROPOFOL 10 MG/ML
INJECTION, EMULSION INTRAVENOUS AS NEEDED
Status: DISCONTINUED | OUTPATIENT
Start: 2024-12-09 | End: 2024-12-09

## 2024-12-09 RX ORDER — GLYCOPYRROLATE 0.2 MG/ML
INJECTION INTRAMUSCULAR; INTRAVENOUS AS NEEDED
Status: DISCONTINUED | OUTPATIENT
Start: 2024-12-09 | End: 2024-12-09

## 2024-12-09 RX ORDER — KETAMINE HCL IN NACL, ISO-OSM 100MG/10ML
SYRINGE (ML) INJECTION AS NEEDED
Status: DISCONTINUED | OUTPATIENT
Start: 2024-12-09 | End: 2024-12-09

## 2024-12-09 RX ADMIN — PROPOFOL 30 MG: 10 INJECTION, EMULSION INTRAVENOUS at 11:16

## 2024-12-09 RX ADMIN — PROPOFOL 80 MCG/KG/MIN: 10 INJECTION, EMULSION INTRAVENOUS at 11:16

## 2024-12-09 RX ADMIN — GLYCOPYRROLATE 0.2 MG: 0.2 INJECTION, SOLUTION INTRAMUSCULAR; INTRAVENOUS at 11:11

## 2024-12-09 RX ADMIN — Medication 20 MG: at 11:15

## 2024-12-09 RX ADMIN — TOPICAL ANESTHETIC 1 SPRAY: 200 SPRAY DENTAL; PERIODONTAL at 11:11

## 2024-12-09 RX ADMIN — PROPOFOL 20 MG: 10 INJECTION, EMULSION INTRAVENOUS at 11:25

## 2024-12-09 RX ADMIN — SODIUM CHLORIDE: 0.9 INJECTION, SOLUTION INTRAVENOUS at 11:06

## 2024-12-09 RX ADMIN — Medication 30 MG: at 11:12

## 2024-12-09 RX ADMIN — PROPOFOL 50 MG: 10 INJECTION, EMULSION INTRAVENOUS at 11:14

## 2024-12-09 RX ADMIN — LIDOCAINE HYDROCHLORIDE 50 MG: 10 INJECTION, SOLUTION EPIDURAL; INFILTRATION; INTRACAUDAL; PERINEURAL at 11:14

## 2024-12-09 NOTE — ANESTHESIA POSTPROCEDURE EVALUATION
Post-Op Assessment Note    CV Status:  Stable    Pain management: adequate       Mental Status:  Alert and awake   Hydration Status:  Euvolemic   PONV Controlled:  Controlled   Airway Patency:  Patent     Post Op Vitals Reviewed: Yes    No anethesia notable event occurred.    Staff: Anesthesiologist, CRNA           Last Filed PACU Vitals:  Vitals Value Taken Time   Temp     Pulse 89 12/09/24 1148   BP 97/66 12/09/24 1148   Resp     SpO2 94 % 12/09/24 1148       Modified Juancarlos:  No data recorded

## 2024-12-09 NOTE — ANESTHESIA POSTPROCEDURE EVALUATION
Post-Op Assessment Note    CV Status:  Stable    Pain management: adequate       Mental Status:  Alert and awake   Hydration Status:  Euvolemic   PONV Controlled:  Controlled   Airway Patency:  Patent     Post Op Vitals Reviewed: Yes    No anethesia notable event occurred.    Staff: Anesthesiologist, CRNA           Last Filed PACU Vitals:  Vitals Value Taken Time   Temp     Pulse 84 12/09/24 1202   /92 12/09/24 1202   Resp     SpO2 92 % 12/09/24 1202       Modified Juancarlos:  No data recorded

## 2024-12-09 NOTE — ANESTHESIA PREPROCEDURE EVALUATION
Procedure:  MEI    11/18/2024 EKG:  Ventricular rate 103 bpm, QRS 88 ms, QTc 466 ms, atrial fibrillation with RVR     11/6/2024:    Left Ventricle: Left ventricular cavity size is normal. Wall thickness is mildly increased. There is mild concentric hypertrophy. The left ventricular ejection fraction is 60-65% by visual estimation. Systolic function is normal. Although no diagnostic regional wall motion abnormality was identified, this possibility cannot be completely excluded on the basis of this study. Unable to assess diastolic function due to atrial fibrillation.    Right Ventricle: Right ventricular cavity size is moderately dilated. Systolic function is normal.    Right Atrium: The atrium is moderately dilated.    Relevant Problems   CARDIO   (+) A-fib (HCC)   (+) Other hyperlipidemia      ENDO   (+) Type 2 diabetes mellitus without complication, without long-term current use of insulin (HCC)      GI/HEPATIC   (+) GERD (gastroesophageal reflux disease)      MUSCULOSKELETAL   (+) Acute idiopathic gout of right foot   (+) Primary osteoarthritis of right knee      PULMONARY   (+) Other emphysema (HCC)     Eliquis last taken this morning, missed Thursday night dose  Metop this morning  Ozempic held from Friday before last  NPO since last night    Physical Exam    Airway    Mallampati score: IV         Dental   No notable dental hx     Cardiovascular  Rhythm: irregular, Rate: abnormal    Pulmonary  Pulmonary exam normal     Other Findings        Anesthesia Plan  ASA Score- 3     Anesthesia Type- IV sedation with anesthesia with ASA Monitors.         Additional Monitors:     Airway Plan:            Plan Factors-Exercise tolerance (METS): >4 METS.    Chart reviewed. EKG reviewed.   Patient summary reviewed.    Patient is not a current smoker.              Induction- intravenous.    Postoperative Plan-         Informed Consent- Anesthetic plan and risks discussed with patient.  I personally reviewed this patient with  the CRNA. Discussed and agreed on the Anesthesia Plan with the CRNA..

## 2024-12-09 NOTE — ANESTHESIA PREPROCEDURE EVALUATION
Procedure:  CARDIOVERSION    11/18/2024 EKG:  Ventricular rate 103 bpm, QRS 88 ms, QTc 466 ms, atrial fibrillation with RVR     11/6/2024:    Left Ventricle: Left ventricular cavity size is normal. Wall thickness is mildly increased. There is mild concentric hypertrophy. The left ventricular ejection fraction is 60-65% by visual estimation. Systolic function is normal. Although no diagnostic regional wall motion abnormality was identified, this possibility cannot be completely excluded on the basis of this study. Unable to assess diastolic function due to atrial fibrillation.    Right Ventricle: Right ventricular cavity size is moderately dilated. Systolic function is normal.    Right Atrium: The atrium is moderately dilated.    Relevant Problems   CARDIO   (+) A-fib (HCC)   (+) Other hyperlipidemia      ENDO   (+) Type 2 diabetes mellitus without complication, without long-term current use of insulin (HCC)      GI/HEPATIC   (+) GERD (gastroesophageal reflux disease)      MUSCULOSKELETAL   (+) Acute idiopathic gout of right foot   (+) Primary osteoarthritis of right knee      PULMONARY   (+) Other emphysema (HCC)     Eliquis last taken this morning, missed Thursday night dose  Metop this morning  Ozempic held from Friday before last  NPO since last night    Physical Exam    Airway    Mallampati score: IV         Dental   No notable dental hx     Cardiovascular  Rhythm: irregular, Rate: abnormal    Pulmonary  Pulmonary exam normal     Other Findings        Anesthesia Plan  ASA Score- 3     Anesthesia Type- IV sedation with anesthesia with ASA Monitors.         Additional Monitors:     Airway Plan:            Plan Factors-Exercise tolerance (METS): >4 METS.    Chart reviewed. EKG reviewed.   Patient summary reviewed.    Patient is not a current smoker.              Induction- intravenous.    Postoperative Plan-         Informed Consent- Anesthetic plan and risks discussed with patient.  I personally reviewed this  patient with the CRNA. Discussed and agreed on the Anesthesia Plan with the CRNA..

## 2024-12-11 ENCOUNTER — TELEPHONE (OUTPATIENT)
Dept: NON INVASIVE DIAGNOSTICS | Facility: HOSPITAL | Age: 54
End: 2024-12-11

## 2024-12-13 ENCOUNTER — HOSPITAL ENCOUNTER (OUTPATIENT)
Dept: RADIOLOGY | Facility: HOSPITAL | Age: 54
Discharge: HOME | End: 2024-12-13
Attending: STUDENT IN AN ORGANIZED HEALTH CARE EDUCATION/TRAINING PROGRAM
Payer: OTHER MISCELLANEOUS

## 2024-12-13 ENCOUNTER — HOSPITAL ENCOUNTER (OUTPATIENT)
Dept: SLEEP CENTER | Facility: CLINIC | Age: 54
Discharge: HOME | End: 2024-12-13
Payer: COMMERCIAL
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
## 2024-12-13 DIAGNOSIS — S89.91XA INJURY OF RIGHT KNEE, INITIAL ENCOUNTER: ICD-10-CM

## 2024-12-13 DIAGNOSIS — E66.813 CLASS 3 SEVERE OBESITY DUE TO EXCESS CALORIES WITHOUT SERIOUS COMORBIDITY WITH BODY MASS INDEX (BMI) OF 50.0 TO 59.9 IN ADULT (HCC): ICD-10-CM

## 2024-12-13 DIAGNOSIS — E66.01 CLASS 3 SEVERE OBESITY DUE TO EXCESS CALORIES WITHOUT SERIOUS COMORBIDITY WITH BODY MASS INDEX (BMI) OF 50.0 TO 59.9 IN ADULT (HCC): ICD-10-CM

## 2024-12-13 DIAGNOSIS — I48.91 ATRIAL FIBRILLATION, UNSPECIFIED TYPE (HCC): ICD-10-CM

## 2024-12-13 PROCEDURE — 73721 MRI JNT OF LWR EXTRE W/O DYE: CPT

## 2024-12-13 PROCEDURE — G0399 HOME SLEEP TEST/TYPE 3 PORTA: HCPCS

## 2024-12-14 NOTE — PROGRESS NOTES
Home Sleep Study Documentation    HOME STUDY DEVICE: Noxturnal no                                           Christina G3 yes      Pre-Sleep Home Study:    Set-up and instructions performed by: Patrice Carreon    Technician performed demonstration for Patient: yes    Return demonstration performed by Patient: yes    Written instructions provided to Patient: yes    Patient signed consent form: yes        Post-Sleep Home Study:    Additional comments by Patient: None    Home Sleep Study Failed:no:    Failure reason: N/A    Reported or Detected: N/A    Scored by: PADDY Lundberg

## 2024-12-16 ENCOUNTER — PATIENT OUTREACH (OUTPATIENT)
Dept: CASE MANAGEMENT | Facility: OTHER | Age: 54
End: 2024-12-16

## 2024-12-16 DIAGNOSIS — Z71.89 COMPLEX CARE COORDINATION: Primary | ICD-10-CM

## 2024-12-16 NOTE — PROGRESS NOTES
Outpatient Care Management Note    CM referral, diabetic list- HgbA1c 11.2%.  Chart reviewed. CM referral placed.     Diabetes medications include Metformin and Ozempic.    RN CM will schedule outreach to patient to assess for care management needs.    Call placed and RN CM spoke with patient.  RN BARRINGTON introduced self, role, and reason for outreach.    Patient verbalized that he has a lot going on right now and is working hard to take care of getting all of his necessary appointments and follow up tests completed.    He states that he would be open to care management and RN CM outreach but would like to wait for a few weeks until he gets through all of his follow ups and the holidays.    RN BARRINGTON discussed his diabetes self management.  Patient shared that he is monitoring his blood sugars daily but was not able to provide any numbers at this time.  He states that he is using finger sticks at this time to monitor.  He was instructed to hold off using CGM until after his cardioversion. He states that his plan is to start using CGM on Tuesday.   Additionally, patient shared that he was instructed to stop his Ozempic until after procedure as well and that is supposed to resume on Tuesday as well.    Patient states that he is monitoring his diet and his wife helps to support him with his diet as well.    Patient was agreeable to receiving some basic diabetes education and diet information. RN CM will mail out information to patient and patient will follow up with patient again in 3 weeks as per patient request.

## 2024-12-16 NOTE — PROGRESS NOTES
Cardiology Follow Up    Luis Fernando Jay  1970  [id number]  [facility]  [address] [place] [id number] [phone]    Assessment & Plan  Atrial fibrillation, unspecified type (HCC)    EKG in the office atrial fibrillation with  bpm.  He denies symptoms lightheadedness or dizziness or shortness of breath.  According to his wife she knew he converted back into A-fib this afternoon when his personality changed a little.  Continues on Eliquis 5 mg twice daily for stroke prevention metoprolol succinate 25 mg every 12 hours. He is obese and due to his size I am unable to get an accurate blood pressure. BP radial appears low 100/60.   I have sent in to [facility] emergency room for rate control.   I have messaged Dr. Mckinney, electrophysiology, to be seen sooner in the office in March.  s      Interval History:   Mr Luis Fernando Jay was admitted to [facility] on 11/05 - 11/07/24 with syncope and collapse.  He presented to [facility] emergency room with an episode of syncope while having a bowel movement.  He struck his head on the sink and fell to the side of the shower.  Reported a headache shortness of breath and left-sided rib pain as well as lower abdominal pain.  Arrival in the emergency room found to be tachycardic heart rate 120s.  EKG showed atrial fibrillation with RVR.  CT of the chest abdomen pelvis showed no acute fracture,  CT showed mastoiditis.  He was started on metoprolol succinate 25 mg twice daily, Eliquis 5 mg twice daily for stroke prevention.  It was recommended he undergo outpatient sleep study to rule out ASIM.  Discussed also cardioversion versus rhythm control.    On 11/18/24 Luis Fernando was seen in our office for a recent hospitalization follow-up visit.  He was accompanied by his wife.  He denied dyspnea with minimal minor exertion chest pain palpitations lightheadedness or  dizziness.  He is following up with sleep medicine in the future to rule out sleep apnea.  Prior continue on Eliquis 5 mg twice daily for stroke prevention.  Discussion regards to atrial fibrillation and treatment.  Is agreeable to undergo cardioversion.  Ambulatory referral to electrophysiologist.    On 12/09/24 Luis Fernando underwent MEI LVEF 55% RV CAVITY SIZE DILATED SYSTOLIC FUNCTION NORMAL.  LEFT AND RIGHT ATRIUM DILATED.  NO PFO.  LEFT ATRIAL APPENDAGE DILATED NORMAL FUNCTION NO THROMBUS.  AORTIC VALVE TRILEAFLET NO EVIDENCE OF AORTIC REGURGITATION OR STENOSIS.  MILD MR, TRACE TR.  AORTIC ROOT NORMAL IN SIZE ASCENDING AORTA NORMAL IN SIZE  STATUS POST DCCV 4 shocks 275J 360-J 360-J 360-J to normal sinus rhythm .      Luis Fernando presents to our office for a follow up visit     Medical History   Primary Cardiologist  Hyperlipidemia 11/06/24   HDL 28 LDL 72  DM2 HgbA1C hemoglobin A1c 11.2 on 9/09/2 for  Obesity BMI 50.98       Patient Active Problem List   Diagnosis    Vasovagal syncope    Pulmonary nodules    Abnormal CT of the chest    Class 3 severe obesity due to excess calories without serious comorbidity with body mass index (BMI) of 50.0 to 59.9 in adult (HCC)    GERD (gastroesophageal reflux disease)    Other emphysema (HCC)    Type 2 diabetes mellitus without complication, without long-term current use of insulin (HCC)    Other hyperlipidemia    Cigarette nicotine dependence in remission    Primary osteoarthritis of right knee    Acute idiopathic gout of right foot    A-fib (HCC)    Mastoiditis    Syncope and collapse     Past Medical History:   Diagnosis Date    Diabetes mellitus (HCC)     GERD (gastroesophageal reflux disease)     Lung mass     Obesity      Social History     Socioeconomic History    Marital status: /Civil Union     Spouse name: Not on file    Number of children: Not on file    Years of education: Not on file    Highest education level: Not on file   Occupational History    Not  on file   Tobacco Use    Smoking status: Former     Current packs/day: 0.00     Average packs/day: 3.0 packs/day for 40.3 years (121.0 ttl pk-yrs)     Types: Cigarettes     Start date:      Quit date: 2019     Years since quittin.6    Smokeless tobacco: Never   Vaping Use    Vaping status: Never Used   Substance and Sexual Activity    Alcohol use: Not Currently    Drug use: Never    Sexual activity: Not Currently   Other Topics Concern    Not on file   Social History Narrative    Drinks coffee 1 cup per day         Works in [place] - works at [employer],      Social Drivers of Health     Financial Resource Strain: Not on file   Food Insecurity: No Food Insecurity (2024)    Nursing - Inadequate Food Risk Classification     Worried About Running Out of Food in the Last Year: Not on file     Ran Out of Food in the Last Year: Not on file     Ran Out of Food in the Last Year: 1   Transportation Needs: No Transportation Needs (2024)    Nursing - Transportation Risk Classification     Lack of Transportation: Not on file     Lack of Transportation: 2   Physical Activity: Not on file   Stress: Not on file   Social Connections: Not on file   Intimate Partner Violence: Unknown (2024)    Nursing IPS     Feels Physically and Emotionally Safe: Not on file     Physically Hurt by Someone: Not on file     Humiliated or Emotionally Abused by Someone: Not on file     Physically Hurt by Someone: 2     Hurt or Threatened by Someone: 2   Housing Stability: Unknown (2024)    Nursing: Inadequate Housing Risk Classification     Has Housing: Not on file     Worried About Losing Housing: Not on file     Unable to Get Utilities: Not on file     Unable to Pay for Housing in the Last Year: 2     Has Housin      Family History   Problem Relation Age of Onset    No Known Problems Mother     Tuberculosis Father     Atrial fibrillation Father 65    Lung cancer Paternal Uncle     Alcohol abuse  Neg Hx     Substance Abuse Neg Hx     Mental illness Neg Hx     Depression Neg Hx      No past surgical history on file.    Current Outpatient Medications:     albuterol (2.5 mg/3 mL) 0.083 % nebulizer solution, Take 1 vial (2.5 mg total) by nebulization every 4 (four) hours as needed for wheezing or shortness of breath, Disp: 270 mL, Rfl: 5    albuterol (ProAir HFA) 90 mcg/act inhaler, Inhale 2 puffs every 6 (six) hours as needed for wheezing, Disp: 8.5 g, Rfl: 0    albuterol (PROVENTIL HFA,VENTOLIN HFA) 90 mcg/act inhaler, Inhale 2 puffs every 4 (four) hours as needed for wheezing, Disp: 8.5 g, Rfl: 3    Alcohol Swabs 70 % PADS, May substitute brand based on insurance coverage. Check glucose BID., Disp: 100 each, Rfl: 0    allopurinol (ZYLOPRIM) 300 mg tablet, Take 1 tablet (300 mg total) by mouth daily, Disp: 90 tablet, Rfl: 1    apixaban (Eliquis) 5 mg, Take 1 tablet (5 mg total) by mouth 2 (two) times a day, Disp: 60 tablet, Rfl: 3    Blood Glucose Monitoring Suppl (OneTouch Verio Reflect) w/Device KIT, May substitute brand based on insurance coverage. Check glucose BID., Disp: 1 kit, Rfl: 0    ciprofloxacin-dexamethasone (CIPRODEX) otic suspension, Administer 4 drops into the left ear 2 (two) times a day for 18 doses, Disp: 4 mL, Rfl: 0    Continuous Glucose Sensor (FreeStyle Hali 3 Sensor) MISC, Use 1 each every 14 (fourteen) days, Disp: 6 each, Rfl: 1    esomeprazole (NexIUM) 20 mg capsule, Take 1 capsule (20 mg total) by mouth daily in the early morning, Disp: 90 capsule, Rfl: 0    glucose blood (OneTouch Verio) test strip, May substitute brand based on insurance coverage. Check glucose BID., Disp: 100 each, Rfl: 0    metFORMIN (GLUCOPHAGE) 1000 MG tablet, Take 1 tablet (1,000 mg total) by mouth 2 (two) times a day with meals, Disp: , Rfl:     methocarbamol (Robaxin-750) 750 mg tablet, Take 1 tablet (750 mg total) by mouth 3 (three) times a day as needed for muscle spasms (may cause drowsiness), Disp: 20  tablet, Rfl: 0    metoprolol succinate (TOPROL-XL) 25 mg 24 hr tablet, Take 1 tablet (25 mg total) by mouth 2 (two) times a day, Disp: 60 tablet, Rfl: 3    naproxen (NAPROSYN) 500 mg tablet, Take 1 tablet (500 mg total) by mouth 2 (two) times a day as needed (gout), Disp: 180 tablet, Rfl: 0    OneTouch Delica Lancets 33G MISC, May substitute brand based on insurance coverage. Check glucose BID., Disp: 100 each, Rfl: 0    semaglutide, 2 mg/dose, (Ozempic) 8 mg/ mL injection pen, Inject 0.75 mL (2 mg total) under the skin every 7 days, Disp: 9 mL, Rfl: 1  No Known Allergies    Labs:  Hospital Outpatient Visit on 12/09/2024   Component Date Value    LV EF 12/09/2024 55     POC Glucose 12/09/2024 102     Ventricular Rate 12/09/2024 90     Atrial Rate 12/09/2024 90     GA Interval 12/09/2024 182     QRSD Interval 12/09/2024 88     QT Interval 12/09/2024 386     QTC Interval 12/09/2024 472     P Axis 12/09/2024 49     QRS Axis 12/09/2024 73     T Wave [place] 12/09/2024 11     Ventricular Rate 12/09/2024 92     QRSD Interval 12/09/2024 86     QT Interval 12/09/2024 368     QTC Interval 12/09/2024 455     QRS Axis 12/09/2024 61     T Wave [place] 12/09/2024 30    Orders Only on 11/19/2024   Component Date Value    Right Eye Diabetic Retin* 11/19/2024 None     Left Eye Diabetic Retino* 11/19/2024 None    Admission on 11/05/2024, Discharged on 11/07/2024   Component Date Value    Ventricular Rate 11/05/2024 118     QRSD Interval 11/05/2024 88     QT Interval 11/05/2024 340     QTC Interval 11/05/2024 477     QRS Axis 11/05/2024 55     T Wave [place] 11/05/2024 36     Sodium 11/05/2024 138     Potassium 11/05/2024 3.7     Chloride 11/05/2024 107     CO2 11/05/2024 24     ANION GAP 11/05/2024 7     BUN 11/05/2024 10     Creatinine 11/05/2024 0.83     Glucose 11/05/2024 99     Calcium 11/05/2024 9.0     AST 11/05/2024 30     ALT 11/05/2024 55 (H)     Alkaline Phosphatase 11/05/2024 89     Total Protein 11/05/2024 6.8     Albumin  11/05/2024 4.0     Total Bilirubin 11/05/2024 0.90     eGFR 11/05/2024 98     WBC 11/05/2024 11.78 (H)     RBC 11/05/2024 5.22     Hemoglobin 11/05/2024 16.1     Hematocrit 11/05/2024 47.6     MCV 11/05/2024 91     MCH 11/05/2024 30.8     MCHC 11/05/2024 33.8     RDW 11/05/2024 13.0     MPV 11/05/2024 9.4     Platelets 11/05/2024 194     nRBC 11/05/2024 0     Segmented % 11/05/2024 69     Immature Grans % 11/05/2024 1     Lymphocytes % 11/05/2024 18     Monocytes % 11/05/2024 5     Eosinophils Relative 11/05/2024 7 (H)     Basophils Relative 11/05/2024 0     Absolute Neutrophils 11/05/2024 8.17 (H)     Absolute Immature Grans 11/05/2024 0.06     Absolute Lymphocytes 11/05/2024 2.09     Absolute Monocytes 11/05/2024 0.61     Eosinophils Absolute 11/05/2024 0.82 (H)     Basophils Absolute 11/05/2024 0.03     Protime 11/05/2024 14.6     INR 11/05/2024 1.11     PTT 11/05/2024 26     hs TnI 0hr 11/05/2024 3     hs TnI 2hr 11/05/2024 3     Delta 2hr hsTnI 11/05/2024 0     hs TnI 4hr 11/05/2024 3     Delta 4hr hsTnI 11/05/2024 0     BSA 11/06/2024 2.92     A4C EF 11/06/2024 72     LV Diastolic Volume (BP) 11/06/2024 91     LV Systolic Volume (BP) 11/06/2024 28     EF 11/06/2024 70     LVIDd 11/06/2024 4.00     LVIDS 11/06/2024 3.20     IVSd 11/06/2024 1.40     LVPWd 11/06/2024 1.60     FS 11/06/2024 20     MV E' Tissue Velocity Se* 11/06/2024 11     LA Volume Index (BP) 11/06/2024 25.0     E/A ratio 11/06/2024 95.00     E wave deceleration time 11/06/2024 138     MV Peak E Asim 11/06/2024 95     MV Peak A Asim 11/06/2024 0.01     RVID d 11/06/2024 4.9     Tricuspid annular plane * 11/06/2024 2.00     LA size 11/06/2024 5.4     LA length (A2C) 11/06/2024 5.50     LA volume (BP) 11/06/2024 73     RAA A4C 11/06/2024 22.4     MV stenosis pressure 1/2* 11/06/2024 40     MV valve area p 1/2 meth* 11/06/2024 5.50     TR Peak Asim 11/06/2024 2.0     Triscuspid Valve Regurgi* 11/06/2024 17.0     Ao root 11/06/2024 3.90     Asc Ao  11/06/2024 3.4     Tricuspid valve peak reg* 11/06/2024 2.04     Left ventricular stroke * 11/06/2024 29.00     IVS 11/06/2024 1.4     LEFT VENTRICLE SYSTOLIC * 11/06/2024 40     LV DIASTOLIC VOLUME (MOD* 11/06/2024 69     Left Atrium Area-systoli* 11/06/2024 22.1     Left Atrium Area-systoli* 11/06/2024 22.6     LVSV, 2D 11/06/2024 29     LV Diastolic Volume Inde* 11/06/2024 31.2     LV Systolic Volume Index* 11/06/2024 9.6     LV EF 11/06/2024 60     Color, UA 11/06/2024 Yellow     Clarity, UA 11/06/2024 Clear     Specific Gravity, UA 11/06/2024 1.021     pH, UA 11/06/2024 5.5     Leukocytes, UA 11/06/2024 Negative     Nitrite, UA 11/06/2024 Negative     Protein, UA 11/06/2024 Negative     Glucose, UA 11/06/2024 Negative     Ketones, UA 11/06/2024 Negative     Urobilinogen, UA 11/06/2024 2.0 (A)     Bilirubin, UA 11/06/2024 Negative     Occult Blood, UA 11/06/2024 Negative     Protime 11/06/2024 15.2 (H)     INR 11/06/2024 1.17     PTT 11/06/2024 29     Sodium 11/06/2024 141     Potassium 11/06/2024 3.7     Chloride 11/06/2024 110 (H)     CO2 11/06/2024 23     ANION GAP 11/06/2024 8     BUN 11/06/2024 10     Creatinine 11/06/2024 0.73     Glucose 11/06/2024 97     Calcium 11/06/2024 8.1 (L)     Corrected Calcium 11/06/2024 8.6     AST 11/06/2024 24     ALT 11/06/2024 45     Alkaline Phosphatase 11/06/2024 79     Total Protein 11/06/2024 5.8 (L)     Albumin 11/06/2024 3.4 (L)     Total Bilirubin 11/06/2024 0.57     eGFR 11/06/2024 103     Magnesium 11/06/2024 1.8 (L)     Phosphorus 11/06/2024 3.7     WBC 11/06/2024 8.61     RBC 11/06/2024 4.50     Hemoglobin 11/06/2024 14.1     Hematocrit 11/06/2024 42.3     MCV 11/06/2024 94     MCH 11/06/2024 31.3     MCHC 11/06/2024 33.3     RDW 11/06/2024 13.1     MPV 11/06/2024 9.5     Platelets 11/06/2024 172     nRBC 11/06/2024 0     Segmented % 11/06/2024 57     Immature Grans % 11/06/2024 1     Lymphocytes % 11/06/2024 25     Monocytes % 11/06/2024 7     Eosinophils  Relative 11/06/2024 10 (H)     Basophils Relative 11/06/2024 0     Absolute Neutrophils 11/06/2024 4.95     Absolute Immature Grans 11/06/2024 0.07     Absolute Lymphocytes 11/06/2024 2.15     Absolute Monocytes 11/06/2024 0.57     Eosinophils Absolute 11/06/2024 0.84 (H)     Basophils Absolute 11/06/2024 0.03     TSH 3RD GENERATON 11/06/2024 2.224     Cholesterol 11/06/2024 122     Triglycerides 11/06/2024 108     HDL, Direct 11/06/2024 28 (L)     LDL Calculated 11/06/2024 72     POC Glucose 11/06/2024 152 (H)     POC Glucose 11/06/2024 122     POC Glucose 11/06/2024 123     POC Glucose 11/06/2024 112     POC Glucose 11/07/2024 108     POC Glucose 11/07/2024 112      Imaging: Echo complete w/ contrast if indicated  Result Date: 11/6/2024  Narrative:   Left Ventricle: Left ventricular cavity size is normal. Wall thickness is mildly increased. There is mild concentric hypertrophy. The left ventricular ejection fraction is 60-65% by visual estimation. Systolic function is normal. Although no diagnostic regional wall motion abnormality was identified, this possibility cannot be completely excluded on the basis of this study. Unable to assess diastolic function due to atrial fibrillation.   Right Ventricle: Right ventricular cavity size is moderately dilated. Systolic function is normal.   Right Atrium: The atrium is moderately dilated. Very technically difficult study.     CT chest abdomen pelvis w contrast  Result Date: 11/5/2024  Narrative: CT CHEST, ABDOMEN AND PELVIS WITH IV CONTRAST INDICATION: Syncope with possible head strike, rule out bleed, fracture; left costal pain, rule out rib fracture; LLQ pain, rule out diverticulitis. COMPARISON: Low-dose chest CT dated 2/27/2023 TECHNIQUE: CT examination of the chest, abdomen and pelvis was performed. Multiplanar 2D reformatted images were created from the source data. This examination, like all CT scans performed in the [facility], was performed  utilizing techniques to minimize radiation dose exposure, including the use of iterative reconstruction and automated exposure control. Radiation dose length product (DLP) for this visit: 2674.1 mGy-cm IV Contrast: 100 mL of iohexol (OMNIPAQUE) Enteric Contrast: Not administered. FINDINGS: CHEST LUNGS: Azygos fissure. PLEURA: Unremarkable. HEART/GREAT VESSELS: Heart is unremarkable for patient's age. No thoracic aortic aneurysm. MEDIASTINUM AND SHWETA: Small hiatal hernia. No mediastinal or hilar lymphadenopathy. CHEST WALL AND LOWER NECK: Unremarkable. ABDOMEN LIVER/BILIARY TREE: Diffuse hepatic steatosis GALLBLADDER: No calcified gallstones. No pericholecystic inflammatory change. SPLEEN: Unremarkable. PANCREAS: Unremarkable. ADRENAL GLANDS: Unremarkable. KIDNEYS/URETERS: Simple renal cyst(s). Punctate nonobstructing left lower pole renal calculus. STOMACH AND BOWEL: Nondilated bowel. Occasional colonic diverticula. APPENDIX: No findings to suggest appendicitis. ABDOMINOPELVIC CAVITY: No ascites. No pneumoperitoneum. No lymphadenopathy. VESSELS: Unremarkable for patient's age. PELVIS REPRODUCTIVE ORGANS: Unremarkable for patient's age. URINARY BLADDER: Unremarkable. ABDOMINAL WALL/INGUINAL REGIONS: Small fat-containing umbilical hernia BONES: No acute fracture or suspicious osseous lesion.     Impression: No CT evidence of acute findings. Workstation performed: [id number]     CT head without contrast  Result Date: 11/5/2024  Narrative: CT BRAIN - WITHOUT CONTRAST INDICATION:   Syncope with possible head strike, rule out bleed, fracture; left costal pain, rule out rib fracture; LLQ pain, rule out diverticulitis. COMPARISON: CT head 12/6/2021. TECHNIQUE:  CT examination of the brain was performed.  Multiplanar 2D reformatted images were created from the source data. Radiation dose length product (DLP) for this visit:  1172.31 mGy-cm .  This examination, like all CT scans performed in the [facility],  was performed utilizing techniques to minimize radiation dose exposure, including the use of iterative reconstruction and automated exposure control. IMAGE QUALITY:  Diagnostic. FINDINGS: PARENCHYMA:No acute intracranial hemorrhage, mass effect or midline shift VENTRICLES AND EXTRA-AXIAL SPACES:  Normal for the patient's age. VISUALIZED ORBITS: No acute abnormality. PARANASAL SINUSES: Mild mucosal thickening of the paranasal sinuses. Nonspecific subtotal opacification of the left mastoid air cells. CALVARIUM AND EXTRACRANIAL SOFT TISSUES: No acute abnormality.     Impression: 1. No acute intracranial hemorrhage, mass effect or midline shift. 2. Nonspecific subtotal opacification of the left mastoid air cells, which in the appropriate clinical setting can be seen with mastoiditis. The study was marked in EPIC for immediate notification. Workstation performed: [id number]       Review of Systems:  Review of Systems   Musculoskeletal:  Positive for arthralgias, gait problem, joint swelling and myalgias.        Recent right knee injury using crutches   All other systems reviewed and are negative.      Physical Exam:  Physical Exam  Vitals reviewed.   Constitutional:       Appearance: He is obese.   HENT:      Head: Normocephalic.   Cardiovascular:      Rate and Rhythm: Tachycardia present. Rhythm irregular.      Pulses: Normal pulses.      Heart sounds: Normal heart sounds.   Pulmonary:      Effort: Pulmonary effort is normal.      Breath sounds: Normal breath sounds.   Musculoskeletal:      Cervical back: Normal range of motion and neck supple.      Right lower leg: No edema.      Left lower leg: No edema.   Skin:     General: Skin is warm and dry.      Capillary Refill: Capillary refill takes less than 2 seconds.   Neurological:      General: No focal deficit present.      Mental Status: He is alert and oriented to person, place, and time.   Psychiatric:         Mood and Affect: Mood normal.         Behavior: Behavior  normal.

## 2024-12-17 ENCOUNTER — OFFICE VISIT (OUTPATIENT)
Dept: CARDIOLOGY CLINIC | Facility: CLINIC | Age: 54
End: 2024-12-17
Payer: COMMERCIAL

## 2024-12-17 ENCOUNTER — HOSPITAL ENCOUNTER (INPATIENT)
Facility: HOSPITAL | Age: 54
LOS: 4 days | Discharge: HOME | DRG: 309 | End: 2024-12-21
Attending: EMERGENCY MEDICINE | Admitting: INTERNAL MEDICINE
Payer: COMMERCIAL

## 2024-12-17 VITALS
HEART RATE: 120 BPM | SYSTOLIC BLOOD PRESSURE: 106 MMHG | DIASTOLIC BLOOD PRESSURE: 68 MMHG | OXYGEN SATURATION: 97 % | BODY MASS INDEX: 40.43 KG/M2 | HEIGHT: 74 IN | WEIGHT: 315 LBS

## 2024-12-17 DIAGNOSIS — M25.561 RIGHT KNEE PAIN, UNSPECIFIED CHRONICITY: ICD-10-CM

## 2024-12-17 DIAGNOSIS — I48.91 ATRIAL FIBRILLATION WITH RVR (HCC): Primary | ICD-10-CM

## 2024-12-17 DIAGNOSIS — E66.01 CLASS 3 SEVERE OBESITY DUE TO EXCESS CALORIES WITHOUT SERIOUS COMORBIDITY WITH BODY MASS INDEX (BMI) OF 50.0 TO 59.9 IN ADULT (HCC): ICD-10-CM

## 2024-12-17 DIAGNOSIS — R42 LIGHTHEADEDNESS: ICD-10-CM

## 2024-12-17 DIAGNOSIS — R07.9 CHEST PAIN: ICD-10-CM

## 2024-12-17 DIAGNOSIS — E78.49 OTHER HYPERLIPIDEMIA: ICD-10-CM

## 2024-12-17 DIAGNOSIS — E11.9 TYPE 2 DIABETES MELLITUS WITHOUT COMPLICATION, WITHOUT LONG-TERM CURRENT USE OF INSULIN (HCC): ICD-10-CM

## 2024-12-17 DIAGNOSIS — E66.813 CLASS 3 SEVERE OBESITY DUE TO EXCESS CALORIES WITHOUT SERIOUS COMORBIDITY WITH BODY MASS INDEX (BMI) OF 50.0 TO 59.9 IN ADULT (HCC): ICD-10-CM

## 2024-12-17 DIAGNOSIS — I48.91 ATRIAL FIBRILLATION, UNSPECIFIED TYPE (HCC): Primary | ICD-10-CM

## 2024-12-17 LAB
2HR DELTA HS TROPONIN: <-1 NG/L
ANION GAP SERPL CALCULATED.3IONS-SCNC: 12 MMOL/L (ref 4–13)
BASOPHILS # BLD AUTO: 0.06 THOUSANDS/ÂΜL (ref 0–0.1)
BASOPHILS NFR BLD AUTO: 1 % (ref 0–1)
BUN SERPL-MCNC: 14 MG/DL (ref 5–25)
CALCIUM SERPL-MCNC: 8.8 MG/DL (ref 8.4–10.2)
CARDIAC TROPONIN I PNL SERPL HS: 3 NG/L (ref ?–50)
CARDIAC TROPONIN I PNL SERPL HS: <2 NG/L (ref ?–50)
CHLORIDE SERPL-SCNC: 106 MMOL/L (ref 96–108)
CO2 SERPL-SCNC: 21 MMOL/L (ref 21–32)
CREAT SERPL-MCNC: 0.82 MG/DL (ref 0.6–1.3)
EOSINOPHIL # BLD AUTO: 0.42 THOUSAND/ÂΜL (ref 0–0.61)
EOSINOPHIL NFR BLD AUTO: 4 % (ref 0–6)
ERYTHROCYTE [DISTWIDTH] IN BLOOD BY AUTOMATED COUNT: 13.7 % (ref 11.6–15.1)
GFR SERPL CREATININE-BSD FRML MDRD: 98 ML/MIN/1.73SQ M
GLUCOSE SERPL-MCNC: 111 MG/DL (ref 65–140)
HCT VFR BLD AUTO: 47.5 % (ref 36.5–49.3)
HGB BLD-MCNC: 15.7 G/DL (ref 12–17)
IMM GRANULOCYTES # BLD AUTO: 0.04 THOUSAND/UL (ref 0–0.2)
IMM GRANULOCYTES NFR BLD AUTO: 0 % (ref 0–2)
LYMPHOCYTES # BLD AUTO: 2.91 THOUSANDS/ÂΜL (ref 0.6–4.47)
LYMPHOCYTES NFR BLD AUTO: 26 % (ref 14–44)
MAGNESIUM SERPL-MCNC: 1.7 MG/DL (ref 1.9–2.7)
MCH RBC QN AUTO: 30.7 PG (ref 26.8–34.3)
MCHC RBC AUTO-ENTMCNC: 33.1 G/DL (ref 31.4–37.4)
MCV RBC AUTO: 93 FL (ref 82–98)
MONOCYTES # BLD AUTO: 0.78 THOUSAND/ÂΜL (ref 0.17–1.22)
MONOCYTES NFR BLD AUTO: 7 % (ref 4–12)
NEUTROPHILS # BLD AUTO: 6.96 THOUSANDS/ÂΜL (ref 1.85–7.62)
NEUTS SEG NFR BLD AUTO: 62 % (ref 43–75)
NRBC BLD AUTO-RTO: 0 /100 WBCS
PLATELET # BLD AUTO: 213 THOUSANDS/UL (ref 149–390)
PMV BLD AUTO: 9.6 FL (ref 8.9–12.7)
POTASSIUM SERPL-SCNC: 3.5 MMOL/L (ref 3.5–5.3)
RBC # BLD AUTO: 5.12 MILLION/UL (ref 3.88–5.62)
SODIUM SERPL-SCNC: 139 MMOL/L (ref 135–147)
WBC # BLD AUTO: 11.17 THOUSAND/UL (ref 4.31–10.16)

## 2024-12-17 PROCEDURE — 99285 EMERGENCY DEPT VISIT HI MDM: CPT | Performed by: EMERGENCY MEDICINE

## 2024-12-17 PROCEDURE — 96365 THER/PROPH/DIAG IV INF INIT: CPT

## 2024-12-17 PROCEDURE — 93000 ELECTROCARDIOGRAM COMPLETE: CPT | Performed by: NURSE PRACTITIONER

## 2024-12-17 PROCEDURE — 85025 COMPLETE CBC W/AUTO DIFF WBC: CPT

## 2024-12-17 PROCEDURE — 36415 COLL VENOUS BLD VENIPUNCTURE: CPT

## 2024-12-17 PROCEDURE — 80048 BASIC METABOLIC PNL TOTAL CA: CPT

## 2024-12-17 PROCEDURE — 99214 OFFICE O/P EST MOD 30 MIN: CPT | Performed by: NURSE PRACTITIONER

## 2024-12-17 PROCEDURE — 83735 ASSAY OF MAGNESIUM: CPT | Performed by: EMERGENCY MEDICINE

## 2024-12-17 PROCEDURE — 99223 1ST HOSP IP/OBS HIGH 75: CPT | Performed by: INTERNAL MEDICINE

## 2024-12-17 PROCEDURE — 84484 ASSAY OF TROPONIN QUANT: CPT | Performed by: EMERGENCY MEDICINE

## 2024-12-17 PROCEDURE — 93005 ELECTROCARDIOGRAM TRACING: CPT

## 2024-12-17 PROCEDURE — 84484 ASSAY OF TROPONIN QUANT: CPT | Performed by: INTERNAL MEDICINE

## 2024-12-17 PROCEDURE — 99285 EMERGENCY DEPT VISIT HI MDM: CPT

## 2024-12-17 RX ORDER — INSULIN LISPRO 100 [IU]/ML
2-12 INJECTION, SOLUTION INTRAVENOUS; SUBCUTANEOUS
Status: DISCONTINUED | OUTPATIENT
Start: 2024-12-18 | End: 2024-12-21 | Stop reason: HOSPADM

## 2024-12-17 RX ORDER — METOPROLOL TARTRATE 1 MG/ML
5 INJECTION, SOLUTION INTRAVENOUS ONCE
Status: DISCONTINUED | OUTPATIENT
Start: 2024-12-17 | End: 2024-12-17

## 2024-12-17 RX ORDER — METOPROLOL TARTRATE 1 MG/ML
2.5 INJECTION, SOLUTION INTRAVENOUS ONCE
Status: DISCONTINUED | OUTPATIENT
Start: 2024-12-18 | End: 2024-12-18

## 2024-12-17 RX ORDER — ONDANSETRON 2 MG/ML
4 INJECTION INTRAMUSCULAR; INTRAVENOUS EVERY 6 HOURS PRN
Status: DISCONTINUED | OUTPATIENT
Start: 2024-12-17 | End: 2024-12-21 | Stop reason: HOSPADM

## 2024-12-17 RX ORDER — METHOCARBAMOL 750 MG/1
750 TABLET, FILM COATED ORAL 3 TIMES DAILY PRN
Status: DISCONTINUED | OUTPATIENT
Start: 2024-12-17 | End: 2024-12-21 | Stop reason: HOSPADM

## 2024-12-17 RX ORDER — METOPROLOL SUCCINATE 25 MG/1
25 TABLET, EXTENDED RELEASE ORAL 2 TIMES DAILY
Status: DISCONTINUED | OUTPATIENT
Start: 2024-12-18 | End: 2024-12-18

## 2024-12-17 RX ORDER — MAGNESIUM SULFATE HEPTAHYDRATE 40 MG/ML
2 INJECTION, SOLUTION INTRAVENOUS ONCE
Status: COMPLETED | OUTPATIENT
Start: 2024-12-17 | End: 2024-12-18

## 2024-12-17 RX ORDER — ALBUTEROL SULFATE 0.83 MG/ML
2.5 SOLUTION RESPIRATORY (INHALATION) EVERY 4 HOURS PRN
Status: DISCONTINUED | OUTPATIENT
Start: 2024-12-17 | End: 2024-12-19

## 2024-12-17 RX ORDER — SODIUM CHLORIDE, SODIUM GLUCONATE, SODIUM ACETATE, POTASSIUM CHLORIDE, MAGNESIUM CHLORIDE, SODIUM PHOSPHATE, DIBASIC, AND POTASSIUM PHOSPHATE .53; .5; .37; .037; .03; .012; .00082 G/100ML; G/100ML; G/100ML; G/100ML; G/100ML; G/100ML; G/100ML
500 INJECTION, SOLUTION INTRAVENOUS ONCE
Status: DISCONTINUED | OUTPATIENT
Start: 2024-12-17 | End: 2024-12-18

## 2024-12-17 RX ORDER — PANTOPRAZOLE SODIUM 20 MG/1
20 TABLET, DELAYED RELEASE ORAL
Status: DISCONTINUED | OUTPATIENT
Start: 2024-12-18 | End: 2024-12-21 | Stop reason: HOSPADM

## 2024-12-17 RX ORDER — METOPROLOL TARTRATE 1 MG/ML
2.5 INJECTION, SOLUTION INTRAVENOUS ONCE
Status: DISCONTINUED | OUTPATIENT
Start: 2024-12-17 | End: 2024-12-17

## 2024-12-17 RX ORDER — ALLOPURINOL 300 MG/1
300 TABLET ORAL DAILY
Status: DISCONTINUED | OUTPATIENT
Start: 2024-12-18 | End: 2024-12-21 | Stop reason: HOSPADM

## 2024-12-17 RX ORDER — INSULIN LISPRO 100 [IU]/ML
1-6 INJECTION, SOLUTION INTRAVENOUS; SUBCUTANEOUS
Status: DISCONTINUED | OUTPATIENT
Start: 2024-12-17 | End: 2024-12-21 | Stop reason: HOSPADM

## 2024-12-17 RX ADMIN — MAGNESIUM SULFATE HEPTAHYDRATE 2 G: 40 INJECTION, SOLUTION INTRAVENOUS at 22:16

## 2024-12-18 ENCOUNTER — PATIENT OUTREACH (OUTPATIENT)
Dept: CASE MANAGEMENT | Facility: OTHER | Age: 54
End: 2024-12-18

## 2024-12-18 LAB
ANION GAP SERPL CALCULATED.3IONS-SCNC: 7 MMOL/L (ref 4–13)
ATRIAL RATE: 277 BPM
BUN SERPL-MCNC: 14 MG/DL (ref 5–25)
CALCIUM SERPL-MCNC: 9 MG/DL (ref 8.4–10.2)
CHLORIDE SERPL-SCNC: 106 MMOL/L (ref 96–108)
CO2 SERPL-SCNC: 25 MMOL/L (ref 21–32)
CREAT SERPL-MCNC: 0.77 MG/DL (ref 0.6–1.3)
ERYTHROCYTE [DISTWIDTH] IN BLOOD BY AUTOMATED COUNT: 13.9 % (ref 11.6–15.1)
EST. AVERAGE GLUCOSE BLD GHB EST-MCNC: 140 MG/DL
GFR SERPL CREATININE-BSD FRML MDRD: 101 ML/MIN/1.73SQ M
GLUCOSE SERPL-MCNC: 110 MG/DL (ref 65–140)
GLUCOSE SERPL-MCNC: 116 MG/DL (ref 65–140)
GLUCOSE SERPL-MCNC: 116 MG/DL (ref 65–140)
GLUCOSE SERPL-MCNC: 125 MG/DL (ref 65–140)
GLUCOSE SERPL-MCNC: 130 MG/DL (ref 65–140)
HBA1C MFR BLD: 6.5 %
HCT VFR BLD AUTO: 43.9 % (ref 36.5–49.3)
HGB BLD-MCNC: 14.8 G/DL (ref 12–17)
MAGNESIUM SERPL-MCNC: 2.3 MG/DL (ref 1.9–2.7)
MCH RBC QN AUTO: 30.8 PG (ref 26.8–34.3)
MCHC RBC AUTO-ENTMCNC: 33.7 G/DL (ref 31.4–37.4)
MCV RBC AUTO: 92 FL (ref 82–98)
PLATELET # BLD AUTO: 195 THOUSANDS/UL (ref 149–390)
PMV BLD AUTO: 9.9 FL (ref 8.9–12.7)
POTASSIUM SERPL-SCNC: 4.8 MMOL/L (ref 3.5–5.3)
QRS AXIS: 63 DEGREES
QRS AXIS: 79 DEGREES
QRSD INTERVAL: 88 MS
QRSD INTERVAL: 92 MS
QT INTERVAL: 350 MS
QT INTERVAL: 356 MS
QTC INTERVAL: 445 MS
QTC INTERVAL: 491 MS
RBC # BLD AUTO: 4.8 MILLION/UL (ref 3.88–5.62)
SODIUM SERPL-SCNC: 138 MMOL/L (ref 135–147)
T WAVE AXIS: -74 DEGREES
T WAVE AXIS: 14 DEGREES
VENTRICULAR RATE: 118 BPM
VENTRICULAR RATE: 94 BPM
WBC # BLD AUTO: 9.4 THOUSAND/UL (ref 4.31–10.16)

## 2024-12-18 PROCEDURE — 99223 1ST HOSP IP/OBS HIGH 75: CPT | Performed by: INTERNAL MEDICINE

## 2024-12-18 PROCEDURE — 93010 ELECTROCARDIOGRAM REPORT: CPT | Performed by: STUDENT IN AN ORGANIZED HEALTH CARE EDUCATION/TRAINING PROGRAM

## 2024-12-18 PROCEDURE — 99232 SBSQ HOSP IP/OBS MODERATE 35: CPT | Performed by: FAMILY MEDICINE

## 2024-12-18 PROCEDURE — 80048 BASIC METABOLIC PNL TOTAL CA: CPT | Performed by: INTERNAL MEDICINE

## 2024-12-18 PROCEDURE — 83036 HEMOGLOBIN GLYCOSYLATED A1C: CPT | Performed by: INTERNAL MEDICINE

## 2024-12-18 PROCEDURE — 85027 COMPLETE CBC AUTOMATED: CPT | Performed by: INTERNAL MEDICINE

## 2024-12-18 PROCEDURE — 83735 ASSAY OF MAGNESIUM: CPT | Performed by: INTERNAL MEDICINE

## 2024-12-18 PROCEDURE — 82948 REAGENT STRIP/BLOOD GLUCOSE: CPT

## 2024-12-18 PROCEDURE — 93005 ELECTROCARDIOGRAM TRACING: CPT

## 2024-12-18 PROCEDURE — 36415 COLL VENOUS BLD VENIPUNCTURE: CPT | Performed by: INTERNAL MEDICINE

## 2024-12-18 RX ORDER — DOFETILIDE 0.5 MG/1
500 CAPSULE ORAL 2 TIMES DAILY
Qty: 60 CAPSULE | Refills: 0 | Status: SHIPPED | OUTPATIENT
Start: 2024-12-18 | End: 2024-12-20

## 2024-12-18 RX ORDER — DOFETILIDE 0.5 MG/1
500 CAPSULE ORAL EVERY 12 HOURS SCHEDULED
Status: DISCONTINUED | OUTPATIENT
Start: 2024-12-18 | End: 2024-12-21 | Stop reason: HOSPADM

## 2024-12-18 RX ORDER — METOPROLOL SUCCINATE 50 MG/1
50 TABLET, EXTENDED RELEASE ORAL 2 TIMES DAILY
Status: DISCONTINUED | OUTPATIENT
Start: 2024-12-18 | End: 2024-12-21 | Stop reason: HOSPADM

## 2024-12-18 RX ORDER — ACETAMINOPHEN 325 MG/1
650 TABLET ORAL EVERY 6 HOURS PRN
Status: DISCONTINUED | OUTPATIENT
Start: 2024-12-18 | End: 2024-12-21 | Stop reason: HOSPADM

## 2024-12-18 RX ADMIN — METOPROLOL SUCCINATE 50 MG: 50 TABLET, EXTENDED RELEASE ORAL at 17:20

## 2024-12-18 RX ADMIN — ALLOPURINOL 300 MG: 300 TABLET ORAL at 08:37

## 2024-12-18 RX ADMIN — PANTOPRAZOLE SODIUM 20 MG: 20 TABLET, DELAYED RELEASE ORAL at 05:19

## 2024-12-18 RX ADMIN — APIXABAN 5 MG: 5 TABLET, FILM COATED ORAL at 17:20

## 2024-12-18 RX ADMIN — METOPROLOL SUCCINATE 50 MG: 50 TABLET, EXTENDED RELEASE ORAL at 08:37

## 2024-12-18 RX ADMIN — DOFETILIDE 500 MCG: 0.5 CAPSULE ORAL at 21:15

## 2024-12-18 RX ADMIN — APIXABAN 5 MG: 5 TABLET, FILM COATED ORAL at 08:37

## 2024-12-18 NOTE — ASSESSMENT & PLAN NOTE
Newly diagnosed atrial fibrillation as of 11/2024, was started on metoprolol succinate and Eliquis.  Subsequently had follow-up with cardiology and underwent MEI with cardioversion x 4 12/9/2024 with conversion to normal sinus rhythm  During cardiology follow-up appointment 12/17/2024 he was noted in rapid atrial fibrillation and advised to present here for evaluation/management.  Initially he denied symptoms at appointment but now complains of some lightheadedness with chest pressure  HR  throughout ED evaluation.  Continue metoprolol succinate await Cardiology consultation  Monitor on telemetry  Continue anticoagulation with Eliquis

## 2024-12-18 NOTE — ASSESSMENT & PLAN NOTE
Dietary and lifestyle modification advised  Morbid obesity complicates all facets of care  Patient is working with sleep medicine to see if diagnosis of ASIM is present   no

## 2024-12-18 NOTE — PROGRESS NOTES
"Outpatient Care Management Note    IB: ADT alert received-IP admission.  Chart reviewed.    Patient is currently hospitalized at [facility].  Patient admitted 12/17/24 for atrial fibrillation with RVR.    Patient recently underwent MEI with cardioversion on 12/9/24.    RN BARRINGTON will continue to monitor for patient discharge and will outreach to patient once he is discharged to home.    Additionally, RN CM recently spoke with patient on 12/16/24 after receiving patient as a CM referral, diabetic list for elevated A1c.  During this outreach call, patient was agreeable to receiving some basic diabetes education and diet information.  RN BARRINGTON sending diabetes education obtained from \"[employer]\" patient education now  RN CM will schedule patient outreach to follow up on information sent.  "

## 2024-12-18 NOTE — CONSULTS
Electrophysiology Consult H&P  Luis Fernandojoe Jay 54 y.o. male MRN: [id number]  Unit/Bed#: CW2 213-02 Encounter: [id number]  Physician Requesting Consult: Beth Bridges MD  Reason for Consult: A fib with RVR    HPI  54 y.o. male with a history of obesity, type 2 diabetes, emphysema and atrial fibrillation recently diagnosed during hospitalization between 11/5-11/7 of this year s/p MEI cardioversion x 4 on 12/9 with successful restoration of sinus rhythm who presented from the outpatient cardiology office on 12/17 for A-fib with RVR.  During his routine follow-up he was noted to be in A-fib with a heart rate in the 110s to 120s.  He denied any symptoms at that time, however in the ED he did report some lightheadedness and chest tightness.  He was noted to be hypomagnesemic and received IV magnesium.  Electrophysiology was consulted.    Today the patient was seen and examined at bedside.  Overall he reports feeling well.  He does complain of mild skin irritation at the location of his pacer  pad placement from recent cardioversion last week.  He also complains of right knee pain and is currently following with orthopedic surgery and so he has been mostly bedbound recently.  He also complains of mild occasional lightheadedness, but denies any palpitations or other cardiac symptoms.  He reports that he has been compliant with his medications.  Telemetry shows A-fib with rates in the 90s to low 100s.    Prior Cardiac Imaging  -TTE 11/6/24:    Left Ventricle: Left ventricular cavity size is normal. Wall thickness is mildly increased. There is mild concentric hypertrophy. The left ventricular ejection fraction is 60-65% by visual estimation. Systolic function is normal. Although no diagnostic regional wall motion abnormality was identified, this possibility cannot be completely excluded on the basis of this study. Unable to assess diastolic function due to atrial fibrillation.    Right Ventricle: Right ventricular cavity  size is moderately dilated. Systolic function is normal.    Right Atrium: The atrium is moderately dilated.    A&P  Assessment & Plan  Atrial fibrillation with RVR ([facility])  Patient was newly diagnosed with atrial fibrillation in November of this year and was started on Toprol-XL 25 mg BID and Eliquis 5 mg BID at that time.  He subsequently underwent MEI cardioversion x 4 on 12/9/2024 with successful conversion to normal sinus rhythm.  During a routine cardiology follow-up visit on 12/17 he was noted to be in A-fib with RVR with heart rates in the low 100s and was sent to the ED.  He did complain of some lightheadedness and chest pressure on presentation.    Plan:  -Continue increased dose Toprol-XL 50 mg twice daily.  -Continue Eliquis 5 mg twice daily.  -Continue outpatient sleep medicine follow-up for probable sleep apnea.  -Monitor electrolytes and replete as needed.  -Continue telemetry monitoring.  -Plan for initiation of dofetilide 500 mg every 12 hours tonight with ECG monitoring for first 5 doses.    Class 3 severe obesity due to excess calories without serious comorbidity with body mass index (BMI) of 50.0 to 59.9 in adult ([facility])  -Continue outpatient workup for ASIM.  - on weight loss.  Other emphysema ([facility])  -Continue as needed breathing treatments.  Type 2 diabetes mellitus without complication, without long-term current use of insulin ([facility])  Lab Results   Component Value Date    HGBA1C 6.5 (H) 12/18/2024       Recent Labs     12/18/24  0022 12/18/24  1059   POCGLU 116 125       Blood Sugar Average: Last 72 hrs:  (P) 120.5  -Continue SSI with hypoglycemia protocol.  Right knee pain  -Patient fell on his right knee in late November and recently underwent MRI. He has an outpatient ortho visit scheduled for Friday. Orthopedic surgery consulted as patient must remain in the hospital for dofetilide loading.         No intake or output data in the 24 hours ending 12/18/24 1318      Ruiz Cheng,  "MD  -PGY-5 Cardiology Fellow  -Brnaden text enabled      ======================================================    Physical exam  Vitals: Blood pressure 137/99, pulse 94, temperature (!) 97.2 °F (36.2 °C), resp. rate 17, height 6' 2\" (1.88 m), weight (!) 180 kg (396 lb), SpO2 96%.  Gen: Well appearing. Obese.   Psych: AOx3  Skin: Intact. Rash present on chest.   Neck: Supple  Cardiac: S1, S2, irregular rate, no S3 or S4 appreciated. No murmurs. +2 PT, radial pulses. No peripheral edema. No carotid bruits.  Resp: CTABL. No crackles  Abd: Nontender, nondistended  Rheum: No joint deformities in UE or LE    ======================================================    TREADMILL STRESS  No results found for this or any previous visit.     ----------------------------------------------------------------------------------------------  NUCLEAR STRESS TEST: No results found for this or any previous visit.    No results found for this or any previous visit.      --------------------------------------------------------------------------------  CATH:  No results found for this or any previous visit.    --------------------------------------------------------------------------------  ECHO:   No results found for this or any previous visit.    No results found for this or any previous visit.    --------------------------------------------------------------------------------  HOLTER  No results found for this or any previous visit.    --------------------------------------------------------------------------------  CAROTIDS  No results found for this or any previous visit.       [unfilled]   ======================================================      Review of Systems   Constitutional:  Negative for activity change, appetite change, chills, fatigue and fever.   HENT:  Negative for congestion, ear discharge, ear pain, hearing loss, sinus pain, sore throat, tinnitus and trouble swallowing.    Eyes:  Negative for pain and visual " disturbance.   Respiratory:  Negative for cough, chest tightness, shortness of breath and wheezing.    Cardiovascular:  Negative for chest pain and leg swelling.   Gastrointestinal:  Negative for abdominal distention, abdominal pain, anal bleeding and blood in stool.   Endocrine: Negative for cold intolerance and heat intolerance.   Genitourinary:  Negative for difficulty urinating, dysuria and frequency.   Musculoskeletal:  Negative for arthralgias and myalgias.        Right knee pain   Skin:  Positive for rash.   Allergic/Immunologic: Negative for environmental allergies and food allergies.   Neurological:  Positive for light-headedness. Negative for dizziness, numbness and headaches.   Hematological:  Negative for adenopathy.   Psychiatric/Behavioral:  Negative for agitation, behavioral problems and confusion.      ROS as noted above, otherwise 12 point review of systems was performed and is negative.     Historical Information   Past Medical History:   Diagnosis Date    Diabetes mellitus (HCC)     GERD (gastroesophageal reflux disease)     Lung mass     Obesity      History reviewed. No pertinent surgical history.  Social History     Substance and Sexual Activity   Alcohol Use Not Currently     Social History     Substance and Sexual Activity   Drug Use Never     Social History     Tobacco Use   Smoking Status Former    Current packs/day: 0.00    Average packs/day: 3.0 packs/day for 40.3 years (121.0 ttl pk-yrs)    Types: Cigarettes    Start date:     Quit date: 2019    Years since quittin.6   Smokeless Tobacco Never     Family History:   Family History   Problem Relation Age of Onset    No Known Problems Mother     Tuberculosis Father     Atrial fibrillation Father 65    Lung cancer Paternal Uncle     Alcohol abuse Neg Hx     Substance Abuse Neg Hx     Mental illness Neg Hx     Depression Neg Hx        Meds/Allergies   Hospital Medications:   Current Facility-Administered Medications:      "acetaminophen (TYLENOL) tablet 650 mg, Q6H PRN    albuterol inhalation solution 2.5 mg, Q4H PRN    allopurinol (ZYLOPRIM) tablet 300 mg, Daily    apixaban (ELIQUIS) tablet 5 mg, BID    insulin lispro (HumALOG/ADMELOG) 100 units/mL subcutaneous injection 1-6 Units, HS    insulin lispro (HumALOG/ADMELOG) 100 units/mL subcutaneous injection 2-12 Units, TID AC **AND** Fingerstick Glucose (POCT), TID AC    methocarbamol (ROBAXIN) tablet 750 mg, TID PRN    metoprolol succinate (TOPROL-XL) 24 hr tablet 50 mg, BID    ondansetron (ZOFRAN) injection 4 mg, Q6H PRN    pantoprazole (PROTONIX) EC tablet 20 mg, Early Morning  Home Medications:   Medications Prior to Admission:     albuterol (2.5 mg/3 mL) 0.083 % nebulizer solution    albuterol (ProAir HFA) 90 mcg/act inhaler    albuterol (PROVENTIL HFA,VENTOLIN HFA) 90 mcg/act inhaler    Alcohol Swabs 70 % PADS    allopurinol (ZYLOPRIM) 300 mg tablet    apixaban (Eliquis) 5 mg    Blood Glucose Monitoring Suppl (OneTouch Verio Reflect) w/Device KIT    ciprofloxacin-dexamethasone (CIPRODEX) otic suspension    Continuous Glucose Sensor (FreeStyle Hali 3 Sensor) MISC    esomeprazole (NexIUM) 20 mg capsule    glucose blood (OneTouch Verio) test strip    metFORMIN (GLUCOPHAGE) 1000 MG tablet    methocarbamol (Robaxin-750) 750 mg tablet    metoprolol succinate (TOPROL-XL) 25 mg 24 hr tablet    naproxen (NAPROSYN) 500 mg tablet    OneTouch Delica Lancets 33G MISC    semaglutide, 2 mg/dose, (Ozempic) 8 mg/ mL injection pen    No Known Allergies    Objective   Vitals: Blood pressure 137/99, pulse 94, temperature (!) 97.2 °F (36.2 °C), resp. rate 17, height 6' 2\" (1.88 m), weight (!) 180 kg (396 lb), SpO2 96%.  Orthostatic Blood Pressures      Flowsheet Row Most Recent Value   Blood Pressure 137/99 filed at 12/18/2024 1101   Patient Position - Orthostatic VS Lying filed at 12/18/2024 0828            No intake or output data in the 24 hours ending 12/18/24 1318    Invasive Devices       " Peripheral Intravenous Line  Duration             Peripheral IV 12/17/24 Left Antecubital <1 day                      Lab Results: I have personally reviewed pertinent lab results.    Results from last 7 days   Lab Units 12/18/24  0519 12/17/24  2048   WBC Thousand/uL 9.40 11.17*   HEMOGLOBIN g/dL 14.8 15.7   HEMATOCRIT % 43.9 47.5   PLATELETS Thousands/uL 195 213     Results from last 7 days   Lab Units 12/18/24  0638 12/17/24  2104   POTASSIUM mmol/L 4.8 3.5   CHLORIDE mmol/L 106 106   CO2 mmol/L 25 21   BUN mg/dL 14 14   CREATININE mg/dL 0.77 0.82   CALCIUM mg/dL 9.0 8.8         Results from last 7 days   Lab Units 12/18/24  0638 12/17/24  2104   MAGNESIUM mg/dL 2.3 1.7*

## 2024-12-18 NOTE — ASSESSMENT & PLAN NOTE
Lab Results   Component Value Date    HGBA1C 6.5 (H) 12/18/2024       Recent Labs     12/18/24  0022   POCGLU 116       Blood Sugar Average: Last 72 hrs:  (P) 116  Recently diagnosed as of September 2024.  Will obtain updated A1c- controlled at 6.5  Hold home oral medications/non-insulin injectables.  Start correctional insulin coverage with Accu-Cheks while admitted.  Carb controlled diet  Initiate hypoglycemia protocol

## 2024-12-18 NOTE — ASSESSMENT & PLAN NOTE
Lab Results   Component Value Date    HGBA1C 11.2 (H) 09/09/2024       Recent Labs     12/18/24  0022   POCGLU 116       Blood Sugar Average: Last 72 hrs:  (P) 116  Recently diagnosed as of September 2024.  Will obtain updated A1c  Hold home oral medications/non-insulin injectables.  Start correctional insulin coverage with Accu-Cheks while admitted.  Carb controlled diet  Initiate hypoglycemia protocol

## 2024-12-18 NOTE — ED PROVIDER NOTES
Time reflects when diagnosis was documented in both MDM as applicable and the Disposition within this note       Time User Action Codes Description Comment    12/17/2024 10:38 PM Mary Grace Alvarado Add [I48.91] Atrial fibrillation with RVR (HCC)     12/17/2024 10:38 PM Mary Grace Alvarado Add [R42] Lightheadedness     12/17/2024 10:38 PM Mary Grace Alvarado Add [R07.9] Chest pain           ED Disposition       ED Disposition   Admit    Condition   Stable    Date/Time   Tue Dec 17, 2024 10:37 PM    Comment   Case was discussed with Dr. Herrera and the patient's admission status was agreed to be Admission Status: inpatient status to the service of Dr. Herrera .               Assessment & Plan       Medical Decision Making  Amount and/or Complexity of Data Reviewed  Labs: ordered. Decision-making details documented in ED Course.    Risk  Prescription drug management.  Decision regarding hospitalization.    Patient is 54 y.o. male with PMH of recently diagnosed with Afib 1 month ago started on eliquis and metoprolol, DM presenting to ED for evaluation of Afib with RVR. . See history and physical documented below.     On my interpretation of EKG Afib with RVR, 120 bpm. Occasional PVCs.     Impression: Afib with RVR, ectopy. Differential includes ACS, electrolyte abnormalities.   Plan: CBC, BMP Mg, troponin, EKG, rate control, cardiology.     On review of outpatient cardiology note from today sent to ED for rate control. In office BP in Afib with RVR rate 120s and /60. Current outpatient medication regimen metoprolol succinate 25 mg q12 and Eliquis 5 mg.       View ED course above for further discussion on patient workup.       All labs reviewed and utilized in the medical decision making process  All radiology studies independently viewed by me and interpreted by the radiologist.  I reviewed all testing with the patient.     Spoke with cardiology fellow regarding patient and agreed with current workup and rate control with  metoprolol if BP can tolerate.     Upon re-evaluation patient mildly decreased Bps given Afib with RVR given 500 cc bolus of crystalloid. Plans for rate control with metoprolol, but held for low blood pressures. Case discussed with SLIM and admitted for Afib with RVR.             ED Course as of 12/17/24 2238   Tue Dec 17, 2024   2132 MAGNESIUM(!): 1.7   2133 WBC(!): 11.17   2144 hs TnI 0hr: 3   2144 Basic metabolic panel  Unremarkable        Medications   magnesium sulfate 2 g/50 mL IVPB (premix) 2 g (2 g Intravenous New Bag 12/17/24 2216)   metoprolol (LOPRESSOR) injection 2.5 mg (0 mg Intravenous Hold 12/17/24 2223)   multi-electrolyte (ISOLYTE-S PH 7.4) bolus 500 mL (has no administration in time range)       ED Risk Strat Scores                          SBIRT 20yo+      Flowsheet Row Most Recent Value   Initial Alcohol Screen: US AUDIT-C     1. How often do you have a drink containing alcohol? 0 Filed at: 12/17/2024 1917   2. How many drinks containing alcohol do you have on a typical day you are drinking?  0 Filed at: 12/17/2024 1917   3a. Male UNDER 65: How often do you have five or more drinks on one occasion? 0 Filed at: 12/17/2024 1917   Audit-C Score 0 Filed at: 12/17/2024 1917   HIGINIO: How many times in the past year have you...    Used an illegal drug or used a prescription medication for non-medical reasons? Never Filed at: 12/17/2024 1917                            History of Present Illness       Chief Complaint   Patient presents with    Dizziness     Pt presents with Afib. Was cardioverted several days ago. Pt feels dizzy.        Past Medical History:   Diagnosis Date    Diabetes mellitus (HCC)     GERD (gastroesophageal reflux disease)     Lung mass     Obesity       History reviewed. No pertinent surgical history.   Family History   Problem Relation Age of Onset    No Known Problems Mother     Tuberculosis Father     Atrial fibrillation Father 65    Lung cancer Paternal Uncle     Alcohol abuse Neg  Hx     Substance Abuse Neg Hx     Mental illness Neg Hx     Depression Neg Hx       Social History     Tobacco Use    Smoking status: Former     Current packs/day: 0.00     Average packs/day: 3.0 packs/day for 40.3 years (121.0 ttl pk-yrs)     Types: Cigarettes     Start date:      Quit date: 2019     Years since quittin.6    Smokeless tobacco: Never   Vaping Use    Vaping status: Never Used   Substance Use Topics    Alcohol use: Not Currently    Drug use: Never      E-Cigarette/Vaping    E-Cigarette Use Never User       E-Cigarette/Vaping Substances    Nicotine No     THC No     CBD No     Flavoring No     Other No     Unknown No       I have reviewed and agree with the history as documented.     HPI  Patient is 54 y.o. male rcently diagnosed with Afib 1 month ago started on eliquis and metoprolol, DM presenting to ED for evaluation of Afib with RVR. On last admission patient had cardioversion attempt x 4. Today went outpatient cardiology appointment and sent to ED for Afib with RVR, rate 120s. Patient reports lightheadedness on arrival to ED as well as intermittent chest tightness and SOB. He states at home has not been moving around much since discharge and SOB with minimal exertion. Denies fever, chills, cough/congestion, abdominal pain, n/v, leg pain/swelling.     On arrival c/o lightheadedness, chest tightness and shortness of breath. Afib RVR from 107-120s. Exam obese, irregularly irregular and fast. breath sounds clear. abdomen soft nontender. no calf tenderness. EKG afib RVR. some ectopy on monitor. mag 1.7, giving mag. Ordered metoprolol 5mg. held initally for bp 100 systolic. cahnged to 2.5 to see if can control rate. otherwise CBC, bmp unremarkable. would like to admit for Afib for rate control and medication management. He is currently on 25 mg metoprolol succinate XL BID. Spoke with cards who said can increase to 50 mg BID if rate >110.   Review of Systems   Constitutional:  Negative for  chills and fever.   HENT:  Negative for ear pain and sore throat.    Respiratory:  Positive for chest tightness and shortness of breath. Negative for cough.    Cardiovascular:  Positive for chest pain. Negative for palpitations and leg swelling.   Gastrointestinal:  Negative for abdominal pain, diarrhea, nausea and vomiting.   Genitourinary:  Negative for dysuria, frequency and hematuria.   Musculoskeletal:  Negative for back pain and neck pain.   Skin:  Negative for rash.   Neurological:  Negative for dizziness, light-headedness and headaches.           Objective       ED Triage Vitals   Temperature Pulse Blood Pressure Respirations SpO2 Patient Position - Orthostatic VS   12/17/24 1915 12/17/24 1915 12/17/24 1915 12/17/24 1915 12/17/24 1915 12/17/24 1915   97.6 °F (36.4 °C) (!) 107 134/92 20 98 % Sitting      Temp Source Heart Rate Source BP Location FiO2 (%) Pain Score    12/17/24 1915 12/17/24 1915 12/17/24 1915 -- 12/17/24 2004    Oral Monitor Left arm  7      Vitals      Date and Time Temp Pulse SpO2 Resp BP Pain Score FACES Pain Rating User   12/17/24 2220 -- 109 95 % 16 98/66 -- -- ED   12/17/24 2052 -- 105 93 % 20 109/66 -- -- ED   12/17/24 2004 -- 109 94 % 20 126/99 7 -- ED   12/17/24 1915 97.6 °F (36.4 °C) 107 98 % 20 134/92 -- -- AM            Physical Exam  Vitals reviewed.   Constitutional:       General: He is awake.      Appearance: He is not ill-appearing.      Comments: Obese    HENT:      Head: Normocephalic and atraumatic.      Mouth/Throat:      Mouth: Mucous membranes are moist.   Eyes:      Extraocular Movements: Extraocular movements intact.      Right eye: No nystagmus.      Left eye: No nystagmus.      Conjunctiva/sclera: Conjunctivae normal.      Pupils: Pupils are equal, round, and reactive to light.   Cardiovascular:      Rate and Rhythm: Tachycardia present. Rhythm regularly irregular.      Pulses: Normal pulses.      Heart sounds: Normal heart sounds, S1 normal and S2 normal. Heart  sounds not distant. No murmur heard.     No friction rub. No gallop.   Pulmonary:      Breath sounds: No stridor. No wheezing, rhonchi or rales.      Comments: CTA b/l   Abdominal:      General: Bowel sounds are normal.      Palpations: Abdomen is soft.      Tenderness: There is no abdominal tenderness.   Musculoskeletal:      Right lower leg: No edema.      Left lower leg: No edema.   Skin:     General: Skin is warm and dry.      Capillary Refill: Capillary refill takes less than 2 seconds.   Neurological:      Mental Status: He is alert and oriented to person, place, and time.      GCS: GCS eye subscore is 4. GCS verbal subscore is 5. GCS motor subscore is 6.      Cranial Nerves: Cranial nerves 2-12 are intact.      Sensory: Sensation is intact.      Motor: No weakness or pronator drift.      Coordination: Coordination normal. Finger-Nose-Finger Test normal.         Results Reviewed       Procedure Component Value Units Date/Time    HS Troponin 0hr (reflex protocol) [[id number]]  (Normal) Collected: 12/17/24 2104    Lab Status: Final result Specimen: Blood from Arm, Left Updated: 12/17/24 2134     hs TnI 0hr 3 ng/L     HS Troponin I 2hr [[id number]]     Lab Status: No result Specimen: Blood     Basic metabolic panel [[id number]] Collected: 12/17/24 2104    Lab Status: Final result Specimen: Blood from Arm, Left Updated: 12/17/24 2132     Sodium 139 mmol/L      Potassium 3.5 mmol/L      Chloride 106 mmol/L      CO2 21 mmol/L      ANION GAP 12 mmol/L      BUN 14 mg/dL      Creatinine 0.82 mg/dL      Glucose 111 mg/dL      Calcium 8.8 mg/dL      eGFR 98 ml/min/1.73sq m     Narrative:      [employer] guidelines for Chronic Kidney Disease (CKD):     Stage 1 with normal or high GFR (GFR > 90 mL/min/1.73 square meters)    Stage 2 Mild CKD (GFR = 60-89 mL/min/1.73 square meters)    Stage 3A Moderate CKD (GFR = 45-59 mL/min/1.73 square meters)    Stage 3B Moderate CKD (GFR = 30-44 mL/min/1.73 square  meters)    Stage 4 Severe CKD (GFR = 15-29 mL/min/1.73 square meters)    Stage 5 End Stage CKD (GFR <15 mL/min/1.73 square meters)  Note: GFR calculation is accurate only with a steady state creatinine    Magnesium [[id number]]  (Abnormal) Collected: 12/17/24 2104    Lab Status: Final result Specimen: Blood from Arm, Left Updated: 12/17/24 2132     Magnesium 1.7 mg/dL     CBC and differential [[id number]]  (Abnormal) Collected: 12/17/24 2048    Lab Status: Final result Specimen: Blood from Arm, Left Updated: 12/17/24 2118     WBC 11.17 Thousand/uL      RBC 5.12 Million/uL      Hemoglobin 15.7 g/dL      Hematocrit 47.5 %      MCV 93 fL      MCH 30.7 pg      MCHC 33.1 g/dL      RDW 13.7 %      MPV 9.6 fL      Platelets 213 Thousands/uL      nRBC 0 /100 WBCs      Segmented % 62 %      Immature Grans % 0 %      Lymphocytes % 26 %      Monocytes % 7 %      Eosinophils Relative 4 %      Basophils Relative 1 %      Absolute Neutrophils 6.96 Thousands/µL      Absolute Immature Grans 0.04 Thousand/uL      Absolute Lymphocytes 2.91 Thousands/µL      Absolute Monocytes 0.78 Thousand/µL      Eosinophils Absolute 0.42 Thousand/µL      Basophils Absolute 0.06 Thousands/µL             No orders to display       Procedures    ED Medication and Procedure Management   Prior to Admission Medications   Prescriptions Last Dose Informant Patient Reported? Taking?   Alcohol Swabs 70 % PADS  Spouse/Significant Other No No   Sig: May substitute brand based on insurance coverage. Check glucose BID.   Blood Glucose Monitoring Suppl (OneTouch Verio Reflect) w/Device KIT  Spouse/Significant Other No No   Sig: May substitute brand based on insurance coverage. Check glucose BID.   Continuous Glucose Sensor (FreeStyle Hali 3 Sensor) MISC  Spouse/Significant Other No No   Sig: Use 1 each every 14 (fourteen) days   OneTouch Delica Lancets 33G MISC  Spouse/Significant Other No No   Sig: May substitute brand based on insurance coverage. Check glucose  BID.   albuterol (2.5 mg/3 mL) 0.083 % nebulizer solution  Spouse/Significant Other No No   Sig: Take 1 vial (2.5 mg total) by nebulization every 4 (four) hours as needed for wheezing or shortness of breath   albuterol (PROVENTIL HFA,VENTOLIN HFA) 90 mcg/act inhaler  Spouse/Significant Other No No   Sig: Inhale 2 puffs every 4 (four) hours as needed for wheezing   albuterol (ProAir HFA) 90 mcg/act inhaler  Spouse/Significant Other No No   Sig: Inhale 2 puffs every 6 (six) hours as needed for wheezing   allopurinol (ZYLOPRIM) 300 mg tablet  Spouse/Significant Other No No   Sig: Take 1 tablet (300 mg total) by mouth daily   apixaban (Eliquis) 5 mg  Spouse/Significant Other No No   Sig: Take 1 tablet (5 mg total) by mouth 2 (two) times a day   ciprofloxacin-dexamethasone (CIPRODEX) otic suspension  Spouse/Significant Other No No   Sig: Administer 4 drops into the left ear 2 (two) times a day for 18 doses   esomeprazole (NexIUM) 20 mg capsule  Spouse/Significant Other No No   Sig: Take 1 capsule (20 mg total) by mouth daily in the early morning   glucose blood (OneTouch Verio) test strip  Spouse/Significant Other No No   Sig: May substitute brand based on insurance coverage. Check glucose BID.   metFORMIN (GLUCOPHAGE) 1000 MG tablet  Spouse/Significant Other No No   Sig: Take 1 tablet (1,000 mg total) by mouth 2 (two) times a day with meals   methocarbamol (Robaxin-750) 750 mg tablet  Spouse/Significant Other No No   Sig: Take 1 tablet (750 mg total) by mouth 3 (three) times a day as needed for muscle spasms (may cause drowsiness)   metoprolol succinate (TOPROL-XL) 25 mg 24 hr tablet  Spouse/Significant Other No No   Sig: Take 1 tablet (25 mg total) by mouth 2 (two) times a day   naproxen (NAPROSYN) 500 mg tablet  Spouse/Significant Other No No   Sig: Take 1 tablet (500 mg total) by mouth 2 (two) times a day as needed (gout)   semaglutide, 2 mg/dose, (Ozempic) 8 mg/ mL injection pen  Spouse/Significant Other No No    Sig: Inject 0.75 mL (2 mg total) under the skin every 7 days      Facility-Administered Medications: None     Patient's Medications   Discharge Prescriptions    No medications on file     No discharge procedures on file.  ED SEPSIS DOCUMENTATION   Time reflects when diagnosis was documented in both MDM as applicable and the Disposition within this note       Time User Action Codes Description Comment    12/17/2024 10:38 PM Mary Grace Alvarado [I48.91] Atrial fibrillation with RVR (HCC)     12/17/2024 10:38 PM Mary Grace Alvarado [R42] Lightheadedness     12/17/2024 10:38 PM Mary Grace Alvarado [R07.9] Chest pain                  Mary Grace Alvarado DO  12/20/24 1238

## 2024-12-18 NOTE — PROGRESS NOTES
Progress Note - Hospitalist   Name: Luis Fernando Jay 54 y.o. male I MRN: [id number]  Unit/Bed#: CW2 213-02 I Date of Admission: 12/17/2024   Date of Service: 12/18/2024 I Hospital Day: 1    Assessment & Plan  Atrial fibrillation with RVR ([facility])  Newly diagnosed atrial fibrillation as of 11/2024, was started on metoprolol succinate and Eliquis.  Subsequently had follow-up with cardiology and underwent MEI with cardioversion x 4 12/9/2024 with conversion to normal sinus rhythm  During cardiology follow-up appointment 12/17/2024 he was noted in rapid atrial fibrillation and advised to present here for evaluation/management.  Initially he denied symptoms at appointment but now complains of some lightheadedness with chest pressure  HR  throughout ED evaluation.  Continue metoprolol succinate await Cardiology consultation  Monitor on telemetry  Continue anticoagulation with Eliquis  Class 3 severe obesity due to excess calories without serious comorbidity with body mass index (BMI) of 50.0 to 59.9 in adult ([facility])  Dietary and lifestyle modification advised  Morbid obesity complicates all facets of care  Patient is working with sleep medicine to see if diagnosis of ASIM is present  Other emphysema ([facility])  Not in acute exacerbation, continue as needed albuterol nebulizer  Type 2 diabetes mellitus without complication, without long-term current use of insulin ([facility])  Lab Results   Component Value Date    HGBA1C 6.5 (H) 12/18/2024       Recent Labs     12/18/24  0022   POCGLU 116       Blood Sugar Average: Last 72 hrs:  (P) 116  Recently diagnosed as of September 2024.  Will obtain updated A1c- controlled at 6.5  Hold home oral medications/non-insulin injectables.  Start correctional insulin coverage with Accu-Cheks while admitted.  Carb controlled diet  Initiate hypoglycemia protocol    VTE Pharmacologic Prophylaxis: VTE Score: 4 Moderate Risk (Score 3-4) - Pharmacological DVT Prophylaxis Ordered: apixaban  (Eliquis).    Mobility:   Basic Mobility Inpatient Raw Score: 23  JH-HLM Goal: 7: Walk 25 feet or more  JH-HLM Achieved: 7: Walk 25 feet or more  JH-HLM Goal achieved. Continue to encourage appropriate mobility.    Patient Centered Rounds: I performed bedside rounds with nursing staff today.   Discussions with Specialists or Other Care Team Provider: will discuss with cardiology     Education and Discussions with Family / Patient: pt     Current Length of Stay: 1 day(s)  Current Patient Status: Inpatient   Certification Statement: The patient will continue to require additional inpatient hospital stay due to afib with rvr  Discharge Plan: Anticipate discharge in 24-48 hrs to home.    Code Status: Level 1 - Full Code    Subjective   Seen and examined no cp just some sob    Objective :  Temp:  [97.2 °F (36.2 °C)-97.6 °F (36.4 °C)] 97.2 °F (36.2 °C)  HR:  [] 89  BP: ()/() 141/107  Resp:  [15-20] 18  SpO2:  [90 %-98 %] 96 %  O2 Device: None (Room air)    Body mass index is 50.84 kg/m².     Input and Output Summary (last 24 hours):   No intake or output data in the 24 hours ending 12/18/24 1044    Physical Exam  Vitals and nursing note reviewed.   Constitutional:       General: He is not in acute distress.     Appearance: He is well-developed.   HENT:      Head: Normocephalic and atraumatic.   Eyes:      Conjunctiva/sclera: Conjunctivae normal.   Cardiovascular:      Rate and Rhythm: Tachycardia present. Rhythm irregular.      Heart sounds: No murmur heard.  Pulmonary:      Effort: Pulmonary effort is normal. No respiratory distress.      Breath sounds: Normal breath sounds. No wheezing or rales.   Abdominal:      Palpations: Abdomen is soft.      Tenderness: There is no abdominal tenderness.   Musculoskeletal:         General: No swelling.      Cervical back: Neck supple.   Skin:     General: Skin is warm and dry.      Capillary Refill: Capillary refill takes less than 2 seconds.   Neurological:       Mental Status: He is alert and oriented to person, place, and time.   Psychiatric:         Mood and Affect: Mood normal.           Lines/Drains:        Telemetry:  Telemetry Orders (From admission, onward)               24 Hour Telemetry Monitoring  Continuous x 24 Hours (Telem)        Question:  Reason for 24 Hour Telemetry  Answer:  Arrhythmias requiring acute medical intervention / PPM or ICD malfunction                     Telemetry Reviewed: Atrial fibrillation. HR averaging 95  Indication for Continued Telemetry Use: Arrthymias requiring medical therapy               Lab Results: I have reviewed the following results:   Results from last 7 days   Lab Units 12/18/24  0519 12/17/24  2048   WBC Thousand/uL 9.40 11.17*   HEMOGLOBIN g/dL 14.8 15.7   HEMATOCRIT % 43.9 47.5   PLATELETS Thousands/uL 195 213   SEGS PCT %  --  62   LYMPHO PCT %  --  26   MONO PCT %  --  7   EOS PCT %  --  4     Results from last 7 days   Lab Units 12/18/24  0638   SODIUM mmol/L 138   POTASSIUM mmol/L 4.8   CHLORIDE mmol/L 106   CO2 mmol/L 25   BUN mg/dL 14   CREATININE mg/dL 0.77   ANION GAP mmol/L 7   CALCIUM mg/dL 9.0   GLUCOSE RANDOM mg/dL 116         Results from last 7 days   Lab Units 12/18/24  0022   POC GLUCOSE mg/dl 116     Results from last 7 days   Lab Units 12/18/24  0519   HEMOGLOBIN A1C % 6.5*           Recent Cultures (last 7 days):         Imaging Results Review: No pertinent imaging studies reviewed.  Other Study Results Review: No additional pertinent studies reviewed.    Last 24 Hours Medication List:     Current Facility-Administered Medications:     acetaminophen (TYLENOL) tablet 650 mg, Q6H PRN    albuterol inhalation solution 2.5 mg, Q4H PRN    allopurinol (ZYLOPRIM) tablet 300 mg, Daily    apixaban (ELIQUIS) tablet 5 mg, BID    insulin lispro (HumALOG/ADMELOG) 100 units/mL subcutaneous injection 1-6 Units, HS    insulin lispro (HumALOG/ADMELOG) 100 units/mL subcutaneous injection 2-12 Units, TID AC **AND**  Fingerstick Glucose (POCT), TID AC    methocarbamol (ROBAXIN) tablet 750 mg, TID PRN    metoprolol succinate (TOPROL-XL) 24 hr tablet 50 mg, BID    ondansetron (ZOFRAN) injection 4 mg, Q6H PRN    pantoprazole (PROTONIX) EC tablet 20 mg, Early Morning    Administrative Statements   Today, Patient Was Seen By: Beth Bridges MD  I have spent a total time of >35 minutes in caring for this patient on the day of the visit/encounter including Documenting in the medical record, Reviewing / ordering tests, medicine, procedures  , Obtaining or reviewing history  , and Communicating with other healthcare professionals .    **Please Note: This note may have been constructed using a voice recognition system.**

## 2024-12-18 NOTE — PLAN OF CARE
Problem: PAIN - ADULT  Goal: Verbalizes/displays adequate comfort level or baseline comfort level  Description: Interventions:  - Encourage patient to monitor pain and request assistance  - Assess pain using appropriate pain scale  - Administer analgesics based on type and severity of pain and evaluate response  - Implement non-pharmacological measures as appropriate and evaluate response  - Consider cultural and social influences on pain and pain management  - Notify physician/advanced practitioner if interventions unsuccessful or patient reports new pain  Outcome: Progressing     Problem: INFECTION - ADULT  Goal: Absence or prevention of progression during hospitalization  Description: INTERVENTIONS:  - Assess and monitor for signs and symptoms of infection  - Monitor lab/diagnostic results  - Monitor all insertion sites, i.e. indwelling lines, tubes, and drains  - Monitor endotracheal if appropriate and nasal secretions for changes in amount and color  - Citra appropriate cooling/warming therapies per order  - Administer medications as ordered  - Instruct and encourage patient and family to use good hand hygiene technique  - Identify and instruct in appropriate isolation precautions for identified infection/condition  Outcome: Progressing     Problem: SAFETY ADULT  Goal: Patient will remain free of falls  Description: INTERVENTIONS:  - Educate patient/family on patient safety including physical limitations  - Instruct patient to call for assistance with activity   - Consult OT/PT to assist with strengthening/mobility   - Keep Call bell within reach  - Keep bed low and locked with side rails adjusted as appropriate  - Keep care items and personal belongings within reach  - Initiate and maintain comfort rounds  - Make Fall Risk Sign visible to staff  - Apply yellow socks and bracelet for high fall risk patients  - Consider moving patient to room near nurses station  Outcome: Progressing     Problem: DISCHARGE  PLANNING  Goal: Discharge to home or other facility with appropriate resources  Description: INTERVENTIONS:  - Identify barriers to discharge w/patient and caregiver  - Arrange for needed discharge resources and transportation as appropriate  - Identify discharge learning needs (meds, wound care, etc.)  - Arrange for interpretive services to assist at discharge as needed  - Refer to Case Management Department for coordinating discharge planning if the patient needs post-hospital services based on physician/advanced practitioner order or complex needs related to functional status, cognitive ability, or social support system  Outcome: Progressing     Problem: Knowledge Deficit  Goal: Patient/family/caregiver demonstrates understanding of disease process, treatment plan, medications, and discharge instructions  Description: Complete learning assessment and assess knowledge base.  Interventions:  - Provide teaching at level of understanding  - Provide teaching via preferred learning methods  Outcome: Progressing     Problem: CARDIOVASCULAR - ADULT  Goal: Maintains optimal cardiac output and hemodynamic stability  Description: INTERVENTIONS:  - Monitor I/O, vital signs and rhythm  - Monitor for S/S and trends of decreased cardiac output  - Administer and titrate ordered vasoactive medications to optimize hemodynamic stability  - Assess quality of pulses, skin color and temperature  - Assess for signs of decreased coronary artery perfusion  - Instruct patient to report change in severity of symptoms  Outcome: Progressing  Goal: Absence of cardiac dysrhythmias or at baseline rhythm  Description: INTERVENTIONS:  - Continuous cardiac monitoring, vital signs, obtain 12 lead EKG if ordered  - Administer antiarrhythmic and heart rate control medications as ordered  - Monitor electrolytes and administer replacement therapy as ordered  Outcome: Progressing     Problem: RESPIRATORY - ADULT  Goal: Achieves optimal ventilation and  oxygenation  Description: INTERVENTIONS:  - Assess for changes in respiratory status  - Assess for changes in mentation and behavior  - Position to facilitate oxygenation and minimize respiratory effort  - Oxygen administered by appropriate delivery if ordered  - Initiate smoking cessation education as indicated  - Encourage broncho-pulmonary hygiene including cough, deep breathe, Incentive Spirometry  - Assess the need for suctioning and aspirate as needed  - Assess and instruct to report SOB or any respiratory difficulty  - Respiratory Therapy support as indicated  Outcome: Progressing     Problem: METABOLIC, FLUID AND ELECTROLYTES - ADULT  Goal: Electrolytes maintained within normal limits  Description: INTERVENTIONS:  - Monitor labs and assess patient for signs and symptoms of electrolyte imbalances  - Administer electrolyte replacement as ordered  - Monitor response to electrolyte replacements, including repeat lab results as appropriate  - Instruct patient on fluid and nutrition as appropriate  Outcome: Progressing  Goal: Fluid balance maintained  Description: INTERVENTIONS:  - Monitor labs   - Monitor I/O and WT  - Instruct patient on fluid and nutrition as appropriate  - Assess for signs & symptoms of volume excess or deficit  Outcome: Progressing     Problem: MUSCULOSKELETAL - ADULT  Goal: Maintain or return mobility to safest level of function  Description: INTERVENTIONS:  - Assess patient's ability to carry out ADLs; assess patient's baseline for ADL function and identify physical deficits which impact ability to perform ADLs (bathing, care of mouth/teeth, toileting, grooming, dressing, etc.)  - Assess/evaluate cause of self-care deficits   - Assess range of motion  - Assess patient's mobility  - Assess patient's need for assistive devices and provide as appropriate  - Encourage maximum independence but intervene and supervise when necessary  - Involve family in performance of ADLs  - Assess for home care  needs following discharge   - Consider OT consult to assist with ADL evaluation and planning for discharge  - Provide patient education as appropriate  Outcome: Progressing  Goal: Maintain proper alignment of affected body part  Description: INTERVENTIONS:  - Support, maintain and protect limb and body alignment  - Provide patient/ family with appropriate education  Outcome: Progressing

## 2024-12-18 NOTE — UTILIZATION REVIEW
Initial Clinical Review    Admission: Date/Time/Statement:   Admission Orders (From admission, onward)       Ordered        12/17/24 2238  INPATIENT ADMISSION  Once                          Orders Placed This Encounter   Procedures    INPATIENT ADMISSION     Standing Status:   Standing     Number of Occurrences:   1     Level of Care:   Med Surg [16]     Estimated length of stay:   More than 2 Midnights     Certification:   I certify that inpatient services are medically necessary for this patient for a duration of greater than two midnights. See H&P and MD Progress Notes for additional information about the patient's course of treatment.     ED Arrival Information       Expected   12/17/2024     Arrival   12/17/2024 19:10    Acuity   Emergent              Means of arrival   Walk-In    Escorted by   Family Member    Service   Hospitalist    Admission type   Emergency              Arrival complaint   Atrial fibrillation, unspecified type             Chief Complaint   Patient presents with    Dizziness     Pt presents with Afib. Was cardioverted several days ago. Pt feels dizzy.        Initial Presentation: 54 y.o. male who presented w/ family to [facility] ED. Admitted as Inpatient for evaluation and treatment of Rapid AFIB.     PMHx:  has a past medical history of Diabetes mellitus (HCC), GERD, Lung mass, Obesity, Emphysema, AFIB - s/p MEI w/ cardioversion x4 12/9/2024.       Presented from outpatient cardiology office for follow up appt and found to have recurrent DEION however denied any complaints. On exam, pt has irregularly irregular rate and rhythm. Pt was newly diagnosed w/ AFIB on 11/2024 and started on Metoprolol and Eliquis. Pt s/p MEI w/ cardioversion x4 12/9/24 w/ conversion to NSR. In ED, HR . Abnormal Labs Mg 1.7, WBC 11.17. In ED, pt given Mg IV x1, Metoprolol PO x1, Eliquis x1, Protonix PO x1 and Allopurinol x1.     Plan: Monitor on Tele, Continue Metoprolol and  Eliquis, Hold home oral diabetic meds, Start correctional insulin coverage w/ Accu-cheks. Carb controlled diet. Check A1C. CBC and CMP in am. Cardiology consulted.      Anticipated Length of Stay/Certification Statement:  Patient will be admitted on an Inpatient basis with an anticipated length of stay of greater than 2 midnights     Date: 12/18/24   Day 2:   Pt denies any complaints. HR noted to be 120 this am. Abnormal labs: HgbA1C 6.5. Plan: Continue Tele, Metoprolol, Eliquis, Correctional insulin coverage w/ Accu-cheks, Carb control diet.     EP consult: Afib w/ RVR. Plan: continue increased dose of Toprol XL 50 mg BID, Continue Eliquis, Initiate Dofetilide 500 mg BID tonight with ECG monitoring for first 5 doses. Monitor electrolytes and replete as needed. Continue Tele monitoring. Outpatient sleep study for probable sleep apnea .     Certification Statement: The patient will continue to require additional inpatient hospital stay due to afib with rvr  Discharge Plan: Anticipate discharge in 24-48 hrs to home.    ED Treatment-Medication Administration from 12/17/2024 1822 to 12/18/2024 0908         Date/Time Order Dose Route Action     12/17/2024 2216 magnesium sulfate 2 g/50 mL IVPB (premix) 2 g 2 g Intravenous New Bag     12/18/2024 0837 allopurinol (ZYLOPRIM) tablet 300 mg 300 mg Oral Given     12/18/2024 0837 apixaban (ELIQUIS) tablet 5 mg 5 mg Oral Given     12/18/2024 0519 pantoprazole (PROTONIX) EC tablet 20 mg 20 mg Oral Given     12/18/2024 0837 metoprolol succinate (TOPROL-XL) 24 hr tablet 50 mg 50 mg Oral Given            Scheduled Medications:  allopurinol, 300 mg, Oral, Daily  apixaban, 5 mg, Oral, BID  insulin lispro, 1-6 Units, Subcutaneous, HS  insulin lispro, 2-12 Units, Subcutaneous, TID AC  metoprolol succinate, 50 mg, Oral, BID  pantoprazole, 20 mg, Oral, Early Morning  dofetilide (TIKOSYN) capsule 500 mcg  Dose: 500 mcg  Freq: Every 12 hours scheduled Route: PO  Start: 12/18/24 2100      Continuous IV Infusions: NONE      PRN Meds:  acetaminophen, 650 mg, Oral, Q6H PRN  albuterol, 2.5 mg, Nebulization, Q4H PRN  methocarbamol, 750 mg, Oral, TID PRN  ondansetron, 4 mg, Intravenous, Q6H PRN      ED Triage Vitals   Temperature Pulse Respirations Blood Pressure SpO2 Pain Score   12/17/24 1915 12/17/24 1915 12/17/24 1915 12/17/24 1915 12/17/24 1915 12/17/24 2004   97.6 °F (36.4 °C) (!) 107 20 134/92 98 % 7     Weight (last 2 days)       Date/Time Weight    12/18/24 0920 180 (396)            Vital Signs (last 3 days)       Date/Time Temp Pulse Resp BP MAP (mmHg) SpO2 O2 Device Patient Position - Orthostatic VS Lucero Coma Scale Score Pain    12/18/24 11:01:17 -- 94 17 137/99 112 94 % -- -- -- --    12/18/24 0917 -- -- -- -- -- -- -- -- -- No Pain    12/18/24 09:15:38 97.2 °F (36.2 °C) 89 18 141/107 118 96 % -- -- -- --    12/18/24 09:15:07 -- 96 16 141/107 118 95 % -- -- -- --    12/18/24 0828 -- 106 18 122/72 -- 94 % None (Room air) Lying -- No Pain    12/18/24 0520 -- 120 16 105/71 81 93 % None (Room air) Lying -- --    12/18/24 0300 -- 98 17 144/80 104 93 % None (Room air) Lying -- --    12/18/24 0100 -- 100 15 131/74 88 94 % None (Room air) Lying -- --    12/18/24 0030 -- 97 18 102/61 77 93 % None (Room air) Lying -- --    12/18/24 0000 -- 101 16 106/57 74 93 % None (Room air) Lying -- --    12/17/24 2330 -- 102 18 105/61 76 90 % None (Room air) Lying -- --    12/17/24 2317 -- 102 17 117/63 -- 93 % None (Room air) Lying -- --    12/17/24 2220 -- 109 16 98/66 76 95 % None (Room air) Lying -- --    12/17/24 2052 -- 105 20 109/66 -- 93 % None (Room air) Lying -- --    12/17/24 2004 -- 109 20 126/99 110 94 % None (Room air) Lying -- 7    12/17/24 2003 -- -- -- -- -- -- None (Room air) -- -- --    12/17/24 2000 -- -- -- -- -- -- -- -- 15 --    12/17/24 1915 97.6 °F (36.4 °C) 107 20 134/92 -- 98 % None (Room air) Sitting -- --              Pertinent Labs/Diagnostic Test Results:       Cardiology:  ECG  12 lead    by Interface, Ris Results In (12/18 0133)      ECG 12 lead    by Interface, Ris Results In (12/17 1917)   Impression    Ventricular response 120 bpm QRS 88 ms QTc 477 ms, atrial fibrillation with RVR            Results from last 7 days   Lab Units 12/18/24  0519 12/17/24  2048   WBC Thousand/uL 9.40 11.17*   HEMOGLOBIN g/dL 14.8 15.7   HEMATOCRIT % 43.9 47.5   PLATELETS Thousands/uL 195 213   TOTAL NEUT ABS Thousands/µL  --  6.96         Results from last 7 days   Lab Units 12/18/24  0638 12/17/24  2104   SODIUM mmol/L 138 139   POTASSIUM mmol/L 4.8 3.5   CHLORIDE mmol/L 106 106   CO2 mmol/L 25 21   ANION GAP mmol/L 7 12   BUN mg/dL 14 14   CREATININE mg/dL 0.77 0.82   EGFR ml/min/1.73sq m 101 98   CALCIUM mg/dL 9.0 8.8   MAGNESIUM mg/dL 2.3 1.7*         Results from last 7 days   Lab Units 12/18/24  0022   POC GLUCOSE mg/dl 116     Results from last 7 days   Lab Units 12/18/24  0638 12/17/24  2104   GLUCOSE RANDOM mg/dL 116 111         Results from last 7 days   Lab Units 12/18/24  0519   HEMOGLOBIN A1C % 6.5*   EAG mg/dl 140           Results from last 7 days   Lab Units 12/17/24  2321 12/17/24  2104   HS TNI 0HR ng/L  --  3   HS TNI 2HR ng/L <2  --    HSTNI D2 ng/L <-1  --            Past Medical History:   Diagnosis Date    Diabetes mellitus (HCC)     GERD (gastroesophageal reflux disease)     Lung mass     Obesity      Present on Admission:   Other emphysema (HCC)   Type 2 diabetes mellitus without complication, without long-term current use of insulin (HCC)   Atrial fibrillation with RVR (HCC)      Admitting Diagnosis: Lightheadedness [R42]  A-fib (HCC) [I48.91]  Chest pain [R07.9]  Atrial fibrillation with RVR (HCC) [I48.91]  Age/Sex: 54 y.o. male    Network Utilization Review Department  ATTENTION: Please call with any questions or concerns to [phone] and carefully listen to the prompts so that you are directed to the right person. All voicemails are confidential.   For Discharge needs,  contact Care Management DC Support Team at [phone] opt. 2  Send all requests for admission clinical reviews, approved or denied determinations and any other requests to dedicated fax number below belonging to the campus where the patient is receiving treatment. List of dedicated fax numbers for the Facilities:  FACILITY NAME UR FAX NUMBER   ADMISSION DENIALS (Administrative/Medical Necessity) [phone]   DISCHARGE SUPPORT TEAM (NETWORK) [phone]   PARENT CHILD HEALTH (Maternity/NICU/Pediatrics) [phone]   [facility] [phone]   [facility] [phone]   [facility] [phone]   [facility] [phone]   [facility] [phone]   [facility] [phone]   [facility] [phone]   [facility] [phone]   [facility] [phone]   [facility] [phone]   [facility] [phone]   [facility] [phone]

## 2024-12-18 NOTE — ASSESSMENT & PLAN NOTE
Patient was newly diagnosed with atrial fibrillation in November of this year and was started on Toprol-XL 25 mg BID and Eliquis 5 mg BID at that time.  He subsequently underwent MEI cardioversion x 4 on 12/9/2024 with successful conversion to normal sinus rhythm.  During a routine cardiology follow-up visit on 12/17 he was noted to be in A-fib with RVR with heart rates in the low 100s and was sent to the ED.  He did complain of some lightheadedness and chest pressure on presentation.    Plan:  -Continue increased dose Toprol-XL 50 mg twice daily.  -Continue Eliquis 5 mg twice daily.  -Continue outpatient sleep medicine follow-up for probable sleep apnea.  -Monitor electrolytes and replete as needed.  -Continue telemetry monitoring.  -Plan for initiation of dofetilide 500 mg every 12 hours tonight with ECG monitoring for first 5 doses.

## 2024-12-18 NOTE — ASSESSMENT & PLAN NOTE
Lab Results   Component Value Date    HGBA1C 6.5 (H) 12/18/2024       Recent Labs     12/18/24  0022 12/18/24  1059   POCGLU 116 125       Blood Sugar Average: Last 72 hrs:  (P) 120.5  -Continue SSI with hypoglycemia protocol.

## 2024-12-18 NOTE — ASSESSMENT & PLAN NOTE
Newly diagnosed atrial fibrillation as of 11/2024, was started on metoprolol succinate and Eliquis.  Subsequently had follow-up with cardiology and underwent MEI with cardioversion x 4 12/9/2024 with conversion to normal sinus rhythm  During cardiology follow-up appointment 12/17/2024 he was noted in rapid atrial fibrillation and advised to present here for evaluation/management.  Initially he denied symptoms at appointment but now complains of some lightheadedness with chest pressure  HR  throughout ED evaluation.  Continue metoprolol succinate for now however low threshold to increase dosage as long as blood pressure allows  Cardiology consultation  Monitor on telemetry  Continue anticoagulation with Eliquis

## 2024-12-18 NOTE — H&P
H&P - Hospitalist   Name: Luis Fernando Jay 54 y.o. male I MRN: [id number]  Unit/Bed#: [facility] I Date of Admission: 12/17/2024   Date of Service: 12/18/2024 I Hospital Day: 1     Assessment & Plan  Atrial fibrillation with RVR (HCC)  Newly diagnosed atrial fibrillation as of 11/2024, was started on metoprolol succinate and Eliquis.  Subsequently had follow-up with cardiology and underwent MEI with cardioversion x 4 12/9/2024 with conversion to normal sinus rhythm  During cardiology follow-up appointment 12/17/2024 he was noted in rapid atrial fibrillation and advised to present here for evaluation/management.  Initially he denied symptoms at appointment but now complains of some lightheadedness with chest pressure  HR  throughout ED evaluation.  Continue metoprolol succinate for now however low threshold to increase dosage as long as blood pressure allows  Cardiology consultation  Monitor on telemetry  Continue anticoagulation with Eliquis  Class 3 severe obesity due to excess calories without serious comorbidity with body mass index (BMI) of 50.0 to 59.9 in adult ([facility])  Dietary and lifestyle modification advised  Morbid obesity complicates all facets of care  Patient is working with sleep medicine to see if diagnosis of ASIM is present  Other emphysema ([facility])  Not in acute exacerbation, continue as needed albuterol nebulizer  Type 2 diabetes mellitus without complication, without long-term current use of insulin ([facility])  Lab Results   Component Value Date    HGBA1C 11.2 (H) 09/09/2024       Recent Labs     12/18/24  0022   POCGLU 116       Blood Sugar Average: Last 72 hrs:  (P) 116  Recently diagnosed as of September 2024.  Will obtain updated A1c  Hold home oral medications/non-insulin injectables.  Start correctional insulin coverage with [employer] while admitted.  Carb controlled diet  Initiate hypoglycemia protocol      VTE Prophylaxis: Apixaban (Eliquis)  / sequential compression device   Code Status: Level 1 -  Full Code   POLST: POLST form is not discussed and not completed at this time.    Anticipated Length of Stay:  Patient will be admitted on an Inpatient basis with an anticipated length of stay of greater than 2 midnights.   Justification for Hospital Stay: Please see detailed plans noted above.    Chief Complaint:     Recurrent atrial fibrillation  History of Present Illness:  Luis Fernando Jay is a 54 y.o. male who has a past medical history significant for atrial fibrillation recently diagnosed during hospitalization 11/5/2024 - 11/7/2024 now following with cardiology and recently underwent MEI with subsequent cardioversion x 4 12/9/2024 with successful restoration of normal sinus rhythm, type 2 diabetes, morbid obesity, emphysema who presents from outpatient cardiology office with recurrent rapid atrial fibrillation.  Patient had routine follow-up appointment with cardiology 12/17/2024, and at this appointment he was found with a rapid irregularly irregular rhythm with subsequent EKG revealing atrial fibrillation with RVR for which he was advised to present here for further evaluation and rate control.  During the office visit he denied any symptoms of lightheadedness/dizziness or shortness of breath however during ED evaluation he now complains of some chest tightness with light headedness.  Throughout ED evaluation he maintained atrial fibrillation HR , with labs without marked abnormality aside from mild hypomagnesemia for which IV magnesium was provided and patient admitted for further management of his recurrent atrial fibrillation with earlier RVR with plans for cardiology consultation and further management as appropriate.    Review of Systems:    Constitutional:  Denies fever or chills   Eyes:  Denies change in visual acuity   HENT:  Denies nasal congestion or sore throat   Respiratory:  Denies cough or shortness of breath   Cardiovascular:  Denies  edema but reported chest tightness  GI:  Denies  abdominal pain, nausea, vomiting, bloody stools or diarrhea   :  Denies dysuria   Musculoskeletal:  Denies back pain or joint pain   Integument:  Denies rash   Neurologic:  Denies headache, focal weakness or sensory changes but reported dizziness/lightheadedness  Endocrine:  Denies polyuria or polydipsia   Lymphatic:  Denies swollen glands   Psychiatric:  Denies depression or anxiety     Past Medical and Surgical History:   Past Medical History:   Diagnosis Date    Diabetes mellitus (HCC)     GERD (gastroesophageal reflux disease)     Lung mass     Obesity      History reviewed. No pertinent surgical history.    Meds/Allergies:  Current Outpatient Medications   Medication Instructions    albuterol (ProAir HFA) 90 mcg/act inhaler 2 puffs, Inhalation, Every 6 hours PRN    albuterol (PROVENTIL HFA,VENTOLIN HFA) 90 mcg/act inhaler 2 puffs, Inhalation, Every 4 hours PRN    albuterol 2.5 mg, Nebulization, Every 4 hours PRN    Alcohol Swabs 70 % PADS May substitute brand based on insurance coverage. Check glucose BID.    allopurinol (ZYLOPRIM) 300 mg, Oral, Daily    apixaban (ELIQUIS) 5 mg, Oral, 2 times daily    Blood Glucose Monitoring Suppl (OneTouch Verio Reflect) w/Device KIT May substitute brand based on insurance coverage. Check glucose BID.    ciprofloxacin-dexamethasone (CIPRODEX) otic suspension 4 drops, Left Ear, 2 times daily    Continuous Glucose Sensor (FreeStyle Hali 3 Sensor) MISC 1 each, Does not apply, Every 14 days    esomeprazole (NEXIUM) 20 mg, Oral, Daily (early morning)    glucose blood (OneTouch Verio) test strip May substitute brand based on insurance coverage. Check glucose BID.    metFORMIN (GLUCOPHAGE) 1,000 mg, Oral, 2 times daily with meals    methocarbamol (ROBAXIN-750) 750 mg, Oral, 3 times daily PRN    metoprolol succinate (TOPROL-XL) 25 mg, Oral, 2 times daily    naproxen (NAPROSYN) 500 mg, Oral, 2 times daily PRN    OneTouch Delica Lancets 33G MISC May substitute brand based on  insurance coverage. Check glucose BID.    semaglutide (2 mg/dose) (OZEMPIC) 2 mg, Subcutaneous, Every 7 days         Allergies: No Known Allergies  History:  Marital Status: /Civil Union     Substance Use History:   Social History     Substance and Sexual Activity   Alcohol Use Not Currently     Social History     Tobacco Use   Smoking Status Former    Current packs/day: 0.00    Average packs/day: 3.0 packs/day for 40.3 years (121.0 ttl pk-yrs)    Types: Cigarettes    Start date:     Quit date: 2019    Years since quittin.6   Smokeless Tobacco Never     Social History     Substance and Sexual Activity   Drug Use Never       Family History:  Family History   Problem Relation Age of Onset    No Known Problems Mother     Tuberculosis Father     Atrial fibrillation Father 65    Lung cancer Paternal Uncle     Alcohol abuse Neg Hx     Substance Abuse Neg Hx     Mental illness Neg Hx     Depression Neg Hx        Physical Exam:     Vitals:   Blood Pressure: 102/61 (24 0030)  Pulse: 97 (240)  Temperature: 97.6 °F (36.4 °C) (24)  Temp Source: Oral (24)  Respirations: 18 (240)  SpO2: 93 % (24)    Constitutional:  Well developed, morbidly obese, no acute distress, non-toxic appearance   Eyes:  PERRL, conjunctiva normal   HENT:  Atraumatic, external ears normal, nose normal, oropharynx moist, no pharyngeal exudates. Neck- normal range of motion, no tenderness, supple   Respiratory:  No respiratory distress, normal breath sounds, no rales, no wheezing   Cardiovascular: Irregularly irregular rate and rhythm, no murmurs, no gallops, no rubs   GI:  Soft, nondistended, normal bowel sounds, nontender, no organomegaly, no mass, no rebound, no guarding   :  No costovertebral angle tenderness   Musculoskeletal:  No edema, no tenderness, no deformities. Back- no tenderness  Integument:  Well hydrated, no rash   Lymphatic:  No lymphadenopathy noted    Neurologic:  Alert &awake, communicative, CN 2-12 normal, normal motor function, normal sensory function, no focal deficits noted   Psychiatric:  Speech and behavior appropriate       Lab Results: I have reviewed laboratory reports/results from this admission    Results from last 7 days   Lab Units 12/17/24  2048   WBC Thousand/uL 11.17*   HEMOGLOBIN g/dL 15.7   HEMATOCRIT % 47.5   PLATELETS Thousands/uL 213   SEGS PCT % 62   LYMPHO PCT % 26   MONO PCT % 7   EOS PCT % 4     Results from last 7 days   Lab Units 12/17/24  2104   SODIUM mmol/L 139   POTASSIUM mmol/L 3.5   CHLORIDE mmol/L 106   CO2 mmol/L 21   BUN mg/dL 14   CREATININE mg/dL 0.82   ANION GAP mmol/L 12   CALCIUM mg/dL 8.8   GLUCOSE RANDOM mg/dL 111         Results from last 7 days   Lab Units 12/18/24  0022   POC GLUCOSE mg/dl 116               EKG: Personally reviewed, atrial fibrillation with RVR     Imaging: Results Review Statement: No pertinent imaging studies reviewed.    MRI knee right  wo contrast  Result Date: 12/13/2024  Narrative: MRI RIGHT KNEE INDICATION:   W19.XXXA: Unspecified fall, initial encounter S89.91XA: Unspecified injury of right lower leg, initial encounter. COMPARISON: Prior MRI study dated 6/27/2022. TECHNIQUE: Multiplanar/multisequence MR of the right knee was performed. Imaging performed on 1.5T MRI FINDINGS: There is motion artifact. SUBCUTANEOUS TISSUES: There is minimal anterior subcutaneous edema. JOINT EFFUSION: Trace joint fluid. BAKER'S CYST: None. MENISCI: There is redemonstrated a horizontal tear at the junction of the posterior horn and body of the medial meniscus. The lateral meniscus is intact. CRUCIATE LIGAMENTS: Intact. EXTENSOR APPARATUS: Intact. COLLATERAL LIGAMENTS: Intact. ARTICULAR SURFACES: Mild cartilage thinning in all 3 compartments, slightly progressed in the patellofemoral compartment. BONES: Normal. No fracture. MUSCULATURE:  Intact.     Impression: Redemonstrated horizontal tear at the  junction of the posterior horn and body of the medial meniscus. Mild tricompartmental osteoarthrosis slightly progressed in the patellofemoral compartment. No fracture or contusion. Workstation performed: [id number]     MEI  Result Date: 12/9/2024  Narrative:   Left Ventricle: Left ventricular cavity size is normal. Wall thickness is normal. The left ventricular ejection fraction is 55%. Systolic function is normal. Wall motion is normal.   Right Ventricle: Right ventricular cavity size is dilated. Systolic function is normal.   Left Atrium: The atrium is dilated.   Right Atrium: The atrium is dilated.   Atrial Septum: No obvious patent foramen ovale detected, confirmed at rest using color doppler.   Left Atrial Appendage: Left atrial appendage is dilated. There is normal function. There is no thrombus.   Mitral Valve: There is mild regurgitation.     Cardioversion  Result Date: 12/9/2024  Narrative: Electrical Cardioversion Note Indication: atrial fibrillation Anticoagulation: apixaban (patient missed a dose three days prior, MEI ordered) Sedation provided by anesthesia team MEI findings: no intracardiac thrombus, further findings in MEI report. Pad placement: anteroposterior Successful cardioversion after the fourth shock. First three shocks were unsuccessful and patient remained in atrial fibrillation. Fist shock unsuccessful Anterolateral pad placement with 275 J Second shock successful anterolateral pad placement with 360 J Third shock unsuccessful anteroposterior pad placement with 360 J Fourth shock successful anteroposterior pad placement with 360 J. Sinus rhythm confirmed by EKG Ashok Dominique MD, cardiology fellow.  I supervised and was present for the entire procedure. Hank Garrison MD     XR ankle 3+ vw right  Result Date: 11/26/2024  Narrative: XR ANKLE 3+ VW RIGHT INDICATION: pain s/p trauma. Fall 5 days before presentation. COMPARISON: None FINDINGS: No acute fracture or dislocation. Calcaneal  enthesophyte(s). Degenerative changes in the midfoot demonstrated by joint space narrowing osteophyte formation. Mild degenerative changes including osteophytosis are seen along the anterior aspect of the distal tibia. No lytic or blastic osseous lesion. Atherosclerotic calcifications. Soft tissue swelling is seen about the ankle. Generalized subcutaneous edematous changes are suggested throughout the visualized soft tissues.     Impression: No evidence of acute osseous abnormality. Degenerative changes as described. Possible mild soft tissue swelling along the lateral aspect of the ankle. Generalized edematous changes are suggested throughout the visualized subcutaneous soft tissues. Computerized Assisted Algorithm (CAA) may have been used to analyze all applicable images. Resident: Gordon Barcenas I, the attending radiologist, have reviewed the images and agree with the final report above. Workstation performed: [id number]     XR knee 1 or 2 views right  Result Date: 11/26/2024  Narrative: XR KNEE 1 OR 2 VW RIGHT INDICATION: just get sunrise view - already had AP & lateral - s/p trauma. COMPARISON: Right knee radiograph 11/21/2024 FINDINGS: No acute fracture or dislocation. Mild enthesophytes are noted. No lytic or blastic osseous lesion. Unremarkable soft tissues.     Impression: No evidence of acute osseous abnormality on this limited 1 view study. Computerized Assisted Algorithm (CAA) may have been used to analyze all applicable images. Resident: Gordon Barcenas I, the attending radiologist, have reviewed the images and agree with the final report above. Workstation performed: [id number]     XR knee 4+ vw right injury  Result Date: 11/21/2024  Narrative: XR KNEE 4+ VW RIGHT INJURY INDICATION: S89.91XA: Unspecified injury of right lower leg, initial encounter. COMPARISON: None FINDINGS: No acute fracture or dislocation. No joint effusion. No significant degenerative changes. No lytic or blastic osseous lesion.  Unremarkable soft tissues.     Impression: No acute osseous abnormality. Computerized Assisted Algorithm (CAA) may have been used to analyze all applicable images. Workstation performed: [id number]         ** Please Note: Dragon 360 Dictation voice to text software was used in the creation of this document. **      ** Please Note: This note has been constructed using a voice recognition system. **

## 2024-12-18 NOTE — ASSESSMENT & PLAN NOTE
-Patient fell on his right knee in late November and recently underwent MRI. He has an outpatient ortho visit scheduled for Friday. Orthopedic surgery consulted as patient must remain in the hospital for dofetilide loading.

## 2024-12-18 NOTE — ASSESSMENT & PLAN NOTE
Dietary and lifestyle modification advised  Morbid obesity complicates all facets of care  Patient is working with sleep medicine to see if diagnosis of ASIM is present

## 2024-12-19 LAB
ANION GAP SERPL CALCULATED.3IONS-SCNC: 8 MMOL/L (ref 4–13)
BUN SERPL-MCNC: 12 MG/DL (ref 5–25)
CALCIUM SERPL-MCNC: 8.8 MG/DL (ref 8.4–10.2)
CHLORIDE SERPL-SCNC: 105 MMOL/L (ref 96–108)
CO2 SERPL-SCNC: 25 MMOL/L (ref 21–32)
CREAT SERPL-MCNC: 0.7 MG/DL (ref 0.6–1.3)
GFR SERPL CREATININE-BSD FRML MDRD: 105 ML/MIN/1.73SQ M
GLUCOSE SERPL-MCNC: 107 MG/DL (ref 65–140)
GLUCOSE SERPL-MCNC: 114 MG/DL (ref 65–140)
GLUCOSE SERPL-MCNC: 123 MG/DL (ref 65–140)
GLUCOSE SERPL-MCNC: 205 MG/DL (ref 65–140)
GLUCOSE SERPL-MCNC: 96 MG/DL (ref 65–140)
POTASSIUM SERPL-SCNC: 3.8 MMOL/L (ref 3.5–5.3)
QRS AXIS: 65 DEGREES
QRS AXIS: 65 DEGREES
QRS AXIS: 69 DEGREES
QRSD INTERVAL: 90 MS
QRSD INTERVAL: 92 MS
QRSD INTERVAL: 94 MS
QT INTERVAL: 354 MS
QT INTERVAL: 374 MS
QT INTERVAL: 394 MS
QTC INTERVAL: 448 MS
QTC INTERVAL: 476 MS
QTC INTERVAL: 498 MS
SODIUM SERPL-SCNC: 138 MMOL/L (ref 135–147)
T WAVE AXIS: 33 DEGREES
T WAVE AXIS: 54 DEGREES
T WAVE AXIS: 56 DEGREES
VENTRICULAR RATE: 96 BPM
VENTRICULAR RATE: 96 BPM
VENTRICULAR RATE: 97 BPM

## 2024-12-19 PROCEDURE — 93005 ELECTROCARDIOGRAM TRACING: CPT

## 2024-12-19 PROCEDURE — NC001 PR NO CHARGE: Performed by: INTERNAL MEDICINE

## 2024-12-19 PROCEDURE — NC001 PR NO CHARGE: Performed by: ORTHOPAEDIC SURGERY

## 2024-12-19 PROCEDURE — 99222 1ST HOSP IP/OBS MODERATE 55: CPT | Performed by: ORTHOPAEDIC SURGERY

## 2024-12-19 PROCEDURE — 82948 REAGENT STRIP/BLOOD GLUCOSE: CPT

## 2024-12-19 PROCEDURE — 99232 SBSQ HOSP IP/OBS MODERATE 35: CPT | Performed by: FAMILY MEDICINE

## 2024-12-19 PROCEDURE — 93010 ELECTROCARDIOGRAM REPORT: CPT | Performed by: INTERNAL MEDICINE

## 2024-12-19 PROCEDURE — 3E0U33Z INTRODUCTION OF ANTI-INFLAMMATORY INTO JOINTS, PERCUTANEOUS APPROACH: ICD-10-PCS | Performed by: ORTHOPAEDIC SURGERY

## 2024-12-19 PROCEDURE — 80048 BASIC METABOLIC PNL TOTAL CA: CPT | Performed by: STUDENT IN AN ORGANIZED HEALTH CARE EDUCATION/TRAINING PROGRAM

## 2024-12-19 RX ORDER — ALBUTEROL SULFATE 90 UG/1
2 INHALANT RESPIRATORY (INHALATION) EVERY 4 HOURS PRN
Status: DISCONTINUED | OUTPATIENT
Start: 2024-12-19 | End: 2024-12-21 | Stop reason: HOSPADM

## 2024-12-19 RX ADMIN — APIXABAN 5 MG: 5 TABLET, FILM COATED ORAL at 17:43

## 2024-12-19 RX ADMIN — APIXABAN 5 MG: 5 TABLET, FILM COATED ORAL at 08:02

## 2024-12-19 RX ADMIN — DOFETILIDE 500 MCG: 0.5 CAPSULE ORAL at 08:02

## 2024-12-19 RX ADMIN — ALLOPURINOL 300 MG: 300 TABLET ORAL at 08:02

## 2024-12-19 RX ADMIN — PANTOPRAZOLE SODIUM 20 MG: 20 TABLET, DELAYED RELEASE ORAL at 05:21

## 2024-12-19 RX ADMIN — INSULIN LISPRO 2 UNITS: 100 INJECTION, SOLUTION INTRAVENOUS; SUBCUTANEOUS at 20:57

## 2024-12-19 RX ADMIN — METOPROLOL SUCCINATE 50 MG: 50 TABLET, EXTENDED RELEASE ORAL at 17:43

## 2024-12-19 RX ADMIN — DOFETILIDE 500 MCG: 0.5 CAPSULE ORAL at 20:23

## 2024-12-19 NOTE — RESTORATIVE TECHNICIAN NOTE
Restorative Technician Note      Patient Name: Luis Fernando Jay     Restorative Tech Visit Date: 12/19/24  Note Type: Bracing, Initial consult  Patient Position Upon Consult: Supine  Brace Applied: Other (Comment) (short neoprene hinged knee wrap. XXXL)  Additional Brace Ordered: No  Patient Position When Brace Applied: Supine  Education Provided: Yes  Patient Position at End of Consult: Supine  Nurse Communication: Nurse aware of consult, application of brace      Please contact [facility] Restorative tech on Epic Secure Chat  in regards to bracing instruction and/or adjustment.       CFo Yanely

## 2024-12-19 NOTE — CASE MANAGEMENT
Case Management Assessment & Discharge Planning Note    Patient name Luis Fernando Jay  Location 2 213/CW2 213-02 MRN [id number]  : 1970 Date 2024       Current Admission Date: 2024  Current Admission Diagnosis:Atrial fibrillation with RVR (HCC)   Patient Active Problem List    Diagnosis Date Noted Date Diagnosed    Atrial fibrillation with RVR ([facility]) 2024     Mastoiditis 2024     Syncope and collapse 2024     Acute idiopathic gout of right foot 08/10/2023     Primary osteoarthritis of right knee 2022     Right knee pain 2022     Cigarette nicotine dependence in remission 2021     Other hyperlipidemia 2020     Type 2 diabetes mellitus without complication, without long-term current use of insulin ([facility]) 2020     Other emphysema ([facility]) 2019     Class 3 severe obesity due to excess calories without serious comorbidity with body mass index (BMI) of 50.0 to 59.9 in adult ([facility]) 2019     GERD (gastroesophageal reflux disease) 2019     Vasovagal syncope 2019     Pulmonary nodules 2019     Abnormal CT of the chest 2019       LOS (days): 2  Geometric Mean LOS (GMLOS) (days):   Days to GMLOS:     OBJECTIVE:    Risk of Unplanned Readmission Score: 8         Current admission status: Inpatient       Preferred Pharmacy:   [id number] [place] [facility] [address] [place] [id number]  Phone: [phone] Fax: [phone]    [facility] [place] - [address]  [address]  [place] [address]  Phone: [phone] Fax: [phone]    Primary Care Provider: Serenity Márquez MD    Primary Insurance: [employer]  Secondary Insurance: BLUE CROSS    ASSESSMENT:  Active Health Care Proxies       Erika Jay Health Care Representative - Spouse   Primary Phone: [phone] (Mobile)                 Advance Directives  Does patient have a Health Care POA?:  No  Was patient offered paperwork?: Yes (Pt declined)  Does patient currently have a Health Care decision maker?: Yes, please see Health Care Proxy section  Does patient have Advance Directives?: Yes  Advance Directives: Living will  Primary Contact: Erika Jay/Wife ([phone])    Readmission Root Cause  30 Day Readmission: No    Patient Information  Admitted from:: Home  Mental Status: Alert  During Assessment patient was accompanied by: Not accompanied during assessment  Assessment information provided by:: Patient  Primary Caregiver: Self  Support Systems: Spouse/significant other  County of Residence: [place]  [place] you live [place]  Home entry access options. Select all that apply.: Stairs  Number of steps to enter home.: 2  Do the steps have railings?: No  Type of Current Residence: [facility]  Living Arrangements: Lives w/ Spouse/significant other, Lives w/ Extended Family (Lives with wife and father in law, pt's dad is in an in-law suite)  Is patient a ?: No    Activities of Daily Living Prior to Admission  Functional Status: Independent  Completes ADLs independently?: Yes  Ambulates independently?: Yes  Does patient use assisted devices?: Yes  Assisted Devices (DME) used: Crutches  Does patient currently own DME?: Yes  What DME does the patient currently own?: Crutches  Does patient have a history of Outpatient Therapy (PT/OT)?: Yes  Does the patient have a history of Short-Term Rehab?: No  Does patient have a history of HHC?: No  Does patient currently have HHC?: No    Patient Information Continued  Income Source: Employed  Does patient have prescription coverage?: Yes  Does patient have a history of substance abuse?: No  Does patient have a history of Mental Health Diagnosis?: No    Means of Transportation  Means of Transport to Appts:: Drives Self    DISCHARGE DETAILS:    Discharge planning discussed with:: Patient     CM contacted family/caregiver?: No- see comments (Pt reports  keeping family updated)  Were Treatment Team discharge recommendations reviewed with patient/caregiver?: Yes  Did patient/caregiver verbalize understanding of patient care needs?: Yes  Were patient/caregiver advised of the risks associated with not following Treatment Team discharge recommendations?: Yes    Contacts  Patient Contacts: Patient  Contact Method: In Person  Reason/Outcome: Continuity of Care, Emergency Contact, Discharge Planning    Other Referral/Resources/Interventions Provided:  Interventions: None Indicated    Treatment Team Recommendation: Home  Discharge Destination Plan:: Home  Transport at Discharge : Family     Additional Comments: Pt reports residing with his wife and father in law in a ranch style home that has 2 ROD, pt's father is in an in-law suite. Pt reports being independent with ADLs and ambulation, pt uses crutches. Pt reports a history of OPPT, no history of HHC or STR. No indicated history of MH or D/A treatment. Pt's wife to transport home. Pt reports no concerns or needs for time of discharge,  department to remain available.

## 2024-12-19 NOTE — PLAN OF CARE
Problem: PAIN - ADULT  Goal: Verbalizes/displays adequate comfort level or baseline comfort level  Description: Interventions:  - Encourage patient to monitor pain and request assistance  - Assess pain using appropriate pain scale  - Administer analgesics based on type and severity of pain and evaluate response  - Implement non-pharmacological measures as appropriate and evaluate response  - Consider cultural and social influences on pain and pain management  - Notify physician/advanced practitioner if interventions unsuccessful or patient reports new pain  Outcome: Progressing     Problem: INFECTION - ADULT  Goal: Absence or prevention of progression during hospitalization  Description: INTERVENTIONS:  - Assess and monitor for signs and symptoms of infection  - Monitor lab/diagnostic results  - Monitor all insertion sites, i.e. indwelling lines, tubes, and drains  - Monitor endotracheal if appropriate and nasal secretions for changes in amount and color  - Joanna appropriate cooling/warming therapies per order  - Administer medications as ordered  - Instruct and encourage patient and family to use good hand hygiene technique  - Identify and instruct in appropriate isolation precautions for identified infection/condition  Outcome: Progressing     Problem: SAFETY ADULT  Goal: Patient will remain free of falls  Description: INTERVENTIONS:  - Educate patient/family on patient safety including physical limitations  - Instruct patient to call for assistance with activity   - Consult OT/PT to assist with strengthening/mobility   - Keep Call bell within reach  - Keep bed low and locked with side rails adjusted as appropriate  - Keep care items and personal belongings within reach  - Initiate and maintain comfort rounds  - Make Fall Risk Sign visible to staff  - Apply yellow socks and bracelet for high fall risk patients  - Consider moving patient to room near nurses station  Outcome: Progressing     Problem: DISCHARGE  PLANNING  Goal: Discharge to home or other facility with appropriate resources  Description: INTERVENTIONS:  - Identify barriers to discharge w/patient and caregiver  - Arrange for needed discharge resources and transportation as appropriate  - Identify discharge learning needs (meds, wound care, etc.)  - Arrange for interpretive services to assist at discharge as needed  - Refer to Case Management Department for coordinating discharge planning if the patient needs post-hospital services based on physician/advanced practitioner order or complex needs related to functional status, cognitive ability, or social support system  Outcome: Progressing     Problem: Knowledge Deficit  Goal: Patient/family/caregiver demonstrates understanding of disease process, treatment plan, medications, and discharge instructions  Description: Complete learning assessment and assess knowledge base.  Interventions:  - Provide teaching at level of understanding  - Provide teaching via preferred learning methods  Outcome: Progressing     Problem: CARDIOVASCULAR - ADULT  Goal: Maintains optimal cardiac output and hemodynamic stability  Description: INTERVENTIONS:  - Monitor I/O, vital signs and rhythm  - Monitor for S/S and trends of decreased cardiac output  - Administer and titrate ordered vasoactive medications to optimize hemodynamic stability  - Assess quality of pulses, skin color and temperature  - Assess for signs of decreased coronary artery perfusion  - Instruct patient to report change in severity of symptoms  Outcome: Progressing  Goal: Absence of cardiac dysrhythmias or at baseline rhythm  Description: INTERVENTIONS:  - Continuous cardiac monitoring, vital signs, obtain 12 lead EKG if ordered  - Administer antiarrhythmic and heart rate control medications as ordered  - Monitor electrolytes and administer replacement therapy as ordered  Outcome: Progressing     Problem: RESPIRATORY - ADULT  Goal: Achieves optimal ventilation and  oxygenation  Description: INTERVENTIONS:  - Assess for changes in respiratory status  - Assess for changes in mentation and behavior  - Position to facilitate oxygenation and minimize respiratory effort  - Oxygen administered by appropriate delivery if ordered  - Initiate smoking cessation education as indicated  - Encourage broncho-pulmonary hygiene including cough, deep breathe, Incentive Spirometry  - Assess the need for suctioning and aspirate as needed  - Assess and instruct to report SOB or any respiratory difficulty  - Respiratory Therapy support as indicated  Outcome: Progressing     Problem: METABOLIC, FLUID AND ELECTROLYTES - ADULT  Goal: Electrolytes maintained within normal limits  Description: INTERVENTIONS:  - Monitor labs and assess patient for signs and symptoms of electrolyte imbalances  - Administer electrolyte replacement as ordered  - Monitor response to electrolyte replacements, including repeat lab results as appropriate  - Instruct patient on fluid and nutrition as appropriate  Outcome: Progressing  Goal: Fluid balance maintained  Description: INTERVENTIONS:  - Monitor labs   - Monitor I/O and WT  - Instruct patient on fluid and nutrition as appropriate  - Assess for signs & symptoms of volume excess or deficit  Outcome: Progressing     Problem: MUSCULOSKELETAL - ADULT  Goal: Maintain or return mobility to safest level of function  Description: INTERVENTIONS:  - Assess patient's ability to carry out ADLs; assess patient's baseline for ADL function and identify physical deficits which impact ability to perform ADLs (bathing, care of mouth/teeth, toileting, grooming, dressing, etc.)  - Assess/evaluate cause of self-care deficits   - Assess range of motion  - Assess patient's mobility  - Assess patient's need for assistive devices and provide as appropriate  - Encourage maximum independence but intervene and supervise when necessary  - Involve family in performance of ADLs  - Assess for home care  needs following discharge   - Consider OT consult to assist with ADL evaluation and planning for discharge  - Provide patient education as appropriate  Outcome: Progressing  Goal: Maintain proper alignment of affected body part  Description: INTERVENTIONS:  - Support, maintain and protect limb and body alignment  - Provide patient/ family with appropriate education  Outcome: Progressing

## 2024-12-19 NOTE — ASSESSMENT & PLAN NOTE
Lab Results   Component Value Date    HGBA1C 6.5 (H) 12/18/2024       Recent Labs     12/18/24  1701 12/18/24  2113 12/19/24  0700 12/19/24  1127   POCGLU 130 110 114 96       Blood Sugar Average: Last 72 hrs:  (P) 115.8133670849496961  Recently diagnosed as of September 2024.  Will obtain updated A1c- controlled at 6.5  Hold home oral medications/non-insulin injectables.  Start correctional insulin coverage with Accu-Cheks while admitted.  Carb controlled diet  Initiate hypoglycemia protocol

## 2024-12-19 NOTE — PROGRESS NOTES
Electrophysiology Progress Note   Luis Fernando Jay 54 y.o. male MRN: [id number]  Unit/Bed#: CW2 213-02 Encounter: [id number]    Hospital Course/Assessment  54 y.o. male with a history of obesity, type 2 diabetes, emphysema and atrial fibrillation recently diagnosed during hospitalization between 11/5-11/7 of this year s/p MEI cardioversion x 4 on 12/9 with successful restoration of sinus rhythm who presented from the outpatient cardiology office on 12/17 for A-fib with RVR.  During his routine follow-up he was noted to be in A-fib with a heart rate in the 110s to 120s.  He denied any symptoms at that time, however in the ED he did report some lightheadedness and chest tightness.  He was noted to be hypomagnesemic and received IV magnesium.  Patient was started on dofetilide.    Subjective:   Today the patient was seen and examined at bedside.  He had no acute events overnight.  Overall he reports feeling well and had no acute complaints.  Telemetry shows rate controlled A-fib.    Plan  Assessment & Plan  Atrial fibrillation with RVR (HCC)  Patient was newly diagnosed with atrial fibrillation in November of this year and was started on Toprol-XL 25 mg BID and Eliquis 5 mg BID at that time.  He subsequently underwent MEI cardioversion x 4 on 12/9/2024 with successful conversion to normal sinus rhythm.  During a routine cardiology follow-up visit on 12/17 he was noted to be in A-fib with RVR with heart rates in the low 100s and was sent to the ED.  He did complain of some lightheadedness and chest pressure on presentation.    Plan:  -Continue increased dose Toprol-XL 50 mg twice daily.  -Continue Eliquis 5 mg twice daily.  -Continue outpatient sleep medicine follow-up for probable sleep apnea.  -Monitor electrolytes and replete as needed.  -Continue telemetry monitoring.  -Continue dofetilide 500 mg every 12 hours with ECG monitoring for first 5 doses. Started 12/18 PM.     Class 3 severe obesity due to excess calories  "without serious comorbidity with body mass index (BMI) of 50.0 to 59.9 in adult ([facility])  -Continue outpatient workup for ASIM.  - on weight loss.  Other emphysema ([facility])  -Continue as needed breathing treatments.  Type 2 diabetes mellitus without complication, without long-term current use of insulin ([facility])  Lab Results   Component Value Date    HGBA1C 6.5 (H) 12/18/2024       Recent Labs     12/18/24  1701 12/18/24  2113 12/19/24  0700 12/19/24  1127   POCGLU 130 110 114 96       Blood Sugar Average: Last 72 hrs:  (P) 115.1241403249376121  -Continue SSI with hypoglycemia protocol.  Right knee pain  -Ortho following. Conservative management.          Ruiz Cheng MD  -PGY-5 Cardiology Fellow  -Tiger text enabled    ======================================================  Objective  VS: Blood pressure 114/84, pulse 94, temperature (!) 97.4 °F (36.3 °C), resp. rate 17, height 6' 2\" (1.88 m), weight (!) 180 kg (396 lb), SpO2 96%.  Gen: Well appearing  Psych: AOx3  Skin: Intact  Neck: Supple  Cardiac: S1, S2, irregular rate, no S3 or S4 appreciated. No murmurs. +2 PT, radial pulses. No peripheral edema. No carotid bruits.  Resp: CTABL. No crackles  Abd: Nontender, nondistended  Rheum: No joint deformities in UE or LE  ======================================================  TREADMILL STRESS  No results found for this or any previous visit.     ----------------------------------------------------------------------------------------------  NUCLEAR STRESS TEST: No results found for this or any previous visit.    No results found for this or any previous visit.      --------------------------------------------------------------------------------  CATH:  No results found for this or any previous visit.    --------------------------------------------------------------------------------  ECHO:   No results found for this or any previous visit.    No results found for this or any previous " visit.    --------------------------------------------------------------------------------  HOLTER  No results found for this or any previous visit.    --------------------------------------------------------------------------------  CAROTIDS  No results found for this or any previous visit.       [unfilled]   =====================================================    Active Meds    Current Facility-Administered Medications:     acetaminophen (TYLENOL) tablet 650 mg, 650 mg, Oral, Q6H PRN, Lee Herrera DO    albuterol (PROVENTIL HFA,VENTOLIN HFA) inhaler 2 puff, 2 puff, Inhalation, Q4H PRN, Stephen Emery MD    allopurinol (ZYLOPRIM) tablet 300 mg, 300 mg, Oral, Daily, Lee Herrera DO, 300 mg at 12/19/24 0802    apixaban (ELIQUIS) tablet 5 mg, 5 mg, Oral, BID, Lee Herrera DO, 5 mg at 12/19/24 0802    dofetilide (TIKOSYN) capsule 500 mcg, 500 mcg, Oral, Q12H ROM, Ruiz Cheng MD, 500 mcg at 12/19/24 0802    insulin lispro (HumALOG/ADMELOG) 100 units/mL subcutaneous injection 1-6 Units, 1-6 Units, Subcutaneous, HS, Lee Herrera DO    insulin lispro (HumALOG/ADMELOG) 100 units/mL subcutaneous injection 2-12 Units, 2-12 Units, Subcutaneous, TID AC **AND** Fingerstick Glucose (POCT), , , TID AC, Lee Herrera DO    methocarbamol (ROBAXIN) tablet 750 mg, 750 mg, Oral, TID PRN, Lee Herrera DO    metoprolol succinate (TOPROL-XL) 24 hr tablet 50 mg, 50 mg, Oral, BID, Lee Herrera DO, 50 mg at 12/18/24 1720    ondansetron (ZOFRAN) injection 4 mg, 4 mg, Intravenous, Q6H PRN, Lee Herrera DO    pantoprazole (PROTONIX) EC tablet 20 mg, 20 mg, Oral, Early Morning, Lee Herrera DO, 20 mg at 12/19/24 0521    Labs & Results  Lab Results   Component Value Date    TROPONINI <0.02 04/23/2019    TROPONINI <0.02 04/23/2019    TROPONINI <0.02 04/22/2019     Lab Results   Component Value Date    GLUCOSE 163 (H) 04/22/2019    CALCIUM 8.8 12/19/2024    K 3.8 12/19/2024    CO2 25 12/19/2024      "12/19/2024    BUN 12 12/19/2024    CREATININE 0.70 12/19/2024     Lab Results   Component Value Date    WBC 9.40 12/18/2024    HGB 14.8 12/18/2024    HCT 43.9 12/18/2024    MCV 92 12/18/2024     12/18/2024         No results found for: \"CHOL\"  Lab Results   Component Value Date    HDL 28 (L) 11/06/2024    HDL 35 (L) 09/09/2024     Lab Results   Component Value Date    LDLCALC 72 11/06/2024    LDLCALC 109 (H) 09/09/2024     Lab Results   Component Value Date    TRIG 108 11/06/2024    TRIG 261 (H) 09/09/2024     Lab Results   Component Value Date    ALT 45 11/06/2024    AST 24 11/06/2024    ALKPHOS 79 11/06/2024          "

## 2024-12-19 NOTE — PROCEDURES
Procedure- Orthopedics   Luis Fernando Jay 54 y.o. male MRN: [id number]  Unit/Bed#: CW2 213-02    Procedure: right knee injection    After sterile preparation of the skin overlying the knee an 23 gauge needle was inserted via a superior lateral portal.  A mixture of 2cc 1% lidocaine, 2cc .5% Marcaine, 2cc Betamethasone was injected into the knee joint without resistance.  The needle was withdrawn and a piece of sterile gauze was placed over the site.  Pt tolerated the procedure well and was neurovascularly intact both pre and post procedure.      Mayur Smalls MD  Orthopedic surgery, PGY-2

## 2024-12-19 NOTE — ASSESSMENT & PLAN NOTE
Lab Results   Component Value Date    HGBA1C 6.5 (H) 12/18/2024       Recent Labs     12/18/24  1701 12/18/24  2113 12/19/24  0700 12/19/24  1127   POCGLU 130 110 114 96       Blood Sugar Average: Last 72 hrs:  (P) 115.0976706458398979  -Continue SSI with hypoglycemia protocol.

## 2024-12-19 NOTE — PROGRESS NOTES
Progress Note - Hospitalist   Name: Luis Fernando Jay 54 y.o. male I MRN: [id number]  Unit/Bed#: CW2 213-02 I Date of Admission: 12/17/2024   Date of Service: 12/19/2024 I Hospital Day: 2     Assessment & Plan  Atrial fibrillation with RVR (HCC)  Newly diagnosed atrial fibrillation as of 11/2024, was started on metoprolol succinate and Eliquis.  Subsequently had follow-up with cardiology and underwent MEI with cardioversion x 4 12/9/2024 with conversion to normal sinus rhythm  During cardiology follow-up appointment 12/17/2024 he was noted in rapid atrial fibrillation and advised to present here for evaluation/management.  Initially he denied symptoms at appointment but now complains of some lightheadedness with chest pressure  HR  throughout ED evaluation.  Continue metoprolol succinate await Cardiology consultation  Monitor on telemetry  Continue anticoagulation with Eliquis  Metoprolol succinate increased to 50 mg twice daily  Started on dofetilide 500 mcg bid by EP with close monitoring  Class 3 severe obesity due to excess calories without serious comorbidity with body mass index (BMI) of 50.0 to 59.9 in adult ([facility])  Dietary and lifestyle modification advised  Morbid obesity complicates all facets of care  Patient is working with sleep medicine to see if diagnosis of ASIM is present  Other emphysema ([facility])  Not in acute exacerbation, continue as needed albuterol nebulizer  Type 2 diabetes mellitus without complication, without long-term current use of insulin ([facility])  Lab Results   Component Value Date    HGBA1C 6.5 (H) 12/18/2024       Recent Labs     12/18/24  1701 12/18/24  2113 12/19/24  0700 12/19/24  1127   POCGLU 130 110 114 96       Blood Sugar Average: Last 72 hrs:  (P) 115.9689603546172054  Recently diagnosed as of September 2024.  Will obtain updated A1c- controlled at 6.5  Hold home oral medications/non-insulin injectables.  Start correctional insulin coverage with Accu-Cheks while admitted.  Carb  controlled diet  Initiate hypoglycemia protocol  Right knee pain  Patient seen by ortho in outpatient setting has a meniscal tear and currently on workman comp  Orthopedics consulted while inpatient  Patient provided with brace and offered steroid injection    24 Hour Events : No acute overnight events  Subjective : Patient seen and examined at bedside this am. Patient states he is feeling well and denies any palpitations, shortness of breath. He states he has some chest soreness at the areas of cardioversion but otherwise has no chest pains.     Objective :  Temp:  [97.8 °F (36.6 °C)-98.2 °F (36.8 °C)] 98.2 °F (36.8 °C)  HR:  [95-99] 98  BP: (108-124)/(82-87) 108/82  Resp:  [17-19] 17  SpO2:  [94 %-96 %] 95 %  O2 Device: None (Room air)    Physical Exam  Vitals and nursing note reviewed.   Constitutional:       General: He is not in acute distress.     Appearance: He is well-developed. He is obese.   HENT:      Head: Normocephalic and atraumatic.   Eyes:      Conjunctiva/sclera: Conjunctivae normal.   Cardiovascular:      Rate and Rhythm: Rhythm irregular.      Heart sounds: No murmur heard.     Comments: Rate ranges from 80s-100s on monitor, regularly irregular  Pulmonary:      Effort: Pulmonary effort is normal. No respiratory distress.      Breath sounds: Normal breath sounds.   Abdominal:      Palpations: Abdomen is soft.      Tenderness: There is no abdominal tenderness.   Musculoskeletal:         General: No swelling.      Cervical back: Neck supple.   Skin:     General: Skin is warm and dry.      Capillary Refill: Capillary refill takes less than 2 seconds.   Neurological:      Mental Status: He is alert.   Psychiatric:         Mood and Affect: Mood normal.         Lab Results: I have reviewed the following results:  Lab Results   Component Value Date    WBC 9.40 12/18/2024    HGB 14.8 12/18/2024    HCT 43.9 12/18/2024    MCV 92 12/18/2024     12/18/2024     Lab Results   Component Value Date     SODIUM 138 12/19/2024    K 3.8 12/19/2024     12/19/2024    CO2 25 12/19/2024    BUN 12 12/19/2024    CREATININE 0.70 12/19/2024    GLUC 123 12/19/2024    CALCIUM 8.8 12/19/2024               VTE Pharmacologic Prophylaxis: On eliquis  VTE Mechanical Prophylaxis: sequential compression device        Peggy Collazo M.D.  [facility] PGY2  12/19/2024, 12:03 PM     [Well Developed] : well developed [PERRL] : pupils equal round and reactive to light [Normal Oropharynx] : the oropharynx was normal [Supple] : supple [No Lymphadenopathy] : no lymphadenopathy [Clear to Auscultation] : lungs were clear to auscultation bilaterally [No Accessory Muscle Use] : no accessory muscle use [Regular Rhythm] : with a regular rhythm [Normal S1, S2] : normal S1 and S2

## 2024-12-19 NOTE — ASSESSMENT & PLAN NOTE
54 y.o.male with right knee osteoarthritis and medial meniscal tear as evidenced by MRI. No evidence of fracture at this point in time. Plan for continued nonoperative management. Because there is no evidence of fracture patient may resume weight bearing as tolerated.    WBAT RLE  PT/OT  Pain control  Medical management per primary team  Dispo planning

## 2024-12-19 NOTE — ASSESSMENT & PLAN NOTE
Newly diagnosed atrial fibrillation as of 11/2024, was started on metoprolol succinate and Eliquis.  Subsequently had follow-up with cardiology and underwent MEI with cardioversion x 4 12/9/2024 with conversion to normal sinus rhythm  During cardiology follow-up appointment 12/17/2024 he was noted in rapid atrial fibrillation and advised to present here for evaluation/management.  Initially he denied symptoms at appointment but now complains of some lightheadedness with chest pressure  HR  throughout ED evaluation.  Continue metoprolol succinate await Cardiology consultation  Monitor on telemetry  Continue anticoagulation with Eliquis  Metoprolol succinate increased to 50 mg twice daily  Started on dofetilide 500 mcg bid by EP with close monitoring

## 2024-12-19 NOTE — CONSULTS
Consultation - Orthopedics   Name: Luis Fernando Jay 54 y.o. male I MRN: [id number]  Unit/Bed#: CW2 213-02 I Date of Admission: 12/17/2024   Date of Service: 12/19/2024 I Hospital Day: 2       Inpatient consult to Orthopedic Surgery     Date/Time  12/19/2024 1:06 AM     Performed by  Mayur Smalls MD   Authorized by  Ruiz Cheng MD           Physician Requesting Evaluation: Leonides Rivera MD   Reason for Evaluation / Principal Problem: right knee pain      Assessment & Plan  Atrial fibrillation with RVR ([facility])    Class 3 severe obesity due to excess calories without serious comorbidity with body mass index (BMI) of 50.0 to 59.9 in adult (HCC)    Other emphysema ([facility])    Type 2 diabetes mellitus without complication, without long-term current use of insulin ([facility])  Lab Results   Component Value Date    HGBA1C 6.5 (H) 12/18/2024       Recent Labs     12/18/24  0022 12/18/24  1059 12/18/24  1701 12/18/24  2113   POCGLU 116 125 130 110       Blood Sugar Average: Last 72 hrs:  (P) 120.25    Right knee pain  54 y.o.male with right knee osteoarthritis and medial meniscal tear as evidenced by MRI. No evidence of fracture at this point in time. Plan for continued nonoperative management. Because there is no evidence of fracture patient may resume weight bearing as tolerated.    WBAT RLE  PT/OT  Pain control  Medical management per primary team  Dispo planning     Please contact the SecureChat role for the Orthopedics service with any questions/concerns.    History of Present Illness   HPI: Luis Fernando Jay is a 54 y.o. male community ambulator who presents with a chief complaint of right knee pain status post a mechanical fall at work on 11/25/2024. He had sudden onset of pain at that time, but no HS/LOC. He is on eliquis and currently admitted for A-fib with RVR and further medical management. He was see in clinic with Dr. Vidales and had no findings of fractures on XR's but he continued to have difficulty bearing weight at that  time and was subsequently sent for MRI for evaluation of occult fracture.       Review of Systems  I have reviewed the patient's available PMH, PSH, Social History, Family History, Meds, and Allergies  Historical Information   Past Medical History:   Diagnosis Date    Diabetes mellitus (HCC)     GERD (gastroesophageal reflux disease)     Lung mass     Obesity      History reviewed. No pertinent surgical history.  Social History     Tobacco Use    Smoking status: Former     Current packs/day: 0.00     Average packs/day: 3.0 packs/day for 40.3 years (121.0 ttl pk-yrs)     Types: Cigarettes     Start date:      Quit date: 2019     Years since quittin.6    Smokeless tobacco: Never   Vaping Use    Vaping status: Never Used   Substance and Sexual Activity    Alcohol use: Not Currently    Drug use: Never    Sexual activity: Not Currently     E-Cigarette/Vaping    E-Cigarette Use Never User      E-Cigarette/Vaping Substances    Nicotine No     THC No     CBD No     Flavoring No     Other No     Unknown No        Social History     Tobacco Use    Smoking status: Former     Current packs/day: 0.00     Average packs/day: 3.0 packs/day for 40.3 years (121.0 ttl pk-yrs)     Types: Cigarettes     Start date:      Quit date: 2019     Years since quittin.6    Smokeless tobacco: Never   Vaping Use    Vaping status: Never Used   Substance and Sexual Activity    Alcohol use: Not Currently    Drug use: Never    Sexual activity: Not Currently       Current Facility-Administered Medications:     acetaminophen (TYLENOL) tablet 650 mg, Q6H PRN    albuterol inhalation solution 2.5 mg, Q4H PRN    allopurinol (ZYLOPRIM) tablet 300 mg, Daily    apixaban (ELIQUIS) tablet 5 mg, BID    dofetilide (TIKOSYN) capsule 500 mcg, Q12H ROM    insulin lispro (HumALOG/ADMELOG) 100 units/mL subcutaneous injection 1-6 Units, HS    insulin lispro (HumALOG/ADMELOG) 100 units/mL subcutaneous injection 2-12 Units, TID AC **AND**  Fingerstick Glucose (POCT), TID AC    methocarbamol (ROBAXIN) tablet 750 mg, TID PRN    metoprolol succinate (TOPROL-XL) 24 hr tablet 50 mg, BID    ondansetron (ZOFRAN) injection 4 mg, Q6H PRN    pantoprazole (PROTONIX) EC tablet 20 mg, Early Morning  Prior to Admission Medications   Prescriptions Last Dose Informant Patient Reported? Taking?   Alcohol Swabs 70 % PADS  Spouse/Significant Other No No   Sig: May substitute brand based on insurance coverage. Check glucose BID.   Blood Glucose Monitoring Suppl (OneTouch Verio Reflect) w/Device KIT  Spouse/Significant Other No No   Sig: May substitute brand based on insurance coverage. Check glucose BID.   Continuous Glucose Sensor (FreeStyle Hali 3 Sensor) MISC  Spouse/Significant Other No No   Sig: Use 1 each every 14 (fourteen) days   OneTouch Delica Lancets 33G MISC  Spouse/Significant Other No No   Sig: May substitute brand based on insurance coverage. Check glucose BID.   albuterol (2.5 mg/3 mL) 0.083 % nebulizer solution  Spouse/Significant Other No No   Sig: Take 1 vial (2.5 mg total) by nebulization every 4 (four) hours as needed for wheezing or shortness of breath   albuterol (PROVENTIL HFA,VENTOLIN HFA) 90 mcg/act inhaler  Spouse/Significant Other No No   Sig: Inhale 2 puffs every 4 (four) hours as needed for wheezing   albuterol (ProAir HFA) 90 mcg/act inhaler  Spouse/Significant Other No No   Sig: Inhale 2 puffs every 6 (six) hours as needed for wheezing   allopurinol (ZYLOPRIM) 300 mg tablet  Spouse/Significant Other No No   Sig: Take 1 tablet (300 mg total) by mouth daily   apixaban (Eliquis) 5 mg  Spouse/Significant Other No No   Sig: Take 1 tablet (5 mg total) by mouth 2 (two) times a day   ciprofloxacin-dexamethasone (CIPRODEX) otic suspension  Spouse/Significant Other No No   Sig: Administer 4 drops into the left ear 2 (two) times a day for 18 doses   esomeprazole (NexIUM) 20 mg capsule  Spouse/Significant Other No No   Sig: Take 1 capsule (20 mg  total) by mouth daily in the early morning   glucose blood (OneTouch Verio) test strip  Spouse/Significant Other No No   Sig: May substitute brand based on insurance coverage. Check glucose BID.   metFORMIN (GLUCOPHAGE) 1000 MG tablet  Spouse/Significant Other No No   Sig: Take 1 tablet (1,000 mg total) by mouth 2 (two) times a day with meals   methocarbamol (Robaxin-750) 750 mg tablet  Spouse/Significant Other No No   Sig: Take 1 tablet (750 mg total) by mouth 3 (three) times a day as needed for muscle spasms (may cause drowsiness)   metoprolol succinate (TOPROL-XL) 25 mg 24 hr tablet  Spouse/Significant Other No No   Sig: Take 1 tablet (25 mg total) by mouth 2 (two) times a day   naproxen (NAPROSYN) 500 mg tablet  Spouse/Significant Other No No   Sig: Take 1 tablet (500 mg total) by mouth 2 (two) times a day as needed (gout)   semaglutide, 2 mg/dose, (Ozempic) 8 mg/ mL injection pen  Spouse/Significant Other No No   Sig: Inject 0.75 mL (2 mg total) under the skin every 7 days      Facility-Administered Medications: None     Patient has no known allergies.    Objective      Temp:  [97.2 °F (36.2 °C)] 97.2 °F (36.2 °C)  HR:  [] 95  BP: (102-144)/() 124/87  Resp:  [15-19] 19  SpO2:  [93 %-96 %] 95 %  O2 Device: None (Room air)  O2 Device: None (Room air)          I/O last 24 hours:  In: 240 [P.O.:240]  Out: -     Physical Exam   Ortho Exam   Musculoskeletal: Right Lower Extremity  There is a small superficial abrasion partially healed over the tibial tubercle.  Medial and lateral joint line tenderness over the right knee  Sensation intact to sural, saphenous, superficial peroneal, deep peroneal, and tibial distributions  Motor intact to ankle plantarflexion/dorsiflexion, EHL/FHL.  Extremity is warm and well perfused with capillary refill < 2 seconds.    No other areas of tenderness on tertiary exam.        Imaging:  MRI demonstrates medial meniscus tear along with tricompartmental arthritis of the right  "knee.  No evidence of fracture, dislocation, or other acute osseous abnormality.        Lab Results: I have reviewed the following results:   Recent Labs     12/17/24 2048 12/17/24 2104 12/18/24  0519 12/18/24  0638   WBC 11.17*  --  9.40  --    HGB 15.7  --  14.8  --    HCT 47.5  --  43.9  --      --  195  --    BUN  --  14  --  14   CREATININE  --  0.82  --  0.77     Blood Culture:   Lab Results   Component Value Date    BLOODCX No Growth After 5 Days. 04/22/2019    BLOODCX No Growth After 5 Days. 04/22/2019     Wound Culture: No results found for: \"WOUNDCULT\"    "

## 2024-12-19 NOTE — ASSESSMENT & PLAN NOTE
Patient seen by ortho in outpatient setting has a meniscal tear and currently on workman comp  Orthopedics consulted while inpatient  Patient provided with brace and offered steroid injection

## 2024-12-19 NOTE — ASSESSMENT & PLAN NOTE
Lab Results   Component Value Date    HGBA1C 6.5 (H) 12/18/2024       Recent Labs     12/18/24  0022 12/18/24  1059 12/18/24  1701 12/18/24  2113   POCGLU 116 125 130 110       Blood Sugar Average: Last 72 hrs:  (P) 120.25

## 2024-12-19 NOTE — RESPIRATORY THERAPY NOTE
Resp care   12/19/24 0947   Respiratory Protocol   Respiratory Plan Discontinue Protocol   Respiratory Assessment   Assessment Type Assess only   General Appearance Awake;Alert   Respiratory Pattern Normal   Chest Assessment Chest expansion symmetrical   Bilateral Breath Sounds Clear   Resp Comments Pt offers no resp c/o. states no udn needed. Pt uses prn mdi and prn udn at home. Will add prn mdi. dc udn and dc pt from resp protocol.

## 2024-12-19 NOTE — ASSESSMENT & PLAN NOTE
Patient was newly diagnosed with atrial fibrillation in November of this year and was started on Toprol-XL 25 mg BID and Eliquis 5 mg BID at that time.  He subsequently underwent MEI cardioversion x 4 on 12/9/2024 with successful conversion to normal sinus rhythm.  During a routine cardiology follow-up visit on 12/17 he was noted to be in A-fib with RVR with heart rates in the low 100s and was sent to the ED.  He did complain of some lightheadedness and chest pressure on presentation.    Plan:  -Continue increased dose Toprol-XL 50 mg twice daily.  -Continue Eliquis 5 mg twice daily.  -Continue outpatient sleep medicine follow-up for probable sleep apnea.  -Monitor electrolytes and replete as needed.  -Continue telemetry monitoring.  -Continue dofetilide 500 mg every 12 hours with ECG monitoring for first 5 doses. Started 12/18 PM.

## 2024-12-20 ENCOUNTER — TELEPHONE (OUTPATIENT)
Age: 54
End: 2024-12-20

## 2024-12-20 PROBLEM — G47.33 OSA (OBSTRUCTIVE SLEEP APNEA): Status: ACTIVE | Noted: 2024-12-20

## 2024-12-20 LAB
ANION GAP SERPL CALCULATED.3IONS-SCNC: 10 MMOL/L (ref 4–13)
BASOPHILS # BLD AUTO: 0.02 THOUSANDS/ÂΜL (ref 0–0.1)
BASOPHILS NFR BLD AUTO: 0 % (ref 0–1)
BUN SERPL-MCNC: 13 MG/DL (ref 5–25)
CALCIUM SERPL-MCNC: 9.6 MG/DL (ref 8.4–10.2)
CHLORIDE SERPL-SCNC: 103 MMOL/L (ref 96–108)
CO2 SERPL-SCNC: 20 MMOL/L (ref 21–32)
CREAT SERPL-MCNC: 0.76 MG/DL (ref 0.6–1.3)
EOSINOPHIL # BLD AUTO: 0.01 THOUSAND/ÂΜL (ref 0–0.61)
EOSINOPHIL NFR BLD AUTO: 0 % (ref 0–6)
ERYTHROCYTE [DISTWIDTH] IN BLOOD BY AUTOMATED COUNT: 13.1 % (ref 11.6–15.1)
GFR SERPL CREATININE-BSD FRML MDRD: 101 ML/MIN/1.73SQ M
GLUCOSE SERPL-MCNC: 175 MG/DL (ref 65–140)
GLUCOSE SERPL-MCNC: 177 MG/DL (ref 65–140)
GLUCOSE SERPL-MCNC: 186 MG/DL (ref 65–140)
GLUCOSE SERPL-MCNC: 205 MG/DL (ref 65–140)
GLUCOSE SERPL-MCNC: 207 MG/DL (ref 65–140)
HCT VFR BLD AUTO: 50.4 % (ref 36.5–49.3)
HGB BLD-MCNC: 17 G/DL (ref 12–17)
IMM GRANULOCYTES # BLD AUTO: 0.08 THOUSAND/UL (ref 0–0.2)
IMM GRANULOCYTES NFR BLD AUTO: 1 % (ref 0–2)
LYMPHOCYTES # BLD AUTO: 1.22 THOUSANDS/ÂΜL (ref 0.6–4.47)
LYMPHOCYTES NFR BLD AUTO: 11 % (ref 14–44)
MAGNESIUM SERPL-MCNC: 2 MG/DL (ref 1.9–2.7)
MCH RBC QN AUTO: 30.2 PG (ref 26.8–34.3)
MCHC RBC AUTO-ENTMCNC: 33.7 G/DL (ref 31.4–37.4)
MCV RBC AUTO: 90 FL (ref 82–98)
MONOCYTES # BLD AUTO: 0.2 THOUSAND/ÂΜL (ref 0.17–1.22)
MONOCYTES NFR BLD AUTO: 2 % (ref 4–12)
NEUTROPHILS # BLD AUTO: 9.97 THOUSANDS/ÂΜL (ref 1.85–7.62)
NEUTS SEG NFR BLD AUTO: 86 % (ref 43–75)
NRBC BLD AUTO-RTO: 0 /100 WBCS
PLATELET # BLD AUTO: 234 THOUSANDS/UL (ref 149–390)
PMV BLD AUTO: 9.4 FL (ref 8.9–12.7)
POTASSIUM SERPL-SCNC: 4.6 MMOL/L (ref 3.5–5.3)
QRS AXIS: 63 DEGREES
QRS AXIS: 80 DEGREES
QRSD INTERVAL: 88 MS
QRSD INTERVAL: 94 MS
QT INTERVAL: 290 MS
QT INTERVAL: 360 MS
QTC INTERVAL: 398 MS
QTC INTERVAL: 487 MS
RBC # BLD AUTO: 5.62 MILLION/UL (ref 3.88–5.62)
SODIUM SERPL-SCNC: 133 MMOL/L (ref 135–147)
T WAVE AXIS: 52 DEGREES
T WAVE AXIS: 9 DEGREES
VENTRICULAR RATE: 110 BPM
VENTRICULAR RATE: 113 BPM
WBC # BLD AUTO: 11.5 THOUSAND/UL (ref 4.31–10.16)

## 2024-12-20 PROCEDURE — 99232 SBSQ HOSP IP/OBS MODERATE 35: CPT | Performed by: FAMILY MEDICINE

## 2024-12-20 PROCEDURE — 83735 ASSAY OF MAGNESIUM: CPT | Performed by: FAMILY MEDICINE

## 2024-12-20 PROCEDURE — 93010 ELECTROCARDIOGRAM REPORT: CPT | Performed by: STUDENT IN AN ORGANIZED HEALTH CARE EDUCATION/TRAINING PROGRAM

## 2024-12-20 PROCEDURE — 99232 SBSQ HOSP IP/OBS MODERATE 35: CPT | Performed by: INTERNAL MEDICINE

## 2024-12-20 PROCEDURE — 85025 COMPLETE CBC W/AUTO DIFF WBC: CPT | Performed by: FAMILY MEDICINE

## 2024-12-20 PROCEDURE — 80048 BASIC METABOLIC PNL TOTAL CA: CPT | Performed by: STUDENT IN AN ORGANIZED HEALTH CARE EDUCATION/TRAINING PROGRAM

## 2024-12-20 PROCEDURE — 82948 REAGENT STRIP/BLOOD GLUCOSE: CPT

## 2024-12-20 PROCEDURE — 93005 ELECTROCARDIOGRAM TRACING: CPT

## 2024-12-20 RX ORDER — DOFETILIDE 0.5 MG/1
500 CAPSULE ORAL 2 TIMES DAILY
Qty: 60 CAPSULE | Refills: 1 | Status: SHIPPED | OUTPATIENT
Start: 2024-12-20

## 2024-12-20 RX ADMIN — DOFETILIDE 500 MCG: 0.5 CAPSULE ORAL at 21:29

## 2024-12-20 RX ADMIN — DOFETILIDE 500 MCG: 0.5 CAPSULE ORAL at 08:29

## 2024-12-20 RX ADMIN — INSULIN LISPRO 2 UNITS: 100 INJECTION, SOLUTION INTRAVENOUS; SUBCUTANEOUS at 08:30

## 2024-12-20 RX ADMIN — INSULIN LISPRO 4 UNITS: 100 INJECTION, SOLUTION INTRAVENOUS; SUBCUTANEOUS at 12:04

## 2024-12-20 RX ADMIN — ALLOPURINOL 300 MG: 300 TABLET ORAL at 08:29

## 2024-12-20 RX ADMIN — INSULIN LISPRO 1 UNITS: 100 INJECTION, SOLUTION INTRAVENOUS; SUBCUTANEOUS at 21:30

## 2024-12-20 RX ADMIN — INSULIN LISPRO 4 UNITS: 100 INJECTION, SOLUTION INTRAVENOUS; SUBCUTANEOUS at 16:54

## 2024-12-20 RX ADMIN — APIXABAN 5 MG: 5 TABLET, FILM COATED ORAL at 16:54

## 2024-12-20 RX ADMIN — METOPROLOL SUCCINATE 50 MG: 50 TABLET, EXTENDED RELEASE ORAL at 16:54

## 2024-12-20 RX ADMIN — METOPROLOL SUCCINATE 50 MG: 50 TABLET, EXTENDED RELEASE ORAL at 08:29

## 2024-12-20 RX ADMIN — PANTOPRAZOLE SODIUM 20 MG: 20 TABLET, DELAYED RELEASE ORAL at 06:10

## 2024-12-20 RX ADMIN — APIXABAN 5 MG: 5 TABLET, FILM COATED ORAL at 08:29

## 2024-12-20 NOTE — PROGRESS NOTES
Electrophysiology Progress Note   Luis Fernando Jay 54 y.o. male MRN: [id number]  Unit/Bed#: CW2 213-02 Encounter: [id number]    Hospital Course/Assessment  54 y.o. male with a history of obesity, type 2 diabetes, emphysema and atrial fibrillation recently diagnosed during hospitalization between 11/5-11/7 of this year s/p MEI cardioversion x 4 on 12/9 with successful restoration of sinus rhythm who presented from the outpatient cardiology office on 12/17 for A-fib with RVR.  During his routine follow-up he was noted to be in A-fib with a heart rate in the 110s to 120s.  He denied any symptoms at that time, however in the ED he did report some lightheadedness and chest tightness.  He was noted to be hypomagnesemic and received IV magnesium.  Patient was started on dofetilide.      Subjective:   Today the patient was seen and examined at bedside.  He had no acute events overnight.  Overall he reports feeling well and had no acute complaints. Telemetry shows A-fib in the low 100's.     Plan  Assessment & Plan  Atrial fibrillation with RVR (HCC)  Patient was newly diagnosed with atrial fibrillation in November of this year and was started on Toprol-XL 25 mg BID and Eliquis 5 mg BID at that time.  He subsequently underwent MEI cardioversion x 4 on 12/9/2024 with successful conversion to normal sinus rhythm.  During a routine cardiology follow-up visit on 12/17 he was noted to be in A-fib with RVR with heart rates in the low 100s and was sent to the ED.  He did complain of some lightheadedness and chest pressure on presentation.    Plan:  -Continue increased dose Toprol-XL 50 mg twice daily.  -Continue Eliquis 5 mg twice daily.  -Continue outpatient sleep medicine follow-up for probable sleep apnea.  -Monitor electrolytes and replete as needed.  -Continue telemetry monitoring.  -Continue dofetilide 500 mg every 12 hours with ECG monitoring for first 5 doses.  -Okay for discharge if ECG is stable tonight.  Patient is  "scheduled for outpatient follow-up with Dr. Mckinney on 1/2/25. May consider outpatient cardioversion at that time. Patient was also instructed to monitor his BP and HR at home and to call the office if his HR drops below 50 BPM.   Class 3 severe obesity due to excess calories without serious comorbidity with body mass index (BMI) of 50.0 to 59.9 in adult ([facility])  -Continue outpatient workup for ASIM.  - on weight loss.  Other emphysema ([facility])  -Continue as needed breathing treatments.  Type 2 diabetes mellitus without complication, without long-term current use of insulin ([facility])  Lab Results   Component Value Date    HGBA1C 6.5 (H) 12/18/2024       Recent Labs     12/19/24  1611 12/19/24  2029 12/20/24  0710 12/20/24  1104   POCGLU 107 205* 175* 207*       Blood Sugar Average: Last 72 hrs:  (P) 138.5  -Continue SSI with hypoglycemia protocol.  Right knee pain  -Ortho following.          Ruiz Cheng MD  -PGY-5 Cardiology Fellow  -Tiger text enabled    ======================================================  Objective  VS: Blood pressure 103/65, pulse 93, temperature 97.6 °F (36.4 °C), resp. rate 18, height 6' 2\" (1.88 m), weight (!) 180 kg (396 lb), SpO2 95%.  Gen: Well appearing  Psych: AOx3  Skin: Intact  Neck: Supple  Cardiac: S1, S2, irregular rhythm, tachycardic, no S3 or S4 appreciated. No murmurs. +2 PT, radial pulses. No peripheral edema. No carotid bruits.  Resp: CTABL. No crackles  Abd: Nontender, nondistended  Rheum: No joint deformities in UE or LE  ======================================================  TREADMILL STRESS  No results found for this or any previous visit.     ----------------------------------------------------------------------------------------------  NUCLEAR STRESS TEST: No results found for this or any previous visit.    No results found for this or any previous visit.      --------------------------------------------------------------------------------  CATH:  No results found for " this or any previous visit.    --------------------------------------------------------------------------------  ECHO:   No results found for this or any previous visit.    No results found for this or any previous visit.    --------------------------------------------------------------------------------  HOLTER  No results found for this or any previous visit.    --------------------------------------------------------------------------------  CAROTIDS  No results found for this or any previous visit.       [unfilled]   =====================================================    Active Meds    Current Facility-Administered Medications:     acetaminophen (TYLENOL) tablet 650 mg, 650 mg, Oral, Q6H PRN, Lee Hrerera DO    albuterol (PROVENTIL HFA,VENTOLIN HFA) inhaler 2 puff, 2 puff, Inhalation, Q4H PRN, Stephen Emery MD    allopurinol (ZYLOPRIM) tablet 300 mg, 300 mg, Oral, Daily, Lee Herrera DO, 300 mg at 12/20/24 0829    apixaban (ELIQUIS) tablet 5 mg, 5 mg, Oral, BID, Lee Herrera DO, 5 mg at 12/20/24 0829    dofetilide (TIKOSYN) capsule 500 mcg, 500 mcg, Oral, Q12H ROM, Ruiz Cheng MD, 500 mcg at 12/20/24 0829    insulin lispro (HumALOG/ADMELOG) 100 units/mL subcutaneous injection 1-6 Units, 1-6 Units, Subcutaneous, HS, Lee Herrera DO, 2 Units at 12/19/24 2057    insulin lispro (HumALOG/ADMELOG) 100 units/mL subcutaneous injection 2-12 Units, 2-12 Units, Subcutaneous, TID AC, 4 Units at 12/20/24 1204 **AND** Fingerstick Glucose (POCT), , , TID AC, Lee Herrera DO    methocarbamol (ROBAXIN) tablet 750 mg, 750 mg, Oral, TID PRN, Lee Herrera DO    metoprolol succinate (TOPROL-XL) 24 hr tablet 50 mg, 50 mg, Oral, BID, Lee Herrera DO, 50 mg at 12/20/24 0829    ondansetron (ZOFRAN) injection 4 mg, 4 mg, Intravenous, Q6H PRN, Lee Herrera DO    pantoprazole (PROTONIX) EC tablet 20 mg, 20 mg, Oral, Early Morning, Lee Herrera DO, 20 mg at 12/20/24 0610    Labs & Results  Lab  "Results   Component Value Date    TROPONINI <0.02 04/23/2019    TROPONINI <0.02 04/23/2019    TROPONINI <0.02 04/22/2019     Lab Results   Component Value Date    GLUCOSE 163 (H) 04/22/2019    CALCIUM 9.6 12/20/2024    K 4.6 12/20/2024    CO2 20 (L) 12/20/2024     12/20/2024    BUN 13 12/20/2024    CREATININE 0.76 12/20/2024     Lab Results   Component Value Date    WBC 11.50 (H) 12/20/2024    HGB 17.0 12/20/2024    HCT 50.4 (H) 12/20/2024    MCV 90 12/20/2024     12/20/2024         No results found for: \"CHOL\"  Lab Results   Component Value Date    HDL 28 (L) 11/06/2024    HDL 35 (L) 09/09/2024     Lab Results   Component Value Date    LDLCALC 72 11/06/2024    LDLCALC 109 (H) 09/09/2024     Lab Results   Component Value Date    TRIG 108 11/06/2024    TRIG 261 (H) 09/09/2024     Lab Results   Component Value Date    ALT 45 11/06/2024    AST 24 11/06/2024    ALKPHOS 79 11/06/2024          "

## 2024-12-20 NOTE — PLAN OF CARE
Patients HR increases to 150's with ambulation to bathroom but patient is asymptomatic ,returns to 110's at rest

## 2024-12-20 NOTE — PLAN OF CARE
Problem: PAIN - ADULT  Goal: Verbalizes/displays adequate comfort level or baseline comfort level  Description: Interventions:  - Encourage patient to monitor pain and request assistance  - Assess pain using appropriate pain scale  - Administer analgesics based on type and severity of pain and evaluate response  - Implement non-pharmacological measures as appropriate and evaluate response  - Consider cultural and social influences on pain and pain management  - Notify physician/advanced practitioner if interventions unsuccessful or patient reports new pain  Outcome: Progressing     Problem: INFECTION - ADULT  Goal: Absence or prevention of progression during hospitalization  Description: INTERVENTIONS:  - Assess and monitor for signs and symptoms of infection  - Monitor lab/diagnostic results  - Monitor all insertion sites, i.e. indwelling lines, tubes, and drains  - Monitor endotracheal if appropriate and nasal secretions for changes in amount and color  - [place] appropriate cooling/warming therapies per order  - Administer medications as ordered  - Instruct and encourage patient and family to use good hand hygiene technique  - Identify and instruct in appropriate isolation precautions for identified infection/condition  Outcome: Progressing     Problem: SAFETY ADULT  Goal: Patient will remain free of falls  Description: INTERVENTIONS:  - Educate patient/family on patient safety including physical limitations  - Instruct patient to call for assistance with activity   - Consult OT/PT to assist with strengthening/mobility   - Keep Call bell within reach  - Keep bed low and locked with side rails adjusted as appropriate  - Keep care items and personal belongings within reach  - Initiate and maintain comfort rounds  - Make Fall Risk Sign visible to staff  - Apply yellow socks and bracelet for high fall risk patients  - Consider moving patient to room near nurses station  Outcome: Progressing     Problem: DISCHARGE  PLANNING  Goal: Discharge to home or other facility with appropriate resources  Description: INTERVENTIONS:  - Identify barriers to discharge w/patient and caregiver  - Arrange for needed discharge resources and transportation as appropriate  - Identify discharge learning needs (meds, wound care, etc.)  - Arrange for interpretive services to assist at discharge as needed  - Refer to Case Management Department for coordinating discharge planning if the patient needs post-hospital services based on physician/advanced practitioner order or complex needs related to functional status, cognitive ability, or social support system  Outcome: Progressing     Problem: Knowledge Deficit  Goal: Patient/family/caregiver demonstrates understanding of disease process, treatment plan, medications, and discharge instructions  Description: Complete learning assessment and assess knowledge base.  Interventions:  - Provide teaching at level of understanding  - Provide teaching via preferred learning methods  Outcome: Progressing     Problem: CARDIOVASCULAR - ADULT  Goal: Maintains optimal cardiac output and hemodynamic stability  Description: INTERVENTIONS:  - Monitor I/O, vital signs and rhythm  - Monitor for S/S and trends of decreased cardiac output  - Administer and titrate ordered vasoactive medications to optimize hemodynamic stability  - Assess quality of pulses, skin color and temperature  - Assess for signs of decreased coronary artery perfusion  - Instruct patient to report change in severity of symptoms  Outcome: Progressing  Goal: Absence of cardiac dysrhythmias or at baseline rhythm  Description: INTERVENTIONS:  - Continuous cardiac monitoring, vital signs, obtain 12 lead EKG if ordered  - Administer antiarrhythmic and heart rate control medications as ordered  - Monitor electrolytes and administer replacement therapy as ordered  Outcome: Progressing     Problem: RESPIRATORY - ADULT  Goal: Achieves optimal ventilation and  oxygenation  Description: INTERVENTIONS:  - Assess for changes in respiratory status  - Assess for changes in mentation and behavior  - Position to facilitate oxygenation and minimize respiratory effort  - Oxygen administered by appropriate delivery if ordered  - Initiate smoking cessation education as indicated  - Encourage broncho-pulmonary hygiene including cough, deep breathe, Incentive Spirometry  - Assess the need for suctioning and aspirate as needed  - Assess and instruct to report SOB or any respiratory difficulty  - Respiratory Therapy support as indicated  Outcome: Progressing     Problem: METABOLIC, FLUID AND ELECTROLYTES - ADULT  Goal: Electrolytes maintained within normal limits  Description: INTERVENTIONS:  - Monitor labs and assess patient for signs and symptoms of electrolyte imbalances  - Administer electrolyte replacement as ordered  - Monitor response to electrolyte replacements, including repeat lab results as appropriate  - Instruct patient on fluid and nutrition as appropriate  Outcome: Progressing  Goal: Fluid balance maintained  Description: INTERVENTIONS:  - Monitor labs   - Monitor I/O and WT  - Instruct patient on fluid and nutrition as appropriate  - Assess for signs & symptoms of volume excess or deficit  Outcome: Progressing     Problem: MUSCULOSKELETAL - ADULT  Goal: Maintain or return mobility to safest level of function  Description: INTERVENTIONS:  - Assess patient's ability to carry out ADLs; assess patient's baseline for ADL function and identify physical deficits which impact ability to perform ADLs (bathing, care of mouth/teeth, toileting, grooming, dressing, etc.)  - Assess/evaluate cause of self-care deficits   - Assess range of motion  - Assess patient's mobility  - Assess patient's need for assistive devices and provide as appropriate  - Encourage maximum independence but intervene and supervise when necessary  - Involve family in performance of ADLs  - Assess for home care  needs following discharge   - Consider OT consult to assist with ADL evaluation and planning for discharge  - Provide patient education as appropriate  Outcome: Progressing  Goal: Maintain proper alignment of affected body part  Description: INTERVENTIONS:  - Support, maintain and protect limb and body alignment  - Provide patient/ family with appropriate education  Outcome: Progressing

## 2024-12-20 NOTE — ASSESSMENT & PLAN NOTE
Patient was newly diagnosed with atrial fibrillation in November of this year and was started on Toprol-XL 25 mg BID and Eliquis 5 mg BID at that time.  He subsequently underwent MEI cardioversion x 4 on 12/9/2024 with successful conversion to normal sinus rhythm.  During a routine cardiology follow-up visit on 12/17 he was noted to be in A-fib with RVR with heart rates in the low 100s and was sent to the ED.  He did complain of some lightheadedness and chest pressure on presentation.    Plan:  -Continue increased dose Toprol-XL 50 mg twice daily.  -Continue Eliquis 5 mg twice daily.  -Continue outpatient sleep medicine follow-up for probable sleep apnea.  -Monitor electrolytes and replete as needed.  -Continue telemetry monitoring.  -Continue dofetilide 500 mg every 12 hours with ECG monitoring for first 5 doses.  -Okay for discharge if ECG is stable tonight.  Patient is scheduled for outpatient follow-up with Dr. Mckinney on 1/2/25. May consider outpatient cardioversion at that time. Patient was also instructed to monitor his BP and HR at home and to call the office if his HR drops below 50 BPM.

## 2024-12-20 NOTE — PROGRESS NOTES
Progress Note - Hospitalist   Name: Luis Fernando Jay 54 y.o. male I MRN: [id number]  Unit/Bed#: CW2 213-02 I Date of Admission: 12/17/2024   Date of Service: 12/20/2024 I Hospital Day: 3    Assessment & Plan  Atrial fibrillation with RVR ([facility])  Newly diagnosed atrial fibrillation as of 11/2024, was started on metoprolol succinate and Eliquis.  Subsequently had follow-up with cardiology and underwent MEI with cardioversion x 4 12/9/2024 with conversion to normal sinus rhythm  During cardiology follow-up appointment 12/17/2024 he was noted in rapid atrial fibrillation and advised to present here for evaluation/management.  Initially he denied symptoms at appointment but now complains of some lightheadedness with chest pressure  HR  throughout ED evaluation.  Continue metoprolol succinate await Cardiology consultation  Monitor on telemetry  Continue anticoagulation with Eliquis  Metoprolol succinate increased to 50 mg twice daily  Started on dofetilide 500 mcg bid by EP with close monitoring  Continue telemetry monitoring for another 24 hours.  If no events over night patient may be discharged December 21  Class 3 severe obesity due to excess calories without serious comorbidity with body mass index (BMI) of 50.0 to 59.9 in adult ([facility])  Dietary and lifestyle modification advised  Morbid obesity complicates all facets of care  Patient is working with sleep medicine to see if diagnosis of ASIM is present  Other emphysema ([facility])  Not in acute exacerbation, continue as needed albuterol nebulizer  Type 2 diabetes mellitus without complication, without long-term current use of insulin ([facility])  Lab Results   Component Value Date    HGBA1C 6.5 (H) 12/18/2024       Recent Labs     12/19/24  1611 12/19/24 2029 12/20/24  0710 12/20/24  1104   POCGLU 107 205* 175* 207*       Blood Sugar Average: Last 72 hrs:  (P) 138.5  Recently diagnosed as of September 2024.  Will obtain updated A1c- controlled at 6.5  Hold home oral  medications/non-insulin injectables.  Start correctional insulin coverage with Accu-Cheks while admitted.  Carb controlled diet  Initiate hypoglycemia protocol  Right knee pain  Patient seen by ortho in outpatient setting has a meniscal tear and currently on workman comp  Orthopedics consulted while inpatient  Patient provided with brace and offered steroid injection    VTE Pharmacologic Prophylaxis: VTE Score: 4 Moderate Risk (Score 3-4) - Pharmacological DVT Prophylaxis Ordered: apixaban (Eliquis).    Mobility:   Basic Mobility Inpatient Raw Score: 24  JH-HLM Goal: 8: Walk 250 feet or more  JH-HLM Achieved: 8: Walk 250 feet ot more  JH-HLM Goal achieved. Continue to encourage appropriate mobility.    Patient Centered Rounds: I performed bedside rounds with nursing staff today.   Discussions with Specialists or Other Care Team Provider: Electrophysiology    Education and Discussions with Family / Patient: Updated  (wife) via phone.    Current Length of Stay: 3 day(s)  Current Patient Status: Inpatient   Certification Statement: The patient will continue to require additional inpatient hospital stay due to monitoring on antiarrhythmic medication  Discharge Plan: Anticipate discharge tomorrow to home.    Code Status: Level 1 - Full Code    Subjective   Is a very pleasant 54-year-old gentleman who was seen evaluated today at bedside.  Patient denies any acute complaints this time.    Objective :  Temp:  [97.3 °F (36.3 °C)-98 °F (36.7 °C)] 97.6 °F (36.4 °C)  HR:  [] 93  BP: (103-134)/() 103/65  Resp:  [16-18] 18  SpO2:  [91 %-97 %] 95 %    Body mass index is 50.84 kg/m².     Input and Output Summary (last 24 hours):   No intake or output data in the 24 hours ending 12/20/24 1220    Physical Exam  Vitals and nursing note reviewed.   Constitutional:       General: He is not in acute distress.     Appearance: He is well-developed. He is obese.   HENT:      Head: Normocephalic and atraumatic.    Eyes:      Conjunctiva/sclera: Conjunctivae normal.   Cardiovascular:      Rate and Rhythm: Rhythm irregular.      Heart sounds: No murmur heard.     Comments: Rate ranges from 80s-100s on monitor, regularly irregular  Pulmonary:      Effort: Pulmonary effort is normal. No respiratory distress.      Breath sounds: Normal breath sounds.   Abdominal:      Palpations: Abdomen is soft.      Tenderness: There is no abdominal tenderness.   Musculoskeletal:         General: No swelling.      Cervical back: Neck supple.   Skin:     General: Skin is warm and dry.      Capillary Refill: Capillary refill takes less than 2 seconds.   Neurological:      Mental Status: He is alert.   Psychiatric:         Mood and Affect: Mood normal.           Lines/Drains:              Lab Results: I have reviewed the following results:   Results from last 7 days   Lab Units 12/20/24  0521   WBC Thousand/uL 11.50*   HEMOGLOBIN g/dL 17.0   HEMATOCRIT % 50.4*   PLATELETS Thousands/uL 234   SEGS PCT % 86*   LYMPHO PCT % 11*   MONO PCT % 2*   EOS PCT % 0     Results from last 7 days   Lab Units 12/20/24  0521   SODIUM mmol/L 133*   POTASSIUM mmol/L 4.6   CHLORIDE mmol/L 103   CO2 mmol/L 20*   BUN mg/dL 13   CREATININE mg/dL 0.76   ANION GAP mmol/L 10   CALCIUM mg/dL 9.6   GLUCOSE RANDOM mg/dL 177*         Results from last 7 days   Lab Units 12/20/24  1104 12/20/24  0710 12/19/24  2029 12/19/24  1611 12/19/24  1127 12/19/24  0700 12/18/24  2113 12/18/24  1701 12/18/24  1059 12/18/24  0022   POC GLUCOSE mg/dl 207* 175* 205* 107 96 114 110 130 125 116     Results from last 7 days   Lab Units 12/18/24  0519   HEMOGLOBIN A1C % 6.5*           Recent Cultures (last 7 days):         Imaging Results Review: No pertinent imaging studies reviewed.  Other Study Results Review: No additional pertinent studies reviewed.    Last 24 Hours Medication List:     Current Facility-Administered Medications:     acetaminophen (TYLENOL) tablet 650 mg, Q6H PRN     albuterol (PROVENTIL HFA,VENTOLIN HFA) inhaler 2 puff, Q4H PRN    allopurinol (ZYLOPRIM) tablet 300 mg, Daily    apixaban (ELIQUIS) tablet 5 mg, BID    dofetilide (TIKOSYN) capsule 500 mcg, Q12H ROM    insulin lispro (HumALOG/ADMELOG) 100 units/mL subcutaneous injection 1-6 Units, HS    insulin lispro (HumALOG/ADMELOG) 100 units/mL subcutaneous injection 2-12 Units, TID AC **AND** Fingerstick Glucose (POCT), TID AC    methocarbamol (ROBAXIN) tablet 750 mg, TID PRN    metoprolol succinate (TOPROL-XL) 24 hr tablet 50 mg, BID    ondansetron (ZOFRAN) injection 4 mg, Q6H PRN    pantoprazole (PROTONIX) EC tablet 20 mg, Early Morning    Administrative Statements   Today, Patient Was Seen By: Stephen Emery MD  I have spent a total time of 45 minutes in caring for this patient on the day of the visit/encounter including Diagnostic results, Risks and benefits of tx options, Risk factor reductions, Documenting in the medical record, and Communicating with other healthcare professionals .    **Please Note: This note may have been constructed using a voice recognition system.**

## 2024-12-20 NOTE — LETTER
December 20, 2024     Patient: Luis Fernando Jay  YOB: 1970  Date of Visit: 12/3/24      To Whom it May Concern:    Luis Fernando Jay is under my professional care. Luis Fernando was seen in my office on 12/3/24.  Luis Fernando will remain out of work until his follow up appointment on 1/27/25 to review his MRI.    If you have any questions or concerns, please don't hesitate to call.         Sincerely,          [email or web address] [email or web address].        CC: No Recipients

## 2024-12-20 NOTE — ASSESSMENT & PLAN NOTE
Newly diagnosed atrial fibrillation as of 11/2024, was started on metoprolol succinate and Eliquis.  Subsequently had follow-up with cardiology and underwent MEI with cardioversion x 4 12/9/2024 with conversion to normal sinus rhythm  During cardiology follow-up appointment 12/17/2024 he was noted in rapid atrial fibrillation and advised to present here for evaluation/management.  Initially he denied symptoms at appointment but now complains of some lightheadedness with chest pressure  HR  throughout ED evaluation.  Continue metoprolol succinate await Cardiology consultation  Monitor on telemetry  Continue anticoagulation with Eliquis  Metoprolol succinate increased to 50 mg twice daily  Started on dofetilide 500 mcg bid by EP with close monitoring  Continue telemetry monitoring for another 24 hours.  If no events over night patient may be discharged December 21

## 2024-12-20 NOTE — TELEPHONE ENCOUNTER
Caller: Patient    Doctor: Flash    Reason for call:     Patient calling for new work note, he was hospitalized at [facility] and could not make his appointment on 12/20/24 due to hospitalization.  His note must state he is out of work until his next scheduled appointment 1/27/25.  His wife will print from [employer], please upload to [employer].  Thank you.    Call back#: n/a

## 2024-12-20 NOTE — ASSESSMENT & PLAN NOTE
Lab Results   Component Value Date    HGBA1C 6.5 (H) 12/18/2024       Recent Labs     12/19/24  1611 12/19/24 2029 12/20/24  0710 12/20/24  1104   POCGLU 107 205* 175* 207*       Blood Sugar Average: Last 72 hrs:  (P) 138.5  -Continue SSI with hypoglycemia protocol.

## 2024-12-20 NOTE — ASSESSMENT & PLAN NOTE
Lab Results   Component Value Date    HGBA1C 6.5 (H) 12/18/2024       Recent Labs     12/19/24  1611 12/19/24 2029 12/20/24  0710 12/20/24  1104   POCGLU 107 205* 175* 207*       Blood Sugar Average: Last 72 hrs:  (P) 138.5  Recently diagnosed as of September 2024.  Will obtain updated A1c- controlled at 6.5  Hold home oral medications/non-insulin injectables.  Start correctional insulin coverage with Accu-Cheks while admitted.  Carb controlled diet  Initiate hypoglycemia protocol

## 2024-12-21 VITALS
WEIGHT: 315 LBS | HEIGHT: 74 IN | RESPIRATION RATE: 17 BRPM | TEMPERATURE: 97.7 F | OXYGEN SATURATION: 95 % | BODY MASS INDEX: 40.43 KG/M2 | DIASTOLIC BLOOD PRESSURE: 72 MMHG | SYSTOLIC BLOOD PRESSURE: 111 MMHG | HEART RATE: 98 BPM

## 2024-12-21 LAB
ANION GAP SERPL CALCULATED.3IONS-SCNC: 7 MMOL/L (ref 4–13)
BASOPHILS # BLD AUTO: 0.03 THOUSANDS/ÂΜL (ref 0–0.1)
BASOPHILS NFR BLD AUTO: 0 % (ref 0–1)
BUN SERPL-MCNC: 17 MG/DL (ref 5–25)
CALCIUM SERPL-MCNC: 9.1 MG/DL (ref 8.4–10.2)
CHLORIDE SERPL-SCNC: 107 MMOL/L (ref 96–108)
CO2 SERPL-SCNC: 22 MMOL/L (ref 21–32)
CREAT SERPL-MCNC: 0.75 MG/DL (ref 0.6–1.3)
EOSINOPHIL # BLD AUTO: 0.02 THOUSAND/ÂΜL (ref 0–0.61)
EOSINOPHIL NFR BLD AUTO: 0 % (ref 0–6)
ERYTHROCYTE [DISTWIDTH] IN BLOOD BY AUTOMATED COUNT: 13.5 % (ref 11.6–15.1)
GFR SERPL CREATININE-BSD FRML MDRD: 102 ML/MIN/1.73SQ M
GLUCOSE SERPL-MCNC: 153 MG/DL (ref 65–140)
GLUCOSE SERPL-MCNC: 154 MG/DL (ref 65–140)
GLUCOSE SERPL-MCNC: 171 MG/DL (ref 65–140)
HCT VFR BLD AUTO: 46 % (ref 36.5–49.3)
HGB BLD-MCNC: 15.6 G/DL (ref 12–17)
IMM GRANULOCYTES # BLD AUTO: 0.13 THOUSAND/UL (ref 0–0.2)
IMM GRANULOCYTES NFR BLD AUTO: 1 % (ref 0–2)
LYMPHOCYTES # BLD AUTO: 2.09 THOUSANDS/ÂΜL (ref 0.6–4.47)
LYMPHOCYTES NFR BLD AUTO: 13 % (ref 14–44)
MAGNESIUM SERPL-MCNC: 2.2 MG/DL (ref 1.9–2.7)
MCH RBC QN AUTO: 30.6 PG (ref 26.8–34.3)
MCHC RBC AUTO-ENTMCNC: 33.9 G/DL (ref 31.4–37.4)
MCV RBC AUTO: 90 FL (ref 82–98)
MONOCYTES # BLD AUTO: 0.95 THOUSAND/ÂΜL (ref 0.17–1.22)
MONOCYTES NFR BLD AUTO: 6 % (ref 4–12)
NEUTROPHILS # BLD AUTO: 13.56 THOUSANDS/ÂΜL (ref 1.85–7.62)
NEUTS SEG NFR BLD AUTO: 80 % (ref 43–75)
NRBC BLD AUTO-RTO: 0 /100 WBCS
PLATELET # BLD AUTO: 229 THOUSANDS/UL (ref 149–390)
PMV BLD AUTO: 9.4 FL (ref 8.9–12.7)
POTASSIUM SERPL-SCNC: 4 MMOL/L (ref 3.5–5.3)
QRS AXIS: 61 DEGREES
QRSD INTERVAL: 88 MS
QT INTERVAL: 374 MS
QTC INTERVAL: 463 MS
RBC # BLD AUTO: 5.09 MILLION/UL (ref 3.88–5.62)
SODIUM SERPL-SCNC: 136 MMOL/L (ref 135–147)
T WAVE AXIS: 58 DEGREES
VENTRICULAR RATE: 92 BPM
WBC # BLD AUTO: 16.78 THOUSAND/UL (ref 4.31–10.16)

## 2024-12-21 PROCEDURE — 99239 HOSP IP/OBS DSCHRG MGMT >30: CPT | Performed by: FAMILY MEDICINE

## 2024-12-21 PROCEDURE — 83735 ASSAY OF MAGNESIUM: CPT | Performed by: FAMILY MEDICINE

## 2024-12-21 PROCEDURE — 93010 ELECTROCARDIOGRAM REPORT: CPT | Performed by: INTERNAL MEDICINE

## 2024-12-21 PROCEDURE — 99232 SBSQ HOSP IP/OBS MODERATE 35: CPT | Performed by: PHYSICIAN ASSISTANT

## 2024-12-21 PROCEDURE — 82948 REAGENT STRIP/BLOOD GLUCOSE: CPT

## 2024-12-21 PROCEDURE — 80048 BASIC METABOLIC PNL TOTAL CA: CPT | Performed by: STUDENT IN AN ORGANIZED HEALTH CARE EDUCATION/TRAINING PROGRAM

## 2024-12-21 PROCEDURE — 85025 COMPLETE CBC W/AUTO DIFF WBC: CPT | Performed by: FAMILY MEDICINE

## 2024-12-21 RX ORDER — METOPROLOL SUCCINATE 50 MG/1
50 TABLET, EXTENDED RELEASE ORAL 2 TIMES DAILY
Qty: 60 TABLET | Refills: 0 | Status: SHIPPED | OUTPATIENT
Start: 2024-12-21

## 2024-12-21 RX ADMIN — DOFETILIDE 500 MCG: 0.5 CAPSULE ORAL at 09:12

## 2024-12-21 RX ADMIN — METOPROLOL SUCCINATE 50 MG: 50 TABLET, EXTENDED RELEASE ORAL at 09:12

## 2024-12-21 RX ADMIN — PANTOPRAZOLE SODIUM 20 MG: 20 TABLET, DELAYED RELEASE ORAL at 05:11

## 2024-12-21 RX ADMIN — INSULIN LISPRO 2 UNITS: 100 INJECTION, SOLUTION INTRAVENOUS; SUBCUTANEOUS at 09:13

## 2024-12-21 RX ADMIN — APIXABAN 5 MG: 5 TABLET, FILM COATED ORAL at 09:12

## 2024-12-21 RX ADMIN — ALLOPURINOL 300 MG: 300 TABLET ORAL at 09:12

## 2024-12-21 NOTE — DISCHARGE SUMMARY
Discharge Summary - Hospitalist   Name: Luis Fernando Jay 54 y.o. male I MRN: [id number]  Unit/Bed#: CW2 213-02 I Date of Admission: 12/17/2024   Date of Service: 12/21/2024 I Hospital Day: 4     Assessment & Plan  Atrial fibrillation with RVR ([facility])  Newly diagnosed atrial fibrillation as of 11/2024, was started on metoprolol succinate and Eliquis.  Subsequently had follow-up with cardiology and underwent MEI with cardioversion x 4 12/9/2024 with conversion to normal sinus rhythm  During cardiology follow-up appointment 12/17/2024 he was noted in rapid atrial fibrillation and advised to present here for evaluation/management.  Initially he denied symptoms at appointment but now complains of some lightheadedness with chest pressure  HR  throughout ED evaluation.  Continue metoprolol succinate await Cardiology consultation  Monitor on telemetry  Continue anticoagulation with Eliquis  Metoprolol succinate increased to 50 mg twice daily  Started on dofetilide 500 mcg bid by EP with close monitoring  Patient tolerating new regimen well.  Patient medically clear for discharge.  Patient will follow-up with EP outpatient.  Class 3 severe obesity due to excess calories without serious comorbidity with body mass index (BMI) of 50.0 to 59.9 in adult ([facility])  Dietary and lifestyle modification advised  Morbid obesity complicates all facets of care  Patient is working with sleep medicine to see if diagnosis of ASIM is present  Other emphysema ([facility])  Not in acute exacerbation, continue as needed albuterol nebulizer  Type 2 diabetes mellitus without complication, without long-term current use of insulin ([facility])  Lab Results   Component Value Date    HGBA1C 6.5 (H) 12/18/2024       Recent Labs     12/20/24  1642 12/20/24  2100 12/21/24  0621 12/21/24  1049   POCGLU 205* 186* 154* 171*       Blood Sugar Average: Last 72 hrs:  (P) 150.9471491957812274  Recently diagnosed as of September 2024.  Will obtain updated A1c- controlled at  6.5  Hold home oral medications/non-insulin injectables.  Start correctional insulin coverage with Accu-Cheks while admitted.  Carb controlled diet  Initiate hypoglycemia protocol  Right knee pain  Patient seen by ortho in outpatient setting has a meniscal tear and currently on workman comp  Orthopedics consulted while inpatient  Patient provided with brace and offered steroid injection     Medical Problems       Resolved Problems  Date Reviewed: 12/18/2024   None       Discharging Physician / Practitioner: Stephen Emery MD  PCP: Serenity Márquez MD  Admission Date:   Admission Orders (From admission, onward)       Ordered        12/17/24 2238  INPATIENT ADMISSION  Once                          Discharge Date: 12/21/24    Consultations During Hospital Stay:  Electrophysiology  Orthopedic surgery  Nutrition    Procedures Performed:   Corticosteroid injection of the right knee    Significant Findings / Test Results:   None    Incidental Findings:   None     Test Results Pending at Discharge (will require follow up):   None     Outpatient Tests Requested:  None    Complications:  None    Reason for Admission: Atrial fibrillation with RVR    Hospital Course:   Luis Fernando Jay is a 54 y.o. male patient who originally presented to the hospital on 12/17/2024 due to atrial fibrillation with RVR.  Patient was seen and evaluated by electrophysiology.  Patient started on Tikosyn.  Patient was monitored on telemetry.  Patient tolerated new regimen very well.  Patient is medically clear for discharge.  Of note patient was also seen by orthopedic surgery and had a corticosteroid injection of his right knee.      Please see above list of diagnoses and related plan for additional information.     Condition at Discharge: stable    Discharge Day Visit / Exam:   Subjective: This is a very pleasant 54-year-old gentleman who was seen and evaluated today at bedside.  Patient denies any acute complaints at this time.  Vitals: Blood  "Pressure: 111/72 (12/21/24 0744)  Pulse: 98 (12/21/24 0744)  Temperature: 97.7 °F (36.5 °C) (12/21/24 0744)  Temp Source: Oral (12/21/24 0235)  Respirations: 17 (12/21/24 0744)  Height: 6' 2\" (188 cm) (12/18/24 0920)  Weight - Scale: (!) 180 kg (396 lb) (12/18/24 0920)  SpO2: 95 % (12/21/24 0744)  Physical Exam  Vitals and nursing note reviewed.   Constitutional:       General: He is not in acute distress.     Appearance: He is well-developed. He is obese.   HENT:      Head: Normocephalic and atraumatic.   Eyes:      Conjunctiva/sclera: Conjunctivae normal.   Cardiovascular:      Rate and Rhythm: Rhythm irregular.      Heart sounds: No murmur heard.     Comments: Rate ranges from 80s-100s on monitor, regularly irregular  Pulmonary:      Effort: Pulmonary effort is normal. No respiratory distress.      Breath sounds: Normal breath sounds.   Abdominal:      Palpations: Abdomen is soft.      Tenderness: There is no abdominal tenderness.   Musculoskeletal:         General: No swelling.      Cervical back: Neck supple.   Skin:     General: Skin is warm and dry.      Capillary Refill: Capillary refill takes less than 2 seconds.   Neurological:      Mental Status: He is alert.   Psychiatric:         Mood and Affect: Mood normal.          Discussion with Family: Patient declined call to .     Discharge instructions/Information to patient and family:   See after visit summary for information provided to patient and family.      Provisions for Follow-Up Care:  See after visit summary for information related to follow-up care and any pertinent home health orders.      Mobility at time of Discharge:   Basic Mobility Inpatient Raw Score: 22  JH-HLM Goal: 7: Walk 25 feet or more  JH-HLM Achieved: 3: Sit at edge of bed  HLM Goal NOT achieved. Continue to encourage mobility in post discharge setting.     Disposition:   Home    Planned Readmission: None    Discharge Medications:  See after visit summary for reconciled " discharge medications provided to patient and/or family.      Administrative Statements   Discharge Statement:  I have spent a total time of 45 minutes in caring for this patient on the day of the visit/encounter. >30 minutes of time was spent on: Diagnostic results, Prognosis, Risks and benefits of tx options, Patient and family education, Impressions, Documenting in the medical record, and Reviewing / ordering tests, medicine, procedures  .    **Please Note: This note may have been constructed using a voice recognition system**

## 2024-12-21 NOTE — ASSESSMENT & PLAN NOTE
Patient was newly diagnosed with atrial fibrillation in November of this year and was started on Toprol-XL 25 mg BID and Eliquis 5 mg BID at that time.  He subsequently underwent MEI cardioversion x 4 on 12/9/2024 with successful conversion to normal sinus rhythm.  During a routine cardiology follow-up visit on 12/17 he was noted to be in A-fib with RVR with heart rates in the low 100s and was sent to the ED.  He did complain of some lightheadedness and chest pressure on presentation.    Plan:  -Continue increased dose Toprol-XL 50 mg twice daily.  -Continue Eliquis 5 mg twice daily.  -Continue outpatient sleep medicine follow-up for probable sleep apnea.  -Monitor electrolytes and replete as needed.  -Last EKG QTc 490 however not reliable given patient is in atrial fibrillation  Has follow-up appointment with Dr. Mckinney 1/2-will reassess QTc at that time  For now, continue dofetilide 500 mcg every 12 hours-  -patient is aware to monitor BP and heart rate at home and call if BP drops less than 100 systolic or if BPM drops below 50  -Will consider outpatient cardioversion at follow-up visit with Dr. Mckinney  -Dofetilide sent to [facility] pharmacy on file  -Patient is stable for discharge from  perspective

## 2024-12-21 NOTE — PROGRESS NOTES
Progress Note - Electrophysiology   Name: Luis Fernando Jay 54 y.o. male I MRN: [id number]  Unit/Bed#: CW2 213-02 I Date of Admission: 12/17/2024   Date of Service: 12/21/2024 I Hospital Day: 4     Assessment & Plan  Atrial fibrillation with RVR ([facility])  Patient was newly diagnosed with atrial fibrillation in November of this year and was started on Toprol-XL 25 mg BID and Eliquis 5 mg BID at that time.  He subsequently underwent MEI cardioversion x 4 on 12/9/2024 with successful conversion to normal sinus rhythm.  During a routine cardiology follow-up visit on 12/17 he was noted to be in A-fib with RVR with heart rates in the low 100s and was sent to the ED.  He did complain of some lightheadedness and chest pressure on presentation.    Plan:  -Continue increased dose Toprol-XL 50 mg twice daily.  -Continue Eliquis 5 mg twice daily.  -Continue outpatient sleep medicine follow-up for probable sleep apnea.  -Monitor electrolytes and replete as needed.  -Last EKG QTc 490 however not reliable given patient is in atrial fibrillation  Has follow-up appointment with Dr. Mckinney 1/2-will reassess QTc at that time  For now, continue dofetilide 500 mcg every 12 hours-  -patient is aware to monitor BP and heart rate at home and call if BP drops less than 100 systolic or if BPM drops below 50  -Will consider outpatient cardioversion at follow-up visit with Dr. Mckinney  -Dofetilide sent to [facility] pharmacy on file  -Patient is stable for discharge from [facility]  Class 3 severe obesity due to excess calories without serious comorbidity with body mass index (BMI) of 50.0 to 59.9 in adult ([facility])  -Continue outpatient workup for ASIM.  - on weight loss.  Other emphysema ([facility])  -Continue breathing treatments at home  Type 2 diabetes mellitus without complication, without long-term current use of insulin ([facility])  Lab Results   Component Value Date    HGBA1C 6.5 (H) 12/18/2024       Recent Labs     12/20/24  0710 12/20/24  1104  "12/20/24  1642 12/20/24  2100   POCGLU 175* 207* 205* 186*       Blood Sugar Average: Last 72 hrs:  (P) 148  -Continue with outpatient regimen  Right knee pain  -Ortho following.     Progress Note - Electrophysiology - Cardiology  Luis Fernando Jay 54 y.o. male MRN: [id number]  Unit/Bed#: CW2 213-02 Encounter: [id number]    Subjective/Objective   Subjective: Doing well this morning.  Does not feel his atrial fibrillation.  Discussed plans for outpatient cardioversion after load with dofetilide.  He is aware of how to take this medication and is aware that is at [facility] pharmacy for him to  later on today.  All questions answered.  Patient stable for discharge.    TELE: Atrial fibrillation rates relatively well-controlled on evaluation today.  Some RVR overnight    EKG:       Objective:  Vitals: /73 (BP Location: Right arm)   Pulse 95   Temp 97.7 °F (36.5 °C) (Oral)   Resp 16   Ht 6' 2\" (1.88 m)   Wt (!) 180 kg (396 lb)   SpO2 95%   BMI 50.84 kg/m²     Vitals:    12/18/24 0920   Weight: (!) 180 kg (396 lb)     Orthostatic Blood Pressures      Flowsheet Row Most Recent Value   Blood Pressure 111/73 filed at 12/21/2024 0235   Patient Position - Orthostatic VS Lying filed at 12/21/2024 0235              Intake/Output Summary (Last 24 hours) at 12/21/2024 0555  Last data filed at 12/21/2024 0235  Gross per 24 hour   Intake --   Output 0 ml   Net 0 ml       Invasive Devices       Peripheral Intravenous Line  Duration             Peripheral IV 12/17/24 Left Antecubital 3 days                              Scheduled Meds:  Current Facility-Administered Medications   Medication Dose Route Frequency Provider Last Rate    acetaminophen  650 mg Oral Q6H PRN Lee Herrera, DO      albuterol  2 puff Inhalation Q4H PRN Stephen Emery MD      allopurinol  300 mg Oral Daily Lee Herrera, DO      apixaban  5 mg Oral BID Lee Herrera, DO      dofetilide  500 mcg Oral Q12H [facility] Ruiz Cheng MD      insulin lispro  " 1-6 Units Subcutaneous HS Lee Otti, DO      insulin lispro  2-12 Units Subcutaneous TID AC Lee Otti, DO      methocarbamol  750 mg Oral TID PRN Lee Otti, DO      metoprolol succinate  50 mg Oral BID Lee Otti, DO      ondansetron  4 mg Intravenous Q6H PRN Lee Otti, DO      pantoprazole  20 mg Oral Early Morning Leedeborah Otti, DO       Continuous Infusions:   PRN Meds:.  acetaminophen    albuterol    methocarbamol    ondansetron    Review of Systems:  Review of Systems   Constitutional: Negative for fever and malaise/fatigue.   Cardiovascular:  Negative for chest pain and dyspnea on exertion.   Respiratory:  Negative for shortness of breath.    Neurological:  Negative for dizziness and light-headedness.   All other systems reviewed and are negative.    ROS as noted above, otherwise 12 point review of systems was performed and is negative.     Physical Exam:   Physical Exam  Vitals and nursing note reviewed.   Constitutional:       General: He is not in acute distress.     Appearance: He is obese.   Cardiovascular:      Rate and Rhythm: Normal rate. Rhythm irregular.   Pulmonary:      Effort: Pulmonary effort is normal.      Breath sounds: Normal breath sounds.   Musculoskeletal:      Right lower leg: Edema present.      Left lower leg: Edema present.   Skin:     General: Skin is warm and dry.   Neurological:      Mental Status: He is alert.                   Lab Results: I have personally reviewed pertinent lab results.    Results from last 7 days   Lab Units 12/21/24  0507 12/20/24  0521 12/18/24  0519   WBC Thousand/uL 16.78* 11.50* 9.40   HEMOGLOBIN g/dL 15.6 17.0 14.8   HEMATOCRIT % 46.0 50.4* 43.9   PLATELETS Thousands/uL 229 234 195     Results from last 7 days   Lab Units 12/21/24  0445 12/20/24  0521 12/19/24  0537   POTASSIUM mmol/L 4.0 4.6 3.8   CHLORIDE mmol/L 107 103 105   CO2 mmol/L 22 20* 25   BUN mg/dL 17 13 12   CREATININE mg/dL 0.75 0.76 0.70   CALCIUM mg/dL 9.1 9.6 8.8          Results from last 7 days   Lab Units 12/21/24  0526 12/20/24  0521 12/18/24  0638   MAGNESIUM mg/dL 2.2 2.0 2.3       Imaging: Results Review Statement: No pertinent imaging studies reviewed.  No results found for this or any previous visit.      VTE Pharmacologic Prophylaxis: eliquis  VTE Mechanical Prophylaxis: sequential compression device

## 2024-12-21 NOTE — NURSING NOTE
Discharge instructions reviewed with pt. Pt verbalized understanding. IV removed. Detailed education to pts new medication. Pt understanding of not missing a dose and if any doses are missed. Pt transported to the main lobby via floor staff.

## 2024-12-21 NOTE — CONSULTS
Reviewed wt loss education, consistent CHO intake at meals, wt loss tips, handouts provided per pt request (1800 calorie 5 day meals, consistent carbohydrate counting, DM food label reading from nutrition care manual). Outpatient RD number provided

## 2024-12-21 NOTE — ASSESSMENT & PLAN NOTE
Newly diagnosed atrial fibrillation as of 11/2024, was started on metoprolol succinate and Eliquis.  Subsequently had follow-up with cardiology and underwent MEI with cardioversion x 4 12/9/2024 with conversion to normal sinus rhythm  During cardiology follow-up appointment 12/17/2024 he was noted in rapid atrial fibrillation and advised to present here for evaluation/management.  Initially he denied symptoms at appointment but now complains of some lightheadedness with chest pressure  HR  throughout ED evaluation.  Continue metoprolol succinate await Cardiology consultation  Monitor on telemetry  Continue anticoagulation with Eliquis  Metoprolol succinate increased to 50 mg twice daily  Started on dofetilide 500 mcg bid by EP with close monitoring  Patient tolerating new regimen well.  Patient medically clear for discharge.  Patient will follow-up with EP outpatient.

## 2024-12-21 NOTE — ASSESSMENT & PLAN NOTE
Lab Results   Component Value Date    HGBA1C 6.5 (H) 12/18/2024       Recent Labs     12/20/24  0710 12/20/24  1104 12/20/24  1642 12/20/24  2100   POCGLU 175* 207* 205* 186*       Blood Sugar Average: Last 72 hrs:  (P) 148  -Continue with outpatient regimen

## 2024-12-21 NOTE — ASSESSMENT & PLAN NOTE
Lab Results   Component Value Date    HGBA1C 6.5 (H) 12/18/2024       Recent Labs     12/20/24  1642 12/20/24  2100 12/21/24  0621 12/21/24  1049   POCGLU 205* 186* 154* 171*       Blood Sugar Average: Last 72 hrs:  (P) 150.4547251635146212  Recently diagnosed as of September 2024.  Will obtain updated A1c- controlled at 6.5  Hold home oral medications/non-insulin injectables.  Start correctional insulin coverage with Accu-Cheks while admitted.  Carb controlled diet  Initiate hypoglycemia protocol

## 2024-12-21 NOTE — PLAN OF CARE
Problem: PAIN - ADULT  Goal: Verbalizes/displays adequate comfort level or baseline comfort level  Description: Interventions:  - Encourage patient to monitor pain and request assistance  - Assess pain using appropriate pain scale  - Administer analgesics based on type and severity of pain and evaluate response  - Implement non-pharmacological measures as appropriate and evaluate response  - Consider cultural and social influences on pain and pain management  - Notify physician/advanced practitioner if interventions unsuccessful or patient reports new pain  Outcome: Progressing     Problem: INFECTION - ADULT  Goal: Absence or prevention of progression during hospitalization  Description: INTERVENTIONS:  - Assess and monitor for signs and symptoms of infection  - Monitor lab/diagnostic results  - Monitor all insertion sites, i.e. indwelling lines, tubes, and drains  - Monitor endotracheal if appropriate and nasal secretions for changes in amount and color  - Brandon appropriate cooling/warming therapies per order  - Administer medications as ordered  - Instruct and encourage patient and family to use good hand hygiene technique  - Identify and instruct in appropriate isolation precautions for identified infection/condition  Outcome: Progressing     Problem: SAFETY ADULT  Goal: Patient will remain free of falls  Description: INTERVENTIONS:  - Educate patient/family on patient safety including physical limitations  - Instruct patient to call for assistance with activity   - Consult OT/PT to assist with strengthening/mobility   - Keep Call bell within reach  - Keep bed low and locked with side rails adjusted as appropriate  - Keep care items and personal belongings within reach  - Initiate and maintain comfort rounds  - Make Fall Risk Sign visible to staff  - Offer Toileting every 2 Hours, in advance of need  - Obtain necessary fall risk management equipment: call bell in reach  - Apply yellow socks and bracelet for  high fall risk patients  - Consider moving patient to room near nurses station  Outcome: Progressing     Problem: DISCHARGE PLANNING  Goal: Discharge to home or other facility with appropriate resources  Description: INTERVENTIONS:  - Identify barriers to discharge w/patient and caregiver  - Arrange for needed discharge resources and transportation as appropriate  - Identify discharge learning needs (meds, wound care, etc.)  - Arrange for interpretive services to assist at discharge as needed  - Refer to Case Management Department for coordinating discharge planning if the patient needs post-hospital services based on physician/advanced practitioner order or complex needs related to functional status, cognitive ability, or social support system  Outcome: Progressing     Problem: Knowledge Deficit  Goal: Patient/family/caregiver demonstrates understanding of disease process, treatment plan, medications, and discharge instructions  Description: Complete learning assessment and assess knowledge base.  Interventions:  - Provide teaching at level of understanding  - Provide teaching via preferred learning methods  Outcome: Progressing     Problem: CARDIOVASCULAR - ADULT  Goal: Maintains optimal cardiac output and hemodynamic stability  Description: INTERVENTIONS:  - Monitor I/O, vital signs and rhythm  - Monitor for S/S and trends of decreased cardiac output  - Administer and titrate ordered vasoactive medications to optimize hemodynamic stability  - Assess quality of pulses, skin color and temperature  - Assess for signs of decreased coronary artery perfusion  - Instruct patient to report change in severity of symptoms  Outcome: Progressing  Goal: Absence of cardiac dysrhythmias or at baseline rhythm  Description: INTERVENTIONS:  - Continuous cardiac monitoring, vital signs, obtain 12 lead EKG if ordered  - Administer antiarrhythmic and heart rate control medications as ordered  - Monitor electrolytes and administer  replacement therapy as ordered  Outcome: Progressing     Problem: RESPIRATORY - ADULT  Goal: Achieves optimal ventilation and oxygenation  Description: INTERVENTIONS:  - Assess for changes in respiratory status  - Assess for changes in mentation and behavior  - Position to facilitate oxygenation and minimize respiratory effort  - Oxygen administered by appropriate delivery if ordered  - Initiate smoking cessation education as indicated  - Encourage broncho-pulmonary hygiene including cough, deep breathe, Incentive Spirometry  - Assess the need for suctioning and aspirate as needed  - Assess and instruct to report SOB or any respiratory difficulty  - Respiratory Therapy support as indicated  Outcome: Progressing     Problem: METABOLIC, FLUID AND ELECTROLYTES - ADULT  Goal: Electrolytes maintained within normal limits  Description: INTERVENTIONS:  - Monitor labs and assess patient for signs and symptoms of electrolyte imbalances  - Administer electrolyte replacement as ordered  - Monitor response to electrolyte replacements, including repeat lab results as appropriate  - Instruct patient on fluid and nutrition as appropriate  Outcome: Progressing  Goal: Fluid balance maintained  Description: INTERVENTIONS:  - Monitor labs   - Monitor I/O and WT  - Instruct patient on fluid and nutrition as appropriate  - Assess for signs & symptoms of volume excess or deficit  Outcome: Progressing     Problem: MUSCULOSKELETAL - ADULT  Goal: Maintain or return mobility to safest level of function  Description: INTERVENTIONS:  - Assess patient's ability to carry out ADLs; assess patient's baseline for ADL function and identify physical deficits which impact ability to perform ADLs (bathing, care of mouth/teeth, toileting, grooming, dressing, etc.)  - Assess/evaluate cause of self-care deficits   - Assess range of motion  - Assess patient's mobility  - Assess patient's need for assistive devices and provide as appropriate  - Encourage  maximum independence but intervene and supervise when necessary  - Involve family in performance of ADLs  - Assess for home care needs following discharge   - Consider OT consult to assist with ADL evaluation and planning for discharge  - Provide patient education as appropriate  Outcome: Progressing  Goal: Maintain proper alignment of affected body part  Description: INTERVENTIONS:  - Support, maintain and protect limb and body alignment  - Provide patient/ family with appropriate education  Outcome: Progressing

## 2024-12-21 NOTE — DISCHARGE INSTR - AVS FIRST PAGE
Discharge Medication Instructions:    New medication instructions:   Take medication at the same time every day, 12 hours apart    If you are more than 2 hours late taking your medication, skip that dose.  Resume your regular timing.    Do not stop taking the medication until your doctor tells you to stop.    Do not take 2 doses at the same time.   Speak with a pharmacist or your cardiologist prior to starting the new medication.  Medication interactions must be evaluated with your antiarrythmic medication.    Common medication classes that interact:    Antibiotics   Antifungals   Antianxiety/antidepressants      Stop taking:   ***     Discharge follow up:    1- week EKG/Nurse visit at a Cardiology office    If you filled your prescription at the hospital pharmacy, please ask the office to transfer your prescription to your home pharmacy   Provider follow up appointment at 4-6 weeks     If you have any questions:   [phone] 8:30am-4pm  [phone] After hours  [phone] Appointments

## 2024-12-21 NOTE — PROGRESS NOTES
Telemetry reading afib with HR around 100bpm.  Discussed with Rusty Simental PA-C with [facility] to give tikosyn.  Pt likely to be discharged today.

## 2024-12-23 ENCOUNTER — TRANSITIONAL CARE MANAGEMENT (OUTPATIENT)
Dept: INTERNAL MEDICINE CLINIC | Facility: CLINIC | Age: 54
End: 2024-12-23

## 2024-12-23 ENCOUNTER — PATIENT OUTREACH (OUTPATIENT)
Dept: CASE MANAGEMENT | Facility: OTHER | Age: 54
End: 2024-12-23

## 2024-12-23 NOTE — ED ATTENDING ATTESTATION
12/17/2024  I, Elvie Main MD, saw and evaluated the patient. I have discussed the patient with the resident/non-physician practitioner and agree with the resident's/non-physician practitioner's findings, Plan of Care, and MDM as documented in the resident's/non-physician practitioner's note, except where noted. All available labs and Radiology studies were reviewed.  I was present for key portions of any procedure(s) performed by the resident/non-physician practitioner and I was immediately available to provide assistance.       At this point I agree with the current assessment done in the Emergency Department.  I have conducted an independent evaluation of this patient a history and physical is as follows: Patient with recently diagnosed Afib. On eliquis and metoprolol. Presents with Afib RVR. Was seen by cardiology office earlier today and noted  in the office per review of records. On telemetry review in ED patient is having some PVCs as well. No chest pain but does note some tightness. Heart irregularly irregular and tachycardic. Lungs CTA, abdomen soft, nontender. No pedal edema. Plan: rate control if continued RVR. Will have resident d/w cardiology fellow on call. BP soft in ED, fluids given. Will admit for further treatment.     ED Course  ED Course as of 12/23/24 1806   Tue Dec 17, 2024   1956 Review of external notes: cardiology visit earlier today noted . Advised to come to ED for rate control.          Critical Care Time  Procedures

## 2024-12-23 NOTE — UTILIZATION REVIEW
NOTIFICATION OF ADMISSION DISCHARGE   This is a Notification of Discharge from [facility]. Please be advised that this patient has been discharge from our facility. Below you will find the admission and discharge date and time including the patient’s disposition.   UTILIZATION REVIEW CONTACT:  Mayra Wiggins  Utilization   Network Utilization Review Department  Phone: [phone] x carefully listen to the prompts. All voicemails are confidential.  Email: [email or web address]     ADMISSION INFORMATION  PRESENTATION DATE: 12/17/2024  7:18 PM  OBERVATION ADMISSION DATE: N/A  INPATIENT ADMISSION DATE: 12/17/24 10:38 PM   DISCHARGE DATE: 12/21/2024 12:54 PM   DISPOSITION:Home/Self Care    Network Utilization Review Department  ATTENTION: Please call with any questions or concerns to [phone] and carefully listen to the prompts so that you are directed to the right person. All voicemails are confidential.   For Discharge needs, contact Care Management DC Support Team at [phone] opt. 2  Send all requests for admission clinical reviews, approved or denied determinations and any other requests to dedicated fax number below belonging to the campus where the patient is receiving treatment. List of dedicated fax numbers for the Facilities:  FACILITY NAME UR FAX NUMBER   ADMISSION DENIALS (Administrative/Medical Necessity) [phone]   DISCHARGE SUPPORT TEAM ([facility]) [phone]   PARENT CHILD HEALTH (Maternity/NICU/Pediatrics) [phone]   [facility] [phone]   [facility] [phone]   [facility] [phone]   [facility] [phone]   [facility] [phone]   [facility] [phone]   [facility] [phone]   [facility]  [phone]   [facility] [phone]   [facility] [phone]   [facility] [phone]   [facility] [phone]

## 2024-12-23 NOTE — PROGRESS NOTES
"Outpatient Care Management Note    ADT alert received.  Chart reviewed.    Patient was hospitalized at [facility] 12/17/24-12/21/24 for atrial fibrillation with RVR.    Per chart notes, patient was newly diagnosed with AFIB on 11/20/24 and was started on Metoprolol and Eliquis.   He underwent MEI with cardioversion x 4 on 12/9/24 with conversion to normal sinus rhythm.  At Cardiology follow up on 12/17/24 was found to be in rapid AFIB and advised to go to ED for evaluation and management.  Metoprolol Succinate was increased to 50 mg twice daily and patient was started on Dofetilide (Tikosyn)- patient tolerated well.  Patient is to follow up with EP as an outpatient.    RN CM will place outreach call to follow up after hospitalization and to assess for care management needs.    Call placed and RN CM spoke with patient.  Patient states that \"everything is going well so far\".    Patient confirmed that he received a copy of his AVS instructions and medication list.  He states that everything was reviewed with him at discharge and denies having any questions or concerns at this time.    He states that he was started on a new medication but was unable to provide medication name stating that his wife handles his medications and has everything set up for him in his pillbox.  He declined need to review medications with RN CM at this time.      Patient shared that he now has a blood pressure monitor and pulse ox monitor at home and is keeping track of those numbers as well as monitoring his blood sugars using Dexcom CGM.  He denies any concerns.    Patient confirmed that he has a follow up appointment with Cardiology 1/2/25.    RN CM explained role and care management to patient. Patient denies having any care management needs at this time and declined need for further outreach from RN CM.    RN CM will remove self from care team and close referral at this time.  "

## 2024-12-24 PROCEDURE — 95806 SLEEP STUDY UNATT&RESP EFFT: CPT | Performed by: INTERNAL MEDICINE

## 2024-12-26 ENCOUNTER — RESULTS FOLLOW-UP (OUTPATIENT)
Dept: SLEEP CENTER | Facility: CLINIC | Age: 54
End: 2024-12-26

## 2024-12-26 ENCOUNTER — TELEPHONE (OUTPATIENT)
Dept: SLEEP CENTER | Facility: CLINIC | Age: 54
End: 2024-12-26

## 2024-12-26 NOTE — TELEPHONE ENCOUNTER
Home sleep study resulted and shows moderate sleep apnea with event related hypoxemia.    RECOMMENDATION:per Dr. Kerr  Recommend auto CPAP with pressure range of 5-20 cm H20.  Compliance check 1-2 months after starting CPAP.  Recommend consultation with sleep medicine.    Results read by patient.     Consult with sleep medicine Fellow scheduled for 2/24/25 @ 10:20 am in the [place] office.     Called patient and left message advising I will send a [employer] message with the sleep study results and next steps.  Provided sleep center number([phone]) to call if any questions.

## 2024-12-27 ENCOUNTER — TELEPHONE (OUTPATIENT)
Age: 54
End: 2024-12-27

## 2024-12-27 LAB

## 2024-12-27 NOTE — TELEPHONE ENCOUNTER
Caller: WENDY Nicholas [facility]    Doctor: Flash    Reason for call: Yu called to get WC info, Yu will follow up with external insurance to get info for WC.

## 2024-12-30 ENCOUNTER — OFFICE VISIT (OUTPATIENT)
Dept: INTERNAL MEDICINE CLINIC | Facility: CLINIC | Age: 54
End: 2024-12-30
Payer: COMMERCIAL

## 2024-12-30 VITALS
TEMPERATURE: 96.7 F | BODY MASS INDEX: 50.84 KG/M2 | HEIGHT: 74 IN | HEART RATE: 104 BPM | SYSTOLIC BLOOD PRESSURE: 120 MMHG | DIASTOLIC BLOOD PRESSURE: 74 MMHG | OXYGEN SATURATION: 96 %

## 2024-12-30 DIAGNOSIS — E11.9 TYPE 2 DIABETES MELLITUS WITHOUT COMPLICATION, WITHOUT LONG-TERM CURRENT USE OF INSULIN (HCC): ICD-10-CM

## 2024-12-30 DIAGNOSIS — M25.561 RIGHT KNEE PAIN, UNSPECIFIED CHRONICITY: ICD-10-CM

## 2024-12-30 DIAGNOSIS — G47.33 OSA (OBSTRUCTIVE SLEEP APNEA): ICD-10-CM

## 2024-12-30 DIAGNOSIS — I48.91 ATRIAL FIBRILLATION WITH RVR (HCC): Primary | ICD-10-CM

## 2024-12-30 LAB
DME PARACHUTE DELIVERY DATE EXPECTED: NORMAL
DME PARACHUTE DELIVERY DATE REQUESTED: NORMAL
DME PARACHUTE ITEM DESCRIPTION: NORMAL
DME PARACHUTE ORDER STATUS: NORMAL
DME PARACHUTE SUPPLIER NAME: NORMAL
DME PARACHUTE SUPPLIER PHONE: NORMAL

## 2024-12-30 PROCEDURE — 99495 TRANSJ CARE MGMT MOD F2F 14D: CPT | Performed by: NURSE PRACTITIONER

## 2024-12-30 NOTE — ASSESSMENT & PLAN NOTE
Rate controlled in office.   On metoprolol, eliquis, and tikosyn.   Sees cardiology this week.

## 2024-12-30 NOTE — PROGRESS NOTES
Transition of Care Visit  Name: Luis Fernando Jay      : 1970      MRN: [id number]  Encounter Provider: MARISEL Moreno  Encounter Date: 2024   Encounter department: [facility] INTERNAL MEDICINE    Assessment & Plan  Atrial fibrillation with RVR (HCC)  Rate controlled in office.   On metoprolol, eliquis, and tikosyn.   Sees cardiology this week.        ASIM (obstructive sleep apnea)  Waiting to start CPAP, ordered last week.        Type 2 diabetes mellitus without complication, without long-term current use of insulin (HCC)    Lab Results   Component Value Date    HGBA1C 6.5 (H) 2024     Well controlled.        Right knee pain, unspecified chronicity  Workers comp injury.   Seeing orthopedics.   Temporary handicap placard completed.             History of Present Illness     Transitional Care Management Review:   Luis Fernando Jay is a 54 y.o. male here for TCM follow up.     During the TCM phone call patient stated:  TCM Call       Date and time call was made  2024  2:01 PM    Hospital care reviewed  Records reviewed    Patient was hospitialized at  [facility]    Date of Admission  24    Date of discharge  24    Diagnosis  Afib with RVR    Disposition  Home    Were the patients medications reviewed and updated  No    Current Symptoms  None          TCM Call       Post hospital issues  None    Scheduled for follow up?  Yes    Patients specialists  Cardiologist    Cardiologist name  Raghulida Mckinney    Did you obtain your prescribed medications  Yes    Do you need help managing your prescriptions or medications  No    Is transportation to your appointment needed  No    I have advised the patient to call PCP with any new or worsening symptoms  Cristina Ariza    Living Arrangements  Spouse or Significiant other    Support System  Spouse    The type of support provided  Emotional; Physical    Do you have social support  Yes, as much as I need    Are you recieving  "any outpatient services  No    Are you recieving home care services  No    Are you using any community resources  No    Current waiver services  No    Have you fallen in the last 12 months  No    Interperter language line needed  No    Comments  LM on  for patient to call to schedule appointment.          Luis Fernando was hospitalized from 12/17-12/21 with afib with RVR.   He was first diagnosed in November. He was started on toprol and eliquis. He also underwent a cardioversion at that time.   He was seen by cardiology and noted to be in afib with RVR.   He was started on tikosyn.   He denies any chest pain. He continues to feel shortness of breath with activity. He has been feeling this way since he was diagnosed in November.     He was recently diagnosed with ASIM. Hasn't received his cpap machine yet.     He received a right knee steroid injection while he was hospitalized. The knee pain is somewhat improved but still with significant mobility. Difficulty ambulating most days.   He has been out of work due to work injury.     He has been losing weight, eating healthier.           Review of Systems   Constitutional:  Negative for activity change, appetite change and fatigue.   Respiratory:  Positive for shortness of breath. Negative for cough.    Cardiovascular:  Negative for chest pain, palpitations and leg swelling.   Gastrointestinal:  Negative for abdominal pain.   Genitourinary:  Negative for difficulty urinating.   Neurological:  Negative for dizziness, light-headedness and headaches.     Objective   /74   Pulse (!) 124   Temp (!) 96.7 °F (35.9 °C)   Ht 6' 2\" (1.88 m)   SpO2 96%   BMI 50.84 kg/m²     Physical Exam  Vitals reviewed.   Constitutional:       Appearance: Normal appearance.   HENT:      Head: Normocephalic and atraumatic.   Eyes:      Conjunctiva/sclera: Conjunctivae normal.   Cardiovascular:      Rate and Rhythm: Normal rate. Rhythm irregular.      Heart sounds: Normal heart sounds. "   Pulmonary:      Effort: Pulmonary effort is normal.      Breath sounds: Normal breath sounds.   Skin:     General: Skin is warm and dry.   Neurological:      Mental Status: He is alert and oriented to person, place, and time.   Psychiatric:         Mood and Affect: Mood normal.         Behavior: Behavior normal.     Medications have been reviewed by provider in current encounter

## 2024-12-30 NOTE — H&P (VIEW-ONLY)
Consultation - Electrophysiology-Cardiology (EP)   Luis Fernando Jay 54 y.o. male MRN: [id number]  Unit/Bed#:  Encounter: [id number]      1. Atrial fibrillation, unspecified type ([facility])  Ambulatory referral to Cardiac Electrophysiology    POCT ECG    Cardioversion      2. Atrial fibrillation with RVR ([facility])        3. Other emphysema ([facility])        4. ASIM (obstructive sleep apnea)        5. Gastroesophageal reflux disease, unspecified whether esophagitis present        6. Type 2 diabetes mellitus without complication, without long-term current use of insulin ([facility])        7. Acute idiopathic gout of right foot        8. Cigarette nicotine dependence in remission        9. Vasovagal syncope        10. Other hyperlipidemia        11. Abnormal CT of the chest        12. Class 3 severe obesity due to excess calories without serious comorbidity with body mass index (BMI) of 50.0 to 59.9 in adult ([facility])        13. Type 2 diabetes mellitus with other specified complication, unspecified whether long term insulin use ([facility])              Consults  Physician Requesting Consult: Scarlett Burnett CRNP   Reason for Consult / Principal Problem: Atrial fibrillation / atrial flutter         Summary of my recommendation    This patient was in persistent atrial fibrillation - DCCV failed  On dofetilide 500 mcg twice daily  Currently in atrial flutter 130/min, however minimally symptomatic    Had a detailed discussion about RVR, chances of heart failure     Multiple risk factors putting him at a high risk of recurrence  Age 54  Morbid obesity with BMI 51  ASIM not treated as yet  Diabetes  Hypertension     At the current time my recommendation for this patient    -Patient supposed to get CPAP on 7 January, 2025    -Proceed with repeat cardioversion attempt between 9th and 14 January  Now has antiarrhythmic in the system and a higher chance of successful cardioversion    -If remains in sinus rhythm, proceed with aggressive weight loss  regimen  Ozempic  Dietary change  Aim for a BMI less than 40    -Once risk factors better controlled we will plan for comprehensive ablation of atrial fibrillation and flutter    -If patient is back in A-fib/a flutter  We will need to discontinue dofetilide  Start the patient on amiodarone load 400 mg twice daily for 2 weeks and then 200 mg daily  And then plan repeat cardioversion    -If none of these work then we will have to proceed with   further rate control - higher dose of metoprolol and possible digoxin  Then proceed with ablation once there is some weight loss    -f/u with me in 3 months             Clinical conditions  A-fib with RVR  Long-term anticoagulation  Recent cardioversion on December 9, back in A-fib  Skin soreness from 4 DC shocks applied  Morbid obesity, BMI 51  Obstructive sleep apnea  Diabetes mellitus  Right knee pain  Emphysema            Assessment & Plan     A-fib with RVR  Long-term anticoagulation  Recent cardioversion on December 9, back in A-fib  Skin soreness from 4 DC shocks applied    This patient was in persistent atrial fibrillation - DCCV failed  On dofetilide 500 mcg twice daily  Currently in atrial flutter 130/min, however minimally symptomatic    Had a detailed discussion about RVR, chances of heart failure     Multiple risk factors putting him at a high risk of recurrence  Age 54  Morbid obesity with BMI 51  ASIM not treated as yet  Diabetes  Hypertension     At the current time my recommendation for this patient    -Patient supposed to get CPAP on 7 January, 2025    -Proceed with repeat cardioversion attempt between 9th and 14 January  Now has antiarrhythmic in the system and a higher chance of successful cardioversion    -If remains in sinus rhythm, proceed with aggressive weight loss regimen  Ozempic  Dietary change  Aim for a BMI less than 40    -Once risk factors better controlled we will plan for comprehensive ablation of atrial fibrillation and flutter    -If patient  is back in A-fib/a flutter  We will need to discontinue dofetilide  Start the patient on amiodarone load 400 mg twice daily for 2 weeks and then 200 mg daily  And then plan repeat cardioversion    -If none of these work then we will have to proceed with   further rate control - higher dose of metoprolol and possible digoxin  Then proceed with ablation once there is some weight loss    -f/u with me in 3 months           Morbid obesity, BMI 51  And has been started on Ozempic/semaglutide  Dietary recommendations made  Aggressive weight management is needed for success with ablation        Obstructive sleep apnea  Plan to get his CPAP in the first week of January      Diabetes mellitus  Patient is on metformin and semaglutide      Right knee pain  Follows up with orthopedics      Emphysema  Chronic and longstanding      History of Present Illness   HPI: Luis Fernando Jay is a 54 y.o. year old male has been referred to me by Dr Pantoja for the evaluation and management of atrial fibrillation and flutter with RVR      The patient has significant medical illnesses which include  A-fib with RVR  Long-term anticoagulation  Recent cardioversion on December 9, back in A-fib  Skin soreness from 4 DC shocks applied  Morbid obesity, BMI 51  Obstructive sleep apnea  Diabetes mellitus  Right knee pain  Emphysema    Prior to first hospital visit  Mr Luis Fernando Jay was admitted to [facility] on 11/05 - 11/07/24 with syncope and collapse.  He presented to [facility] emergency room with an episode of syncope while having a bowel movement.  He struck his head on the sink and fell to the side of the shower.  Reported a headache shortness of breath and left-sided rib pain as well as lower abdominal pain.  Arrival in the emergency room found to be tachycardic heart rate 120s.  EKG showed atrial fibrillation with RVR.  CT of the chest abdomen pelvis showed no acute fracture,  CT showed mastoiditis.  He was started on  metoprolol succinate 25 mg twice daily, Eliquis 5 mg twice daily for stroke prevention.  It was recommended he undergo outpatient sleep study to rule out ASIM.  Discussed also cardioversion versus rhythm control.       Hospital visit and follow-up  This patient was in persistent atrial fibrillation - DCCV failed  On dofetilide 500 mcg twice daily  Currently in atrial flutter 130/min, however minimally symptomatic      Chest pain or pressure, worsening or apnea or PND  Does have chronic leg swelling  He does have occasional palpitation  He is not complaining of presyncope or syncope  He does have orthostatic intolerance  He also has exertional intolerance    He has a history of snoring, morning fatigue and daytime sleepiness    He is not abusing tobacco alcohol or energy drinks            Historical Information   Past Medical History:   Diagnosis Date    Diabetes mellitus (HCC)     GERD (gastroesophageal reflux disease)     Lung mass     Obesity      History reviewed. No pertinent surgical history.  Social History     Substance and Sexual Activity   Alcohol Use Not Currently     Social History     Substance and Sexual Activity   Drug Use Never     Social History     Tobacco Use   Smoking Status Former    Current packs/day: 0.00    Average packs/day: 3.0 packs/day for 40.3 years (121.0 ttl pk-yrs)    Types: Cigarettes    Start date:     Quit date: 2019    Years since quittin.6   Smokeless Tobacco Never     Social History     Socioeconomic History    Marital status: /Civil Union     Spouse name: Not on file    Number of children: Not on file    Years of education: Not on file    Highest education level: Not on file   Occupational History    Not on file   Tobacco Use    Smoking status: Former     Current packs/day: 0.00     Average packs/day: 3.0 packs/day for 40.3 years (121.0 ttl pk-yrs)     Types: Cigarettes     Start date:      Quit date: 2019     Years since quittin.6    Smokeless  tobacco: Never   Vaping Use    Vaping status: Never Used   Substance and Sexual Activity    Alcohol use: Not Currently    Drug use: Never    Sexual activity: Not Currently   Other Topics Concern    Not on file   Social History Narrative    Drinks coffee 1 cup per day         Works in [place] - works at [employer],      Social Drivers of Health     Financial Resource Strain: Not on file   Food Insecurity: No Food Insecurity (2024)    Nursing - Inadequate Food Risk Classification     Worried About Running Out of Food in the Last Year: Not on file     Ran Out of Food in the Last Year: Not on file     Ran Out of Food in the Last Year: 1   Transportation Needs: No Transportation Needs (2024)    Nursing - Transportation Risk Classification     Lack of Transportation: Not on file     Lack of Transportation: 2   Physical Activity: Not on file   Stress: Not on file   Social Connections: Not on file   Intimate Partner Violence: Unknown (2024)    Nursing IPS     Feels Physically and Emotionally Safe: Not on file     Physically Hurt by Someone: Not on file     Humiliated or Emotionally Abused by Someone: Not on file     Physically Hurt by Someone: 2     Hurt or Threatened by Someone: 2   Housing Stability: Unknown (2024)    Nursing: Inadequate Housing Risk Classification     Has Housing: Not on file     Worried About Losing Housing: Not on file     Unable to Get Utilities: Not on file     Unable to Pay for Housing in the Last Year: 2     Has Housin     .  Family History:  Family History   Problem Relation Age of Onset    No Known Problems Mother     Tuberculosis Father     Atrial fibrillation Father 65    Lung cancer Paternal Uncle     Alcohol abuse Neg Hx     Substance Abuse Neg Hx     Mental illness Neg Hx     Depression Neg Hx          Meds/Allergies    No current facility-administered medications for this visit.     Not in a hospital admission.    No Known  "Allergies        Objective   Vitals: Visit Vitals  /82 (BP Location: Left arm, Patient Position: Sitting, Cuff Size: Large)   Pulse (!) 130   Ht 6' 2\" (1.88 m)   Wt (!) 178 kg (391 lb 14.4 oz)   BMI 50.32 kg/m²   Smoking Status Former   BSA 2.89 m²      Vitals:    01/02/25 0903   Weight: (!) 178 kg (391 lb 14.4 oz)   [unfilled]    Invasive Devices       None                     ROS  Review of Systems   All other systems reviewed and are negative.  As described in my history of present illness        PHYSICAL EXAM  Physical Exam  Vitals reviewed.   Constitutional:       General: He is not in acute distress.     Appearance: Normal appearance. He is obese. He is not ill-appearing.      Comments: Morbid obesity  BMI is 51   HENT:      Head: Normocephalic and atraumatic.      Right Ear: External ear normal.      Left Ear: External ear normal.      Nose: Nose normal.      Mouth/Throat:      Comments: Posterior pharynx is crowded  Eyes:      General: No scleral icterus.     Extraocular Movements: Extraocular movements intact.      Conjunctiva/sclera: Conjunctivae normal.      Pupils: Pupils are equal, round, and reactive to light.   Neck:      Comments: Large thick neck  Cardiovascular:      Rate and Rhythm: Regular rhythm. Tachycardia present.      Heart sounds: Normal heart sounds. No murmur heard.     No gallop.   Pulmonary:      Effort: No respiratory distress.      Breath sounds: Examination of the right-lower field reveals decreased breath sounds. Examination of the left-lower field reveals decreased breath sounds. Decreased breath sounds present. No wheezing.   Abdominal:      General: Bowel sounds are normal. There is no distension.      Palpations: Abdomen is soft.      Tenderness: There is no abdominal tenderness. There is no guarding.      Comments: And central obesity   Musculoskeletal:         General: Swelling present. No deformity.      Cervical back: Neck supple. No rigidity.      Right lower leg: " Edema present.      Left lower leg: Edema present.   Skin:     Coloration: Skin is not jaundiced.      Findings: No bruising or lesion.   Neurological:      Mental Status: He is alert and oriented to person, place, and time. Mental status is at baseline.      Motor: No weakness.   Psychiatric:         Mood and Affect: Mood normal.         Behavior: Behavior normal.         Thought Content: Thought content normal.         Judgment: Judgment normal.               LAB RESULTS:    CBC:  WBC   Date Value Ref Range Status   12/21/2024 16.78 (H) 4.31 - 10.16 Thousand/uL Final     Hemoglobin   Date Value Ref Range Status   12/21/2024 15.6 12.0 - 17.0 g/dL Final     Hematocrit   Date Value Ref Range Status   12/21/2024 46.0 36.5 - 49.3 % Final     MCV   Date Value Ref Range Status   12/21/2024 90 82 - 98 fL Final     Platelets   Date Value Ref Range Status   12/21/2024 229 149 - 390 Thousands/uL Final     RBC   Date Value Ref Range Status   12/21/2024 5.09 3.88 - 5.62 Million/uL Final     MCH   Date Value Ref Range Status   12/21/2024 30.6 26.8 - 34.3 pg Final     MCHC   Date Value Ref Range Status   12/21/2024 33.9 31.4 - 37.4 g/dL Final     RDW   Date Value Ref Range Status   12/21/2024 13.5 11.6 - 15.1 % Final     MPV   Date Value Ref Range Status   12/21/2024 9.4 8.9 - 12.7 fL Final     nRBC   Date Value Ref Range Status   12/21/2024 0 /100 WBCs Final       CMP:  Potassium   Date Value Ref Range Status   12/21/2024 4.0 3.5 - 5.3 mmol/L Final     Chloride   Date Value Ref Range Status   12/21/2024 107 96 - 108 mmol/L Final     CO2   Date Value Ref Range Status   12/21/2024 22 21 - 32 mmol/L Final     CO2, i-STAT   Date Value Ref Range Status   04/22/2019 23 21 - 32 mmol/L Final     BUN   Date Value Ref Range Status   12/21/2024 17 5 - 25 mg/dL Final     Creatinine   Date Value Ref Range Status   12/21/2024 0.75 0.60 - 1.30 mg/dL Final     Comment:     Standardized to IDMS reference method     Glucose, i-STAT   Date Value  Ref Range Status   04/22/2019 163 (H) 65 - 140 mg/dl Final     Calcium   Date Value Ref Range Status   12/21/2024 9.1 8.4 - 10.2 mg/dL Final     AST   Date Value Ref Range Status   11/06/2024 24 13 - 39 U/L Final     ALT   Date Value Ref Range Status   11/06/2024 45 7 - 52 U/L Final     Comment:     Specimen collection should occur prior to Sulfasalazine administration due to the potential for falsely depressed results.      Alkaline Phosphatase   Date Value Ref Range Status   11/06/2024 79 34 - 104 U/L Final     eGFR   Date Value Ref Range Status   12/21/2024 102 ml/min/1.73sq m Final   04/22/2019 87 ml/min/1.73sq m Final        Magnesium:   Magnesium   Date Value Ref Range Status   12/21/2024 2.2 1.9 - 2.7 mg/dL Final        A1C:  Hemoglobin A1C   Date Value Ref Range Status   12/18/2024 6.5 (H) Normal 4.0-5.6%; PreDiabetic 5.7-6.4%; Diabetic >=6.5%; Glycemic control for adults with diabetes <7.0% % Final        TSH:  TSH 3RD GENERATON   Date Value Ref Range Status   11/06/2024 2.224 0.450 - 4.500 uIU/mL Final     Comment:     Adult TSH (3rd generation) reference range follows the recommended guidelines of the American Thyroid Association, January, 2020.        PT/INR:  Protime   Date Value Ref Range Status   11/06/2024 15.2 (H) 12.3 - 15.0 seconds Final     INR   Date Value Ref Range Status   11/06/2024 1.17 0.85 - 1.19 Final       Lipid Panel:  Cholesterol   Date Value Ref Range Status   11/06/2024 122 See Comment mg/dL Final     Comment:     Cholesterol:         Pediatric <18 Years        Desirable          <170 mg/dL      Borderline High    170-199 mg/dL      High               >=200 mg/dL        Adult >=18 Years            Desirable        <200 mg/dL      Borderline High  200-239 mg/dL      High             >239 mg/dL       Triglycerides   Date Value Ref Range Status   11/06/2024 108 See Comment mg/dL Final     Comment:     Triglyceride:     0-9Y            <75mg/dL     10Y-17Y         <90 mg/dL       >=18Y      Normal          <150 mg/dL     Borderline High 150-199 mg/dL     High            200-499 mg/dL        Very High       >499 mg/dL    Specimen collection should occur prior to Metamizole administration due to the potential for falsely depressed results.     HDL, Direct   Date Value Ref Range Status   2024 28 (L) >=40 mg/dL Final     Non-HDL-Chol (CHOL-HDL)   Date Value Ref Range Status   2024 161 mg/dl Final       Troponin:  Troponin I   Date Value Ref Range Status   2019 <0.02 <=0.04 ng/mL Final     Comment:       [employer] Chemistry analyzer 99% cutoff is > 0.04 ng/mL in network labs     o cTnI 99% cutoff is useful only when applied to patients in the clinical setting of myocardial ischemia   o cTnI 99% cutoff should be interpreted in the context of clinical history, ECG findings and possibly cardiac imaging to establish correct diagnosis.   o cTnI 99% cutoff may be suggestive but clearly not indicative of a coronary event without the clinical setting of myocardial ischemia.           Imaging:    [id number] - Atrial fibrillation         MEI:  Results for orders placed during the hospital encounter of 24    MEI    Interpretation Summary    Left Ventricle: Left ventricular cavity size is normal. Wall thickness is normal. The left ventricular ejection fraction is 55%. Systolic function is normal. Wall motion is normal.    Right Ventricle: Right ventricular cavity size is dilated. Systolic function is normal.    Left Atrium: The atrium is dilated.    Right Atrium: The atrium is dilated.    Atrial Septum: No obvious patent foramen ovale detected, confirmed at rest using color doppler.    Left Atrial Appendage: Left atrial appendage is dilated. There is normal function. There is no thrombus.    Mitral Valve: There is mild regurgitation.      Echocardiogram:  Results for orders placed during the hospital encounter of 24    Echo complete w/ contrast if indicated    Interpretation  Summary    Left Ventricle: Left ventricular cavity size is normal. Wall thickness is mildly increased. There is mild concentric hypertrophy. The left ventricular ejection fraction is 60-65% by visual estimation. Systolic function is normal. Although no diagnostic regional wall motion abnormality was identified, this possibility cannot be completely excluded on the basis of this study. Unable to assess diastolic function due to atrial fibrillation.    Right Ventricle: Right ventricular cavity size is moderately dilated. Systolic function is normal.    Right Atrium: The atrium is moderately dilated.    Very technically difficult study.      Stress Test:   No results found for this or any previous visit.      Cardiac Catheterization:  No results found for this or any previous visit.      HOLTER MONITOR: 24 HOUR/48 HOUR MONITORS  No results found for this or any previous visit.      AMB Extended Holter Monitor: Zio XT/AT or BioTel  No results found for this or any previous visit.      Cardioversion  12/09/2024    Result Text  Electrical Cardioversion Note     Indication: atrial fibrillation  Anticoagulation: apixaban (patient missed a dose three days prior, MEI ordered)  Sedation provided by anesthesia team  MEI findings: no intracardiac thrombus, further findings in MEI report.   Pad placement: anteroposterior  Successful cardioversion after the fourth shock. First three shocks were unsuccessful and patient remained in atrial fibrillation.   Fist shock unsuccessful Anterolateral pad placement with 275 J  Second shock successful anterolateral pad placement with 360 J  Third shock unsuccessful anteroposterior pad placement with 360 J  Fourth shock successful anteroposterior pad placement with 360 J.   Sinus rhythm confirmed by EKG     Ashok Dominique MD, cardiology fellow.      Pre Cardioversion EKG  12/09/2024      Post Cardioversion EKG  12/09/2024        Sleep Study:    Home Study  12/20/2024    IMPRESSION:  1.   Moderate obstructive sleep apnea with an GERSON of 25.1 events per hour.   2.  Baseline oxygenation adequate.  Respiratory event related hypoxemia with oxygen saturation less than 90% for 61.8 minutes. Oxygen tatyana of 74%.       RECOMMENDATION:  Recommend auto CPAP with pressure range of 5-20 cm H20.  Compliance check 1-2 months after starting CPAP.  Recommend consultation with sleep medicine.        DEVICE CHECK:     No results found for this or any previous visit.         Code Status: [unfilled]  Advance Directive and Living Will:      Power of :    POLST:      Counseling / Coordination of Care    Detailed discussion done about CPAP, cardioversion on dofetilide, if that fails cardioversion on amiodarone, further rate control medication, potential ablation down the line    Raghu Mckinney MD

## 2024-12-30 NOTE — PROGRESS NOTES
Consultation - Electrophysiology-Cardiology (EP)   Luis Fernando Jay 54 y.o. male MRN: [id number]  Unit/Bed#:  Encounter: [id number]      1. Atrial fibrillation, unspecified type ([facility])  Ambulatory referral to Cardiac Electrophysiology    POCT ECG    Cardioversion      2. Atrial fibrillation with RVR ([facility])        3. Other emphysema ([facility])        4. ASIM (obstructive sleep apnea)        5. Gastroesophageal reflux disease, unspecified whether esophagitis present        6. Type 2 diabetes mellitus without complication, without long-term current use of insulin ([facility])        7. Acute idiopathic gout of right foot        8. Cigarette nicotine dependence in remission        9. Vasovagal syncope        10. Other hyperlipidemia        11. Abnormal CT of the chest        12. Class 3 severe obesity due to excess calories without serious comorbidity with body mass index (BMI) of 50.0 to 59.9 in adult ([facility])        13. Type 2 diabetes mellitus with other specified complication, unspecified whether long term insulin use ([facility])              Consults  Physician Requesting Consult: Scarlett Burnett CRNP   Reason for Consult / Principal Problem: Atrial fibrillation / atrial flutter         Summary of my recommendation    This patient was in persistent atrial fibrillation - DCCV failed  On dofetilide 500 mcg twice daily  Currently in atrial flutter 130/min, however minimally symptomatic    Had a detailed discussion about RVR, chances of heart failure     Multiple risk factors putting him at a high risk of recurrence  Age 54  Morbid obesity with BMI 51  ASIM not treated as yet  Diabetes  Hypertension     At the current time my recommendation for this patient    -Patient supposed to get CPAP on 7 January, 2025    -Proceed with repeat cardioversion attempt between 9th and 14 January  Now has antiarrhythmic in the system and a higher chance of successful cardioversion    -If remains in sinus rhythm, proceed with aggressive weight loss  regimen  Ozempic  Dietary change  Aim for a BMI less than 40    -Once risk factors better controlled we will plan for comprehensive ablation of atrial fibrillation and flutter    -If patient is back in A-fib/a flutter  We will need to discontinue dofetilide  Start the patient on amiodarone load 400 mg twice daily for 2 weeks and then 200 mg daily  And then plan repeat cardioversion    -If none of these work then we will have to proceed with   further rate control - higher dose of metoprolol and possible digoxin  Then proceed with ablation once there is some weight loss    -f/u with me in 3 months             Clinical conditions  A-fib with RVR  Long-term anticoagulation  Recent cardioversion on December 9, back in A-fib  Skin soreness from 4 DC shocks applied  Morbid obesity, BMI 51  Obstructive sleep apnea  Diabetes mellitus  Right knee pain  Emphysema            Assessment & Plan     A-fib with RVR  Long-term anticoagulation  Recent cardioversion on December 9, back in A-fib  Skin soreness from 4 DC shocks applied    This patient was in persistent atrial fibrillation - DCCV failed  On dofetilide 500 mcg twice daily  Currently in atrial flutter 130/min, however minimally symptomatic    Had a detailed discussion about RVR, chances of heart failure     Multiple risk factors putting him at a high risk of recurrence  Age 54  Morbid obesity with BMI 51  ASIM not treated as yet  Diabetes  Hypertension     At the current time my recommendation for this patient    -Patient supposed to get CPAP on 7 January, 2025    -Proceed with repeat cardioversion attempt between 9th and 14 January  Now has antiarrhythmic in the system and a higher chance of successful cardioversion    -If remains in sinus rhythm, proceed with aggressive weight loss regimen  Ozempic  Dietary change  Aim for a BMI less than 40    -Once risk factors better controlled we will plan for comprehensive ablation of atrial fibrillation and flutter    -If patient  is back in A-fib/a flutter  We will need to discontinue dofetilide  Start the patient on amiodarone load 400 mg twice daily for 2 weeks and then 200 mg daily  And then plan repeat cardioversion    -If none of these work then we will have to proceed with   further rate control - higher dose of metoprolol and possible digoxin  Then proceed with ablation once there is some weight loss    -f/u with me in 3 months           Morbid obesity, BMI 51  And has been started on Ozempic/semaglutide  Dietary recommendations made  Aggressive weight management is needed for success with ablation        Obstructive sleep apnea  Plan to get his CPAP in the first week of January      Diabetes mellitus  Patient is on metformin and semaglutide      Right knee pain  Follows up with orthopedics      Emphysema  Chronic and longstanding      History of Present Illness   HPI: Luis Fernando Jay is a 54 y.o. year old male has been referred to me by Dr Pantoja for the evaluation and management of atrial fibrillation and flutter with RVR      The patient has significant medical illnesses which include  A-fib with RVR  Long-term anticoagulation  Recent cardioversion on December 9, back in A-fib  Skin soreness from 4 DC shocks applied  Morbid obesity, BMI 51  Obstructive sleep apnea  Diabetes mellitus  Right knee pain  Emphysema    Prior to first hospital visit  Mr Luis Fernando Jay was admitted to [facility] on 11/05 - 11/07/24 with syncope and collapse.  He presented to [facility] emergency room with an episode of syncope while having a bowel movement.  He struck his head on the sink and fell to the side of the shower.  Reported a headache shortness of breath and left-sided rib pain as well as lower abdominal pain.  Arrival in the emergency room found to be tachycardic heart rate 120s.  EKG showed atrial fibrillation with RVR.  CT of the chest abdomen pelvis showed no acute fracture,  CT showed mastoiditis.  He was started on  metoprolol succinate 25 mg twice daily, Eliquis 5 mg twice daily for stroke prevention.  It was recommended he undergo outpatient sleep study to rule out ASIM.  Discussed also cardioversion versus rhythm control.       Hospital visit and follow-up  This patient was in persistent atrial fibrillation - DCCV failed  On dofetilide 500 mcg twice daily  Currently in atrial flutter 130/min, however minimally symptomatic      Chest pain or pressure, worsening or apnea or PND  Does have chronic leg swelling  He does have occasional palpitation  He is not complaining of presyncope or syncope  He does have orthostatic intolerance  He also has exertional intolerance    He has a history of snoring, morning fatigue and daytime sleepiness    He is not abusing tobacco alcohol or energy drinks            Historical Information   Past Medical History:   Diagnosis Date    Diabetes mellitus (HCC)     GERD (gastroesophageal reflux disease)     Lung mass     Obesity      History reviewed. No pertinent surgical history.  Social History     Substance and Sexual Activity   Alcohol Use Not Currently     Social History     Substance and Sexual Activity   Drug Use Never     Social History     Tobacco Use   Smoking Status Former    Current packs/day: 0.00    Average packs/day: 3.0 packs/day for 40.3 years (121.0 ttl pk-yrs)    Types: Cigarettes    Start date:     Quit date: 2019    Years since quittin.6   Smokeless Tobacco Never     Social History     Socioeconomic History    Marital status: /Civil Union     Spouse name: Not on file    Number of children: Not on file    Years of education: Not on file    Highest education level: Not on file   Occupational History    Not on file   Tobacco Use    Smoking status: Former     Current packs/day: 0.00     Average packs/day: 3.0 packs/day for 40.3 years (121.0 ttl pk-yrs)     Types: Cigarettes     Start date:      Quit date: 2019     Years since quittin.6    Smokeless  tobacco: Never   Vaping Use    Vaping status: Never Used   Substance and Sexual Activity    Alcohol use: Not Currently    Drug use: Never    Sexual activity: Not Currently   Other Topics Concern    Not on file   Social History Narrative    Drinks coffee 1 cup per day         Works in [place] - works at [employer],      Social Drivers of Health     Financial Resource Strain: Not on file   Food Insecurity: No Food Insecurity (2024)    Nursing - Inadequate Food Risk Classification     Worried About Running Out of Food in the Last Year: Not on file     Ran Out of Food in the Last Year: Not on file     Ran Out of Food in the Last Year: 1   Transportation Needs: No Transportation Needs (2024)    Nursing - Transportation Risk Classification     Lack of Transportation: Not on file     Lack of Transportation: 2   Physical Activity: Not on file   Stress: Not on file   Social Connections: Not on file   Intimate Partner Violence: Unknown (2024)    Nursing IPS     Feels Physically and Emotionally Safe: Not on file     Physically Hurt by Someone: Not on file     Humiliated or Emotionally Abused by Someone: Not on file     Physically Hurt by Someone: 2     Hurt or Threatened by Someone: 2   Housing Stability: Unknown (2024)    Nursing: Inadequate Housing Risk Classification     Has Housing: Not on file     Worried About Losing Housing: Not on file     Unable to Get Utilities: Not on file     Unable to Pay for Housing in the Last Year: 2     Has Housin     .  Family History:  Family History   Problem Relation Age of Onset    No Known Problems Mother     Tuberculosis Father     Atrial fibrillation Father 65    Lung cancer Paternal Uncle     Alcohol abuse Neg Hx     Substance Abuse Neg Hx     Mental illness Neg Hx     Depression Neg Hx          Meds/Allergies    No current facility-administered medications for this visit.     Not in a hospital admission.    No Known  "Allergies        Objective   Vitals: Visit Vitals  /82 (BP Location: Left arm, Patient Position: Sitting, Cuff Size: Large)   Pulse (!) 130   Ht 6' 2\" (1.88 m)   Wt (!) 178 kg (391 lb 14.4 oz)   BMI 50.32 kg/m²   Smoking Status Former   BSA 2.89 m²      Vitals:    01/02/25 0903   Weight: (!) 178 kg (391 lb 14.4 oz)   [unfilled]    Invasive Devices       None                     ROS  Review of Systems   All other systems reviewed and are negative.  As described in my history of present illness        PHYSICAL EXAM  Physical Exam  Vitals reviewed.   Constitutional:       General: He is not in acute distress.     Appearance: Normal appearance. He is obese. He is not ill-appearing.      Comments: Morbid obesity  BMI is 51   HENT:      Head: Normocephalic and atraumatic.      Right Ear: External ear normal.      Left Ear: External ear normal.      Nose: Nose normal.      Mouth/Throat:      Comments: Posterior pharynx is crowded  Eyes:      General: No scleral icterus.     Extraocular Movements: Extraocular movements intact.      Conjunctiva/sclera: Conjunctivae normal.      Pupils: Pupils are equal, round, and reactive to light.   Neck:      Comments: Large thick neck  Cardiovascular:      Rate and Rhythm: Regular rhythm. Tachycardia present.      Heart sounds: Normal heart sounds. No murmur heard.     No gallop.   Pulmonary:      Effort: No respiratory distress.      Breath sounds: Examination of the right-lower field reveals decreased breath sounds. Examination of the left-lower field reveals decreased breath sounds. Decreased breath sounds present. No wheezing.   Abdominal:      General: Bowel sounds are normal. There is no distension.      Palpations: Abdomen is soft.      Tenderness: There is no abdominal tenderness. There is no guarding.      Comments: And central obesity   Musculoskeletal:         General: Swelling present. No deformity.      Cervical back: Neck supple. No rigidity.      Right lower leg: " Edema present.      Left lower leg: Edema present.   Skin:     Coloration: Skin is not jaundiced.      Findings: No bruising or lesion.   Neurological:      Mental Status: He is alert and oriented to person, place, and time. Mental status is at baseline.      Motor: No weakness.   Psychiatric:         Mood and Affect: Mood normal.         Behavior: Behavior normal.         Thought Content: Thought content normal.         Judgment: Judgment normal.               LAB RESULTS:    CBC:  WBC   Date Value Ref Range Status   12/21/2024 16.78 (H) 4.31 - 10.16 Thousand/uL Final     Hemoglobin   Date Value Ref Range Status   12/21/2024 15.6 12.0 - 17.0 g/dL Final     Hematocrit   Date Value Ref Range Status   12/21/2024 46.0 36.5 - 49.3 % Final     MCV   Date Value Ref Range Status   12/21/2024 90 82 - 98 fL Final     Platelets   Date Value Ref Range Status   12/21/2024 229 149 - 390 Thousands/uL Final     RBC   Date Value Ref Range Status   12/21/2024 5.09 3.88 - 5.62 Million/uL Final     MCH   Date Value Ref Range Status   12/21/2024 30.6 26.8 - 34.3 pg Final     MCHC   Date Value Ref Range Status   12/21/2024 33.9 31.4 - 37.4 g/dL Final     RDW   Date Value Ref Range Status   12/21/2024 13.5 11.6 - 15.1 % Final     MPV   Date Value Ref Range Status   12/21/2024 9.4 8.9 - 12.7 fL Final     nRBC   Date Value Ref Range Status   12/21/2024 0 /100 WBCs Final       CMP:  Potassium   Date Value Ref Range Status   12/21/2024 4.0 3.5 - 5.3 mmol/L Final     Chloride   Date Value Ref Range Status   12/21/2024 107 96 - 108 mmol/L Final     CO2   Date Value Ref Range Status   12/21/2024 22 21 - 32 mmol/L Final     CO2, i-STAT   Date Value Ref Range Status   04/22/2019 23 21 - 32 mmol/L Final     BUN   Date Value Ref Range Status   12/21/2024 17 5 - 25 mg/dL Final     Creatinine   Date Value Ref Range Status   12/21/2024 0.75 0.60 - 1.30 mg/dL Final     Comment:     Standardized to IDMS reference method     Glucose, i-STAT   Date Value  Ref Range Status   04/22/2019 163 (H) 65 - 140 mg/dl Final     Calcium   Date Value Ref Range Status   12/21/2024 9.1 8.4 - 10.2 mg/dL Final     AST   Date Value Ref Range Status   11/06/2024 24 13 - 39 U/L Final     ALT   Date Value Ref Range Status   11/06/2024 45 7 - 52 U/L Final     Comment:     Specimen collection should occur prior to Sulfasalazine administration due to the potential for falsely depressed results.      Alkaline Phosphatase   Date Value Ref Range Status   11/06/2024 79 34 - 104 U/L Final     eGFR   Date Value Ref Range Status   12/21/2024 102 ml/min/1.73sq m Final   04/22/2019 87 ml/min/1.73sq m Final        Magnesium:   Magnesium   Date Value Ref Range Status   12/21/2024 2.2 1.9 - 2.7 mg/dL Final        A1C:  Hemoglobin A1C   Date Value Ref Range Status   12/18/2024 6.5 (H) Normal 4.0-5.6%; PreDiabetic 5.7-6.4%; Diabetic >=6.5%; Glycemic control for adults with diabetes <7.0% % Final        TSH:  TSH 3RD GENERATON   Date Value Ref Range Status   11/06/2024 2.224 0.450 - 4.500 uIU/mL Final     Comment:     Adult TSH (3rd generation) reference range follows the recommended guidelines of the American Thyroid Association, January, 2020.        PT/INR:  Protime   Date Value Ref Range Status   11/06/2024 15.2 (H) 12.3 - 15.0 seconds Final     INR   Date Value Ref Range Status   11/06/2024 1.17 0.85 - 1.19 Final       Lipid Panel:  Cholesterol   Date Value Ref Range Status   11/06/2024 122 See Comment mg/dL Final     Comment:     Cholesterol:         Pediatric <18 Years        Desirable          <170 mg/dL      Borderline High    170-199 mg/dL      High               >=200 mg/dL        Adult >=18 Years            Desirable        <200 mg/dL      Borderline High  200-239 mg/dL      High             >239 mg/dL       Triglycerides   Date Value Ref Range Status   11/06/2024 108 See Comment mg/dL Final     Comment:     Triglyceride:     0-9Y            <75mg/dL     10Y-17Y         <90 mg/dL       >=18Y      Normal          <150 mg/dL     Borderline High 150-199 mg/dL     High            200-499 mg/dL        Very High       >499 mg/dL    Specimen collection should occur prior to Metamizole administration due to the potential for falsely depressed results.     HDL, Direct   Date Value Ref Range Status   2024 28 (L) >=40 mg/dL Final     Non-HDL-Chol (CHOL-HDL)   Date Value Ref Range Status   2024 161 mg/dl Final       Troponin:  Troponin I   Date Value Ref Range Status   2019 <0.02 <=0.04 ng/mL Final     Comment:       [employer] Chemistry analyzer 99% cutoff is > 0.04 ng/mL in network labs     o cTnI 99% cutoff is useful only when applied to patients in the clinical setting of myocardial ischemia   o cTnI 99% cutoff should be interpreted in the context of clinical history, ECG findings and possibly cardiac imaging to establish correct diagnosis.   o cTnI 99% cutoff may be suggestive but clearly not indicative of a coronary event without the clinical setting of myocardial ischemia.           Imaging:    [id number] - Atrial fibrillation         MEI:  Results for orders placed during the hospital encounter of 24    MEI    Interpretation Summary    Left Ventricle: Left ventricular cavity size is normal. Wall thickness is normal. The left ventricular ejection fraction is 55%. Systolic function is normal. Wall motion is normal.    Right Ventricle: Right ventricular cavity size is dilated. Systolic function is normal.    Left Atrium: The atrium is dilated.    Right Atrium: The atrium is dilated.    Atrial Septum: No obvious patent foramen ovale detected, confirmed at rest using color doppler.    Left Atrial Appendage: Left atrial appendage is dilated. There is normal function. There is no thrombus.    Mitral Valve: There is mild regurgitation.      Echocardiogram:  Results for orders placed during the hospital encounter of 24    Echo complete w/ contrast if indicated    Interpretation  Summary    Left Ventricle: Left ventricular cavity size is normal. Wall thickness is mildly increased. There is mild concentric hypertrophy. The left ventricular ejection fraction is 60-65% by visual estimation. Systolic function is normal. Although no diagnostic regional wall motion abnormality was identified, this possibility cannot be completely excluded on the basis of this study. Unable to assess diastolic function due to atrial fibrillation.    Right Ventricle: Right ventricular cavity size is moderately dilated. Systolic function is normal.    Right Atrium: The atrium is moderately dilated.    Very technically difficult study.      Stress Test:   No results found for this or any previous visit.      Cardiac Catheterization:  No results found for this or any previous visit.      HOLTER MONITOR: 24 HOUR/48 HOUR MONITORS  No results found for this or any previous visit.      AMB Extended Holter Monitor: Zio XT/AT or BioTel  No results found for this or any previous visit.      Cardioversion  12/09/2024    Result Text  Electrical Cardioversion Note     Indication: atrial fibrillation  Anticoagulation: apixaban (patient missed a dose three days prior, MEI ordered)  Sedation provided by anesthesia team  MEI findings: no intracardiac thrombus, further findings in MEI report.   Pad placement: anteroposterior  Successful cardioversion after the fourth shock. First three shocks were unsuccessful and patient remained in atrial fibrillation.   Fist shock unsuccessful Anterolateral pad placement with 275 J  Second shock successful anterolateral pad placement with 360 J  Third shock unsuccessful anteroposterior pad placement with 360 J  Fourth shock successful anteroposterior pad placement with 360 J.   Sinus rhythm confirmed by EKG     Ashok Dominique MD, cardiology fellow.      Pre Cardioversion EKG  12/09/2024      Post Cardioversion EKG  12/09/2024        Sleep Study:    Home Study  12/20/2024    IMPRESSION:  1.   Moderate obstructive sleep apnea with an GERSON of 25.1 events per hour.   2.  Baseline oxygenation adequate.  Respiratory event related hypoxemia with oxygen saturation less than 90% for 61.8 minutes. Oxygen tatyana of 74%.       RECOMMENDATION:  Recommend auto CPAP with pressure range of 5-20 cm H20.  Compliance check 1-2 months after starting CPAP.  Recommend consultation with sleep medicine.        DEVICE CHECK:     No results found for this or any previous visit.         Code Status: [unfilled]  Advance Directive and Living Will:      Power of :    POLST:      Counseling / Coordination of Care    Detailed discussion done about CPAP, cardioversion on dofetilide, if that fails cardioversion on amiodarone, further rate control medication, potential ablation down the line    Raghu Mckinney MD

## 2025-01-02 ENCOUNTER — CONSULT (OUTPATIENT)
Dept: CARDIOLOGY CLINIC | Facility: CLINIC | Age: 55
End: 2025-01-02
Payer: COMMERCIAL

## 2025-01-02 VITALS
SYSTOLIC BLOOD PRESSURE: 162 MMHG | DIASTOLIC BLOOD PRESSURE: 82 MMHG | HEART RATE: 130 BPM | HEIGHT: 74 IN | WEIGHT: 315 LBS | BODY MASS INDEX: 40.43 KG/M2

## 2025-01-02 DIAGNOSIS — I48.91 ATRIAL FIBRILLATION, UNSPECIFIED TYPE (HCC): Primary | ICD-10-CM

## 2025-01-02 DIAGNOSIS — R55 VASOVAGAL SYNCOPE: ICD-10-CM

## 2025-01-02 DIAGNOSIS — E66.813 CLASS 3 SEVERE OBESITY DUE TO EXCESS CALORIES WITHOUT SERIOUS COMORBIDITY WITH BODY MASS INDEX (BMI) OF 50.0 TO 59.9 IN ADULT (HCC): ICD-10-CM

## 2025-01-02 DIAGNOSIS — R93.89 ABNORMAL CT OF THE CHEST: ICD-10-CM

## 2025-01-02 DIAGNOSIS — F17.211 CIGARETTE NICOTINE DEPENDENCE IN REMISSION: ICD-10-CM

## 2025-01-02 DIAGNOSIS — E11.9 TYPE 2 DIABETES MELLITUS WITHOUT COMPLICATION, WITHOUT LONG-TERM CURRENT USE OF INSULIN (HCC): ICD-10-CM

## 2025-01-02 DIAGNOSIS — I48.91 ATRIAL FIBRILLATION WITH RVR (HCC): ICD-10-CM

## 2025-01-02 DIAGNOSIS — M10.071 ACUTE IDIOPATHIC GOUT OF RIGHT FOOT: ICD-10-CM

## 2025-01-02 DIAGNOSIS — J43.8 OTHER EMPHYSEMA (HCC): ICD-10-CM

## 2025-01-02 DIAGNOSIS — K21.9 GASTROESOPHAGEAL REFLUX DISEASE, UNSPECIFIED WHETHER ESOPHAGITIS PRESENT: ICD-10-CM

## 2025-01-02 DIAGNOSIS — E66.01 CLASS 3 SEVERE OBESITY DUE TO EXCESS CALORIES WITHOUT SERIOUS COMORBIDITY WITH BODY MASS INDEX (BMI) OF 50.0 TO 59.9 IN ADULT (HCC): ICD-10-CM

## 2025-01-02 DIAGNOSIS — E78.49 OTHER HYPERLIPIDEMIA: ICD-10-CM

## 2025-01-02 DIAGNOSIS — G47.33 OSA (OBSTRUCTIVE SLEEP APNEA): ICD-10-CM

## 2025-01-02 DIAGNOSIS — E11.69 TYPE 2 DIABETES MELLITUS WITH OTHER SPECIFIED COMPLICATION, UNSPECIFIED WHETHER LONG TERM INSULIN USE (HCC): ICD-10-CM

## 2025-01-02 PROCEDURE — 93000 ELECTROCARDIOGRAM COMPLETE: CPT | Performed by: INTERNAL MEDICINE

## 2025-01-02 PROCEDURE — 99205 OFFICE O/P NEW HI 60 MIN: CPT | Performed by: INTERNAL MEDICINE

## 2025-01-02 NOTE — LETTER
January 2, 2025     Serenity Márquez MD  [address] [facility]  [address]  [facility] [address]    Patient: Luis Fernando Jay   YOB: 1970   Date of Visit: 1/2/2025       Dear Dr. Márquez:    Thank you for referring Luis Fernando Jay to me for evaluation. Below are my notes for this consultation.    If you have questions, please do not hesitate to call me. I look forward to following your patient along with you.         Sincerely,        Raghu Mckinney MD        CC: MARISEL Soriano KESHAWN Smallwoodley  CARDIOLOGY SURGERY COORDINATOR    Raghu Mckinney MD  1/2/2025 12:33 PM  Sign when Signing Visit   Consultation - Electrophysiology-Cardiology (EP)   Luis Fernando Jay 54 y.o. male MRN: [id number]  Unit/Bed#:  Encounter: [id number]      1. Atrial fibrillation, unspecified type (HCC)  Ambulatory referral to Cardiac Electrophysiology    POCT ECG    Cardioversion      2. Atrial fibrillation with RVR ([facility])        3. Other emphysema ([facility])        4. ASIM (obstructive sleep apnea)        5. Gastroesophageal reflux disease, unspecified whether esophagitis present        6. Type 2 diabetes mellitus without complication, without long-term current use of insulin ([facility])        7. Acute idiopathic gout of right foot        8. Cigarette nicotine dependence in remission        9. Vasovagal syncope        10. Other hyperlipidemia        11. Abnormal CT of the chest        12. Class 3 severe obesity due to excess calories without serious comorbidity with body mass index (BMI) of 50.0 to 59.9 in adult ([facility])        13. Type 2 diabetes mellitus with other specified complication, unspecified whether long term insulin use ([facility])              Consults  Physician Requesting Consult: Scarlett Burnett CRNP   Reason for Consult / Principal Problem: Atrial fibrillation / atrial flutter         Summary of my recommendation    This patient was in persistent atrial fibrillation - DCCV failed  On dofetilide 500 mcg twice daily  Currently in atrial  flutter 130/min, however minimally symptomatic    Had a detailed discussion about RVR, chances of heart failure     Multiple risk factors putting him at a high risk of recurrence  Age 54  Morbid obesity with BMI 51  ASIM not treated as yet  Diabetes  Hypertension     At the current time my recommendation for this patient    -Patient supposed to get CPAP on 7 January, 2025    -Proceed with repeat cardioversion attempt between 9th and 14 January  Now has antiarrhythmic in the system and a higher chance of successful cardioversion    -If remains in sinus rhythm, proceed with aggressive weight loss regimen  Ozempic  Dietary change  Aim for a BMI less than 40    -Once risk factors better controlled we will plan for comprehensive ablation of atrial fibrillation and flutter    -If patient is back in A-fib/a flutter  We will need to discontinue dofetilide  Start the patient on amiodarone load 400 mg twice daily for 2 weeks and then 200 mg daily  And then plan repeat cardioversion    -If none of these work then we will have to proceed with   further rate control - higher dose of metoprolol and possible digoxin  Then proceed with ablation once there is some weight loss    -f/u with me in 3 months             Clinical conditions  A-fib with RVR  Long-term anticoagulation  Recent cardioversion on December 9, back in A-fib  Skin soreness from 4 DC shocks applied  Morbid obesity, BMI 51  Obstructive sleep apnea  Diabetes mellitus  Right knee pain  Emphysema            Assessment & Plan    A-fib with RVR  Long-term anticoagulation  Recent cardioversion on December 9, back in A-fib  Skin soreness from 4 DC shocks applied    This patient was in persistent atrial fibrillation - DCCV failed  On dofetilide 500 mcg twice daily  Currently in atrial flutter 130/min, however minimally symptomatic    Had a detailed discussion about RVR, chances of heart failure     Multiple risk factors putting him at a high risk of recurrence  Age  54  Morbid obesity with BMI 51  ASIM not treated as yet  Diabetes  Hypertension     At the current time my recommendation for this patient    -Patient supposed to get CPAP on 7 January, 2025    -Proceed with repeat cardioversion attempt between 9th and 14 January  Now has antiarrhythmic in the system and a higher chance of successful cardioversion    -If remains in sinus rhythm, proceed with aggressive weight loss regimen  Ozempic  Dietary change  Aim for a BMI less than 40    -Once risk factors better controlled we will plan for comprehensive ablation of atrial fibrillation and flutter    -If patient is back in A-fib/a flutter  We will need to discontinue dofetilide  Start the patient on amiodarone load 400 mg twice daily for 2 weeks and then 200 mg daily  And then plan repeat cardioversion    -If none of these work then we will have to proceed with   further rate control - higher dose of metoprolol and possible digoxin  Then proceed with ablation once there is some weight loss    -f/u with me in 3 months           Morbid obesity, BMI 51  And has been started on Ozempic/semaglutide  Dietary recommendations made  Aggressive weight management is needed for success with ablation        Obstructive sleep apnea  Plan to get his CPAP in the first week of January      Diabetes mellitus  Patient is on metformin and semaglutide      Right knee pain  Follows up with orthopedics      Emphysema  Chronic and longstanding      History of Present Illness  HPI: Luis Fernando Jay is a 54 y.o. year old male has been referred to me by Dr Pantoja for the evaluation and management of atrial fibrillation and flutter with RVR      The patient has significant medical illnesses which include  A-fib with RVR  Long-term anticoagulation  Recent cardioversion on December 9, back in A-fib  Skin soreness from 4 DC shocks applied  Morbid obesity, BMI 51  Obstructive sleep apnea  Diabetes mellitus  Right knee pain  Emphysema    Prior to first hospital  visit  Mr Luis Fernando Jay was admitted to [facility] on 11/05 - 11/07/24 with syncope and collapse.  He presented to [facility] emergency room with an episode of syncope while having a bowel movement.  He struck his head on the sink and fell to the side of the shower.  Reported a headache shortness of breath and left-sided rib pain as well as lower abdominal pain.  Arrival in the emergency room found to be tachycardic heart rate 120s.  EKG showed atrial fibrillation with RVR.  CT of the chest abdomen pelvis showed no acute fracture,  CT showed mastoiditis.  He was started on metoprolol succinate 25 mg twice daily, Eliquis 5 mg twice daily for stroke prevention.  It was recommended he undergo outpatient sleep study to rule out ASIM.  Discussed also cardioversion versus rhythm control.       Hospital visit and follow-up  This patient was in persistent atrial fibrillation - DCCV failed  On dofetilide 500 mcg twice daily  Currently in atrial flutter 130/min, however minimally symptomatic      Chest pain or pressure, worsening or apnea or PND  Does have chronic leg swelling  He does have occasional palpitation  He is not complaining of presyncope or syncope  He does have orthostatic intolerance  He also has exertional intolerance    He has a history of snoring, morning fatigue and daytime sleepiness    He is not abusing tobacco alcohol or energy drinks            Historical Information  Past Medical History:   Diagnosis Date   • Diabetes mellitus (HCC)    • GERD (gastroesophageal reflux disease)    • Lung mass    • Obesity      History reviewed. No pertinent surgical history.  Social History     Substance and Sexual Activity   Alcohol Use Not Currently     Social History     Substance and Sexual Activity   Drug Use Never     Social History     Tobacco Use   Smoking Status Former   • Current packs/day: 0.00   • Average packs/day: 3.0 packs/day for 40.3 years (121.0 ttl pk-yrs)   • Types: Cigarettes   •  Start date:    • Quit date: 2019   • Years since quittin.6   Smokeless Tobacco Never     Social History     Socioeconomic History   • Marital status: /Civil Union     Spouse name: Not on file   • Number of children: Not on file   • Years of education: Not on file   • Highest education level: Not on file   Occupational History   • Not on file   Tobacco Use   • Smoking status: Former     Current packs/day: 0.00     Average packs/day: 3.0 packs/day for 40.3 years (121.0 ttl pk-yrs)     Types: Cigarettes     Start date:      Quit date: 2019     Years since quittin.6   • Smokeless tobacco: Never   Vaping Use   • Vaping status: Never Used   Substance and Sexual Activity   • Alcohol use: Not Currently   • Drug use: Never   • Sexual activity: Not Currently   Other Topics Concern   • Not on file   Social History Narrative    Drinks coffee 1 cup per day         Works in [place] - works at [employer],      Social Drivers of Health     Financial Resource Strain: Not on file   Food Insecurity: No Food Insecurity (2024)    Nursing - Inadequate Food Risk Classification    • Worried About Running Out of Food in the Last Year: Not on file    • Ran Out of Food in the Last Year: Not on file    • Ran Out of Food in the Last Year: 1   Transportation Needs: No Transportation Needs (2024)    Nursing - Transportation Risk Classification    • Lack of Transportation: Not on file    • Lack of Transportation: 2   Physical Activity: Not on file   Stress: Not on file   Social Connections: Not on file   Intimate Partner Violence: Unknown (2024)    Nursing IPS    • Feels Physically and Emotionally Safe: Not on file    • Physically Hurt by Someone: Not on file    • Humiliated or Emotionally Abused by Someone: Not on file    • Physically Hurt by Someone: 2    • Hurt or Threatened by Someone: 2   Housing Stability: Unknown (2024)    Nursing: Inadequate Housing Risk  "Classification    • Has Housing: Not on file    • Worried About Losing Housing: Not on file    • Unable to Get Utilities: Not on file    • Unable to Pay for Housing in the Last Year: 2    • Has Housin     .  Family History:  Family History   Problem Relation Age of Onset   • No Known Problems Mother    • Tuberculosis Father    • Atrial fibrillation Father 65   • Lung cancer Paternal Uncle    • Alcohol abuse Neg Hx    • Substance Abuse Neg Hx    • Mental illness Neg Hx    • Depression Neg Hx          Meds/Allergies   No current facility-administered medications for this visit.     Not in a hospital admission.    No Known Allergies        Objective  Vitals: Visit Vitals  /82 (BP Location: Left arm, Patient Position: Sitting, Cuff Size: Large)   Pulse (!) 130   Ht 6' 2\" (1.88 m)   Wt (!) 178 kg (391 lb 14.4 oz)   BMI 50.32 kg/m²   Smoking Status Former   BSA 2.89 m²      Vitals:    25 0903   Weight: (!) 178 kg (391 lb 14.4 oz)   [unfilled]    Invasive Devices       None                     ROS  Review of Systems   All other systems reviewed and are negative.  As described in my history of present illness        PHYSICAL EXAM  Physical Exam  Vitals reviewed.   Constitutional:       General: He is not in acute distress.     Appearance: Normal appearance. He is obese. He is not ill-appearing.      Comments: Morbid obesity  BMI is 51   HENT:      Head: Normocephalic and atraumatic.      Right Ear: External ear normal.      Left Ear: External ear normal.      Nose: Nose normal.      Mouth/Throat:      Comments: Posterior pharynx is crowded  Eyes:      General: No scleral icterus.     Extraocular Movements: Extraocular movements intact.      Conjunctiva/sclera: Conjunctivae normal.      Pupils: Pupils are equal, round, and reactive to light.   Neck:      Comments: Large thick neck  Cardiovascular:      Rate and Rhythm: Regular rhythm. Tachycardia present.      Heart sounds: Normal heart sounds. No murmur " heard.     No gallop.   Pulmonary:      Effort: No respiratory distress.      Breath sounds: Examination of the right-lower field reveals decreased breath sounds. Examination of the left-lower field reveals decreased breath sounds. Decreased breath sounds present. No wheezing.   Abdominal:      General: Bowel sounds are normal. There is no distension.      Palpations: Abdomen is soft.      Tenderness: There is no abdominal tenderness. There is no guarding.      Comments: And central obesity   Musculoskeletal:         General: Swelling present. No deformity.      Cervical back: Neck supple. No rigidity.      Right lower leg: Edema present.      Left lower leg: Edema present.   Skin:     Coloration: Skin is not jaundiced.      Findings: No bruising or lesion.   Neurological:      Mental Status: He is alert and oriented to person, place, and time. Mental status is at baseline.      Motor: No weakness.   Psychiatric:         Mood and Affect: Mood normal.         Behavior: Behavior normal.         Thought Content: Thought content normal.         Judgment: Judgment normal.               LAB RESULTS:    CBC:  WBC   Date Value Ref Range Status   12/21/2024 16.78 (H) 4.31 - 10.16 Thousand/uL Final     Hemoglobin   Date Value Ref Range Status   12/21/2024 15.6 12.0 - 17.0 g/dL Final     Hematocrit   Date Value Ref Range Status   12/21/2024 46.0 36.5 - 49.3 % Final     MCV   Date Value Ref Range Status   12/21/2024 90 82 - 98 fL Final     Platelets   Date Value Ref Range Status   12/21/2024 229 149 - 390 Thousands/uL Final     RBC   Date Value Ref Range Status   12/21/2024 5.09 3.88 - 5.62 Million/uL Final     MCH   Date Value Ref Range Status   12/21/2024 30.6 26.8 - 34.3 pg Final     MCHC   Date Value Ref Range Status   12/21/2024 33.9 31.4 - 37.4 g/dL Final     RDW   Date Value Ref Range Status   12/21/2024 13.5 11.6 - 15.1 % Final     MPV   Date Value Ref Range Status   12/21/2024 9.4 8.9 - 12.7 fL Final     nRBC   Date  Value Ref Range Status   12/21/2024 0 /100 WBCs Final       CMP:  Potassium   Date Value Ref Range Status   12/21/2024 4.0 3.5 - 5.3 mmol/L Final     Chloride   Date Value Ref Range Status   12/21/2024 107 96 - 108 mmol/L Final     CO2   Date Value Ref Range Status   12/21/2024 22 21 - 32 mmol/L Final     CO2, i-STAT   Date Value Ref Range Status   04/22/2019 23 21 - 32 mmol/L Final     BUN   Date Value Ref Range Status   12/21/2024 17 5 - 25 mg/dL Final     Creatinine   Date Value Ref Range Status   12/21/2024 0.75 0.60 - 1.30 mg/dL Final     Comment:     Standardized to IDMS reference method     Glucose, i-STAT   Date Value Ref Range Status   04/22/2019 163 (H) 65 - 140 mg/dl Final     Calcium   Date Value Ref Range Status   12/21/2024 9.1 8.4 - 10.2 mg/dL Final     AST   Date Value Ref Range Status   11/06/2024 24 13 - 39 U/L Final     ALT   Date Value Ref Range Status   11/06/2024 45 7 - 52 U/L Final     Comment:     Specimen collection should occur prior to Sulfasalazine administration due to the potential for falsely depressed results.      Alkaline Phosphatase   Date Value Ref Range Status   11/06/2024 79 34 - 104 U/L Final     eGFR   Date Value Ref Range Status   12/21/2024 102 ml/min/1.73sq m Final   04/22/2019 87 ml/min/1.73sq m Final        Magnesium:   Magnesium   Date Value Ref Range Status   12/21/2024 2.2 1.9 - 2.7 mg/dL Final        A1C:  Hemoglobin A1C   Date Value Ref Range Status   12/18/2024 6.5 (H) Normal 4.0-5.6%; PreDiabetic 5.7-6.4%; Diabetic >=6.5%; Glycemic control for adults with diabetes <7.0% % Final        TSH:  TSH 3RD GENERATON   Date Value Ref Range Status   11/06/2024 2.224 0.450 - 4.500 uIU/mL Final     Comment:     Adult TSH (3rd generation) reference range follows the recommended guidelines of the American Thyroid Association, January, 2020.        PT/INR:  Protime   Date Value Ref Range Status   11/06/2024 15.2 (H) 12.3 - 15.0 seconds Final     INR   Date Value Ref Range Status    2024 1.17 0.85 - 1.19 Final       Lipid Panel:  Cholesterol   Date Value Ref Range Status   2024 122 See Comment mg/dL Final     Comment:     Cholesterol:         Pediatric <18 Years        Desirable          <170 mg/dL      Borderline High    170-199 mg/dL      High               >=200 mg/dL        Adult >=18 Years            Desirable        <200 mg/dL      Borderline High  200-239 mg/dL      High             >239 mg/dL       Triglycerides   Date Value Ref Range Status   2024 108 See Comment mg/dL Final     Comment:     Triglyceride:     0-9Y            <75mg/dL     10Y-17Y         <90 mg/dL       >=18Y     Normal          <150 mg/dL     Borderline High 150-199 mg/dL     High            200-499 mg/dL        Very High       >499 mg/dL    Specimen collection should occur prior to Metamizole administration due to the potential for falsely depressed results.     HDL, Direct   Date Value Ref Range Status   2024 28 (L) >=40 mg/dL Final     Non-HDL-Chol (CHOL-HDL)   Date Value Ref Range Status   2024 161 mg/dl Final       Troponin:  Troponin I   Date Value Ref Range Status   2019 <0.02 <=0.04 ng/mL Final     Comment:       [employer] Chemistry analyzer 99% cutoff is > 0.04 ng/mL in network labs     o cTnI 99% cutoff is useful only when applied to patients in the clinical setting of myocardial ischemia   o cTnI 99% cutoff should be interpreted in the context of clinical history, ECG findings and possibly cardiac imaging to establish correct diagnosis.   o cTnI 99% cutoff may be suggestive but clearly not indicative of a coronary event without the clinical setting of myocardial ischemia.           Imaging:    [id number] - Atrial fibrillation         MEI:  Results for orders placed during the hospital encounter of 24    MEI    Interpretation Summary  •  Left Ventricle: Left ventricular cavity size is normal. Wall thickness is normal. The left ventricular ejection fraction is  55%. Systolic function is normal. Wall motion is normal.  •  Right Ventricle: Right ventricular cavity size is dilated. Systolic function is normal.  •  Left Atrium: The atrium is dilated.  •  Right Atrium: The atrium is dilated.  •  Atrial Septum: No obvious patent foramen ovale detected, confirmed at rest using color doppler.  •  Left Atrial Appendage: Left atrial appendage is dilated. There is normal function. There is no thrombus.  •  Mitral Valve: There is mild regurgitation.      Echocardiogram:  Results for orders placed during the hospital encounter of 11/05/24    Echo complete w/ contrast if indicated    Interpretation Summary  •  Left Ventricle: Left ventricular cavity size is normal. Wall thickness is mildly increased. There is mild concentric hypertrophy. The left ventricular ejection fraction is 60-65% by visual estimation. Systolic function is normal. Although no diagnostic regional wall motion abnormality was identified, this possibility cannot be completely excluded on the basis of this study. Unable to assess diastolic function due to atrial fibrillation.  •  Right Ventricle: Right ventricular cavity size is moderately dilated. Systolic function is normal.  •  Right Atrium: The atrium is moderately dilated.    Very technically difficult study.      Stress Test:   No results found for this or any previous visit.      Cardiac Catheterization:  No results found for this or any previous visit.      HOLTER MONITOR: 24 HOUR/48 HOUR MONITORS  No results found for this or any previous visit.      AMB Extended Holter Monitor: Zio XT/AT or BioTel  No results found for this or any previous visit.      Cardioversion  12/09/2024    Result Text  Electrical Cardioversion Note     Indication: atrial fibrillation  Anticoagulation: apixaban (patient missed a dose three days prior, MEI ordered)  Sedation provided by anesthesia team  MEI findings: no intracardiac thrombus, further findings in MEI report.   Pad  placement: anteroposterior  Successful cardioversion after the fourth shock. First three shocks were unsuccessful and patient remained in atrial fibrillation.   Fist shock unsuccessful Anterolateral pad placement with 275 J  Second shock successful anterolateral pad placement with 360 J  Third shock unsuccessful anteroposterior pad placement with 360 J  Fourth shock successful anteroposterior pad placement with 360 J.   Sinus rhythm confirmed by EKG     Ashok Dominique MD, cardiology fellow.      Pre Cardioversion EKG  12/09/2024      Post Cardioversion EKG  12/09/2024        Sleep Study:    Home Study  12/20/2024    IMPRESSION:  1.  Moderate obstructive sleep apnea with an GERSON of 25.1 events per hour.   2.  Baseline oxygenation adequate.  Respiratory event related hypoxemia with oxygen saturation less than 90% for 61.8 minutes. Oxygen tatyana of 74%.       RECOMMENDATION:  Recommend auto CPAP with pressure range of 5-20 cm H20.  Compliance check 1-2 months after starting CPAP.  Recommend consultation with sleep medicine.        DEVICE CHECK:     No results found for this or any previous visit.         Code Status: [unfilled]  Advance Directive and Living Will:      Power of :    POLST:      Counseling / Coordination of Care    Detailed discussion done about CPAP, cardioversion on dofetilide, if that fails cardioversion on amiodarone, further rate control medication, potential ablation down the line    Raghu Mckinney MD

## 2025-01-05 ENCOUNTER — NURSE TRIAGE (OUTPATIENT)
Dept: OTHER | Facility: OTHER | Age: 55
End: 2025-01-05

## 2025-01-06 NOTE — TELEPHONE ENCOUNTER
"Reason for Disposition   [1] Caller has URGENT medicine question about med that PCP or specialist prescribed AND [2] triager unable to answer question    Answer Assessment - Initial Assessment Questions  1. NAME of MEDICINE: \"What medicine(s) are you calling about?\"        Eliquis, tikosyn, metformin, metoprolol, nexium, allopurinal    2. QUESTION: \"What is your question?\" (e.g., double dose of medicine, side effect)        I forgot to take my am doses    3. PRESCRIBER: \"Who prescribed the medicine?\" Reason: if prescribed by specialist, call should be referred to that group.        Dr. Mckinney    4. SYMPTOMS: \"Do you have any symptoms?\" If Yes, ask: \"What symptoms are you having?\"  \"How bad are the symptoms (e.g., mild, moderate, severe)        none    Protocols used: Medication Question Call-Adult-    "

## 2025-01-07 ENCOUNTER — TELEPHONE (OUTPATIENT)
Age: 55
End: 2025-01-07

## 2025-01-07 LAB

## 2025-01-07 NOTE — TELEPHONE ENCOUNTER
Pt was set up on 1/725 by Annika PEREZ on a Resmed S11 set at 5-20cm and gave F&P Dinorah full face mask.

## 2025-01-08 DIAGNOSIS — I48.91 ATRIAL FIBRILLATION WITH RVR (HCC): Primary | ICD-10-CM

## 2025-01-09 DIAGNOSIS — E11.9 TYPE 2 DIABETES MELLITUS WITHOUT COMPLICATION, WITHOUT LONG-TERM CURRENT USE OF INSULIN (HCC): ICD-10-CM

## 2025-01-10 RX ORDER — ACYCLOVIR 800 MG/1
1 TABLET ORAL
Qty: 6 EACH | Refills: 1 | Status: SHIPPED | OUTPATIENT
Start: 2025-01-10

## 2025-01-11 ENCOUNTER — ANESTHESIA EVENT (OUTPATIENT)
Dept: PERIOP | Facility: HOSPITAL | Age: 55
End: 2025-01-11
Payer: COMMERCIAL

## 2025-01-12 DIAGNOSIS — I48.91 ATRIAL FIBRILLATION WITH RVR (HCC): ICD-10-CM

## 2025-01-13 ENCOUNTER — HOSPITAL ENCOUNTER (OUTPATIENT)
Facility: HOSPITAL | Age: 55
Setting detail: OUTPATIENT SURGERY
Discharge: HOME | End: 2025-01-13
Attending: INTERNAL MEDICINE | Admitting: INTERNAL MEDICINE
Payer: COMMERCIAL

## 2025-01-13 ENCOUNTER — HOSPITAL ENCOUNTER (OUTPATIENT)
Dept: NON INVASIVE DIAGNOSTICS | Facility: HOSPITAL | Age: 55
Discharge: HOME | End: 2025-01-13

## 2025-01-13 ENCOUNTER — ANESTHESIA (OUTPATIENT)
Dept: PERIOP | Facility: HOSPITAL | Age: 55
End: 2025-01-13
Payer: COMMERCIAL

## 2025-01-13 VITALS
OXYGEN SATURATION: 95 % | SYSTOLIC BLOOD PRESSURE: 140 MMHG | WEIGHT: 315 LBS | BODY MASS INDEX: 40.43 KG/M2 | DIASTOLIC BLOOD PRESSURE: 80 MMHG | HEIGHT: 74 IN | TEMPERATURE: 97.7 F | HEART RATE: 64 BPM | RESPIRATION RATE: 18 BRPM

## 2025-01-13 DIAGNOSIS — I48.91 ATRIAL FIBRILLATION WITH RVR (HCC): ICD-10-CM

## 2025-01-13 DIAGNOSIS — I48.91 ATRIAL FIBRILLATION, UNSPECIFIED TYPE (HCC): ICD-10-CM

## 2025-01-13 LAB
ANION GAP SERPL CALCULATED.3IONS-SCNC: 7 MMOL/L (ref 4–13)
ATRIAL RATE: 55 BPM
BUN SERPL-MCNC: 15 MG/DL (ref 5–25)
CALCIUM SERPL-MCNC: 9 MG/DL (ref 8.4–10.2)
CHLORIDE SERPL-SCNC: 109 MMOL/L (ref 96–108)
CO2 SERPL-SCNC: 24 MMOL/L (ref 21–32)
CREAT SERPL-MCNC: 0.75 MG/DL (ref 0.6–1.3)
ERYTHROCYTE [DISTWIDTH] IN BLOOD BY AUTOMATED COUNT: 14 % (ref 11.6–15.1)
GFR SERPL CREATININE-BSD FRML MDRD: 102 ML/MIN/1.73SQ M
GLUCOSE P FAST SERPL-MCNC: 103 MG/DL (ref 65–99)
GLUCOSE SERPL-MCNC: 103 MG/DL (ref 65–140)
GLUCOSE SERPL-MCNC: 110 MG/DL (ref 65–140)
HCT VFR BLD AUTO: 44.2 % (ref 36.5–49.3)
HGB BLD-MCNC: 14.3 G/DL (ref 12–17)
MAGNESIUM SERPL-MCNC: 1.8 MG/DL (ref 1.9–2.7)
MCH RBC QN AUTO: 30.1 PG (ref 26.8–34.3)
MCHC RBC AUTO-ENTMCNC: 32.4 G/DL (ref 31.4–37.4)
MCV RBC AUTO: 93 FL (ref 82–98)
P AXIS: 24 DEGREES
PLATELET # BLD AUTO: 189 THOUSANDS/UL (ref 149–390)
PMV BLD AUTO: 9.4 FL (ref 8.9–12.7)
POTASSIUM SERPL-SCNC: 4 MMOL/L (ref 3.5–5.3)
PR INTERVAL: 182 MS
QRS AXIS: 55 DEGREES
QRSD INTERVAL: 90 MS
QT INTERVAL: 500 MS
QTC INTERVAL: 479 MS
RBC # BLD AUTO: 4.75 MILLION/UL (ref 3.88–5.62)
SODIUM SERPL-SCNC: 140 MMOL/L (ref 135–147)
T WAVE AXIS: 51 DEGREES
VENTRICULAR RATE: 55 BPM
WBC # BLD AUTO: 7.47 THOUSAND/UL (ref 4.31–10.16)

## 2025-01-13 PROCEDURE — 93010 ELECTROCARDIOGRAM REPORT: CPT | Performed by: INTERNAL MEDICINE

## 2025-01-13 PROCEDURE — 82948 REAGENT STRIP/BLOOD GLUCOSE: CPT

## 2025-01-13 PROCEDURE — 80048 BASIC METABOLIC PNL TOTAL CA: CPT | Performed by: INTERNAL MEDICINE

## 2025-01-13 PROCEDURE — 83735 ASSAY OF MAGNESIUM: CPT | Performed by: INTERNAL MEDICINE

## 2025-01-13 PROCEDURE — 85027 COMPLETE CBC AUTOMATED: CPT | Performed by: INTERNAL MEDICINE

## 2025-01-13 PROCEDURE — 93005 ELECTROCARDIOGRAM TRACING: CPT

## 2025-01-13 PROCEDURE — NC001 PR NO CHARGE: Performed by: INTERNAL MEDICINE

## 2025-01-13 RX ORDER — ASPIRIN 325 MG
325 TABLET ORAL ONCE
Status: CANCELLED | OUTPATIENT
Start: 2025-01-13 | End: 2025-01-13

## 2025-01-13 RX ORDER — SODIUM CHLORIDE, SODIUM LACTATE, POTASSIUM CHLORIDE, CALCIUM CHLORIDE 600; 310; 30; 20 MG/100ML; MG/100ML; MG/100ML; MG/100ML
50 INJECTION, SOLUTION INTRAVENOUS CONTINUOUS
Status: DISCONTINUED | OUTPATIENT
Start: 2025-01-13 | End: 2025-01-13 | Stop reason: HOSPADM

## 2025-01-13 RX ORDER — ASPIRIN 325 MG
325 TABLET ORAL ONCE
Status: COMPLETED | OUTPATIENT
Start: 2025-01-13 | End: 2025-01-13

## 2025-01-13 RX ORDER — SODIUM CHLORIDE 9 MG/ML
125 INJECTION, SOLUTION INTRAVENOUS CONTINUOUS
Status: CANCELLED | OUTPATIENT
Start: 2025-01-13

## 2025-01-13 RX ORDER — SODIUM CHLORIDE 9 MG/ML
125 INJECTION, SOLUTION INTRAVENOUS CONTINUOUS
Status: DISCONTINUED | OUTPATIENT
Start: 2025-01-13 | End: 2025-01-13 | Stop reason: HOSPADM

## 2025-01-13 RX ADMIN — SODIUM CHLORIDE, SODIUM LACTATE, POTASSIUM CHLORIDE, AND CALCIUM CHLORIDE 50 ML/HR: .6; .31; .03; .02 INJECTION, SOLUTION INTRAVENOUS at 08:44

## 2025-01-13 RX ADMIN — ASPIRIN 325 MG ORAL TABLET 325 MG: 325 PILL ORAL at 09:05

## 2025-01-13 RX ADMIN — TOPICAL ANESTHETIC 2 SPRAY: 200 SPRAY DENTAL; PERIODONTAL at 10:30

## 2025-01-13 NOTE — ANESTHESIA POSTPROCEDURE EVALUATION
Post-Op Assessment Note    CV Status:  Stable  Pain Score: 0    Pain management: adequate       Mental Status:  Alert and awake   Hydration Status:  Euvolemic   PONV Controlled:  Controlled   Airway Patency:  Patent     Post Op Vitals Reviewed: Yes    No anethesia notable event occurred.    Staff: Anesthesiologist, CRNA        Pt returned to [facility]U. Report given to RN.    Last Filed PACU Vitals:  Vitals Value Taken Time   Temp     Pulse     BP     Resp     SpO2

## 2025-01-13 NOTE — ANESTHESIA PREPROCEDURE EVALUATION
Procedure:  TRANSESOPHAGEAL ECHOCARDIOGRAM (MEI) (Esophagus)  CARDIOVERSION (Chest)    Relevant Problems   CARDIO   (+) Atrial fibrillation with RVR (HCC)   (+) Other hyperlipidemia      ENDO   (+) Type 2 diabetes mellitus with other specified complication, unspecified whether long term insulin use (HCC)      GI/HEPATIC   (+) GERD (gastroesophageal reflux disease)      MUSCULOSKELETAL   (+) Acute idiopathic gout of right foot   (+) Primary osteoarthritis of right knee      PULMONARY   (+) ASIM (obstructive sleep apnea)   (+) Other emphysema (HCC)        Physical Exam    Airway    Mallampati score: III  TM Distance: <3 FB  Neck ROM: limited     Dental       Cardiovascular      Pulmonary      Other Findings        Anesthesia Plan  ASA Score- 3     Anesthesia Type- IV sedation with anesthesia with ASA Monitors.         Additional Monitors:     Airway Plan:     Comment: Sleep apnea. Large neck circumference. Tolerated MAC sedation for MEI recently. Extra airway supplies immediately available..       Plan Factors-Exercise tolerance (METS): >4 METS.    Chart reviewed.                      Induction- intravenous.    Postoperative Plan-     Perioperative Resuscitation Plan - Level 1 - Full Code.       Informed Consent- Anesthetic plan and risks discussed with patient.  I personally reviewed this patient with the CRNA. Discussed and agreed on the Anesthesia Plan with the CRNA..    NPO appropriate. Discussed benefits/risks of monitored anesthetic care and discussed providing a dynamic level of mild to deep sedation. Risks include awareness, airway obstruction, aspiration which may necessitate conversion to general anesthesia. All questions answered. Patient understands and wishes to proceed.      Anesthesia plan and consent discussed with Luis Fernando who expressed understanding and agreement. Risks/benefits and alternatives discussed with patient including possible PONV, sore throat, damage to teeth/lips/gums and possibility of rare  anesthetic and surgical emergencies.

## 2025-01-13 NOTE — H&P
Pt seen, examined, informed consent obtained, no change to H and P.    Here for MEI - CV, on Eliquis for anticoagulation. On Dofetilide at this time

## 2025-01-13 NOTE — INTERVAL H&P NOTE
H&P reviewed. After examining the patient I find no changes in the patients condition since the H&P had been written.    Patient with afib prior MEI/DCCV, ASIM, BMI 49, now on AC, admitted for repeat MEI/DCCV.     There were no vitals filed for this visit.

## 2025-01-14 NOTE — ANESTHESIA POSTPROCEDURE EVALUATION
Post-Op Assessment Note    CV Status:  Stable  Pain Score: 0    Pain management: adequate       Mental Status:  Alert and awake   Hydration Status:  Euvolemic   PONV Controlled:  Controlled   Airway Patency:  Patent     Post Op Vitals Reviewed: Yes    No anethesia notable event occurred.    Staff: Anesthesiologist, CRNA        Pt returned to APU. Report given to RN.    Procedure aborted due to NSR.        Post-Op Assessment Note    Last Filed PACU Vitals:  Vitals Value Taken Time   Temp     Pulse     BP     Resp     SpO2

## 2025-01-15 DIAGNOSIS — I48.91 ATRIAL FIBRILLATION WITH RVR (HCC): ICD-10-CM

## 2025-01-15 DIAGNOSIS — M10.071 ACUTE IDIOPATHIC GOUT OF RIGHT FOOT: ICD-10-CM

## 2025-01-15 DIAGNOSIS — K21.9 GASTROESOPHAGEAL REFLUX DISEASE, UNSPECIFIED WHETHER ESOPHAGITIS PRESENT: ICD-10-CM

## 2025-01-15 DIAGNOSIS — G47.33 OSA (OBSTRUCTIVE SLEEP APNEA): ICD-10-CM

## 2025-01-15 DIAGNOSIS — E11.9 TYPE 2 DIABETES MELLITUS WITHOUT COMPLICATION, WITHOUT LONG-TERM CURRENT USE OF INSULIN (HCC): ICD-10-CM

## 2025-01-15 RX ORDER — DOFETILIDE 0.5 MG/1
500 CAPSULE ORAL 2 TIMES DAILY
Qty: 180 CAPSULE | Refills: 1 | Status: SHIPPED | OUTPATIENT
Start: 2025-01-15 | End: 2025-01-15 | Stop reason: SDUPTHER

## 2025-01-16 DIAGNOSIS — E11.9 TYPE 2 DIABETES MELLITUS WITHOUT COMPLICATION, WITHOUT LONG-TERM CURRENT USE OF INSULIN (HCC): ICD-10-CM

## 2025-01-16 RX ORDER — METOPROLOL SUCCINATE 50 MG/1
50 TABLET, EXTENDED RELEASE ORAL 2 TIMES DAILY
Qty: 180 TABLET | Refills: 1 | OUTPATIENT
Start: 2025-01-16

## 2025-01-16 RX ORDER — ALLOPURINOL 300 MG/1
300 TABLET ORAL DAILY
Qty: 90 TABLET | Refills: 0 | OUTPATIENT
Start: 2025-01-16

## 2025-01-16 RX ORDER — METOPROLOL SUCCINATE 50 MG/1
50 TABLET, EXTENDED RELEASE ORAL 2 TIMES DAILY
Qty: 60 TABLET | Refills: 5 | Status: SHIPPED | OUTPATIENT
Start: 2025-01-16

## 2025-01-16 RX ORDER — APIXABAN 5 MG/1
5 TABLET, FILM COATED ORAL 2 TIMES DAILY
Qty: 180 TABLET | Refills: 1 | OUTPATIENT
Start: 2025-01-16

## 2025-01-16 RX ORDER — DOFETILIDE 0.5 MG/1
500 CAPSULE ORAL 2 TIMES DAILY
Qty: 180 CAPSULE | Refills: 0 | Status: SHIPPED | OUTPATIENT
Start: 2025-01-16

## 2025-01-17 ENCOUNTER — TELEPHONE (OUTPATIENT)
Age: 55
End: 2025-01-17

## 2025-01-17 NOTE — TELEPHONE ENCOUNTER
PA for DOFETILIDE  500MG SUBMITTED to CARE  GENERIC ONLY  via    [x]CMM-KEY:  [id number]  []Surescripts-Case ID #     []Availity-Auth ID #  NDC #    []Faxed to plan   []Other website     []Phone call Case ID #      [x]PA sent as URGENT    All office notes, labs and other pertaining documents and studies sent. Clinical questions answered. Awaiting determination from insurance company.     Turnaround time for your insurance to make a decision on your Prior Authorization can take 7-21 business days.

## 2025-01-17 NOTE — TELEPHONE ENCOUNTER
Reason for call:   [] Prior Auth  [] Other:     Caller:  [x] Patient  [] Pharmacy  Name:   Address:   Callback Number:     Medication: Tikosyn     Dose/Frequency: Take 1 capsule (500 mcg total) by mouth 2 (two) times a day    Quantity: 180    Ordering Provider:   [] PCP/Provider -   [x] Speciality/Provider - Miguel MORIN    Has the patient tried other medications and failed? If failed, which medications did they fail?    [] No   [] Yes -     Is the patient's insurance updated in EPIC?   [x] Yes   [] No     Is a copy of the patient's insurance scanned in EPIC?   [x] Yes   [] No

## 2025-01-17 NOTE — TELEPHONE ENCOUNTER
PA for DOFETILIDE CANCELLED due to     []Approval on file-dates approved    [x]Medication already on Formulary  []Brand Name Preferred  []Patient no longer covered by insurancl

## 2025-01-27 ENCOUNTER — OFFICE VISIT (OUTPATIENT)
Age: 55
End: 2025-01-27

## 2025-01-27 VITALS — HEIGHT: 74 IN | BODY MASS INDEX: 40.43 KG/M2 | WEIGHT: 315 LBS

## 2025-01-27 DIAGNOSIS — S83.241D TEAR OF MEDIAL MENISCUS OF RIGHT KNEE, CURRENT, UNSPECIFIED TEAR TYPE, SUBSEQUENT ENCOUNTER: Primary | ICD-10-CM

## 2025-01-27 PROCEDURE — 99213 OFFICE O/P EST LOW 20 MIN: CPT | Performed by: STUDENT IN AN ORGANIZED HEALTH CARE EDUCATION/TRAINING PROGRAM

## 2025-01-27 NOTE — LETTER
January 27, 2025     Patient: Luis Fernando Jay  YOB: 1970  Date of Visit: 1/27/2025      To Whom it May Concern:    Luis Fernando Jay is under my professional care. Luis Fernando was seen in my office on 1/27/2025. Luis Fernando is to remain out of work for another 2 months, March 17th 2025, until he sees another specialist to address his injury. Further recommendations will be given at the time he sees another specialist.     If you have any questions or concerns, please don't hesitate to call.         Sincerely,          Eugene Vidales, DO        CC: No Recipients

## 2025-01-27 NOTE — PROGRESS NOTES
Knee New Office Note    Assessment:     1. Tear of medial meniscus of right knee, current, unspecified tear type, subsequent encounter        Plan:   Diagnoses and all orders for this visit:    Tear of medial meniscus of right knee, current, unspecified tear type, subsequent encounter       Luis Fernando is a very pleasant 54 year old male with a history of right knee pain after a mechanical fall at work on 11/25/2024. Physical examination and image findings are consistent with mild osteoarthritis and a medial meniscus tear of the body of the meniscus. He has mechanical symptoms and most of his pain is located along the medial joint line. He has attempted activity modification, therapy, and a CSI of the right knee all with mild relief. He was referred to Dr. Juarez for sports medicine to discuss further options to address his injury. He may follow up PRN.       Subjective:     Patient ID: Luis Fernando Jay is a 54 y.o. male.  Chief Complaint:  HPI:  54 y.o. male with right knee pain after a mechanical fall in November of 2024, while at work. He reports pain is most severe along the medial joint line of the knee. He has pre-existing right lateral patellar pain that he reports does not bother him much. He received a CSI while inpatient at the hospital in December of 2024 which provided him with mild relief.       Allergy:  No Known Allergies  Medications:  all current active meds have been reviewed  Past Medical History:  Past Medical History:   Diagnosis Date    Diabetes mellitus (HCC)     GERD (gastroesophageal reflux disease)     Lung mass     Obesity      Past Surgical History:  History reviewed. No pertinent surgical history.  Family History:  Family History   Problem Relation Age of Onset    No Known Problems Mother     Tuberculosis Father     Atrial fibrillation Father 65    Lung cancer Paternal Uncle     Alcohol abuse Neg Hx     Substance Abuse Neg Hx     Mental illness Neg Hx     Depression Neg Hx      Social  "History:  Social History     Substance and Sexual Activity   Alcohol Use Not Currently     Social History     Substance and Sexual Activity   Drug Use Never     Social History     Tobacco Use   Smoking Status Former    Current packs/day: 0.00    Average packs/day: 3.0 packs/day for 40.3 years (121.0 ttl pk-yrs)    Types: Cigarettes    Start date:     Quit date: 2019    Years since quittin.7   Smokeless Tobacco Never           ROS:  General: Per HPI  Skin: Negative, except if noted below  HEENT: Negative  Respiratory: Negative  Cardiovascular: Negative  Gastrointestinal: Negative  Urinary: Negative  Vascular: Negative  Musculoskeletal: Positive per HPI   Neurologic: Positive per HPI  Endocrine: Negative    Objective:  BP Readings from Last 1 Encounters:   25 140/80      Wt Readings from Last 1 Encounters:   25 (!) 175 kg (386 lb)        Respiratory:   non-labored respirations    Lymphatics:  no palpable lymph nodes    Gait:   Antalgic     Neurologic:   Alert and oriented times 3  Patient with normal sensation except as noted below  Deep tendon reflexes 2+ except as noted in MSK exam    Bilateral Lower Extremity:      Right knee:     Inspection:  intact without lesions     Overall limb alignment varus     Effusion: none    ROM Full with pain    Extensor Lag: none    Palpation: medial Joint line tenderness to palpation    AP Stability at 90 deg stable    M/L stability in full extension stable    M/L stability in midflexion stable    Motor: 5/5 Q/HS/TA/GS/P    Pulses: 2+ DP / 2+ PT    SILT DP/SP/S/S/TN    Imaging:  MRI of the right knee demonstrates no bony edema, mild chondromalacia with a medial meniscus tear involving the body       BMI:   Estimated body mass index is 49.56 kg/m² as calculated from the following:    Height as of this encounter: 6' 2\" (1.88 m).    Weight as of this encounter: 175 kg (386 lb).  BSA:   Estimated body surface area is 2.87 meters squared as calculated from the " "following:    Height as of this encounter: 6' 2\" (1.88 m).    Weight as of this encounter: 175 kg (386 lb).           Scribe Attestation      I,:   am acting as a scribe while in the presence of the attending physician.:       I,:   personally performed the services described in this documentation    as scribed in my presence.:               "

## 2025-02-03 ENCOUNTER — OFFICE VISIT (OUTPATIENT)
Age: 55
End: 2025-02-03

## 2025-02-03 ENCOUNTER — APPOINTMENT (OUTPATIENT)
Age: 55
End: 2025-02-03
Payer: COMMERCIAL

## 2025-02-03 ENCOUNTER — TELEPHONE (OUTPATIENT)
Age: 55
End: 2025-02-03

## 2025-02-03 VITALS — BODY MASS INDEX: 40.43 KG/M2 | WEIGHT: 315 LBS | HEIGHT: 74 IN

## 2025-02-03 DIAGNOSIS — S83.241D TEAR OF MEDIAL MENISCUS OF RIGHT KNEE, CURRENT, UNSPECIFIED TEAR TYPE, SUBSEQUENT ENCOUNTER: Primary | ICD-10-CM

## 2025-02-03 DIAGNOSIS — Z01.89 ENCOUNTER FOR LOWER EXTREMITY COMPARISON IMAGING STUDY: ICD-10-CM

## 2025-02-03 DIAGNOSIS — S89.91XA INJURY OF RIGHT KNEE, INITIAL ENCOUNTER: ICD-10-CM

## 2025-02-03 DIAGNOSIS — S83.241D TEAR OF MEDIAL MENISCUS OF RIGHT KNEE, CURRENT, UNSPECIFIED TEAR TYPE, SUBSEQUENT ENCOUNTER: ICD-10-CM

## 2025-02-03 PROCEDURE — 73560 X-RAY EXAM OF KNEE 1 OR 2: CPT

## 2025-02-03 PROCEDURE — 99214 OFFICE O/P EST MOD 30 MIN: CPT | Performed by: ORTHOPAEDIC SURGERY

## 2025-02-03 RX ORDER — SODIUM CHLORIDE, SODIUM LACTATE, POTASSIUM CHLORIDE, CALCIUM CHLORIDE 600; 310; 30; 20 MG/100ML; MG/100ML; MG/100ML; MG/100ML
125 INJECTION, SOLUTION INTRAVENOUS CONTINUOUS
OUTPATIENT
Start: 2025-02-03

## 2025-02-03 RX ORDER — CHLORHEXIDINE GLUCONATE ORAL RINSE 1.2 MG/ML
15 SOLUTION DENTAL ONCE
OUTPATIENT
Start: 2025-02-03 | End: 2025-02-03

## 2025-02-03 NOTE — PROGRESS NOTES
ASSESSMENT:  RIGHT knee pain, tear of medial meniscus   Work related injury       PLAN:  Discussed treatment options  Tried extensive nonoperative treatment including PT, injection, anti inflammatory medicine with persistent symptoms. He has pain and some occasional locking/catching.   We discussed further injections (steroid vs HA) and PT/bracing, vs surgery.   Patient is interested in surgery at this time  Risks and benefits of surgery were discussed, also discussed limitations of arthroscopy   Follow up for RIGHT knee arthroscopic debridement, partial meniscectomy    Patient will get PCP clearance prior to surgery   Work note written, to be out until March 17th       REASON FOR VISIT:  Chief Complaint   Patient presents with    Right Knee - Pain       HISTORY OF PRESENT ILLNESS:  RIGHT knee pain    Mechanical fall on 11/20/24  Evaluated by Dr. Vidales   MRI revealed medial mensicus tear     Still experiencing medial joint line pain   Not taking any pain medications   Has some slight occasional locking and catching sensation  Slight associated swelling     Patient has completed PT, CSI and activity modification in [id number] for osteoarthritis  Pain resolved, but has returned medially since injury     Has not been back to work since injury     Was recently diagnosed with afib, on Eliquis       Past Medical History:   Diagnosis Date    Diabetes mellitus (HCC)     GERD (gastroesophageal reflux disease)     Lung mass     Obesity         History reviewed. No pertinent surgical history.    Social History     Socioeconomic History    Marital status: /Civil Union     Spouse name: Not on file    Number of children: Not on file    Years of education: Not on file    Highest education level: Not on file   Occupational History    Not on file   Tobacco Use    Smoking status: Former     Current packs/day: 0.00     Average packs/day: 3.0 packs/day for 40.3 years (121.0 ttl pk-yrs)     Types: Cigarettes     Start date: 1979      Quit date: 2019     Years since quittin.7    Smokeless tobacco: Never   Vaping Use    Vaping status: Never Used   Substance and Sexual Activity    Alcohol use: Not Currently    Drug use: Never    Sexual activity: Not Currently   Other Topics Concern    Not on file   Social History Narrative    Drinks coffee 1 cup per day         Works in [place] - works at the [employer]     Social Drivers of Health     Financial Resource Strain: Not on file   Food Insecurity: No Food Insecurity (2024)    Nursing - Inadequate Food Risk Classification     Worried About Running Out of Food in the Last Year: Not on file     Ran Out of Food in the Last Year: Not on file     Ran Out of Food in the Last Year: Never true   Transportation Needs: No Transportation Needs (2024)    Nursing - Transportation Risk Classification     Lack of Transportation: Not on file     Lack of Transportation: No   Physical Activity: Not on file   Stress: Not on file   Social Connections: Not on file   Intimate Partner Violence: Unknown (2024)    Nursing IPS     Feels Physically and Emotionally Safe: Not on file     Physically Hurt by Someone: Not on file     Humiliated or Emotionally Abused by Someone: Not on file     Physically Hurt by Someone: No     Hurt or Threatened by Someone: No   Housing Stability: Unknown (2024)    Nursing: Inadequate Housing Risk Classification     Has Housing: Not on file     Worried About Losing Housing: Not on file     Unable to Get Utilities: Not on file     Unable to Pay for Housing in the Last Year: No     Has Housin       MEDICATIONS:    Current Outpatient Medications:     albuterol (2.5 mg/3 mL) 0.083 % nebulizer solution, Take 1 vial (2.5 mg total) by nebulization every 4 (four) hours as needed for wheezing or shortness of breath, Disp: 270 mL, Rfl: 5    albuterol (ProAir HFA) 90 mcg/act inhaler, Inhale 2 puffs every 6 (six) hours as needed for wheezing, Disp: 8.5 g,  Rfl: 0    Alcohol Swabs 70 % PADS, May substitute brand based on insurance coverage. Check glucose BID., Disp: 100 each, Rfl: 0    allopurinol (ZYLOPRIM) 300 mg tablet, Take 1 tablet (300 mg total) by mouth daily, Disp: 90 tablet, Rfl: 1    apixaban (Eliquis) 5 mg, Take 1 tablet (5 mg total) by mouth 2 (two) times a day, Disp: 60 tablet, Rfl: 5    Blood Glucose Monitoring Suppl (OneTouch Verio Reflect) w/Device KIT, May substitute brand based on insurance coverage. Check glucose BID., Disp: 1 kit, Rfl: 0    Continuous Glucose Sensor (FreeStyle Hali 3 Sensor) MISC, Use 1 each every 14 (fourteen) days, Disp: 6 each, Rfl: 1    dofetilide (TIKOSYN) 500 mcg capsule, Take 1 capsule (500 mcg total) by mouth 2 (two) times a day Price check only., Disp: 180 capsule, Rfl: 0    esomeprazole (NexIUM) 20 mg capsule, Take 1 capsule (20 mg total) by mouth daily in the early morning, Disp: 90 capsule, Rfl: 1    glucose blood (OneTouch Verio) test strip, May substitute brand based on insurance coverage. Check glucose BID., Disp: 100 each, Rfl: 0    metFORMIN (GLUCOPHAGE) 1000 MG tablet, TAKE 1 TABLET BY MOUTH TWICE A DAY WITH MEALS, Disp: 180 tablet, Rfl: 1    methocarbamol (Robaxin-750) 750 mg tablet, Take 1 tablet (750 mg total) by mouth 3 (three) times a day as needed for muscle spasms (may cause drowsiness), Disp: 20 tablet, Rfl: 0    metoprolol succinate (TOPROL-XL) 50 mg 24 hr tablet, Take 1 tablet (50 mg total) by mouth 2 (two) times a day, Disp: 60 tablet, Rfl: 5    naproxen (NAPROSYN) 500 mg tablet, Take 1 tablet (500 mg total) by mouth 2 (two) times a day as needed (gout), Disp: 180 tablet, Rfl: 0    OneTouch Delica Lancets 33G MISC, May substitute brand based on insurance coverage. Check glucose BID., Disp: 100 each, Rfl: 0    semaglutide, 2 mg/dose, (Ozempic) 8 mg/ mL injection pen, Inject 0.75 mL (2 mg total) under the skin every 7 days, Disp: 9 mL, Rfl: 1    ALLERGIES:  No Known Allergies    MAJOR FINDINGS:  Luis Fernando MERINO  Misty is pleasant, healthy appearing, and in no acute distress.     RIGHT knee  Skin intact, ROM 0-0-100   Trace effusion  Normal patella tracking   No patella apprehension  Joint line tenderness: medially , Nicole test: +medial  Anterior drawer: negative, Lachman: stable, Posterior drawer: negative   Stable to varus/valgus  Sensation intact sp/dp/t  Strength intact 5/5 dorsiflexion/plantarflexion/SLR   Extremity wwp  No calf pain      LEFT knee  Skin intact, ROM 0-0-120   No effusion  Normal patella tracking   No patella apprehension  Joint line tenderness: none, Nicole test: negative  Anterior drawer: negative, Lachman: stable, Posterior drawer: negative   Stable to varus/valgus  Sensation intact sp/dp/t  Strength intact 5/5 dorsiflexion/plantarflexion/SLR   Extremity wwp  No calf pain      IMAGING  I personally reviewed and interpreted the imaging studies  X-rays performed on the  BILATERAL  knee show mild sign arthritis with early joint space narrowing    MRI knee right wo contrast (12/13/24)  Redemonstrated horizontal tear at the junction of the posterior horn and body of the medial meniscus.     Mild tricompartmental osteoarthrosis slightly progressed in the patellofemoral compartment.     No fracture or contusion      CC:  MD Sunny Andres Ryan Andrew, MD

## 2025-02-03 NOTE — TELEPHONE ENCOUNTER
Akila from [facility] called to schedule pre-op clearance appt for pt.  He is having a right meniscus repair on 2/19/25 by Dr Juarez.  We will need to do his EKG.  Dr Márquez' first available appt was 5/5/25, so scheduled his clearance appt with Inessa JOINER for 2/11/25.  Please move pt's clearance appt to Dr Márquez' schedule if possible.  Thank you!

## 2025-02-03 NOTE — LETTER
February 3, 2025     Patient: Luis Fernando Jay  YOB: 1970  Date of Visit: 2/3/2025      To Whom it May Concern:    Luis Fernando Jay is under my professional care. Luis Fernando was seen in my office on 2/3/2025. Luis Fernando may return to work on 3/17/25 to allow time to heal from knee injury .    If you have any questions or concerns, please don't hesitate to call.         Sincerely,        Ruiz Juarez MD        CC: No Recipients

## 2025-02-04 ENCOUNTER — TELEPHONE (OUTPATIENT)
Age: 55
End: 2025-02-04

## 2025-02-04 NOTE — TELEPHONE ENCOUNTER
Left message offering 2/14 for surgery with Dr. Juarez. Left my direct # for patient to return my call.

## 2025-02-07 ENCOUNTER — APPOINTMENT (OUTPATIENT)
Dept: LAB | Facility: CLINIC | Age: 55
End: 2025-02-07
Payer: COMMERCIAL

## 2025-02-07 ENCOUNTER — TELEPHONE (OUTPATIENT)
Age: 55
End: 2025-02-07

## 2025-02-07 DIAGNOSIS — E11.9 TYPE 2 DIABETES MELLITUS WITHOUT COMPLICATION, WITHOUT LONG-TERM CURRENT USE OF INSULIN (HCC): ICD-10-CM

## 2025-02-07 DIAGNOSIS — M10.071 ACUTE IDIOPATHIC GOUT OF RIGHT FOOT: ICD-10-CM

## 2025-02-07 DIAGNOSIS — I48.91 ATRIAL FIBRILLATION WITH RVR (HCC): ICD-10-CM

## 2025-02-07 DIAGNOSIS — E78.49 OTHER HYPERLIPIDEMIA: ICD-10-CM

## 2025-02-07 DIAGNOSIS — S83.241D TEAR OF MEDIAL MENISCUS OF RIGHT KNEE, CURRENT, UNSPECIFIED TEAR TYPE, SUBSEQUENT ENCOUNTER: ICD-10-CM

## 2025-02-07 LAB
ALBUMIN SERPL BCG-MCNC: 4.1 G/DL (ref 3.5–5)
ALP SERPL-CCNC: 95 U/L (ref 34–104)
ALT SERPL W P-5'-P-CCNC: 64 U/L (ref 7–52)
ANION GAP SERPL CALCULATED.3IONS-SCNC: 8 MMOL/L (ref 4–13)
AST SERPL W P-5'-P-CCNC: 42 U/L (ref 13–39)
BASOPHILS # BLD AUTO: 0.06 THOUSANDS/ÂΜL (ref 0–0.1)
BASOPHILS NFR BLD AUTO: 1 % (ref 0–1)
BILIRUB SERPL-MCNC: 0.54 MG/DL (ref 0.2–1)
BUN SERPL-MCNC: 13 MG/DL (ref 5–25)
CALCIUM SERPL-MCNC: 9.3 MG/DL (ref 8.4–10.2)
CHLORIDE SERPL-SCNC: 106 MMOL/L (ref 96–108)
CHOLEST SERPL-MCNC: 170 MG/DL (ref ?–200)
CO2 SERPL-SCNC: 28 MMOL/L (ref 21–32)
CREAT SERPL-MCNC: 0.81 MG/DL (ref 0.6–1.3)
EOSINOPHIL # BLD AUTO: 0.37 THOUSAND/ÂΜL (ref 0–0.61)
EOSINOPHIL NFR BLD AUTO: 5 % (ref 0–6)
ERYTHROCYTE [DISTWIDTH] IN BLOOD BY AUTOMATED COUNT: 13.4 % (ref 11.6–15.1)
EST. AVERAGE GLUCOSE BLD GHB EST-MCNC: 105 MG/DL
GFR SERPL CREATININE-BSD FRML MDRD: 99 ML/MIN/1.73SQ M
GLUCOSE P FAST SERPL-MCNC: 94 MG/DL (ref 65–99)
HBA1C MFR BLD: 5.3 %
HCT VFR BLD AUTO: 45.8 % (ref 36.5–49.3)
HDLC SERPL-MCNC: 36 MG/DL
HGB BLD-MCNC: 15.1 G/DL (ref 12–17)
IMM GRANULOCYTES # BLD AUTO: 0.04 THOUSAND/UL (ref 0–0.2)
IMM GRANULOCYTES NFR BLD AUTO: 1 % (ref 0–2)
LDLC SERPL CALC-MCNC: 107 MG/DL (ref 0–100)
LYMPHOCYTES # BLD AUTO: 2 THOUSANDS/ÂΜL (ref 0.6–4.47)
LYMPHOCYTES NFR BLD AUTO: 25 % (ref 14–44)
MCH RBC QN AUTO: 30.9 PG (ref 26.8–34.3)
MCHC RBC AUTO-ENTMCNC: 33 G/DL (ref 31.4–37.4)
MCV RBC AUTO: 94 FL (ref 82–98)
MONOCYTES # BLD AUTO: 0.52 THOUSAND/ÂΜL (ref 0.17–1.22)
MONOCYTES NFR BLD AUTO: 7 % (ref 4–12)
NEUTROPHILS # BLD AUTO: 4.96 THOUSANDS/ÂΜL (ref 1.85–7.62)
NEUTS SEG NFR BLD AUTO: 61 % (ref 43–75)
NONHDLC SERPL-MCNC: 134 MG/DL
NRBC BLD AUTO-RTO: 0 /100 WBCS
PLATELET # BLD AUTO: 230 THOUSANDS/UL (ref 149–390)
PMV BLD AUTO: 9.4 FL (ref 8.9–12.7)
POTASSIUM SERPL-SCNC: 4.4 MMOL/L (ref 3.5–5.3)
PROT SERPL-MCNC: 7 G/DL (ref 6.4–8.4)
RBC # BLD AUTO: 4.89 MILLION/UL (ref 3.88–5.62)
SODIUM SERPL-SCNC: 142 MMOL/L (ref 135–147)
TRIGL SERPL-MCNC: 135 MG/DL (ref ?–150)
URATE SERPL-MCNC: 6.2 MG/DL (ref 3.5–8.5)
WBC # BLD AUTO: 7.95 THOUSAND/UL (ref 4.31–10.16)

## 2025-02-07 PROCEDURE — 80053 COMPREHEN METABOLIC PANEL: CPT

## 2025-02-07 PROCEDURE — 83036 HEMOGLOBIN GLYCOSYLATED A1C: CPT

## 2025-02-07 PROCEDURE — 36415 COLL VENOUS BLD VENIPUNCTURE: CPT

## 2025-02-07 PROCEDURE — 80061 LIPID PANEL: CPT

## 2025-02-07 PROCEDURE — 84550 ASSAY OF BLOOD/URIC ACID: CPT

## 2025-02-07 PROCEDURE — 85025 COMPLETE CBC W/AUTO DIFF WBC: CPT

## 2025-02-07 RX ORDER — METOPROLOL SUCCINATE 50 MG/1
50 TABLET, EXTENDED RELEASE ORAL 2 TIMES DAILY
Qty: 180 TABLET | Refills: 1 | Status: SHIPPED | OUTPATIENT
Start: 2025-02-07

## 2025-02-07 NOTE — TELEPHONE ENCOUNTER
Patient would like a call back regarding his Dofetilide.  He said he has paid out of pocket for the last two scripts.  He believes the pharmacy is going through his wife's insurance instead of his.    Patient states he doesn't want to have to pay out of pocket for the next refill.

## 2025-02-11 ENCOUNTER — OFFICE VISIT (OUTPATIENT)
Dept: INTERNAL MEDICINE CLINIC | Facility: CLINIC | Age: 55
End: 2025-02-11
Payer: OTHER MISCELLANEOUS

## 2025-02-11 VITALS
TEMPERATURE: 97.6 F | OXYGEN SATURATION: 96 % | DIASTOLIC BLOOD PRESSURE: 84 MMHG | RESPIRATION RATE: 14 BRPM | HEIGHT: 74 IN | SYSTOLIC BLOOD PRESSURE: 140 MMHG | HEART RATE: 80 BPM | BODY MASS INDEX: 48.79 KG/M2

## 2025-02-11 DIAGNOSIS — E78.49 OTHER HYPERLIPIDEMIA: ICD-10-CM

## 2025-02-11 DIAGNOSIS — E11.69 TYPE 2 DIABETES MELLITUS WITH OTHER SPECIFIED COMPLICATION, UNSPECIFIED WHETHER LONG TERM INSULIN USE (HCC): ICD-10-CM

## 2025-02-11 DIAGNOSIS — M10.071 ACUTE IDIOPATHIC GOUT OF RIGHT FOOT: ICD-10-CM

## 2025-02-11 DIAGNOSIS — I48.91 ATRIAL FIBRILLATION WITH RVR (HCC): ICD-10-CM

## 2025-02-11 DIAGNOSIS — Z01.818 PRE-OP EVALUATION: Primary | ICD-10-CM

## 2025-02-11 DIAGNOSIS — G47.33 OSA (OBSTRUCTIVE SLEEP APNEA): ICD-10-CM

## 2025-02-11 DIAGNOSIS — S83.241D TEAR OF MEDIAL MENISCUS OF RIGHT KNEE, CURRENT, UNSPECIFIED TEAR TYPE, SUBSEQUENT ENCOUNTER: ICD-10-CM

## 2025-02-11 DIAGNOSIS — J43.8 OTHER EMPHYSEMA (HCC): ICD-10-CM

## 2025-02-11 PROCEDURE — 99214 OFFICE O/P EST MOD 30 MIN: CPT | Performed by: NURSE PRACTITIONER

## 2025-02-11 NOTE — PROGRESS NOTES
Name: Luis Fernando Jay      : 1970      MRN: [id number]  Encounter Provider: MARISEL Moreno  Encounter Date: 2025   Encounter department: [facility] INTERNAL MEDICINE  :  Assessment & Plan  Pre-op evaluation  Reviewed pre op labs- stable other than liver enzymes slightly elevated. Although this does not impact his operative risk, recommend repeating in 1-2 weeks.   EKG- NSR HR 64  He is at an acceptable risk for surgery.   Recommend cardiac clearance and perioperative guidance for his cardiac medications. I will reach out to his cardiologist.     Orders:    Comprehensive metabolic panel; Future  `  Tear of medial meniscus of right knee, current, unspecified tear type, subsequent encounter    Orders:    Ambulatory referral to Family Practice    Atrial fibrillation with RVR (HCC)  Rate is stable.   On eliquis, tikosyn, and metoprolol.  Follows with cardiology.          ASIM (obstructive sleep apnea)  Compliant with CPAP.        Other emphysema (HCC)  Denies any breathing issues.   On albuterol as needed.          Type 2 diabetes mellitus with other specified complication, unspecified whether long term insulin use (HCC)    Lab Results   Component Value Date    HGBA1C 5.3 2025   Well controlled.   On metformin and ozempic.          Acute idiopathic gout of right foot  On allopurinol.          Other hyperlipidemia  Not on a statin.               History of Present Illness   Luis Fernando is here today for pre op evaluation.  He is scheduled for right knee meniscus repair on .   He denies any issues with anesthesia in the past.   No cardiopulmonary complaints.     Needs cardiology clearance.   CPAP  Liver enzymes      Review of Systems   Constitutional:  Negative for activity change, appetite change, fever and unexpected weight change.   Eyes:  Negative for visual disturbance.   Respiratory:  Negative for cough, chest tightness, shortness of breath and wheezing.    Cardiovascular:  Negative  "for chest pain, palpitations and leg swelling.   Gastrointestinal:  Negative for abdominal pain, constipation and diarrhea.   Genitourinary:  Negative for difficulty urinating.   Musculoskeletal:  Positive for arthralgias.   Skin:  Negative for rash.   Neurological:  Negative for dizziness, light-headedness and headaches.   Psychiatric/Behavioral:  Negative for sleep disturbance.        Objective   /84 (BP Location: Left arm, Patient Position: Sitting, Cuff Size: Large)   Pulse 80   Temp 97.6 °F (36.4 °C)   Resp 14   Ht 6' 2\" (1.88 m)   SpO2 96%   BMI 48.79 kg/m²      Physical Exam  Vitals reviewed.   Constitutional:       Appearance: Normal appearance. He is well-developed.   HENT:      Head: Normocephalic and atraumatic.   Eyes:      Conjunctiva/sclera: Conjunctivae normal.   Cardiovascular:      Rate and Rhythm: Normal rate and regular rhythm.      Heart sounds: Normal heart sounds.   Pulmonary:      Effort: Pulmonary effort is normal.      Breath sounds: Normal breath sounds.   Musculoskeletal:         General: Normal range of motion.      Right lower leg: No edema.      Left lower leg: No edema.   Skin:     General: Skin is warm and dry.   Neurological:      Mental Status: He is alert and oriented to person, place, and time.   Psychiatric:         Mood and Affect: Mood normal.         Behavior: Behavior normal.         "

## 2025-02-11 NOTE — ASSESSMENT & PLAN NOTE
Lab Results   Component Value Date    HGBA1C 5.3 02/07/2025   Well controlled.   On metformin and ozempic.

## 2025-02-12 DIAGNOSIS — I48.91 ATRIAL FIBRILLATION WITH RVR (HCC): ICD-10-CM

## 2025-02-12 RX ORDER — DOFETILIDE 0.5 MG/1
500 CAPSULE ORAL 2 TIMES DAILY
Qty: 180 CAPSULE | Refills: 1 | Status: SHIPPED | OUTPATIENT
Start: 2025-02-12

## 2025-02-12 NOTE — TELEPHONE ENCOUNTER
Was on the phone discussing with patient another issue about anticoagulation hold for upcoming procedure when I also discovered that he had already put in a message about dofetilide on 2/7 to our team and he had received no response.    He is having issues with [facility] not having this medication (dofetilide) covered.    Our team did attempt to put in a prior Auth earlier on (see telephone encounter from 1/17/25) but then it was canceled?  I sent in a new prescription to [facility] and called them a couple times today.  They say that a prior authorization is still required.  I have tagged the prior authorization pod to see if they can help us with this.  PLEASE make sure that this is run through his [employer] and not his wife's federal insurance. We believe that may have been the issue before hand.    Patient reports that his insurance is pretty good and he should only have to pay a couple dollars a month rather than using the coupon cards provided by [email or web address] for $45.      ^^  We do need to make sure that these messages are being addressed and sent to the appropriate pools in a timely manner as patient now only has 2 weeks worth of dofetilide and was extremely concerned that this would not be taken care of and he would have to pay out-of-pocket again in a couple weeks.

## 2025-02-13 ENCOUNTER — TELEPHONE (OUTPATIENT)
Age: 55
End: 2025-02-13

## 2025-02-13 NOTE — TELEPHONE ENCOUNTER
PA for Dofetilide 500 mcg SUBMITTED to [employer] [facility]    via    []CMM-KEY:   [x]Surescripts-Case ID #   [id number]   []Availity-Auth ID #NDC #   []Faxed to plan   []Other website   []Phone call Case ID #     [x]PA sent as URGENT    All office notes, labs and other pertaining documents and studies sent. Clinical questions answered. Awaiting determination from insurance company.     Turnaround time for your insurance to make a decision on your Prior Authorization can take 7-21 business days.

## 2025-02-13 NOTE — TELEPHONE ENCOUNTER
Spoke with patient - is aware surgery is being postponed. Patient requested appt with Dr. Juarez to discuss further options. This is workers comp related. Patient is scheduled for 2/17 @ 3:30pm.

## 2025-02-13 NOTE — TELEPHONE ENCOUNTER
Looks like original prior auth was ran through The FEP plan from [employer] [facility] with a determination of no prior auth required. On 1/20/25 which is secondary his secondary plan. Will resubmit under primary [facility] plan

## 2025-02-13 NOTE — TELEPHONE ENCOUNTER
PA for dofetilide 500 mcg  APPROVED     Date(s) approved  February 13, 2025 to February 13, 2026     Case #[id number]     Patient advised by          [x]VCNChart Message  []Phone call   [x]LMOM  []L/M to call office as no active Communication consent on file  []Unable to leave detailed message as VM not approved on Communication consent       Pharmacy advised by    [x]Fax  []Phone call    Specialty Pharmacy    []     Approval letter scanned into Media Yes

## 2025-02-15 DIAGNOSIS — E11.9 TYPE 2 DIABETES MELLITUS WITHOUT COMPLICATION, WITHOUT LONG-TERM CURRENT USE OF INSULIN (HCC): ICD-10-CM

## 2025-02-17 ENCOUNTER — OFFICE VISIT (OUTPATIENT)
Age: 55
End: 2025-02-17
Payer: OTHER MISCELLANEOUS

## 2025-02-17 DIAGNOSIS — S83.241D TEAR OF MEDIAL MENISCUS OF RIGHT KNEE, CURRENT, UNSPECIFIED TEAR TYPE, SUBSEQUENT ENCOUNTER: Primary | ICD-10-CM

## 2025-02-17 PROCEDURE — 99214 OFFICE O/P EST MOD 30 MIN: CPT | Performed by: ORTHOPAEDIC SURGERY

## 2025-02-17 PROCEDURE — 20610 DRAIN/INJ JOINT/BURSA W/O US: CPT | Performed by: ORTHOPAEDIC SURGERY

## 2025-02-17 RX ORDER — SEMAGLUTIDE 2.68 MG/ML
INJECTION, SOLUTION SUBCUTANEOUS
Qty: 9 ML | Refills: 0 | Status: SHIPPED | OUTPATIENT
Start: 2025-02-17

## 2025-02-17 RX ORDER — TRIAMCINOLONE ACETONIDE 40 MG/ML
40 INJECTION, SUSPENSION INTRA-ARTICULAR; INTRAMUSCULAR
Status: COMPLETED | OUTPATIENT
Start: 2025-02-17 | End: 2025-02-17

## 2025-02-17 RX ORDER — LIDOCAINE HYDROCHLORIDE 10 MG/ML
4 INJECTION, SOLUTION INFILTRATION; PERINEURAL
Status: COMPLETED | OUTPATIENT
Start: 2025-02-17 | End: 2025-02-17

## 2025-02-17 RX ADMIN — TRIAMCINOLONE ACETONIDE 40 MG: 40 INJECTION, SUSPENSION INTRA-ARTICULAR; INTRAMUSCULAR at 15:30

## 2025-02-17 RX ADMIN — LIDOCAINE HYDROCHLORIDE 4 ML: 10 INJECTION, SOLUTION INFILTRATION; PERINEURAL at 15:30

## 2025-02-17 NOTE — PROGRESS NOTES
ASSESSMENT:  RIGHT knee pain, tear of medial meniscus   Work related injury       PLAN:  Discussed treatment options  Tried extensive nonoperative treatment including PT, injection, anti inflammatory medicine with persistent symptoms. He has pain and some occasional locking/catching.   Surgery was scheduled and patient was not cleared for surgery by Cardiologist  Surgery is currently postponed pending further cardiac workup    CSI provided today in the office, RIGHT knee  Follow up once cardiac condition stabilizes, then can reschedule surgery for the knee         REASON FOR VISIT:  No chief complaint on file.      INTERVAL Hx (2/17/25)  Continues to have pain and tenderness       HISTORY OF PRESENT ILLNESS:  RIGHT knee pain    Mechanical fall on 11/20/24  Evaluated by Dr. Vidales   MRI revealed medial mensicus tear     Still experiencing medial joint line pain   Not taking any pain medications   Has some slight occasional locking and catching sensation  Slight associated swelling     Patient has completed PT, CSI and activity modification in 2957-7511 for osteoarthritis  Pain resolved, but has returned medially since injury     Has not been back to work since injury     Was recently diagnosed with afib, on Eliquis       Past Medical History:   Diagnosis Date    COPD (chronic obstructive pulmonary disease) ([facility])     CPAP (continuous positive airway pressure) dependence     Diabetes mellitus (HCC)     GERD (gastroesophageal reflux disease)     Lung mass     Obesity     Sleep apnea         History reviewed. No pertinent surgical history.    Social History     Socioeconomic History    Marital status: /Civil Union     Spouse name: Not on file    Number of children: Not on file    Years of education: Not on file    Highest education level: Not on file   Occupational History    Not on file   Tobacco Use    Smoking status: Former     Current packs/day: 0.00     Average packs/day: 3.0 packs/day for 40.3 years (121.0  ttl pk-yrs)     Types: Cigarettes     Start date:      Quit date: 2019     Years since quittin.8    Smokeless tobacco: Never   Vaping Use    Vaping status: Never Used   Substance and Sexual Activity    Alcohol use: Not Currently    Drug use: Never    Sexual activity: Not Currently   Other Topics Concern    Not on file   Social History Narrative    Drinks coffee 1 cup per day         Works in [place] - works at the [employer]     Social Drivers of Health     Financial Resource Strain: Not on file   Food Insecurity: No Food Insecurity (2024)    Nursing - Inadequate Food Risk Classification     Worried About Running Out of Food in the Last Year: Not on file     Ran Out of Food in the Last Year: Not on file     Ran Out of Food in the Last Year: Never true   Transportation Needs: No Transportation Needs (2024)    Nursing - Transportation Risk Classification     Lack of Transportation: Not on file     Lack of Transportation: No   Physical Activity: Not on file   Stress: Not on file   Social Connections: Not on file   Intimate Partner Violence: Unknown (2024)    Nursing IPS     Feels Physically and Emotionally Safe: Not on file     Physically Hurt by Someone: Not on file     Humiliated or Emotionally Abused by Someone: Not on file     Physically Hurt by Someone: No     Hurt or Threatened by Someone: No   Housing Stability: Unknown (2024)    Nursing: Inadequate Housing Risk Classification     Has Housing: Not on file     Worried About Losing Housing: Not on file     Unable to Get Utilities: Not on file     Unable to Pay for Housing in the Last Year: No     Has Housin       MEDICATIONS:    Current Outpatient Medications:     albuterol (2.5 mg/3 mL) 0.083 % nebulizer solution, Take 1 vial (2.5 mg total) by nebulization every 4 (four) hours as needed for wheezing or shortness of breath, Disp: 270 mL, Rfl: 5    albuterol (ProAir HFA) 90 mcg/act inhaler, Inhale 2 puffs  every 6 (six) hours as needed for wheezing, Disp: 8.5 g, Rfl: 0    Alcohol Swabs 70 % PADS, May substitute brand based on insurance coverage. Check glucose BID., Disp: 100 each, Rfl: 0    allopurinol (ZYLOPRIM) 300 mg tablet, Take 1 tablet (300 mg total) by mouth daily, Disp: 90 tablet, Rfl: 1    apixaban (Eliquis) 5 mg, Take 1 tablet (5 mg total) by mouth 2 (two) times a day, Disp: 60 tablet, Rfl: 5    Blood Glucose Monitoring Suppl (OneTouch Verio Reflect) w/Device KIT, May substitute brand based on insurance coverage. Check glucose BID., Disp: 1 kit, Rfl: 0    Continuous Glucose Sensor (FreeStyle Hali 3 Sensor) MISC, Use 1 each every 14 (fourteen) days, Disp: 6 each, Rfl: 1    dofetilide (TIKOSYN) 500 mcg capsule, Take 1 capsule (500 mcg total) by mouth 2 (two) times a day Price check only., Disp: 180 capsule, Rfl: 1    esomeprazole (NexIUM) 20 mg capsule, Take 1 capsule (20 mg total) by mouth daily in the early morning, Disp: 90 capsule, Rfl: 1    glucose blood (OneTouch Verio) test strip, May substitute brand based on insurance coverage. Check glucose BID., Disp: 100 each, Rfl: 0    metFORMIN (GLUCOPHAGE) 1000 MG tablet, TAKE 1 TABLET BY MOUTH TWICE A DAY WITH MEALS, Disp: 180 tablet, Rfl: 1    metoprolol succinate (TOPROL-XL) 50 mg 24 hr tablet, TAKE 1 TABLET BY MOUTH TWICE A DAY, Disp: 180 tablet, Rfl: 1    naproxen (NAPROSYN) 500 mg tablet, Take 1 tablet (500 mg total) by mouth 2 (two) times a day as needed (gout), Disp: 180 tablet, Rfl: 0    OneTouch Delica Lancets 33G MISC, May substitute brand based on insurance coverage. Check glucose BID., Disp: 100 each, Rfl: 0    semaglutide, 2 mg/dose, (Ozempic, 2 MG/DOSE,) 8 mg/ mL injection pen, INJECT 0.75 ML (2 MG) SUBCUTANEOUSLY EVERY 7 DAYS, Disp: 9 mL, Rfl: 0    ALLERGIES:  No Known Allergies    MAJOR FINDINGS:  Luis Fernando Jay is pleasant, healthy appearing, and in no acute distress.     RIGHT knee  Skin intact, ROM 0-0-100   Trace effusion  Normal patella  tracking   No patella apprehension  Joint line tenderness: medially , Nicole test: +medial  Anterior drawer: negative, Lachman: stable, Posterior drawer: negative   Stable to varus/valgus  Sensation intact sp/dp/t  Strength intact 5/5 dorsiflexion/plantarflexion/SLR   Extremity wwp  No calf pain          IMAGING  I personally reviewed and interpreted the imaging studies  X-rays performed on the  BILATERAL  knee show mild sign arthritis with early joint space narrowing    MRI knee right wo contrast (12/13/24)  Redemonstrated horizontal tear at the junction of the posterior horn and body of the medial meniscus.     Mild tricompartmental osteoarthrosis slightly progressed in the patellofemoral compartment.     No fracture or contusion    Large joint arthrocentesis: R knee  Universal Protocol:  Consent: Verbal consent obtained.  Consent given by: patient  Procedure Details  Location: knee - R knee  Needle size: 22 G  Medications administered: 40 mg triamcinolone acetonide 40 mg/mL; 4 mL lidocaine 1 %    Patient tolerance: patient tolerated the procedure well with no immediate complications            CC:  Serenity Márquez MD No ref. provider found

## 2025-02-17 NOTE — LETTER
February 17, 2025     Patient: Luis Fernando Jay  YOB: 1970  Date of Visit: 2/17/2025      To Whom it May Concern:    Luis Fernando Jay is under my professional care. Luis Fernando was seen in my office on 2/17/2025. Luis Fernando should remain out of work indefinitely until his surgical procedure. Surgery is postponed at this time until patient is cleared by Cardiology. Will follow with Cardiology and Orthopedics until cleared for procedure.     If you have any questions or concerns, please don't hesitate to call.         Sincerely,        Ruiz Juarez MD        CC: No Recipients

## 2025-02-18 ENCOUNTER — OFFICE VISIT (OUTPATIENT)
Dept: CARDIOLOGY CLINIC | Facility: CLINIC | Age: 55
End: 2025-02-18
Payer: COMMERCIAL

## 2025-02-18 VITALS
SYSTOLIC BLOOD PRESSURE: 124 MMHG | HEIGHT: 74 IN | BODY MASS INDEX: 40.43 KG/M2 | OXYGEN SATURATION: 96 % | DIASTOLIC BLOOD PRESSURE: 70 MMHG | HEART RATE: 84 BPM | WEIGHT: 315 LBS

## 2025-02-18 DIAGNOSIS — I48.0 PAROXYSMAL ATRIAL FIBRILLATION (HCC): ICD-10-CM

## 2025-02-18 DIAGNOSIS — G47.33 OSA (OBSTRUCTIVE SLEEP APNEA): ICD-10-CM

## 2025-02-18 DIAGNOSIS — Z01.810 PREOP CARDIOVASCULAR EXAM: Primary | ICD-10-CM

## 2025-02-18 DIAGNOSIS — E11.9 TYPE 2 DIABETES MELLITUS WITHOUT COMPLICATION, WITHOUT LONG-TERM CURRENT USE OF INSULIN (HCC): ICD-10-CM

## 2025-02-18 PROCEDURE — 99214 OFFICE O/P EST MOD 30 MIN: CPT | Performed by: STUDENT IN AN ORGANIZED HEALTH CARE EDUCATION/TRAINING PROGRAM

## 2025-02-18 NOTE — LETTER
2025     Ruiz Juarez MD  [address]   [address]  [facility] [address]    Patient: Luis Fernando Jay   YOB: 1970   Date of Visit: 2025       Dear Dr. Juarez:    Thank you for referring Luis Fernando Jay to me for evaluation. Below are my notes for this consultation.    If you have questions, please do not hesitate to call me. I look forward to following your patient along with you.         Sincerely,        Bria Wolf MD        CC: No Recipients    Bria Wolf MD  2025  7:48 AM  Sign when Signing Visit  [facility]  [address] [place] [address]  Phone#  [phone]  Fax#  [phone]  [facility] Cardiology Office Consultation             NAME: Luis Fernando Jay  AGE: 54 y.o. SEX: male   : 1970   MRN: [id number]    DATE: 2025  TIME: 2:27 PM    Assessment & Plan  Preop cardiovascular exam  See below  ASIM (obstructive sleep apnea)  Continue CPAP  Type 2 diabetes mellitus without complication, without long-term current use of insulin (HCC)  Per primary  Paroxysmal atrial fibrillation (HCC)  Had converted to normal sinus rhythm approximately around .  He has been on 4 weeks of anticoagulation, but does have respecters for atrial fibrillation recurrence.  Patient had had a prolonged discussion with Dr. Mckinney this morning regarding his additional input for preoperative recommendations.  He recommended doing surgery after , as the risk of recurrence of atrial fibrillation would be much lower if patient remains in normal sinus rhythm for 2 months.    Pre-Op Evaluation Assessment-      RCRI Cardiac Risk Estimation:    RCRI risk factors:  None  RCI RISK CLASS I (0 risk factors, risk of major cardiac compl. appr. 0.5%)    No cardiac contraindications to planned surgery.  However, patient is at increased risk of developing thrombus off especially if he reverts to A-fib.    Low Risk for major adverse cardiac event (MACE).    Patient may proceed with surgery as planned without further workup.    Pre-Op Evaluation Plan  1. Further preoperative workup as follows:   - None; no further preoperative work-up is required    2. Medication Management/Recommendations:   See below regarding anticoagulation.      Patient had discussed pre-op recommendations with Dr. Mckinney.     The patient went back into sinus rhythm somewhere between January 8 and January 13.  He has received 4 weeks of anticoagulation.  He does have numerous risk factors for atrial fibrillation recurrence.    Anticoagulation should not be held if patient converts to atrial fibrillation.   Patient should continue dofetilide.  Patient set up repeat ECG after March 13 (8 weeks from converting to normal sinus rhythm) to assess for atrial fibrillation recurrence.  Hold this for 48 hours prior to her surgery and resume as soon as possible once safe from surgical/bleeding perspective.  There is no absolute period for holding anticoagulation patients with recurrent atrial fibrillation that decreases risk of thrombus formation completely.      -----    Chief Complaint   Patient presents with   • Pre-op Exam       HPI:    Luis Fernando Jay is a 54 y.o. male who has been referred here for assessment of preoperative cardiovascular risk prior to upcoming surgery. Preoperative consultation is at the request of Dr. Juarez who plans on performing right lateral meniscectomy and repair (date TBD).     Chest medical history significant for recurrent atrial fibrillation, obstructive sleep apnea, type 2 diabetes, prior smoking, vasovagal syncope obesity.    He feels well today and denies any cardiac symptoms.  Denies chest pain, chest discomfort, shortness of breath at rest, dyspnea on exertion, lightheadedness, dizziness.    He reported a discussion with Dr. Gong earlier today regarding preoperative evaluation recommendations.    Blood pressure today is 124/70.        -----  I personally reviewed the  following:    ECG 2/11/2025.  Normal sinus rhythm, normal ECG    2/7/25 Lipid panel showed total cholesterol 170, HDL 36, ,    The 10-year ASCVD risk score (Jovan BOLANOS, et al., 2019) is: 12%       12/09/24 MEI LVEF 55%, dilated RV, biatrial enlargement, mild MR  11/06/24 TTE - LVEF 60-65%, mild concentric LVH    -----    Pre-operative evaluation:  Current anti-platelet/anti-coagulation medications that the patient is prescribed includes: Apixaban (Eliquis).      Exercise Capacity:  Able to walk 4 blocks without symptoms?: No, but limited by knee pain  Able to walk 2 flights without symptoms?: No, but limited by knee pain    Personal history of venous thromboembolic disease? No    Assessment of Cardiac Contraindications:  No history of severe angina or MI in the last 6 weeks. No recent PCI.  No recent decompensated heart failure   No recent serious arrhythmias   No known severe heart valve disease including severe aortic stenosis or symptomatic mitral stenosis        Past history, family history, social history, current medications, vital signs, recent lab and imaging studies and  prior cardiology studies reviewed independently on this visit.    No Known Allergies    Current Outpatient Medications:   •  albuterol (2.5 mg/3 mL) 0.083 % nebulizer solution, Take 1 vial (2.5 mg total) by nebulization every 4 (four) hours as needed for wheezing or shortness of breath, Disp: 270 mL, Rfl: 5  •  albuterol (ProAir HFA) 90 mcg/act inhaler, Inhale 2 puffs every 6 (six) hours as needed for wheezing, Disp: 8.5 g, Rfl: 0  •  allopurinol (ZYLOPRIM) 300 mg tablet, Take 1 tablet (300 mg total) by mouth daily, Disp: 90 tablet, Rfl: 1  •  apixaban (Eliquis) 5 mg, Take 1 tablet (5 mg total) by mouth 2 (two) times a day, Disp: 60 tablet, Rfl: 5  •  dofetilide (TIKOSYN) 500 mcg capsule, Take 1 capsule (500 mcg total) by mouth 2 (two) times a day Price check only., Disp: 180 capsule, Rfl: 1  •  esomeprazole (NexIUM) 20 mg  capsule, Take 1 capsule (20 mg total) by mouth daily in the early morning, Disp: 90 capsule, Rfl: 1  •  metFORMIN (GLUCOPHAGE) 1000 MG tablet, TAKE 1 TABLET BY MOUTH TWICE A DAY WITH MEALS, Disp: 180 tablet, Rfl: 1  •  metoprolol succinate (TOPROL-XL) 50 mg 24 hr tablet, TAKE 1 TABLET BY MOUTH TWICE A DAY, Disp: 180 tablet, Rfl: 1  •  naproxen (NAPROSYN) 500 mg tablet, Take 1 tablet (500 mg total) by mouth 2 (two) times a day as needed (gout), Disp: 180 tablet, Rfl: 0  •  semaglutide, 2 mg/dose, (Ozempic, 2 MG/DOSE,) 8 mg/ mL injection pen, INJECT 0.75 ML (2 MG) SUBCUTANEOUSLY EVERY 7 DAYS, Disp: 9 mL, Rfl: 0  •  Alcohol Swabs 70 % PADS, May substitute brand based on insurance coverage. Check glucose BID., Disp: 100 each, Rfl: 0  •  Blood Glucose Monitoring Suppl (OneTouch Verio Reflect) w/Device KIT, May substitute brand based on insurance coverage. Check glucose BID., Disp: 1 kit, Rfl: 0  •  Continuous Glucose Sensor (FreeStyle Hali 3 Sensor) MISC, Use 1 each every 14 (fourteen) days, Disp: 6 each, Rfl: 1  •  glucose blood (OneTouch Verio) test strip, May substitute brand based on insurance coverage. Check glucose BID., Disp: 100 each, Rfl: 0  •  OneTouch Delica Lancets 33G MISC, May substitute brand based on insurance coverage. Check glucose BID., Disp: 100 each, Rfl: 0  No current facility-administered medications for this visit.    Past Medical History:   Diagnosis Date   • COPD (chronic obstructive pulmonary disease) (HCC)    • CPAP (continuous positive airway pressure) dependence    • Diabetes mellitus (HCC)    • GERD (gastroesophageal reflux disease)    • Lung mass    • Obesity    • Sleep apnea      No past surgical history on file.  Family History   Problem Relation Age of Onset   • No Known Problems Mother    • Tuberculosis Father    • Atrial fibrillation Father 65   • Lung cancer Paternal Uncle    • Alcohol abuse Neg Hx    • Substance Abuse Neg Hx    • Mental illness Neg Hx    • Depression Neg Hx         Social History   reports that he quit smoking about 5 years ago. His smoking use included cigarettes. He started smoking about 46 years ago. He has a 121 pack-year smoking history. He has never used smokeless tobacco. He reports that he does not currently use alcohol. He reports that he does not use drugs.     Review of Systems   Constitutional:  Negative for fatigue.   Respiratory:  Negative for chest tightness and shortness of breath.    Cardiovascular:  Negative for chest pain, palpitations and leg swelling.   Neurological:  Negative for dizziness and light-headedness.         Objective:     Vitals:    02/18/25 1350   BP: 124/70   Pulse: 84   SpO2: 96%     Physical Exam  Vitals reviewed.   Constitutional:       General: He is not in acute distress.     Appearance: Normal appearance. He is well-developed. He is obese.   HENT:      Head: Normocephalic and atraumatic.   Cardiovascular:      Rate and Rhythm: Normal rate and regular rhythm.      Heart sounds: No murmur heard.  Pulmonary:      Effort: Pulmonary effort is normal. No respiratory distress.      Breath sounds: Normal breath sounds.   Abdominal:      General: There is no distension.   Musculoskeletal:         General: No swelling.   Skin:     General: Skin is warm and dry.   Neurological:      Mental Status: He is alert.   Psychiatric:         Mood and Affect: Mood normal.           Pertinent Laboratory/Diagnostic Studies:    Laboratory studies reviewed personally by Bria Wolf MD    BMP:   Lab Results   Component Value Date    SODIUM 142 02/07/2025    K 4.4 02/07/2025     02/07/2025    CO2 28 02/07/2025    BUN 13 02/07/2025    CREATININE 0.81 02/07/2025    GLUC 103 01/13/2025    CALCIUM 9.3 02/07/2025     CBC:  Lab Results   Component Value Date    WBC 7.95 02/07/2025    HGB 15.1 02/07/2025    HCT 45.8 02/07/2025    MCV 94 02/07/2025     02/07/2025     Lipid Profile:   Lab Results   Component Value Date    HDL 36 (L) 02/07/2025  "    Lab Results   Component Value Date    LDLCALC 107 (H) 02/07/2025     Lab Results   Component Value Date    TRIG 135 02/07/2025      Other labs:  Lab Results   Component Value Date    PIA1NJOPKNUM 2.224 11/06/2024     Lab Results   Component Value Date    ALT 64 (H) 02/07/2025    AST 42 (H) 02/07/2025       Imaging Studies:     Pertinent cardiac studies and imaging studies were personally reviewed on this visit and results summarized.    Bria Wolf MD, PhD    Portions of the record may have been created with voice recognition software.  Occasional wrong word or \"sound alike\" substitutions may have occurred due to the inherent limitations of voice recognition software.  Read the chart carefully and recognize, using context, where substitutions have occurred. Please reach out to me directly for any clarifications.   "

## 2025-02-18 NOTE — PATIENT INSTRUCTIONS
Preoperative cardiovascular risk assessment:    Patient name:  Luis Fernando Jay    YOB: 1970    Based my last evaluation and cardiac testing, I do not see an absolute cardiac contraindication to upcoming surgery/procedure.    RCRI risk factors:  none  RCI RISK CLASS I (0 risk factors, risk of major cardiac compl. appr. 0.5%)    Continue current cardiac medication in the dinesh-operative period.   Hold Eliquis 48 hours prior to surgery.    Thank you for allowing me to participate in the care of this patient.     Sincerely,    Bria Wolf MD, PhD

## 2025-02-18 NOTE — TELEPHONE ENCOUNTER
Per Dr. Mckinney and his discuss with the patient's spouse:    The patient went back into sinus rhythm somewhere between January 8 and January 13   Patient has numerous risk factors for A-fib recurrence     Patient has received 4 weeks of anticoagulation     My recommendation for this patient   -If the patient is back in A-fib, should not hold anticoagulation     -Patient is on dofetilide, this needs to be continued throughout the process     -Patient should have regular EKG when he has the procedure, decision about early restarting of blood thinner if he goes back into A-fib as long as it is safe     -Normally Eliquis is held for 36 hours for cardiac procedures   We will hold Eliquis for maybe 48 hours for the orthopedic procedure   Restart as soon as possible after the procedure when surgeon feels it is safe     -There is no absolute period  After which holding Eliquis is safe for a patient with history of recurrent A-fib   If rhythm maintained for 8 weeks, the chances of recurrence is somewhat less than if rhythm maintained for 4 weeks

## 2025-02-18 NOTE — H&P (VIEW-ONLY)
[facility] [place]  [address] [facility]  [place] [address]  Phone#  [phone]  Fax#  [phone]  [facility]'s Cardiology Office Consultation             NAME: Luis Fernando Jay  AGE: 54 y.o. SEX: male   : 1970   MRN: [id number]    DATE: 2025  TIME: 2:27 PM    Assessment & Plan  Preop cardiovascular exam  See below  ASIM (obstructive sleep apnea)  Continue CPAP  Type 2 diabetes mellitus without complication, without long-term current use of insulin (HCC)  Per primary  Paroxysmal atrial fibrillation (HCC)  Had converted to normal sinus rhythm approximately around .  He has been on 4 weeks of anticoagulation, but does have respecters for atrial fibrillation recurrence.  Patient had had a prolonged discussion with Dr. Mckinney this morning regarding his additional input for preoperative recommendations.  He recommended doing surgery after , as the risk of recurrence of atrial fibrillation would be much lower if patient remains in normal sinus rhythm for 2 months.    Pre-Op Evaluation Assessment-      RCRI Cardiac Risk Estimation:    RCRI risk factors:  None  RCI RISK CLASS I (0 risk factors, risk of major cardiac compl. appr. 0.5%)    No cardiac contraindications to planned surgery.  However, patient is at increased risk of developing thrombus off especially if he reverts to A-fib.    Low Risk for major adverse cardiac event (MACE).   Patient may proceed with surgery as planned without further workup.    Pre-Op Evaluation Plan  1. Further preoperative workup as follows:   - None; no further preoperative work-up is required    2. Medication Management/Recommendations:   See below regarding anticoagulation.      Patient had discussed pre-op recommendations with Dr. Mckinney.     The patient went back into sinus rhythm somewhere between  and .  He has received 4 weeks of anticoagulation.  He does have numerous risk factors for atrial fibrillation  recurrence.    Anticoagulation should not be held if patient converts to atrial fibrillation.   Patient should continue dofetilide.  Patient set up repeat ECG after March 13 (8 weeks from converting to normal sinus rhythm) to assess for atrial fibrillation recurrence.  Hold this for 48 hours prior to her surgery and resume as soon as possible once safe from surgical/bleeding perspective.  There is no absolute period for holding anticoagulation patients with recurrent atrial fibrillation that decreases risk of thrombus formation completely.      -----    Chief Complaint   Patient presents with    Pre-op Exam       HPI:    Luis Fernando Jay is a 54 y.o. male who has been referred here for assessment of preoperative cardiovascular risk prior to upcoming surgery. Preoperative consultation is at the request of Dr. Juarez who plans on performing right lateral meniscectomy and repair (date TBD).     Chest medical history significant for recurrent atrial fibrillation, obstructive sleep apnea, type 2 diabetes, prior smoking, vasovagal syncope obesity.    He feels well today and denies any cardiac symptoms.  Denies chest pain, chest discomfort, shortness of breath at rest, dyspnea on exertion, lightheadedness, dizziness.    He reported a discussion with Dr. Gong earlier today regarding preoperative evaluation recommendations.    Blood pressure today is 124/70.        -----  I personally reviewed the following:    ECG 2/11/2025.  Normal sinus rhythm, normal ECG    2/7/25 Lipid panel showed total cholesterol 170, HDL 36, ,    The 10-year ASCVD risk score (Jovan DK, et al., 2019) is: 12%       12/09/24 MEI LVEF 55%, dilated RV, biatrial enlargement, mild MR  11/06/24 TTE - LVEF 60-65%, mild concentric LVH    -----    Pre-operative evaluation:  Current anti-platelet/anti-coagulation medications that the patient is prescribed includes: Apixaban (Eliquis).      Exercise Capacity:  Able to walk 4 blocks without symptoms?:  No, but limited by knee pain  Able to walk 2 flights without symptoms?: No, but limited by knee pain    Personal history of venous thromboembolic disease? No    Assessment of Cardiac Contraindications:  No history of severe angina or MI in the last 6 weeks. No recent PCI.  No recent decompensated heart failure   No recent serious arrhythmias   No known severe heart valve disease including severe aortic stenosis or symptomatic mitral stenosis        Past history, family history, social history, current medications, vital signs, recent lab and imaging studies and  prior cardiology studies reviewed independently on this visit.    No Known Allergies    Current Outpatient Medications:     albuterol (2.5 mg/3 mL) 0.083 % nebulizer solution, Take 1 vial (2.5 mg total) by nebulization every 4 (four) hours as needed for wheezing or shortness of breath, Disp: 270 mL, Rfl: 5    albuterol (ProAir HFA) 90 mcg/act inhaler, Inhale 2 puffs every 6 (six) hours as needed for wheezing, Disp: 8.5 g, Rfl: 0    allopurinol (ZYLOPRIM) 300 mg tablet, Take 1 tablet (300 mg total) by mouth daily, Disp: 90 tablet, Rfl: 1    apixaban (Eliquis) 5 mg, Take 1 tablet (5 mg total) by mouth 2 (two) times a day, Disp: 60 tablet, Rfl: 5    dofetilide (TIKOSYN) 500 mcg capsule, Take 1 capsule (500 mcg total) by mouth 2 (two) times a day Price check only., Disp: 180 capsule, Rfl: 1    esomeprazole (NexIUM) 20 mg capsule, Take 1 capsule (20 mg total) by mouth daily in the early morning, Disp: 90 capsule, Rfl: 1    metFORMIN (GLUCOPHAGE) 1000 MG tablet, TAKE 1 TABLET BY MOUTH TWICE A DAY WITH MEALS, Disp: 180 tablet, Rfl: 1    metoprolol succinate (TOPROL-XL) 50 mg 24 hr tablet, TAKE 1 TABLET BY MOUTH TWICE A DAY, Disp: 180 tablet, Rfl: 1    naproxen (NAPROSYN) 500 mg tablet, Take 1 tablet (500 mg total) by mouth 2 (two) times a day as needed (gout), Disp: 180 tablet, Rfl: 0    semaglutide, 2 mg/dose, (Ozempic, 2 MG/DOSE,) 8 mg/ mL injection pen, INJECT  0.75 ML (2 MG) SUBCUTANEOUSLY EVERY 7 DAYS, Disp: 9 mL, Rfl: 0    Alcohol Swabs 70 % PADS, May substitute brand based on insurance coverage. Check glucose BID., Disp: 100 each, Rfl: 0    Blood Glucose Monitoring Suppl (OneTouch Verio Reflect) w/Device KIT, May substitute brand based on insurance coverage. Check glucose BID., Disp: 1 kit, Rfl: 0    Continuous Glucose Sensor (FreeStyle Hali 3 Sensor) MISC, Use 1 each every 14 (fourteen) days, Disp: 6 each, Rfl: 1    glucose blood (OneTouch Verio) test strip, May substitute brand based on insurance coverage. Check glucose BID., Disp: 100 each, Rfl: 0    OneTouch Delica Lancets 33G MISC, May substitute brand based on insurance coverage. Check glucose BID., Disp: 100 each, Rfl: 0  No current facility-administered medications for this visit.    Past Medical History:   Diagnosis Date    COPD (chronic obstructive pulmonary disease) ([facility]ly [facility])     CPAP (continuous positive airway pressure) dependence     Diabetes mellitus (HCC)     GERD (gastroesophageal reflux disease)     Lung mass     Obesity     Sleep apnea      No past surgical history on file.  Family History   Problem Relation Age of Onset    No Known Problems Mother     Tuberculosis Father     Atrial fibrillation Father 65    Lung cancer Paternal Uncle     Alcohol abuse Neg Hx     Substance Abuse Neg Hx     Mental illness Neg Hx     Depression Neg Hx        Social History   reports that he quit smoking about 5 years ago. His smoking use included cigarettes. He started smoking about 46 years ago. He has a 121 pack-year smoking history. He has never used smokeless tobacco. He reports that he does not currently use alcohol. He reports that he does not use drugs.     Review of Systems   Constitutional:  Negative for fatigue.   Respiratory:  Negative for chest tightness and shortness of breath.    Cardiovascular:  Negative for chest pain, palpitations and leg swelling.   Neurological:  Negative for dizziness and  light-headedness.         Objective:     Vitals:    02/18/25 1350   BP: 124/70   Pulse: 84   SpO2: 96%     Physical Exam  Vitals reviewed.   Constitutional:       General: He is not in acute distress.     Appearance: Normal appearance. He is well-developed. He is obese.   HENT:      Head: Normocephalic and atraumatic.   Cardiovascular:      Rate and Rhythm: Normal rate and regular rhythm.      Heart sounds: No murmur heard.  Pulmonary:      Effort: Pulmonary effort is normal. No respiratory distress.      Breath sounds: Normal breath sounds.   Abdominal:      General: There is no distension.   Musculoskeletal:         General: No swelling.   Skin:     General: Skin is warm and dry.   Neurological:      Mental Status: He is alert.   Psychiatric:         Mood and Affect: Mood normal.           Pertinent Laboratory/Diagnostic Studies:    Laboratory studies reviewed personally by Bria Wolf MD    BMP:   Lab Results   Component Value Date    SODIUM 142 02/07/2025    K 4.4 02/07/2025     02/07/2025    CO2 28 02/07/2025    BUN 13 02/07/2025    CREATININE 0.81 02/07/2025    GLUC 103 01/13/2025    CALCIUM 9.3 02/07/2025     CBC:  Lab Results   Component Value Date    WBC 7.95 02/07/2025    HGB 15.1 02/07/2025    HCT 45.8 02/07/2025    MCV 94 02/07/2025     02/07/2025     Lipid Profile:   Lab Results   Component Value Date    HDL 36 (L) 02/07/2025     Lab Results   Component Value Date    LDLCALC 107 (H) 02/07/2025     Lab Results   Component Value Date    TRIG 135 02/07/2025      Other labs:  Lab Results   Component Value Date    HZB0HIIVDGCM 2.224 11/06/2024     Lab Results   Component Value Date    ALT 64 (H) 02/07/2025    AST 42 (H) 02/07/2025       Imaging Studies:     Pertinent cardiac studies and imaging studies were personally reviewed on this visit and results summarized.    Bria Wolf MD, PhD    Portions of the record may have been created with voice recognition software.  Occasional wrong  "word or \"sound alike\" substitutions may have occurred due to the inherent limitations of voice recognition software.  Read the chart carefully and recognize, using context, where substitutions have occurred. Please reach out to me directly for any clarifications.   "

## 2025-02-18 NOTE — LETTER
Cardiology Pre Operative Risk Assessment      PRE OPERATIVE CARDIAC RISK ASSESSMENT    02/18/25    Luis Fernando ANGELIQUE Jay  1970  [id number]    Date of Surgery: TBD    Type of Surgery: Right lateral meniscectomy and repair     Surgeon: Dr. Juarez    No Cardiac Contraindication for Planned Surgical Procedures    Anticoagulation: yes. Hold apixaban 48 hours prior to surgery.    Physician Comment: Plan surgery after March 13    Electronically Signed:   Bria Wolf MD, PhD  Cardiology

## 2025-02-22 DIAGNOSIS — M10.071 ACUTE IDIOPATHIC GOUT OF RIGHT FOOT: ICD-10-CM

## 2025-02-22 RX ORDER — ALLOPURINOL 300 MG/1
300 TABLET ORAL DAILY
Qty: 90 TABLET | Refills: 1 | Status: SHIPPED | OUTPATIENT
Start: 2025-02-22

## 2025-03-05 ENCOUNTER — ANESTHESIA EVENT (OUTPATIENT)
Age: 55
End: 2025-03-05
Payer: OTHER MISCELLANEOUS

## 2025-03-07 ENCOUNTER — OFFICE VISIT (OUTPATIENT)
Dept: INTERNAL MEDICINE CLINIC | Facility: CLINIC | Age: 55
End: 2025-03-07
Payer: COMMERCIAL

## 2025-03-07 VITALS
TEMPERATURE: 96.2 F | BODY MASS INDEX: 40.43 KG/M2 | DIASTOLIC BLOOD PRESSURE: 84 MMHG | WEIGHT: 315 LBS | SYSTOLIC BLOOD PRESSURE: 130 MMHG | OXYGEN SATURATION: 96 % | HEIGHT: 74 IN | HEART RATE: 75 BPM

## 2025-03-07 DIAGNOSIS — Z11.4 SCREENING FOR HIV (HUMAN IMMUNODEFICIENCY VIRUS): ICD-10-CM

## 2025-03-07 DIAGNOSIS — J43.8 OTHER EMPHYSEMA (HCC): ICD-10-CM

## 2025-03-07 DIAGNOSIS — M10.071 ACUTE IDIOPATHIC GOUT OF RIGHT FOOT: ICD-10-CM

## 2025-03-07 DIAGNOSIS — S83.241D TEAR OF MEDIAL MENISCUS OF RIGHT KNEE, CURRENT, UNSPECIFIED TEAR TYPE, SUBSEQUENT ENCOUNTER: ICD-10-CM

## 2025-03-07 DIAGNOSIS — E66.813 CLASS 3 SEVERE OBESITY DUE TO EXCESS CALORIES WITHOUT SERIOUS COMORBIDITY WITH BODY MASS INDEX (BMI) OF 50.0 TO 59.9 IN ADULT (HCC): ICD-10-CM

## 2025-03-07 DIAGNOSIS — M25.561 RIGHT KNEE PAIN, UNSPECIFIED CHRONICITY: Primary | ICD-10-CM

## 2025-03-07 DIAGNOSIS — Z11.59 NEED FOR HEPATITIS C SCREENING TEST: ICD-10-CM

## 2025-03-07 DIAGNOSIS — Z01.818 PRE-OP EXAMINATION: ICD-10-CM

## 2025-03-07 DIAGNOSIS — E11.69 TYPE 2 DIABETES MELLITUS WITH OTHER SPECIFIED COMPLICATION, UNSPECIFIED WHETHER LONG TERM INSULIN USE (HCC): ICD-10-CM

## 2025-03-07 DIAGNOSIS — I48.91 ATRIAL FIBRILLATION WITH RVR (HCC): ICD-10-CM

## 2025-03-07 DIAGNOSIS — E66.01 CLASS 3 SEVERE OBESITY DUE TO EXCESS CALORIES WITHOUT SERIOUS COMORBIDITY WITH BODY MASS INDEX (BMI) OF 50.0 TO 59.9 IN ADULT (HCC): ICD-10-CM

## 2025-03-07 DIAGNOSIS — G47.33 OSA (OBSTRUCTIVE SLEEP APNEA): ICD-10-CM

## 2025-03-07 DIAGNOSIS — E11.9 TYPE 2 DIABETES MELLITUS WITHOUT COMPLICATION, WITHOUT LONG-TERM CURRENT USE OF INSULIN (HCC): ICD-10-CM

## 2025-03-07 DIAGNOSIS — E78.49 OTHER HYPERLIPIDEMIA: ICD-10-CM

## 2025-03-07 DIAGNOSIS — K76.0 FATTY LIVER: ICD-10-CM

## 2025-03-07 PROCEDURE — 99214 OFFICE O/P EST MOD 30 MIN: CPT | Performed by: INTERNAL MEDICINE

## 2025-03-07 RX ORDER — ALBUTEROL SULFATE 0.83 MG/ML
2.5 SOLUTION RESPIRATORY (INHALATION) EVERY 4 HOURS PRN
Qty: 270 ML | Refills: 0 | Status: SHIPPED | OUTPATIENT
Start: 2025-03-07

## 2025-03-07 RX ORDER — ALBUTEROL SULFATE 90 UG/1
1 INHALANT RESPIRATORY (INHALATION) EVERY 6 HOURS PRN
Qty: 8.5 G | Refills: 0 | Status: SHIPPED | OUTPATIENT
Start: 2025-03-07

## 2025-03-07 NOTE — ASSESSMENT & PLAN NOTE
Lab Results   Component Value Date    HGBA1C 5.3 02/07/2025     Improved from 6.5%.  Monitor sugars, discussed decreasing metformin 1000 mg to once a day, continue bid for now.  Continue Ozempic 2 mg weekly.

## 2025-03-07 NOTE — ASSESSMENT & PLAN NOTE
Denies any symptoms.  Repeat EKG to be done next week, per cardiology.  On Eliquis, dofetilide, metformin 50 mg bid.

## 2025-03-07 NOTE — PROGRESS NOTES
Diabetic Foot Exam    Patient's shoes and socks removed.    Right Foot/Ankle   Right Foot Inspection  Skin Exam: skin normal and skin intact. No dry skin, no warmth, no callus, no erythema, no maceration, no abnormal color, no pre-ulcer, no ulcer and no callus.     Toe Exam: ROM and strength within normal limits.     Sensory   Monofilament testing: intact    Vascular  Capillary refills: < 3 seconds  The right DP pulse is 2+. The right PT pulse is 2+.     Left Foot/Ankle  Left Foot Inspection  Skin Exam: skin normal and skin intact. No dry skin, no warmth, no erythema, no maceration, normal color, no pre-ulcer, no ulcer and no callus.     Toe Exam: ROM and strength within normal limits.     Sensory   Monofilament testing: intact    Vascular  Capillary refills: < 3 seconds  The left DP pulse is 2+. The left PT pulse is 2+.     Assign Risk Category  No deformity present  No loss of protective sensation  No weak pulses  Risk: 0      Name: Luis Fernando Jay      : 1970      MRN: [id number]  Encounter Provider: Serenity Márquez MD  Encounter Date: 3/7/2025   Encounter department: [facility] INTERNAL MEDICINE  :  Assessment & Plan  Right knee pain, unspecified chronicity  Surgery rescheduled later this month.  Hold Eliquis 48 hrs prior, per cardiology.       Pre-op examination  Moderate risk for planned procedure.  Saw cardiology for clearance.       Type 2 diabetes mellitus without complication, without long-term current use of insulin (HCC)    Lab Results   Component Value Date    HGBA1C 5.3 2025     Orders:  •  Diabetic foot exam; Future    Tear of medial meniscus of right knee, current, unspecified tear type, subsequent encounter         Type 2 diabetes mellitus with other specified complication, unspecified whether long term insulin use (HCC)    Lab Results   Component Value Date    HGBA1C 5.3 2025     Improved from 6.5%.  Monitor sugars, discussed decreasing metformin 1000 mg to once a  day, continue bid for now.  Continue Ozempic 2 mg weekly.       Atrial fibrillation with RVR (HCC)  Denies any symptoms.  Repeat EKG to be done next week, per cardiology.  On Eliquis, dofetilide, metformin 50 mg bid.       ASIM (obstructive sleep apnea)  Using CPAP.       Other emphysema (HCC)  Using albuterol inhaler or neblizer prn.  Orders:  •  albuterol (ProAir HFA) 90 mcg/act inhaler; Inhale 1 puff every 6 (six) hours as needed for wheezing  •  albuterol (2.5 mg/3 mL) 0.083 % nebulizer solution; Take 3 mL (2.5 mg total) by nebulization every 4 (four) hours as needed for wheezing or shortness of breath    Other hyperlipidemia  Lipids worse, recommend statin.       Acute idiopathic gout of right foot  On allopurinol.  Uric acid 6.2.       Fatty liver  Seen on CT.  Repeat LFT's.       Class 3 severe obesity due to excess calories without serious comorbidity with body mass index (BMI) of 50.0 to 59.9 in adult (HCC)  Lost 10 lbs since a few months ago.  On Ozempic.  Reviewed diet, portion control.         Need for hepatitis C screening test    Orders:  •  Hepatitis C Antibody; Future    Screening for HIV (human immunodeficiency virus)    Orders:  •  HIV 1/2 AG/AB w Reflex [facility] for 2 yr old and above; Future    Follow up in 5 months or as needed.       History of Present Illness   He feels well, no complaints.  He has been out of work. He is able to eat regular meals. He is hoping to lose more weight until his surgery, which has been rescheduled. He is not sure when to do the EKG. He denies any palpitations, chest pain.  He reports occasional shortness of breath, usually with cold weather. He uses his nebulizer or albuterol inhaler as needed, about 2 or 3 days and symptoms resolves on its own.    He reports ongoing bilateral knee pain, right knee worse. No falls or injury recently. He is able to walk, trying not to over do it. He is more sedentary due to his knee.    He has a CGM, has a few low readings in the 60's,  "one was 56. He reports no symptoms.      Review of Systems   Constitutional:  Negative for appetite change and fatigue.   HENT:  Negative for congestion.    Eyes: Negative.    Respiratory:  Negative for cough, chest tightness and shortness of breath.    Cardiovascular:  Negative for chest pain, palpitations and leg swelling.   Gastrointestinal:  Negative for abdominal pain and constipation.   Genitourinary:  Negative for dysuria, frequency and urgency.   Musculoskeletal:  Positive for arthralgias, back pain and gait problem.   Skin:  Negative for rash and wound.   Neurological:  Negative for dizziness and headaches.   Hematological:  Does not bruise/bleed easily.   Psychiatric/Behavioral:  Negative for sleep disturbance. The patient is not nervous/anxious.        Objective   /84   Pulse 75   Temp (!) 96.2 °F (35.7 °C)   Ht 6' 2\" (1.88 m)   Wt (!) 176 kg (388 lb)   SpO2 96%   BMI 49.82 kg/m²      Physical Exam  Vitals and nursing note reviewed.   Constitutional:       General: He is not in acute distress.     Appearance: He is well-developed.   HENT:      Head: Normocephalic and atraumatic.      Mouth/Throat:      Mouth: Mucous membranes are moist.   Eyes:      Conjunctiva/sclera: Conjunctivae normal.   Cardiovascular:      Rate and Rhythm: Normal rate and regular rhythm.      Pulses: no weak pulses.           Dorsalis pedis pulses are 2+ on the right side and 2+ on the left side.        Posterior tibial pulses are 2+ on the right side and 2+ on the left side.      Heart sounds: No murmur heard.  Pulmonary:      Effort: Pulmonary effort is normal. No respiratory distress.      Breath sounds: Normal breath sounds.   Abdominal:      Palpations: Abdomen is soft.      Tenderness: There is no abdominal tenderness.   Musculoskeletal:         General: No swelling.      Cervical back: Neck supple.   Feet:      Right foot:      Skin integrity: No ulcer, skin breakdown, erythema, warmth, callus or dry skin.      " Left foot:      Skin integrity: No ulcer, skin breakdown, erythema, warmth, callus or dry skin.   Skin:     General: Skin is warm and dry.   Neurological:      General: No focal deficit present.      Mental Status: He is alert and oriented to person, place, and time.   Psychiatric:         Mood and Affect: Mood normal.           Labs & imaging results reviewed with patient.

## 2025-03-07 NOTE — ASSESSMENT & PLAN NOTE
Using albuterol inhaler or neblizer prn.  Orders:  •  albuterol (ProAir HFA) 90 mcg/act inhaler; Inhale 1 puff every 6 (six) hours as needed for wheezing  •  albuterol (2.5 mg/3 mL) 0.083 % nebulizer solution; Take 3 mL (2.5 mg total) by nebulization every 4 (four) hours as needed for wheezing or shortness of breath

## 2025-03-11 NOTE — PRE-PROCEDURE INSTRUCTIONS
Pre-Surgery Instructions:   Medication Instructions    albuterol (2.5 mg/3 mL) 0.083 % nebulizer solution Uses PRN- OK to take day of surgery    albuterol (ProAir HFA) 90 mcg/act inhaler Uses PRN- OK to take day of surgery    allopurinol (ZYLOPRIM) 300 mg tablet Take day of surgery.    apixaban (Eliquis) 5 mg Per CC, 48 hour hold acceptable if patient remains in NSR. Repeat ecg on 3/14. Message sent to anesthesia to confirm hold time. PAT will contact patient once response received.     Blood Glucose Monitoring Suppl (OneTouch Verio Reflect) w/Device KIT Instructed to bring extra supplies day of surgery.     dofetilide (TIKOSYN) 500 mcg capsule Take day of surgery.    esomeprazole (NexIUM) 20 mg capsule Take day of surgery.    metFORMIN (GLUCOPHAGE) 1000 MG tablet Hold day of surgery.    metoprolol succinate (TOPROL-XL) 50 mg 24 hr tablet Take day of surgery.    naproxen (NAPROSYN) 500 mg tablet Stop taking 7 days prior to surgery.    semaglutide, 2 mg/dose, (Ozempic, 2 MG/DOSE,) 8 mg/ mL injection pen Last dose 3/4  stop taking 7 days prior to surgery.      Medication instructions for day of surgery reviewed. Please take all instructed medications with only a sip of water.       You will receive a call one business day prior to surgery with an arrival time and hospital directions. If your surgery is scheduled on a Monday, the hospital will be calling you on the Friday prior to your surgery. If you have not heard from anyone by 8pm, please call the hospital supervisor through the hospital  at [phone]. ([place] [phone] or [place] [phone]).    Do not eat or drink anything after midnight the night before your surgery, including candy, mints, lifesavers, or chewing gum. Do not drink alcohol 24hrs before your surgery. Try not to smoke at least 24hrs before your surgery.       Follow the pre surgery showering instructions as listed in the “My Surgical Experience Booklet” or otherwise provided by  your surgeon's office. Do not use a blade to shave the surgical area 1 week before surgery. It is okay to use a clean electric clippers up to 24 hours before surgery. Do not apply any lotions, creams, including makeup, cologne, deodorant, or perfumes after showering on the day of your surgery. Do not use dry shampoo, hair spray, hair gel, or any type of hair products.     No contact lenses, eye make-up, or artificial eyelashes. Remove nail polish, including gel polish, and any artificial, gel, or acrylic nails if possible. Remove all jewelry including rings and body piercing jewelry.     Wear causal clothing that is easy to take on and off. Consider your type of surgery.    Keep any valuables, jewelry, piercings at home. Please bring any specially ordered equipment (sling, braces) if indicated.    Arrange for a responsible person to drive you to and from the hospital on the day of your surgery. Please confirm the visitor policy for the day of your procedure when you receive your phone call with an arrival time.     Call the surgeon's office with any new illnesses, exposures, or additional questions prior to surgery.    Please reference your “My Surgical Experience Booklet” for additional information to prepare for your upcoming surgery.

## 2025-03-14 ENCOUNTER — APPOINTMENT (OUTPATIENT)
Dept: LAB | Facility: CLINIC | Age: 55
End: 2025-03-14
Payer: COMMERCIAL

## 2025-03-14 DIAGNOSIS — Z01.818 PRE-OP EVALUATION: ICD-10-CM

## 2025-03-14 DIAGNOSIS — Z11.59 NEED FOR HEPATITIS C SCREENING TEST: ICD-10-CM

## 2025-03-14 DIAGNOSIS — Z11.4 SCREENING FOR HIV (HUMAN IMMUNODEFICIENCY VIRUS): ICD-10-CM

## 2025-03-14 DIAGNOSIS — S83.241D TEAR OF MEDIAL MENISCUS OF RIGHT KNEE, CURRENT, UNSPECIFIED TEAR TYPE, SUBSEQUENT ENCOUNTER: ICD-10-CM

## 2025-03-14 LAB
ALBUMIN SERPL BCG-MCNC: 4 G/DL (ref 3.5–5)
ALP SERPL-CCNC: 103 U/L (ref 34–104)
ALT SERPL W P-5'-P-CCNC: 60 U/L (ref 7–52)
ANION GAP SERPL CALCULATED.3IONS-SCNC: 7 MMOL/L (ref 4–13)
AST SERPL W P-5'-P-CCNC: 34 U/L (ref 13–39)
ATRIAL RATE: 61 BPM
BILIRUB SERPL-MCNC: 0.66 MG/DL (ref 0.2–1)
BUN SERPL-MCNC: 15 MG/DL (ref 5–25)
CALCIUM SERPL-MCNC: 9.2 MG/DL (ref 8.4–10.2)
CHLORIDE SERPL-SCNC: 105 MMOL/L (ref 96–108)
CO2 SERPL-SCNC: 26 MMOL/L (ref 21–32)
CREAT SERPL-MCNC: 0.68 MG/DL (ref 0.6–1.3)
GFR SERPL CREATININE-BSD FRML MDRD: 106 ML/MIN/1.73SQ M
GLUCOSE P FAST SERPL-MCNC: 96 MG/DL (ref 65–99)
P AXIS: 44 DEGREES
POTASSIUM SERPL-SCNC: 3.8 MMOL/L (ref 3.5–5.3)
PR INTERVAL: 172 MS
PROT SERPL-MCNC: 7.2 G/DL (ref 6.4–8.4)
QRS AXIS: 69 DEGREES
QRSD INTERVAL: 88 MS
QT INTERVAL: 448 MS
QTC INTERVAL: 450 MS
SODIUM SERPL-SCNC: 138 MMOL/L (ref 135–147)
T WAVE AXIS: 64 DEGREES
VENTRICULAR RATE: 61 BPM

## 2025-03-14 PROCEDURE — 86803 HEPATITIS C AB TEST: CPT

## 2025-03-14 PROCEDURE — 36415 COLL VENOUS BLD VENIPUNCTURE: CPT

## 2025-03-14 PROCEDURE — 93010 ELECTROCARDIOGRAM REPORT: CPT | Performed by: INTERNAL MEDICINE

## 2025-03-14 PROCEDURE — 87389 HIV-1 AG W/HIV-1&-2 AB AG IA: CPT

## 2025-03-14 PROCEDURE — 80053 COMPREHEN METABOLIC PANEL: CPT

## 2025-03-15 LAB
HCV AB SER QL: NORMAL
HIV 1+2 AB+HIV1 P24 AG SERPL QL IA: NORMAL

## 2025-03-17 ENCOUNTER — TELEPHONE (OUTPATIENT)
Age: 55
End: 2025-03-17

## 2025-03-17 ENCOUNTER — RESULTS FOLLOW-UP (OUTPATIENT)
Dept: INTERNAL MEDICINE CLINIC | Facility: CLINIC | Age: 55
End: 2025-03-17

## 2025-03-17 NOTE — TELEPHONE ENCOUNTER
Caller: Patient    Doctor: Dr. Juarez / Claudia    Reason for call: Patient states he received a call from  asking about his knee, could not find any note of this. Patient states he had all lab work and testing done in preporation of surgery. Please advise.    Call back#: [phone]

## 2025-03-19 ENCOUNTER — ANESTHESIA (OUTPATIENT)
Age: 55
End: 2025-03-19
Payer: OTHER MISCELLANEOUS

## 2025-03-19 ENCOUNTER — HOSPITAL ENCOUNTER (OUTPATIENT)
Age: 55
Setting detail: OUTPATIENT SURGERY
Discharge: HOME | End: 2025-03-19
Attending: ORTHOPAEDIC SURGERY | Admitting: ORTHOPAEDIC SURGERY
Payer: OTHER MISCELLANEOUS

## 2025-03-19 VITALS
BODY MASS INDEX: 40.43 KG/M2 | DIASTOLIC BLOOD PRESSURE: 73 MMHG | TEMPERATURE: 98.6 F | WEIGHT: 315 LBS | RESPIRATION RATE: 19 BRPM | SYSTOLIC BLOOD PRESSURE: 125 MMHG | OXYGEN SATURATION: 100 % | HEIGHT: 74 IN | HEART RATE: 55 BPM

## 2025-03-19 DIAGNOSIS — S83.241D TEAR OF MEDIAL MENISCUS OF RIGHT KNEE, CURRENT, UNSPECIFIED TEAR TYPE, SUBSEQUENT ENCOUNTER: Primary | ICD-10-CM

## 2025-03-19 LAB — GLUCOSE SERPL-MCNC: 110 MG/DL (ref 65–140)

## 2025-03-19 PROCEDURE — 29881 ARTHRS KNE SRG MNISECTMY M/L: CPT

## 2025-03-19 PROCEDURE — 29881 ARTHRS KNE SRG MNISECTMY M/L: CPT | Performed by: ORTHOPAEDIC SURGERY

## 2025-03-19 PROCEDURE — 82948 REAGENT STRIP/BLOOD GLUCOSE: CPT

## 2025-03-19 RX ORDER — DIPHENHYDRAMINE HYDROCHLORIDE 50 MG/ML
12.5 INJECTION, SOLUTION INTRAMUSCULAR; INTRAVENOUS ONCE AS NEEDED
Status: DISCONTINUED | OUTPATIENT
Start: 2025-03-19 | End: 2025-03-19 | Stop reason: HOSPADM

## 2025-03-19 RX ORDER — ONDANSETRON 2 MG/ML
4 INJECTION INTRAMUSCULAR; INTRAVENOUS ONCE AS NEEDED
Status: COMPLETED | OUTPATIENT
Start: 2025-03-19 | End: 2025-03-19

## 2025-03-19 RX ORDER — ACETAMINOPHEN 325 MG/1
650 TABLET ORAL EVERY 6 HOURS PRN
Qty: 60 TABLET | Refills: 0 | Status: SHIPPED | OUTPATIENT
Start: 2025-03-19

## 2025-03-19 RX ORDER — CEFAZOLIN SODIUM 1 G/3ML
INJECTION, POWDER, FOR SOLUTION INTRAMUSCULAR; INTRAVENOUS AS NEEDED
Status: DISCONTINUED | OUTPATIENT
Start: 2025-03-19 | End: 2025-03-19

## 2025-03-19 RX ORDER — OXYCODONE HYDROCHLORIDE 5 MG/1
5 TABLET ORAL EVERY 4 HOURS PRN
Qty: 15 TABLET | Refills: 0 | Status: SHIPPED | OUTPATIENT
Start: 2025-03-19 | End: 2025-03-29

## 2025-03-19 RX ORDER — FENTANYL CITRATE 50 UG/ML
INJECTION, SOLUTION INTRAMUSCULAR; INTRAVENOUS AS NEEDED
Status: DISCONTINUED | OUTPATIENT
Start: 2025-03-19 | End: 2025-03-19

## 2025-03-19 RX ORDER — SUCCINYLCHOLINE/SOD CL,ISO/PF 100 MG/5ML
SYRINGE (ML) INTRAVENOUS AS NEEDED
Status: DISCONTINUED | OUTPATIENT
Start: 2025-03-19 | End: 2025-03-19

## 2025-03-19 RX ORDER — LIDOCAINE HYDROCHLORIDE 10 MG/ML
0.5 INJECTION, SOLUTION EPIDURAL; INFILTRATION; INTRACAUDAL; PERINEURAL ONCE AS NEEDED
Status: COMPLETED | OUTPATIENT
Start: 2025-03-19 | End: 2025-03-19

## 2025-03-19 RX ORDER — FENTANYL CITRATE/PF 50 MCG/ML
50 SYRINGE (ML) INJECTION
Status: DISCONTINUED | OUTPATIENT
Start: 2025-03-19 | End: 2025-03-19 | Stop reason: HOSPADM

## 2025-03-19 RX ORDER — ONDANSETRON 2 MG/ML
INJECTION INTRAMUSCULAR; INTRAVENOUS AS NEEDED
Status: DISCONTINUED | OUTPATIENT
Start: 2025-03-19 | End: 2025-03-19

## 2025-03-19 RX ORDER — HYDROMORPHONE HCL/PF 1 MG/ML
SYRINGE (ML) INJECTION AS NEEDED
Status: DISCONTINUED | OUTPATIENT
Start: 2025-03-19 | End: 2025-03-19

## 2025-03-19 RX ORDER — HYDROMORPHONE HCL/PF 1 MG/ML
0.5 SYRINGE (ML) INJECTION
Status: DISCONTINUED | OUTPATIENT
Start: 2025-03-19 | End: 2025-03-19 | Stop reason: HOSPADM

## 2025-03-19 RX ORDER — ROCURONIUM BROMIDE 10 MG/ML
INJECTION, SOLUTION INTRAVENOUS AS NEEDED
Status: DISCONTINUED | OUTPATIENT
Start: 2025-03-19 | End: 2025-03-19

## 2025-03-19 RX ORDER — SODIUM CHLORIDE, SODIUM LACTATE, POTASSIUM CHLORIDE, CALCIUM CHLORIDE 600; 310; 30; 20 MG/100ML; MG/100ML; MG/100ML; MG/100ML
125 INJECTION, SOLUTION INTRAVENOUS CONTINUOUS
Status: DISCONTINUED | OUTPATIENT
Start: 2025-03-19 | End: 2025-03-19 | Stop reason: HOSPADM

## 2025-03-19 RX ORDER — HYDROMORPHONE HCL IN WATER/PF 6 MG/30 ML
0.2 PATIENT CONTROLLED ANALGESIA SYRINGE INTRAVENOUS
Status: DISCONTINUED | OUTPATIENT
Start: 2025-03-19 | End: 2025-03-19 | Stop reason: HOSPADM

## 2025-03-19 RX ORDER — BUPIVACAINE HYDROCHLORIDE 2.5 MG/ML
INJECTION, SOLUTION EPIDURAL; INFILTRATION; INTRACAUDAL; PERINEURAL AS NEEDED
Status: DISCONTINUED | OUTPATIENT
Start: 2025-03-19 | End: 2025-03-19 | Stop reason: HOSPADM

## 2025-03-19 RX ORDER — CHLORHEXIDINE GLUCONATE ORAL RINSE 1.2 MG/ML
15 SOLUTION DENTAL ONCE
Status: COMPLETED | OUTPATIENT
Start: 2025-03-19 | End: 2025-03-19

## 2025-03-19 RX ORDER — OXYCODONE HYDROCHLORIDE 5 MG/1
5 TABLET ORAL EVERY 4 HOURS PRN
Refills: 0 | Status: DISCONTINUED | OUTPATIENT
Start: 2025-03-19 | End: 2025-03-19 | Stop reason: HOSPADM

## 2025-03-19 RX ORDER — PROPOFOL 10 MG/ML
INJECTION, EMULSION INTRAVENOUS AS NEEDED
Status: DISCONTINUED | OUTPATIENT
Start: 2025-03-19 | End: 2025-03-19

## 2025-03-19 RX ORDER — CEFAZOLIN SODIUM 2 G/50ML
2000 SOLUTION INTRAVENOUS ONCE
Status: COMPLETED | OUTPATIENT
Start: 2025-03-19 | End: 2025-03-19

## 2025-03-19 RX ORDER — METOCLOPRAMIDE HYDROCHLORIDE 5 MG/ML
10 INJECTION INTRAMUSCULAR; INTRAVENOUS ONCE AS NEEDED
Status: DISCONTINUED | OUTPATIENT
Start: 2025-03-19 | End: 2025-03-19 | Stop reason: HOSPADM

## 2025-03-19 RX ADMIN — PROPOFOL 50 MG: 10 INJECTION, EMULSION INTRAVENOUS at 10:16

## 2025-03-19 RX ADMIN — HYDROMORPHONE HYDROCHLORIDE 0.5 MG: 1 INJECTION, SOLUTION INTRAMUSCULAR; INTRAVENOUS; SUBCUTANEOUS at 10:18

## 2025-03-19 RX ADMIN — PROPOFOL 300 MG: 10 INJECTION, EMULSION INTRAVENOUS at 09:59

## 2025-03-19 RX ADMIN — SODIUM CHLORIDE, SODIUM LACTATE, POTASSIUM CHLORIDE, AND CALCIUM CHLORIDE: .6; .31; .03; .02 INJECTION, SOLUTION INTRAVENOUS at 09:54

## 2025-03-19 RX ADMIN — SUGAMMADEX 200 MG: 100 INJECTION, SOLUTION INTRAVENOUS at 10:25

## 2025-03-19 RX ADMIN — OXYCODONE HYDROCHLORIDE 5 MG: 5 TABLET ORAL at 10:57

## 2025-03-19 RX ADMIN — CHLORHEXIDINE GLUCONATE 15 ML: 1.2 SOLUTION ORAL at 08:19

## 2025-03-19 RX ADMIN — ROCURONIUM BROMIDE 30 MG: 10 INJECTION, SOLUTION INTRAVENOUS at 10:16

## 2025-03-19 RX ADMIN — SODIUM CHLORIDE, SODIUM LACTATE, POTASSIUM CHLORIDE, AND CALCIUM CHLORIDE 125 ML/HR: .6; .31; .03; .02 INJECTION, SOLUTION INTRAVENOUS at 08:19

## 2025-03-19 RX ADMIN — CEFAZOLIN SODIUM 2000 MG: 2 SOLUTION INTRAVENOUS at 09:53

## 2025-03-19 RX ADMIN — ONDANSETRON 4 MG: 2 INJECTION INTRAMUSCULAR; INTRAVENOUS at 11:20

## 2025-03-19 RX ADMIN — Medication 180 MG: at 09:59

## 2025-03-19 RX ADMIN — ONDANSETRON 4 MG: 2 INJECTION INTRAMUSCULAR; INTRAVENOUS at 09:59

## 2025-03-19 RX ADMIN — CEFAZOLIN 1000 MG: 1 INJECTION, POWDER, FOR SOLUTION INTRAMUSCULAR; INTRAVENOUS at 10:07

## 2025-03-19 RX ADMIN — FENTANYL CITRATE 100 MCG: 50 INJECTION INTRAMUSCULAR; INTRAVENOUS at 09:59

## 2025-03-19 RX ADMIN — LIDOCAINE HYDROCHLORIDE 5 ML: 10 INJECTION, SOLUTION EPIDURAL; INFILTRATION; INTRACAUDAL; PERINEURAL at 09:59

## 2025-03-19 NOTE — DISCHARGE INSTR - AVS FIRST PAGE
[facility] Patient  Information      Home Instructions after   Knee Arthroscopy            MD FAVIAN PayneA  Orthopaedic Surgery   [facility]  [address], [place] [address]    The following instructions are to be used for the first week after surgery or until you return to see the physician. If you have any questions, you can contact the office at [phone]. The most important concerns in the first week are decreasing swelling and improving range of motion, especially the ability to fully straighten the knee.        Icing your knee Ice therapy: Use ice bags or an ice pack applied around your knee for 20-30 minutes every 3-4 hours. Make sure the ice is not applied directly to the skin.     Elevation       Keep your leg elevated whenever possible. The primary goal during the first week after surgery is to minimize swelling in your knee; therefore it is beneficial to minimize ambulation for that first week. Place 1-2 pillows underneath your calf or ankle, never underneath your knee. If you place pillows  under your knee, it will remain in a flexed position and it will become increasingly difficult to extend your knee    Caring for your dressing You may remove the bandage and gauze from your knee approximately 2 days after surgery. Apply Band-Aids to the wounds. The bandages you remove may be wet to the touch. This is normal as the knee is filled with water during the surgery and it leaks out for 1-2 days after the surgery. Change your  Band-Aids daily.    Bathing/ Showering Keep your bandages and wounds dry. If you want or need to shower, you must keep your incisions dry by wrapping saran wrap snugly over the knee and securing the wrap with tape. If your wounds do get wet, remove the Band-Aids, pat the wound dry and reapply fresh Band-Aids. Do not soak the knee, swim,  or use a hot tub until given permission by Dr. Juarez.      Crutches Use your crutches 1-2 days  or longer if specifically instructed. You can place as much weight on your leg as pain and swelling permit. If you have increasing pain or swelling, you should not be using your leg as much. Once you can walk without pain or a limp, crutches can gradually be discontinued. Please note: some patients will be instructed not to put weight on their leg  for six (6) weeks or longer. If you have any questions about  your weight bearing status, please contact the office.      Home Exercises You should begin the Post-Operative Knee Exercises the  day of or the day after your surgery. You will need to remove the lceMan unit to perform these exercises. If possible, gently use a stationary bike for 15 minutes on NO resistance. This will help you regain motion in your knee.     Taking your medication If you have been given a prescription for narcotic pain pills  (Oxycodone), please use them only for pain on the schedule and in the dosage indicated.    You can restart your apixaban (Eliquis) 5mg 12 hours post-op (around 10:00 pm on 3/19) and then continue to take as prescribed.     A script for Tylenol was written, to take as needed for pain control.            Follow-Up You should have already been scheduled for a follow-up  appointment to see Dr. Juarez or his Physician Assistant in his office. If not, please call [phone].   Reasons for Concern             If you have progressive pain that does not respond to pain medication, a temperature over 101.5, excessive drainage, or decreased sensation in your foot, contact the office at [phone]. Evenings or weekends, call the hospital and ask for the orthopaedics on-call doctor.  Call [phone] or go to the Emergency Room immediately if you experience any CHEST PAIN or SHORTNESS OF BREATH, as these symptoms can be a sign of a life threatening condition.       Post-Operative Knee Exercises    Passive Knee Extension Lie on your back, or increase the stretch by sitting, and place the  "ankle of your surgical leg on top of a rolled up towel, approximately 6\" in diameter. Allow your knee to sag into extension. Hold this position for 10 minutes; repeat 2-3 times daily.            Heel Slides   Remove iceman unit to perform. Lie on your back, or sit up, and slowly bend your knee by sliding your heel towards your buttocks, going as far as you can comfortably and holding the bent position for 5 seconds, repeating this 10 times. Do this 2-3 times a day.   Ankle Pumps   These motion exercises involving your ankle help minimize lower leg, ankle and foot swelling due to the dressing and immobilization, as well as assisting in the prevention of lower leg clots. Perform these regularly throughout the day as you are lying down.       Quadriceps Set   Tighten the muscles on the front of your thigh by pulling your ankle and foot up towards you and pressing the back of your knee downwards to the floor. Hold for five seconds; repeat 20 - 30 times; do this 2-3 times daily.    Straight Leg Raise     Performed once you are able to generate a strong quadriceps set contraction. Lie down, and with your good leg bent to 90 degrees, perform a quad set with your surgical side. Then raise your leg straight up, without allowing the knee to bend, to the angle created by your opposite thigh. Hold this position for 5 seconds then lower your leg slowly to the floor and relax.  Repeat 10 times; do this 2-3 times daily.       "

## 2025-03-19 NOTE — ANESTHESIA POSTPROCEDURE EVALUATION
Post-Op Assessment Note    CV Status:  Stable  Pain Score: 0    Pain management: adequate       Mental Status:  Alert and awake   Hydration Status:  Euvolemic   PONV Controlled:  Controlled   Airway Patency:  Patent     Post Op Vitals Reviewed: Yes    No anethesia notable event occurred.    Staff: Anesthesiologist, with CRNAs           Last Filed PACU Vitals:  Vitals Value Taken Time   Temp     Pulse 60 03/19/25 1039   /73 03/19/25 1039   Resp 19 03/19/25 1039   SpO2 96 % 03/19/25 1039   Vitals shown include unfiled device data.

## 2025-03-19 NOTE — OP NOTE
OPERATIVE REPORT  PATIENT NAME: Luis Fernando Jay    :  1970  MRN: [id number]  Pt Location: [facility] OR ROOM 05    SURGERY DATE: 3/19/2025    Surgeons and Role:     * Ruiz Juarez MD - Primary     * Paola Melo PA-C - Assisting      Procedure(s):  Right knee arthroscopy, partial medial meniscectomy       Preop Diagnosis:  Tear of medial meniscus of right knee, current, unspecified tear type, subsequent encounter [S83.241D]    Post-Op Diagnosis Codes:     * Tear of medial meniscus of right knee, current, unspecified tear type, subsequent encounter [S83.241D]    Estimated Blood Loss:   Minimal    Anesthesia Type:   General    Indications for Surgery:  Luis Fernando Jay is a 55 y.o. is a pleasant patient with a history of a RIGHT knee pain. MRI showed a medial meniscus tear and mild cartilage wear. The patient attempted non operative treatment without relief. We discussed undergoing knee arthroscopy and debridement.       Post-Operative Diagnosis:    RIGHT knee medial meniscus tear  RIGHT knee cartilage wear, medial tibial plateau, lateral tibial plateau and medial femoral condyle    Complications: None    Disposition: PACU      Full Description of the Operation/Procedure:  Luis Fernando Jay was seen in preop holding area. The RIGHT lower extremity was marked, and signed informed consent was taken. All risks and benefits of procedure were explained. Patient was brought to the operating, placed supine on the operating table. Anesthesia was induced.  A tourniquet was placed in the RIGHT upper thigh. The patient was prepped and draped in a sterile manner. A surgical timeout was performed. Esmarch was used to exsanguinate the  RIGHT lower extremity and tourniquet was inflated to 275 mm hg.    A standard anterolateral portal was made and followed by placement of a standard anteromedial portal under direct visualization.    Evaluation of RIGHT knee demonstrated:    Patella grade 1 degenerative changes   Trochlea: grade 1  changes  Medial and lateral gutters and popliteal tendon looked normal   Medial femoral condyle; grade 1 changes  Medial meniscus: complex tear posterior horn and mid body with radial and horizontal cleavage components, white white zone  Medial tibial plateau grade 2 changes   Lateral femoral condyle normal   Lateral tibial plateau: area of grade 3 change in medial portion of lateral tibial plateau   Lateral meniscus: intact   Normal PCL  ACL intact    I debrided a portion of the posterior horn and mid body of the medial meniscus. This extended through the junction of the posterior horn and mid body. I debrided this with a shaver and a meniscal biter. I debrided this back to a stable rim ensuring to preserve any healthy remaining meniscal tissue. I also debrided the medial femoral condyle lesion which was a grade 4 lesion at the weight bearing portion of the medial femoral condyle. There were several loose flaps that were debrided.    I gently debrided the lateral tibial cartilage defect which had some small loose flaps, I was cautious to avoid over debriding in this area and avoid any surrounding cartilage.     At this point, the arthroscope was then removed. The portals were closed with 3-0 monocryl. Sterile dressings were applied. Portal sites were injected with 20 cs 1/4% marcaine. The tourniquet was released. The foot was warm and well perfused with palpable DP pulse.    The patient was extubated and taken to recovery in stable condition. Patient was made weightbearing as tolerated with crutches and activity as tolerated. The patient was given a followup appointment, and appropriate pain medication. The patient was discharged in stable condition.    A physician assistant was required during the procedure for camera positioning, leg positioning, and suturing. There was no qualified resident available to assist so Paola Scott PA-C served as the first assistant.              SIGNATURE: Ruiz Juarez MD  DATE:  March 19, 2025  TIME: 10:43 AM

## 2025-03-19 NOTE — ANESTHESIA PREPROCEDURE EVALUATION
Procedure:  MENISCECTOMY LATERAL /MEDIAL (Right: Knee)  REPAIR MENISCUS (Right: Knee)    Relevant Problems   ANESTHESIA (within normal limits)      CARDIO   (+) Atrial fibrillation with RVR ([facility])   (+) Other hyperlipidemia      ENDO   (+) Type 2 diabetes mellitus with other specified complication, unspecified whether long term insulin use ([facility])      GI/HEPATIC   (+) Fatty liver   (+) GERD (gastroesophageal reflux disease)      /RENAL (within normal limits)      HEMATOLOGY (within normal limits)      MUSCULOSKELETAL   (+) Acute idiopathic gout of right foot   (+) Primary osteoarthritis of right knee      NEURO/PSYCH (within normal limits)      PULMONARY   (+) ASIM (obstructive sleep apnea)   (+) Other emphysema ([facility])      Behavioral Health   (+) Cigarette nicotine dependence in remission      Orthopedic/Musculoskeletal   (+) Right knee pain      Other   (+) Class 3 severe obesity due to excess calories without serious comorbidity with body mass index (BMI) of 50.0 to 59.9 in adult ([facility])        Physical Exam    Airway    Mallampati score: II  TM Distance: >3 FB  Neck ROM: full     Dental   No notable dental hx     Cardiovascular  Rhythm: regular, Rate: normal, Cardiovascular exam normal    Pulmonary  Pulmonary exam normal Breath sounds clear to auscultation    Other Findings        Anesthesia Plan  ASA Score- 3     Anesthesia Type- general with ASA Monitors.         Additional Monitors:     Airway Plan: ETT.           Plan Factors-Exercise tolerance (METS): >4 METS.    Chart reviewed. EKG reviewed. Imaging results reviewed. Existing labs reviewed. Patient summary reviewed.                  Induction- intravenous.    Postoperative Plan- Plan for postoperative opioid use.     Perioperative Resuscitation Plan - Level 1 - Full Code.       Informed Consent- Anesthetic plan and risks discussed with patient.  I personally reviewed this patient with the CRNA. Discussed and agreed on the Anesthesia Plan with the  CRNA..      NPO Status:  Vitals Value Taken Time   Date of last liquid 03/19/25 03/19/25 0813   Time of last liquid 0600 03/19/25 0813   Date of last solid 03/18/25 03/19/25 0813   Time of last solid 1730 03/19/25 0813       Recent labs personally reviewed:  Lab Results   Component Value Date    WBC 7.95 02/07/2025    HGB 15.1 02/07/2025     02/07/2025     Lab Results   Component Value Date    K 3.8 03/14/2025    BUN 15 03/14/2025    CREATININE 0.68 03/14/2025    GLUCOSE 163 (H) 04/22/2019     Lab Results   Component Value Date    PTT 29 11/06/2024      Lab Results   Component Value Date    INR 1.17 11/06/2024     Lab Results   Component Value Date    HGBA1C 5.3 02/07/2025       I, Delroy Chung MD, have personally seen and evaluated the patient prior to anesthetic care.  I have reviewed the pre-anesthetic record, and other medical records if appropriate to the anesthetic care.  If a CRNA is involved in the case, I have reviewed the CRNA assessment, if present, and agree. Risks/benefits and alternatives discussed with patient including possible PONV, sore throat, and possibility of rare anesthetic and surgical emergencies.

## 2025-03-19 NOTE — INTERVAL H&P NOTE
H&P reviewed. After examining the patient I find no changes in the patients condition since the H&P had been written.    Vitals:    03/19/25 0815   BP: 127/74   Pulse: 61   Resp: 18   Temp: (!) 97.3 °F (36.3 °C)   SpO2: 96%

## 2025-03-26 ENCOUNTER — EVALUATION (OUTPATIENT)
Dept: PHYSICAL THERAPY | Facility: REHABILITATION | Age: 55
End: 2025-03-26
Payer: OTHER MISCELLANEOUS

## 2025-03-26 DIAGNOSIS — S83.241D TEAR OF MEDIAL MENISCUS OF RIGHT KNEE, CURRENT, UNSPECIFIED TEAR TYPE, SUBSEQUENT ENCOUNTER: ICD-10-CM

## 2025-03-26 PROCEDURE — 97163 PT EVAL HIGH COMPLEX 45 MIN: CPT | Performed by: PHYSICAL THERAPIST

## 2025-03-26 PROCEDURE — 97110 THERAPEUTIC EXERCISES: CPT | Performed by: PHYSICAL THERAPIST

## 2025-03-26 NOTE — PROGRESS NOTES
PT Evaluation     Today's date: 3/26/2025  Patient name: Luis Fernando Jay  : 1970  MRN: [id number]  Referring provider: Paola Melo PA-C  Dx:   Encounter Diagnosis     ICD-10-CM    1. Tear of medial meniscus of right knee, current, unspecified tear type, subsequent encounter  S83.241D Ambulatory referral to Physical Therapy          Start Time: 1200  Stop Time: 1235  Total time in clinic (min): 35 minutes    Assessment  Impairments: abnormal muscle firing, abnormal muscle tone, abnormal or restricted ROM, abnormal movement, activity intolerance, impaired physical strength and pain with function    Assessment details: Luis Fernando Jay is a 55 y.o. male presenting to outpatient physical therapy on 25 with referral from MD for after right medial partial menisectomy on 3/19/25. Upon evaluation, Luis Fernando demonstrates impaired knee ROM, strength and pain with resulting functional deficits. The listed impairments and functional limitation are effecting Luis Fernando ability to function at prior level. They can continue to benefit from physical therapy services at this times in order to address the above discussed impairments and functional limitation in order to allow for a return to premorbid status     Understanding of Dx/Px/POC: good     Prognosis: good    Plan  Patient would benefit from: skilled PT  Planned modality interventions: thermotherapy: hydrocollator packs    Planned therapy interventions: joint mobilization, manual therapy, ADL training, neuromuscular re-education, home exercise program, therapeutic exercise, therapeutic activities, strengthening, patient education and functional ROM exercises    Frequency: 2x week  Duration in weeks: 8  Treatment plan discussed with: patient        Subjective Evaluation    History of Present Illness  Mechanism of injury: Luis Fernando is a 55 y.o. male presenting to physical therapy on 25 with referral from MD after right partial medial menisectomy on 3/19/25.             "      Recurrent probem    Quality of life: good    Patient Goals  Patient goals for therapy: decreased pain      Short Term Goals:   1. Patient will be Independent with hep  2. Patient will improve pain with activity by 50%  3. Patient will report GROC 50% or greater      Long Term Goals:   1. Patient will improve FOTO to greater then goal  2. Patient will improve pain with activity to 2/10 or less  3. Patient will continue with HEP independence to allow for decreased future reoccurrence of pain and loss in function  4. Patient will report GROC 75% or greater  5. Patient will climb ladders pfree  6. Patient will stand on feet for 8-10 hours with pain 2/10 or less  7. Patient will kneeling,squat pfree      Objective      GAIT: bl crutches, decreased R stance time with flexed knee              MMT         AROM          PROM    Hip       L       R        L           R      L     R   Flex.         Extn.         Abd.         Add.         IR.         ER.         G. Max         G. Med         Iliop.         .         Knee         Extension  3-    0   Flexion      70            Ankle         Dorsi Flexion         Plantar Flexion                       Comments: noted edema around joint line, suprapatellar              Precautions: HTN, DM, post op medial menisectomy 3/19/25      Manuals                                                                 Neuro Re-Ed             WS to R with quad set 10x                                                                                          Ther Ex             Heel slides 15x  5\"            VG             Bike             SLR             LAQ                                                    Ther Activity                                       Gait Training                                       Modalities                                            "

## 2025-03-27 ENCOUNTER — OFFICE VISIT (OUTPATIENT)
Age: 55
End: 2025-03-27

## 2025-03-27 DIAGNOSIS — S83.241D TEAR OF MEDIAL MENISCUS OF RIGHT KNEE, CURRENT, UNSPECIFIED TEAR TYPE, SUBSEQUENT ENCOUNTER: Primary | ICD-10-CM

## 2025-03-27 PROCEDURE — 99024 POSTOP FOLLOW-UP VISIT: CPT | Performed by: ORTHOPAEDIC SURGERY

## 2025-03-27 RX ORDER — TRAMADOL HYDROCHLORIDE 50 MG/1
50 TABLET ORAL EVERY 8 HOURS PRN
Qty: 10 TABLET | Refills: 0 | Status: SHIPPED | OUTPATIENT
Start: 2025-03-27

## 2025-03-27 NOTE — PROGRESS NOTES
:  Assessment & Plan  Tear of medial meniscus of right knee, current, unspecified tear type, subsequent encounter        S/P Right knee arthroscopy, partial medial meniscectomy on 3/19/25    Continue PT  Continue home exercises  Tramadol as needed  Follow up 6 weeks      S:  Doing well, pain controlled. Has been doing home exercises.     Exam:  Incision c/d/i   Small effusion  ROM 0-5-110  SILT sp/dp/tib  5/5 DF/PF  SLR intact, weak  wwp  No calf pain

## 2025-03-27 NOTE — LETTER
March 27, 2025     Patient: Luis Fernando Jay  YOB: 1970  Date of Visit: 3/27/2025      To Whom it May Concern:    Luis Fernando Jay is under my professional care. Luis Fernando was seen in my office on 3/27/2025. Luis Fernando should remain out of work through 5/15/25. I will see him back in the office prior to that to assess his ability to return to work.     If you have any questions or concerns, please don't hesitate to call.         Sincerely,          Ruiz Juarez MD        CC: No Recipients

## 2025-03-31 ENCOUNTER — OFFICE VISIT (OUTPATIENT)
Dept: PHYSICAL THERAPY | Facility: REHABILITATION | Age: 55
End: 2025-03-31
Payer: OTHER MISCELLANEOUS

## 2025-03-31 ENCOUNTER — TELEPHONE (OUTPATIENT)
Dept: OBGYN CLINIC | Facility: HOSPITAL | Age: 55
End: 2025-03-31

## 2025-03-31 DIAGNOSIS — S83.241D TEAR OF MEDIAL MENISCUS OF RIGHT KNEE, CURRENT, UNSPECIFIED TEAR TYPE, SUBSEQUENT ENCOUNTER: Primary | ICD-10-CM

## 2025-03-31 PROCEDURE — 97140 MANUAL THERAPY 1/> REGIONS: CPT

## 2025-03-31 PROCEDURE — 97110 THERAPEUTIC EXERCISES: CPT

## 2025-03-31 NOTE — PROGRESS NOTES
"Daily Note     Today's date: 3/31/2025  Patient name: Luis Fernando Jay  : 1970  MRN: [id number]  Referring provider: Paola Melo PA-C  Dx:   Encounter Diagnosis     ICD-10-CM    1. Tear of medial meniscus of right knee, current, unspecified tear type, subsequent encounter  S83.241D                      Subjective: Brain reports \"over doing it,\" he is performing HEP every other hour. Reports knee pain and swelling.      Objective: See treatment diary below    EDU on HEP 1-2x/day max - patient verbalized understanding.     Assessment: Tolerated treatment fair. Improving ROM into flexion with PROM EOT. Soft tissue restriction at distal quad with limited patellar ROM noted, less post STM/TPR. Reduction in antalgic gait noted post session.       Plan: Continue per plan of care.       Precautions: HTN, DM, post op medial menisectomy 3/19/25      Manuals  3/31           STM/TPR  Distal quad                                                  Neuro Re-Ed             WS to R with quad set 10x                                                                                          Ther Ex             Heel slides 15x  5\"            VG  L6 5'           Bike  5' rocking           SLR             LAQ  2x10           Quad sets  2x10 5\"                                     Ther Activity                                       Gait Training                                       Modalities                                            "

## 2025-04-03 ENCOUNTER — OFFICE VISIT (OUTPATIENT)
Dept: PHYSICAL THERAPY | Facility: REHABILITATION | Age: 55
End: 2025-04-03
Payer: OTHER MISCELLANEOUS

## 2025-04-03 DIAGNOSIS — J43.8 OTHER EMPHYSEMA (HCC): ICD-10-CM

## 2025-04-03 DIAGNOSIS — S83.241D TEAR OF MEDIAL MENISCUS OF RIGHT KNEE, CURRENT, UNSPECIFIED TEAR TYPE, SUBSEQUENT ENCOUNTER: Primary | ICD-10-CM

## 2025-04-03 PROCEDURE — 97140 MANUAL THERAPY 1/> REGIONS: CPT

## 2025-04-03 PROCEDURE — 97110 THERAPEUTIC EXERCISES: CPT

## 2025-04-03 RX ORDER — ALBUTEROL SULFATE 90 UG/1
INHALANT RESPIRATORY (INHALATION)
Qty: 8 G | Refills: 1 | Status: SHIPPED | OUTPATIENT
Start: 2025-04-03

## 2025-04-03 NOTE — PROGRESS NOTES
"Daily Note     Today's date: 4/3/2025  Patient name: Luis Fernando Jay  : 1970  MRN: [id number]  Referring provider: Paola Melo PA-C  Dx:   Encounter Diagnosis     ICD-10-CM    1. Tear of medial meniscus of right knee, current, unspecified tear type, subsequent encounter  S83.241D           Start Time: 1700  Stop Time: 1740  Total time in clinic (min): 40 minutes    Subjective: Pt reports his R knee has been really bothering him since LV.  Symptoms present primarily surrounding knee along distal quad, hip abd, hip add.      Objective: See treatment diary below.  Educated patient on self STM, use of ice, and use of heat, to help manage symptoms.        Assessment: Tolerated treatment poor - fair. Programming modified as outlined below.  Moderate to significant tenderness and soft tissue restriction present along distal quad and hip add.  Felt some relief with thera-roller and educated on use of rolling pin for similar STM that can be performed at home.  All Wb'ing activity very aggravating for symptoms.  Patient would benefit from continued PT.        Plan: Continue per plan of care. Progress as able.       Precautions: HTN, DM, post op medial menisectomy 3/19/25      Manuals  3/31 4/3          STM/TPR  Distal quad Distal quad/hip add  AFB             Thera roller distal quad  AFb                                    Neuro Re-Ed             WS to R with quad set 10x  5\"x10                                                                                        Ther Ex             Heel slides 15x  5\"            VG  L6 5' L4   2x10          Bike  5' rocking np          SLR             LAQ  2x10           Quad sets  2x10 5\" 1x10x5\"          PROM   AFB                       Ther Activity                                       Gait Training                                       Modalities                Heat  5' post                              "

## 2025-04-07 ENCOUNTER — OFFICE VISIT (OUTPATIENT)
Dept: PHYSICAL THERAPY | Facility: REHABILITATION | Age: 55
End: 2025-04-07
Payer: OTHER MISCELLANEOUS

## 2025-04-07 DIAGNOSIS — S83.241D TEAR OF MEDIAL MENISCUS OF RIGHT KNEE, CURRENT, UNSPECIFIED TEAR TYPE, SUBSEQUENT ENCOUNTER: Primary | ICD-10-CM

## 2025-04-07 PROCEDURE — 97140 MANUAL THERAPY 1/> REGIONS: CPT

## 2025-04-07 PROCEDURE — 97110 THERAPEUTIC EXERCISES: CPT

## 2025-04-07 NOTE — PROGRESS NOTES
"Daily Note     Today's date: 2025  Patient name: Luis Fernando Jay  : 1970  MRN: [id number]  Referring provider: Paola Melo PA-C  Dx:   Encounter Diagnosis     ICD-10-CM    1. Tear of medial meniscus of right knee, current, unspecified tear type, subsequent encounter  S83.241D                      Subjective: Both knees are hurting. L knee pain could be  from compensation for R, he continues to walk without AD. He will reach out to ortho regarding the pain in both knees. He has been alternating between CP/HP at home.    Objective: See treatment diary below      Assessment: Tolerated treatment poor. Patient presents with antalgic gait. Low tolerance to TE d/t elevated pain. TTP along quad, adductors, ITB- mild reduction in pain post manuals. EDU on WS for HEP to promote acceptance on RLE. Poor tolerance to PROM in supine, better tolerance siting EOT. Continued PT would be beneficial to improve function.          Plan: Continue per plan of care.       Precautions: HTN, DM, post op medial menisectomy 3/19/25      Manuals  3/31 4/3 4/7         STM/TPR  Distal quad Distal quad/hip add  AFB Distal quad/hip add, distal ITB- MB            Thera roller distal quad  AFb Thera roller quad- MB                                   Neuro Re-Ed             WS to R with quad set 10x  5\"x10 Reviewed                                                                                        Ther Ex             Heel slides 15x  5\"            VG  L6 5' L4   2x10 L4 2x10         Bike  5' rocking np          SLR             LAQ  2x10           Quad sets  2x10 5\" 1x10x5\" 1x10x5\"         PROM   AFB MB- EOT flex                      Ther Activity                                       Gait Training                                       Modalities                Heat  5' post CP post sitting                               "

## 2025-04-08 ENCOUNTER — TELEPHONE (OUTPATIENT)
Age: 55
End: 2025-04-08

## 2025-04-08 NOTE — TELEPHONE ENCOUNTER
Caller: patient     Doctor: Dr. Juarez     Reason for call: asking for status of disability forms,  advised added to [employer] on 4/2    Call back#: [phone]

## 2025-04-08 NOTE — TELEPHONE ENCOUNTER
Patient called in regard to insurance paperwork; advised it was completed and can be picked up or faxed.

## 2025-04-09 ENCOUNTER — OFFICE VISIT (OUTPATIENT)
Dept: PHYSICAL THERAPY | Facility: REHABILITATION | Age: 55
End: 2025-04-09
Payer: OTHER MISCELLANEOUS

## 2025-04-09 DIAGNOSIS — S83.241D TEAR OF MEDIAL MENISCUS OF RIGHT KNEE, CURRENT, UNSPECIFIED TEAR TYPE, SUBSEQUENT ENCOUNTER: Primary | ICD-10-CM

## 2025-04-09 PROCEDURE — 97110 THERAPEUTIC EXERCISES: CPT

## 2025-04-09 PROCEDURE — 97140 MANUAL THERAPY 1/> REGIONS: CPT

## 2025-04-09 NOTE — PROGRESS NOTES
"Daily Note     Today's date: 2025  Patient name: Luis Fernando Jay  : 1970  MRN: [id number]  Referring provider: Paola Melo PA-C  Dx:   Encounter Diagnosis     ICD-10-CM    1. Tear of medial meniscus of right knee, current, unspecified tear type, subsequent encounter  S83.241D                      Subjective: Both knees are hurting again this session. Notes that he did feel good for about an hour after last session until the knots came back into his RLE. Notes that he is brando to get the ball rolling on both knees since they both are hurting.  HE has been compliant with his HEP.     Objective: See treatment diary below  Knee flexion ROM: 105deg    Assessment: Tolerated treatment poor. Pt demonstrated increased tenderness along his distal quad today with manuals performed by PT, AB. Added back in heel slides with a tight end feel present. Continued PT would be beneficial to improve function.          Plan: Continue per plan of care.       Precautions: HTN, DM, post op medial menisectomy 3/19/25      Manuals  3/31 4/3 4/7 4/9        STM/TPR  Distal quad Distal quad/hip add  AFB Distal quad/hip add, distal ITB- MB Distal quad/hip add  AFB           Thera roller distal quad  AFb Thera roller quad- MB                                   Neuro Re-Ed             WS to R with quad set 10x  5\"x10 Reviewed                                                                                        Ther Ex             Heel slides 15x  5\"    2x10 5\"        VG  L6 5' L4   2x10 L4 2x10 L4 2x10        Bike  5' rocking np          SLR             LAQ  2x10           Quad sets  2x10 5\" 1x10x5\" 1x10x5\" 1x10x 5\"        PROM   AFB MB- EOT flex MM                     Ther Activity                                       Gait Training                                       Modalities                Heat  5' post CP post sitting                               "

## 2025-04-10 ENCOUNTER — APPOINTMENT (OUTPATIENT)
Dept: PHYSICAL THERAPY | Facility: REHABILITATION | Age: 55
End: 2025-04-10
Payer: OTHER MISCELLANEOUS

## 2025-04-10 ENCOUNTER — TELEPHONE (OUTPATIENT)
Dept: INTERNAL MEDICINE CLINIC | Facility: CLINIC | Age: 55
End: 2025-04-10

## 2025-04-10 NOTE — TELEPHONE ENCOUNTER
LM on patient's VM that Ortho had filled out the continuing disability claim form that he dropped off to Dr. Márquez at his visit this morning.      Form is in drawer at .

## 2025-04-14 ENCOUNTER — OFFICE VISIT (OUTPATIENT)
Dept: PHYSICAL THERAPY | Facility: REHABILITATION | Age: 55
End: 2025-04-14
Payer: OTHER MISCELLANEOUS

## 2025-04-14 DIAGNOSIS — S83.241D TEAR OF MEDIAL MENISCUS OF RIGHT KNEE, CURRENT, UNSPECIFIED TEAR TYPE, SUBSEQUENT ENCOUNTER: Primary | ICD-10-CM

## 2025-04-14 DIAGNOSIS — I48.91 ATRIAL FIBRILLATION WITH RVR (HCC): ICD-10-CM

## 2025-04-14 PROCEDURE — 97110 THERAPEUTIC EXERCISES: CPT | Performed by: PHYSICAL THERAPIST

## 2025-04-14 PROCEDURE — 97112 NEUROMUSCULAR REEDUCATION: CPT | Performed by: PHYSICAL THERAPIST

## 2025-04-14 RX ORDER — DOFETILIDE 0.5 MG/1
500 CAPSULE ORAL 2 TIMES DAILY
Qty: 180 CAPSULE | Refills: 3 | Status: SHIPPED | OUTPATIENT
Start: 2025-04-14

## 2025-04-14 NOTE — PROGRESS NOTES
"Daily Note     Today's date: 2025  Patient name: Luis Fernando Jay  : 1970  MRN: [id number]  Referring provider: Paola Melo PA-C  Dx:   Encounter Diagnosis     ICD-10-CM    1. Tear of medial meniscus of right knee, current, unspecified tear type, subsequent encounter  S83.241D           Start Time: 1530  Stop Time: 1605  Total time in clinic (min): 35 minutes    Subjective: Patient reports that he has had slow progress, pain gradually improving.       Objective: See treatment diary below    ROM flexion pre tx 90 deg, improved to 105 deg    Assessment: Tolerated treatment well. Patient near full rev on bike. Educated to work on flexion ROM at home on his recumbent bike. Began light resistance training today with leg press and knee ext which he seemed to do well with.       Plan: Continue per plan of care.      Precautions: HTN, DM, post op medial menisectomy 3/19/25      Manuals  3/31 4/3 4/7 4/9 4/14       STM/TPR  Distal quad Distal quad/hip add  AFB Distal quad/hip add, distal ITB- MB Distal quad/hip add  AFB           Thera roller distal quad  AFb Thera roller quad- MB                                   Neuro Re-Ed             WS to R with quad set 10x  5\"x10 Reviewed          SLS foam      5\"x10                                                                        Ther Ex             PROM      10' flexion       Heel slides 15x  5\"    2x10 5\"        VG  L6 5' L4   2x10 L4 2x10 L4 2x10        Bike  5' rocking np   Rocking  10'       SLR             Leg press      40# SL  3x10       LAQ  2x10    Ext machine-20# DL  3x10       Quad sets  2x10 5\" 1x10x5\" 1x10x5\" 1x10x 5\"        HR      2x20                    Ther Activity                                       Gait Training                                       Modalities                Heat  5' post CP post sitting                                 "

## 2025-04-17 ENCOUNTER — OFFICE VISIT (OUTPATIENT)
Dept: PHYSICAL THERAPY | Facility: REHABILITATION | Age: 55
End: 2025-04-17
Payer: OTHER MISCELLANEOUS

## 2025-04-17 DIAGNOSIS — S83.241D TEAR OF MEDIAL MENISCUS OF RIGHT KNEE, CURRENT, UNSPECIFIED TEAR TYPE, SUBSEQUENT ENCOUNTER: Primary | ICD-10-CM

## 2025-04-17 PROCEDURE — 97112 NEUROMUSCULAR REEDUCATION: CPT | Performed by: PHYSICAL THERAPIST

## 2025-04-17 PROCEDURE — 97110 THERAPEUTIC EXERCISES: CPT | Performed by: PHYSICAL THERAPIST

## 2025-04-17 NOTE — PROGRESS NOTES
"Daily Note     Today's date: 2025  Patient name: Luis Fernando Jay  : 1970  MRN: [id number]  Referring provider: Paola Melo PA-C  Dx:   Encounter Diagnosis     ICD-10-CM    1. Tear of medial meniscus of right knee, current, unspecified tear type, subsequent encounter  S83.241D           Start Time: 1635  Stop Time: 1710  Total time in clinic (min): 35 minutes    Subjective: Patient reports that he was able to get onto his home bike more, get full revolutions.       Objective: See treatment diary below      Assessment: Tolerated treatment well. Patient is progressing with his exercises tolerance along with knee ROM. Flexion is slow to progress as he still does have some ERP. Worked in quad loading today with knee ext and leg press, functionally with step ups. No increases in pain or soreness post session.       Plan: Continue per plan of care.      Precautions: HTN, DM, post op medial menisectomy 3/19/25      Manuals  3/31 4/3 4/7 4/9 4/14 4/17      STM/TPR  Distal quad Distal quad/hip add  AFB Distal quad/hip add, distal ITB- MB Distal quad/hip add  AFB  Quad-static and with knee ext prom         Thera roller distal quad  AFb Thera roller quad- MB                                   Neuro Re-Ed             WS to R with quad set 10x  5\"x10 Reviewed          SLS foam      5\"x10 5\"x15                                                                       Ther Ex             PROM      10' flexion       Heel slides 15x  5\"    2x10 5\"        VG  L6 5' L4   2x10 L4 2x10 L4 2x10        Bike  5' rocking np   Rocking  10' Rocking and ful rev (reverse)-10'      SLR             Leg press      40# SL  3x10 100#  DL  3x10      LAQ  2x10    Ext machine-20# DL  3x10 Ext machine 35#  DL  3x10      Quad sets  2x10 5\" 1x10x5\" 1x10x5\" 1x10x 5\"        HR      2x20       TKE       BJB-20x      Ther Activity                                       Gait Training                                       Modalities               "  Heat  5' post CP post sitting

## 2025-04-21 ENCOUNTER — OFFICE VISIT (OUTPATIENT)
Dept: PHYSICAL THERAPY | Facility: REHABILITATION | Age: 55
End: 2025-04-21
Payer: OTHER MISCELLANEOUS

## 2025-04-21 DIAGNOSIS — S83.241D TEAR OF MEDIAL MENISCUS OF RIGHT KNEE, CURRENT, UNSPECIFIED TEAR TYPE, SUBSEQUENT ENCOUNTER: Primary | ICD-10-CM

## 2025-04-21 PROCEDURE — 97112 NEUROMUSCULAR REEDUCATION: CPT | Performed by: PHYSICAL THERAPIST

## 2025-04-21 PROCEDURE — 97140 MANUAL THERAPY 1/> REGIONS: CPT | Performed by: PHYSICAL THERAPIST

## 2025-04-21 PROCEDURE — 97110 THERAPEUTIC EXERCISES: CPT | Performed by: PHYSICAL THERAPIST

## 2025-04-21 NOTE — PROGRESS NOTES
"Daily Note     Today's date: 2025  Patient name: Luis Fernando Jya  : 1970  MRN: [id number]  Referring provider: Paola Melo PA-C  Dx:   Encounter Diagnosis     ICD-10-CM    1. Tear of medial meniscus of right knee, current, unspecified tear type, subsequent encounter  S83.241D           Start Time: 1520  Stop Time: 1600  Total time in clinic (min): 40 minutes    Subjective: Patient reports that his R knee has been improving but his L knee is starting to bother him.       Objective: See treatment diary below      Assessment: Tolerated treatment well. Patient performs steps today but HR required. We discussed continued exercises, getting to the gym 1-2 more times/week. Will work in kneeling next week as for work simulation.      Plan: Continue per plan of care.      Precautions: HTN, DM, post op medial menisectomy 3/19/25      Manuals  3/31 4/3 4/7 4/9 4/14 4/17 4/21     STM/TPR  Distal quad Distal quad/hip add  AFB Distal quad/hip add, distal ITB- MB Distal quad/hip add  AFB  Quad-static and with knee ext prom Quad-AB        Thera roller distal quad  AFb Thera roller quad- MB                                   Neuro Re-Ed             WS to R with quad set 10x  5\"x10 Reviewed          SLS foam      5\"x10 5\"x15      Steps        FF  X2  HRx2                                                         Ther Ex             PROM      10' flexion       Heel slides 15x  5\"    2x10 5\"        VG  L6 5' L4   2x10 L4 2x10 L4 2x10   L5-3'     Bike  5' rocking np   Rocking  10' Rocking and ful rev (reverse)-10' Ful rev 5'     SLR             Leg press      40# SL  3x10 100#  DL  3x10 100#  DL  3x10     LAQ  2x10    Ext machine-20# DL  3x10 Ext machine 35#  DL  3x10 Ext machine 35#  DL  3x10     Quad sets  2x10 5\" 1x10x5\" 1x10x5\" 1x10x 5\"        HR      2x20       TKE       BJB-20x BJB-20x     Ther Activity                                       Gait Training                                       Modalities       "          Heat  5' post CP post sitting

## 2025-04-22 ENCOUNTER — OFFICE VISIT (OUTPATIENT)
Dept: CARDIOLOGY CLINIC | Facility: CLINIC | Age: 55
End: 2025-04-22
Payer: COMMERCIAL

## 2025-04-22 VITALS
WEIGHT: 315 LBS | BODY MASS INDEX: 40.43 KG/M2 | HEART RATE: 72 BPM | DIASTOLIC BLOOD PRESSURE: 62 MMHG | SYSTOLIC BLOOD PRESSURE: 112 MMHG | HEIGHT: 74 IN

## 2025-04-22 DIAGNOSIS — E78.49 OTHER HYPERLIPIDEMIA: ICD-10-CM

## 2025-04-22 DIAGNOSIS — J43.8 OTHER EMPHYSEMA (HCC): ICD-10-CM

## 2025-04-22 DIAGNOSIS — G47.33 OSA (OBSTRUCTIVE SLEEP APNEA): ICD-10-CM

## 2025-04-22 DIAGNOSIS — F17.211 CIGARETTE NICOTINE DEPENDENCE IN REMISSION: ICD-10-CM

## 2025-04-22 DIAGNOSIS — I48.91 ATRIAL FIBRILLATION WITH RVR (HCC): Primary | ICD-10-CM

## 2025-04-22 DIAGNOSIS — E11.69 TYPE 2 DIABETES MELLITUS WITH OTHER SPECIFIED COMPLICATION, UNSPECIFIED WHETHER LONG TERM INSULIN USE (HCC): ICD-10-CM

## 2025-04-22 DIAGNOSIS — K76.0 FATTY LIVER: ICD-10-CM

## 2025-04-22 DIAGNOSIS — E66.813 CLASS 3 SEVERE OBESITY DUE TO EXCESS CALORIES WITHOUT SERIOUS COMORBIDITY WITH BODY MASS INDEX (BMI) OF 50.0 TO 59.9 IN ADULT: ICD-10-CM

## 2025-04-22 LAB
ATRIAL RATE: 70 BPM
P AXIS: 73 DEGREES
PR INTERVAL: 160 MS
QRS AXIS: 80 DEGREES
QRSD INTERVAL: 88 MS
QT INTERVAL: 464 MS
QTC INTERVAL: 501 MS
T WAVE AXIS: 69 DEGREES
VENTRICULAR RATE: 70 BPM

## 2025-04-22 PROCEDURE — 99214 OFFICE O/P EST MOD 30 MIN: CPT | Performed by: INTERNAL MEDICINE

## 2025-04-22 PROCEDURE — 93000 ELECTROCARDIOGRAM COMPLETE: CPT | Performed by: INTERNAL MEDICINE

## 2025-04-22 NOTE — PROGRESS NOTES
Consultation - Electrophysiology-Cardiology (EP)   Luis Fernando Jay 55 y.o. male MRN: [id number]  Unit/Bed#:  Encounter: [id number]      1. Atrial fibrillation with RVR (HCC)  POCT ECG    CANCELED: POCT ECG      2. Other emphysema (HCC)        3. ASIM (obstructive sleep apnea)        4. Fatty liver        5. Type 2 diabetes mellitus with other specified complication, unspecified whether long term insulin use (HCC)        6. Cigarette nicotine dependence in remission        7. Class 3 severe obesity due to excess calories without serious comorbidity with body mass index (BMI) of 50.0 to 59.9 in adult        8. Other hyperlipidemia            Follow up of A fib/ A flutter       Summary of my recommendation    Patient is currently in sinus rhythm, QT of 440 ms and QTc of 480 ms, maintained on dofetilide 500 mcg twice daily and apixaban    Multiple risk factors putting him at a high risk of recurrence  Age 54  Morbid obesity with BMI 51  ASIM not treated as yet  Diabetes  Hypertension     At the current time my recommendation for this patient    -Patient supposed to get CPAP on 7 January, 2025    - If remains in sinus rhythm, proceed with aggressive weight loss regimen  Ozempic  Dietary change  Aim for a BMI less than 40    -Once risk factors better controlled we will plan for comprehensive ablation of atrial fibrillation and flutter    -If patient is back in A-fib/a flutter  We will need to discontinue dofetilide  Start the patient on amiodarone load 400 mg twice daily for 2 weeks and then 200 mg daily  And then plan repeat cardioversion    -If none of these work then we will have to proceed with   further rate control - higher dose of metoprolol and possible digoxin  Then proceed with ablation once there is some weight loss    -f/u with PA/ me in 9 months     Stressed numerous times the importance of losing weight-without that survival is going to be cut short        Clinical conditions  A-fib with RVR  Long-term  anticoagulation  Recent cardioversion on December 9, back in A-fib  Skin soreness from 4 DC shocks applied  Morbid obesity, BMI 51  Obstructive sleep apnea  Diabetes mellitus  Right knee pain  Emphysema            Assessment & Plan     A-fib with RVR  Long-term anticoagulation  Multiple cardioversion failure  Currently in sinus rhythm on dofetilide    Patient is currently in sinus rhythm, QT of 440 ms and QTc of 480 ms, maintained on dofetilide 500 mcg twice daily and apixaban    Multiple risk factors putting him at a high risk of recurrence  Age 54  Morbid obesity with BMI 51  ASIM not treated as yet  Diabetes  Hypertension     At the current time my recommendation for this patient    -Patient supposed to get CPAP on 7 January, 2025    - If remains in sinus rhythm, proceed with aggressive weight loss regimen  Ozempic  Dietary change  Aim for a BMI less than 40    -Once risk factors better controlled we will plan for comprehensive ablation of atrial fibrillation and flutter    -If patient is back in A-fib/a flutter  We will need to discontinue dofetilide  Start the patient on amiodarone load 400 mg twice daily for 2 weeks and then 200 mg daily  And then plan repeat cardioversion    -If none of these work then we will have to proceed with   further rate control - higher dose of metoprolol and possible digoxin  Then proceed with ablation once there is some weight loss    -f/u with PA/ me in 9 months     Stressed numerous times the importance of losing weight-without that survival is going to be cut short          Morbid obesity, BMI 51  And has been started on Ozempic/semaglutide  Dietary recommendations made  Aggressive weight management is needed for success with ablation        Obstructive sleep apnea  Using it      Diabetes mellitus  Patient is on metformin and semaglutide      Right knee pain  Follows up with orthopedics      Emphysema  Chronic and longstanding      History of Present Illness   HPI: Luis Fernando Jay  is a 55 y.o. year old male has been referred to me by Dr Pantoja for the evaluation and management of atrial fibrillation and flutter with RVR    Patient informs that it was Easter, he did not keep his dietary restrictions and his  BMI continues to be 51    The patient has significant medical illnesses which include  A-fib with RVR  Long-term anticoagulation  Recent cardioversion on December 9, back in A-fib  Skin soreness from 4 DC shocks applied  Morbid obesity, BMI 51  Obstructive sleep apnea  Diabetes mellitus  Right knee pain  Emphysema    Prior to first hospital visit  Mr Luis Fernando Jay was admitted to [facility] on 11/05 - 11/07/24 with syncope and collapse.  He presented to [facility] emergency room with an episode of syncope while having a bowel movement.  He struck his head on the sink and fell to the side of the shower.  Reported a headache shortness of breath and left-sided rib pain as well as lower abdominal pain.  Arrival in the emergency room found to be tachycardic heart rate 120s.  EKG showed atrial fibrillation with RVR.  CT of the chest abdomen pelvis showed no acute fracture,  CT showed mastoiditis.  He was started on metoprolol succinate 25 mg twice daily, Eliquis 5 mg twice daily for stroke prevention.  It was recommended he undergo outpatient sleep study to rule out ASIM.  Discussed also cardioversion versus rhythm control.       Hospital visit and follow-up  This patient was in persistent atrial fibrillation - DCCV failed  On dofetilide 500 mcg twice daily  Currently in rhythm      Chest pain or pressure, worsening or apnea or PND  Does have chronic leg swelling  He does have occasional palpitation  He is not complaining of presyncope or syncope  He does have orthostatic intolerance  He also has exertional intolerance    He has a history of snoring, morning fatigue and daytime sleepiness    He is not abusing tobacco alcohol or energy drinks            Historical Information    Past Medical History:   Diagnosis Date    COPD (chronic obstructive pulmonary disease) (HCC)     CPAP (continuous positive airway pressure) dependence     Diabetes mellitus (HCC)     GERD (gastroesophageal reflux disease)     Lung mass     Obesity     Sleep apnea      Past Surgical History:   Procedure Laterality Date    CARDIOVERSION      OH ARTHRS KNE SURG W/MENISCECTOMY MED/LAT W/SHVG Right 3/19/2025    Procedure: MENISCECTOMY LATERAL /MEDIAL;  Surgeon: Ruiz Juarez MD;  Location: [facility];  Service: Orthopedics     Social History     Substance and Sexual Activity   Alcohol Use Not Currently     Social History     Substance and Sexual Activity   Drug Use Never     Social History     Tobacco Use   Smoking Status Former    Current packs/day: 0.00    Average packs/day: 3.0 packs/day for 40.3 years (121.0 ttl pk-yrs)    Types: Cigarettes    Start date:     Quit date: 2019    Years since quittin.9   Smokeless Tobacco Never     Social History     Socioeconomic History    Marital status: /Civil Union     Spouse name: Not on file    Number of children: Not on file    Years of education: Not on file    Highest education level: Not on file   Occupational History    Not on file   Tobacco Use    Smoking status: Former     Current packs/day: 0.00     Average packs/day: 3.0 packs/day for 40.3 years (121.0 ttl pk-yrs)     Types: Cigarettes     Start date:      Quit date: 2019     Years since quittin.9    Smokeless tobacco: Never   Vaping Use    Vaping status: Never Used   Substance and Sexual Activity    Alcohol use: Not Currently    Drug use: Never    Sexual activity: Not Currently   Other Topics Concern    Not on file   Social History Narrative    Drinks coffee 1 cup per day         Works in [place] - works at the [employer]     Social Drivers of Health     Financial Resource Strain: Not on file   Food Insecurity: No Food Insecurity (2024)    Nursing - Inadequate  "Food Risk Classification     Worried About Running Out of Food in the Last Year: Not on file     Ran Out of Food in the Last Year: Not on file     Ran Out of Food in the Last Year: Never true   Transportation Needs: No Transportation Needs (2024)    Nursing - Transportation Risk Classification     Lack of Transportation: Not on file     Lack of Transportation: No   Physical Activity: Not on file   Stress: Not on file   Social Connections: Not on file   Intimate Partner Violence: Unknown (2024)    Nursing IPS     Feels Physically and Emotionally Safe: Not on file     Physically Hurt by Someone: Not on file     Humiliated or Emotionally Abused by Someone: Not on file     Physically Hurt by Someone: No     Hurt or Threatened by Someone: No   Housing Stability: Unknown (2024)    Nursing: Inadequate Housing Risk Classification     Has Housing: Not on file     Worried About Losing Housing: Not on file     Unable to Get Utilities: Not on file     Unable to Pay for Housing in the Last Year: No     Has Housin     .  Family History:  Family History   Problem Relation Age of Onset    No Known Problems Mother     Tuberculosis Father     Atrial fibrillation Father 65    Lung cancer Paternal Uncle     Alcohol abuse Neg Hx     Substance Abuse Neg Hx     Mental illness Neg Hx     Depression Neg Hx          Meds/Allergies    No current facility-administered medications for this visit.     Not in a hospital admission.    No Known Allergies        Objective   Vitals: Visit Vitals  /62 (Patient Position: Sitting, Cuff Size: Standard)   Pulse 72   Ht 6' 2\" (1.88 m)   Wt (!) 178 kg (393 lb 8 oz)   BMI 50.52 kg/m²   Smoking Status Former   BSA 2.89 m²      Vitals:    25 1423   Weight: (!) 178 kg (393 lb 8 oz)   [unfilled]    Invasive Devices       None                     ROS  Review of Systems   All other systems reviewed and are negative.  As described in my history of present illness        PHYSICAL " EXAM  Physical Exam  Vitals reviewed.   Constitutional:       General: He is not in acute distress.     Appearance: Normal appearance. He is obese. He is not ill-appearing.      Comments: Morbid obesity  BMI is 51   HENT:      Head: Normocephalic and atraumatic.      Right Ear: External ear normal.      Left Ear: External ear normal.      Nose: Nose normal.      Mouth/Throat:      Comments: Posterior pharynx is crowded  Eyes:      General: No scleral icterus.     Extraocular Movements: Extraocular movements intact.      Conjunctiva/sclera: Conjunctivae normal.      Pupils: Pupils are equal, round, and reactive to light.   Neck:      Comments: Large thick neck  Cardiovascular:      Rate and Rhythm: Normal rate and regular rhythm.      Heart sounds: Normal heart sounds. No murmur heard.     No gallop.   Pulmonary:      Effort: No respiratory distress.      Breath sounds: Examination of the right-lower field reveals decreased breath sounds. Examination of the left-lower field reveals decreased breath sounds. Decreased breath sounds present. No wheezing.   Abdominal:      General: Bowel sounds are normal. There is no distension.      Palpations: Abdomen is soft.      Tenderness: There is no abdominal tenderness. There is no guarding.      Comments: And central obesity   Musculoskeletal:         General: Swelling present. No deformity.      Cervical back: Neck supple. No rigidity.      Right lower leg: Edema present.      Left lower leg: Edema present.   Skin:     Coloration: Skin is not jaundiced.      Findings: No bruising or lesion.   Neurological:      Mental Status: He is alert and oriented to person, place, and time. Mental status is at baseline.      Motor: No weakness.   Psychiatric:         Mood and Affect: Mood normal.         Behavior: Behavior normal.         Thought Content: Thought content normal.         Judgment: Judgment normal.               LAB RESULTS:    CBC:  WBC   Date Value Ref Range Status    02/07/2025 7.95 4.31 - 10.16 Thousand/uL Final     Hemoglobin   Date Value Ref Range Status   02/07/2025 15.1 12.0 - 17.0 g/dL Final     Hematocrit   Date Value Ref Range Status   02/07/2025 45.8 36.5 - 49.3 % Final     MCV   Date Value Ref Range Status   02/07/2025 94 82 - 98 fL Final     Platelets   Date Value Ref Range Status   02/07/2025 230 149 - 390 Thousands/uL Final     RBC   Date Value Ref Range Status   02/07/2025 4.89 3.88 - 5.62 Million/uL Final     MCH   Date Value Ref Range Status   02/07/2025 30.9 26.8 - 34.3 pg Final     MCHC   Date Value Ref Range Status   02/07/2025 33.0 31.4 - 37.4 g/dL Final     RDW   Date Value Ref Range Status   02/07/2025 13.4 11.6 - 15.1 % Final     MPV   Date Value Ref Range Status   02/07/2025 9.4 8.9 - 12.7 fL Final     nRBC   Date Value Ref Range Status   02/07/2025 0 /100 WBCs Final       CMP:  Potassium   Date Value Ref Range Status   03/14/2025 3.8 3.5 - 5.3 mmol/L Final     Chloride   Date Value Ref Range Status   03/14/2025 105 96 - 108 mmol/L Final     CO2   Date Value Ref Range Status   03/14/2025 26 21 - 32 mmol/L Final     CO2, i-STAT   Date Value Ref Range Status   04/22/2019 23 21 - 32 mmol/L Final     BUN   Date Value Ref Range Status   03/14/2025 15 5 - 25 mg/dL Final     Creatinine   Date Value Ref Range Status   03/14/2025 0.68 0.60 - 1.30 mg/dL Final     Comment:     Standardized to IDMS reference method     Glucose, i-STAT   Date Value Ref Range Status   04/22/2019 163 (H) 65 - 140 mg/dl Final     Calcium   Date Value Ref Range Status   03/14/2025 9.2 8.4 - 10.2 mg/dL Final     AST   Date Value Ref Range Status   03/14/2025 34 13 - 39 U/L Final     ALT   Date Value Ref Range Status   03/14/2025 60 (H) 7 - 52 U/L Final     Comment:     Specimen collection should occur prior to Sulfasalazine administration due to the potential for falsely depressed results.      Alkaline Phosphatase   Date Value Ref Range Status   03/14/2025 103 34 - 104 U/L Final      eGFR   Date Value Ref Range Status   03/14/2025 106 ml/min/1.73sq m Final   04/22/2019 87 ml/min/1.73sq m Final        Magnesium:   Magnesium   Date Value Ref Range Status   01/13/2025 1.8 (L) 1.9 - 2.7 mg/dL Final        A1C:  Hemoglobin A1C   Date Value Ref Range Status   02/07/2025 5.3 Normal 4.0-5.6%; PreDiabetic 5.7-6.4%; Diabetic >=6.5%; Glycemic control for adults with diabetes <7.0% % Final        TSH:  TSH 3RD GENERATON   Date Value Ref Range Status   11/06/2024 2.224 0.450 - 4.500 uIU/mL Final     Comment:     Adult TSH (3rd generation) reference range follows the recommended guidelines of the American Thyroid Association, January, 2020.        PT/INR:  Protime   Date Value Ref Range Status   11/06/2024 15.2 (H) 12.3 - 15.0 seconds Final     INR   Date Value Ref Range Status   11/06/2024 1.17 0.85 - 1.19 Final       Lipid Panel:  Cholesterol   Date Value Ref Range Status   02/07/2025 170 See Comment mg/dL Final     Comment:     Cholesterol:         Pediatric <18 Years        Desirable          <170 mg/dL      Borderline High    170-199 mg/dL      High               >=200 mg/dL        Adult >=18 Years            Desirable        <200 mg/dL      Borderline High  200-239 mg/dL      High             >239 mg/dL       Triglycerides   Date Value Ref Range Status   02/07/2025 135 See Comment mg/dL Final     Comment:     Triglyceride:     0-9Y            <75mg/dL     10Y-17Y         <90 mg/dL       >=18Y     Normal          <150 mg/dL     Borderline High 150-199 mg/dL     High            200-499 mg/dL        Very High       >499 mg/dL    Specimen collection should occur prior to Metamizole administration due to the potential for falsely depressed results.     HDL, Direct   Date Value Ref Range Status   02/07/2025 36 (L) >=40 mg/dL Final     Non-HDL-Chol (CHOL-HDL)   Date Value Ref Range Status   02/07/2025 134 mg/dl Final       Troponin:  Troponin I   Date Value Ref Range Status   04/23/2019 <0.02 <=0.04 ng/mL  Final     Comment:       [employer] Chemistry analyzer 99% cutoff is > 0.04 ng/mL in network labs     o cTnI 99% cutoff is useful only when applied to patients in the clinical setting of myocardial ischemia   o cTnI 99% cutoff should be interpreted in the context of clinical history, ECG findings and possibly cardiac imaging to establish correct diagnosis.   o cTnI 99% cutoff may be suggestive but clearly not indicative of a coronary event without the clinical setting of myocardial ischemia.           Imaging:    [id number] - Atrial fibrillation         MEI:  Results for orders placed during the hospital encounter of 24    MEI    Interpretation Summary    Left Ventricle: Left ventricular cavity size is normal. Wall thickness is normal. The left ventricular ejection fraction is 55%. Systolic function is normal. Wall motion is normal.    Right Ventricle: Right ventricular cavity size is dilated. Systolic function is normal.    Left Atrium: The atrium is dilated.    Right Atrium: The atrium is dilated.    Atrial Septum: No obvious patent foramen ovale detected, confirmed at rest using color doppler.    Left Atrial Appendage: Left atrial appendage is dilated. There is normal function. There is no thrombus.    Mitral Valve: There is mild regurgitation.      Echocardiogram:  Results for orders placed during the hospital encounter of 24    Echo complete w/ contrast if indicated    Interpretation Summary    Left Ventricle: Left ventricular cavity size is normal. Wall thickness is mildly increased. There is mild concentric hypertrophy. The left ventricular ejection fraction is 60-65% by visual estimation. Systolic function is normal. Although no diagnostic regional wall motion abnormality was identified, this possibility cannot be completely excluded on the basis of this study. Unable to assess diastolic function due to atrial fibrillation.    Right Ventricle: Right ventricular cavity size is moderately  dilated. Systolic function is normal.    Right Atrium: The atrium is moderately dilated.    Very technically difficult study.      Stress Test:   No results found for this or any previous visit.      Cardiac Catheterization:  No results found for this or any previous visit.      HOLTER MONITOR: 24 HOUR/48 HOUR MONITORS  No results found for this or any previous visit.      AMB Extended Holter Monitor: Zio XT/AT or BioTel  No results found for this or any previous visit.      Cardioversion  12/09/2024    Result Text  Electrical Cardioversion Note     Indication: atrial fibrillation  Anticoagulation: apixaban (patient missed a dose three days prior, MEI ordered)  Sedation provided by anesthesia team  MEI findings: no intracardiac thrombus, further findings in MEI report.   Pad placement: anteroposterior  Successful cardioversion after the fourth shock. First three shocks were unsuccessful and patient remained in atrial fibrillation.   Fist shock unsuccessful Anterolateral pad placement with 275 J  Second shock successful anterolateral pad placement with 360 J  Third shock unsuccessful anteroposterior pad placement with 360 J  Fourth shock successful anteroposterior pad placement with 360 J.   Sinus rhythm confirmed by EKG     Ashok Dominique MD, cardiology fellow.      Pre Cardioversion EKG  12/09/2024      Post Cardioversion EKG  12/09/2024        Sleep Study:    Home Study  12/20/2024    IMPRESSION:  1.  Moderate obstructive sleep apnea with an GERSON of 25.1 events per hour.   2.  Baseline oxygenation adequate.  Respiratory event related hypoxemia with oxygen saturation less than 90% for 61.8 minutes. Oxygen tatyana of 74%.       RECOMMENDATION:  Recommend auto CPAP with pressure range of 5-20 cm H20.  Compliance check 1-2 months after starting CPAP.  Recommend consultation with sleep medicine.        DEVICE CHECK:     No results found for this or any previous visit.         Code Status: [unfilled]  Advance Directive  and Living Will:      Power of :    POLST:      Counseling / Coordination of Care    Detailed discussion done about CPAP, cardioversion on dofetilide, need for urgent weight loss and then only there is a favorable prognosis      Raghu Mckinney MD

## 2025-04-22 NOTE — LETTER
April 27, 2025     Serenity Márquez MD  [address] [facility]  [address]  [facility] [address]    Patient: Luis Fernando Jay   YOB: 1970   Date of Visit: 4/22/2025       Dear MD Luis Fernando Hatfield KESHAWN MARISEL Matos:    Thank you for referring Luis Fernando Jay to me for evaluation. Below are my notes for this consultation.    If you have questions, please do not hesitate to call me. I look forward to following your patient along with you.         Sincerely,        Raghu Mckinney MD        CC: MARISEL Pham MD  4/27/2025  7:31 AM  Sign when Signing Visit   Consultation - Electrophysiology-Cardiology (EP)   Luis Fernando Jay 55 y.o. male MRN: [id number]  Unit/Bed#:  Encounter: [id number]      1. Atrial fibrillation with RVR (HCC)  POCT ECG    CANCELED: POCT ECG      2. Other emphysema (HCC)        3. ASIM (obstructive sleep apnea)        4. Fatty liver        5. Type 2 diabetes mellitus with other specified complication, unspecified whether long term insulin use (HCC)        6. Cigarette nicotine dependence in remission        7. Class 3 severe obesity due to excess calories without serious comorbidity with body mass index (BMI) of 50.0 to 59.9 in adult        8. Other hyperlipidemia            Follow up of A fib/ A flutter       Summary of my recommendation    Patient is currently in sinus rhythm, QT of 440 ms and QTc of 480 ms, maintained on dofetilide 500 mcg twice daily and apixaban    Multiple risk factors putting him at a high risk of recurrence  Age 54  Morbid obesity with BMI 51  ASIM not treated as yet  Diabetes  Hypertension     At the current time my recommendation for this patient    -Patient supposed to get CPAP on 7 January, 2025    - If remains in sinus rhythm, proceed with aggressive weight loss regimen  Ozempic  Dietary change  Aim for a BMI less than 40    -Once risk factors better controlled we will plan for comprehensive ablation  of atrial fibrillation and flutter    -If patient is back in A-fib/a flutter  We will need to discontinue dofetilide  Start the patient on amiodarone load 400 mg twice daily for 2 weeks and then 200 mg daily  And then plan repeat cardioversion    -If none of these work then we will have to proceed with   further rate control - higher dose of metoprolol and possible digoxin  Then proceed with ablation once there is some weight loss    -f/u with PA/ me in 9 months     Stressed numerous times the importance of losing weight-without that survival is going to be cut short        Clinical conditions  A-fib with RVR  Long-term anticoagulation  [facility]  Skin soreness from 4 DC shocks applied  Morbid obesity, BMI 51  Obstructive sleep apnea  Diabetes mellitus  Right knee pain  Emphysema            Assessment & Plan     A-fib with RVR  Long-term anticoagulation  Multiple cardioversion failure  Currently in sinus rhythm on dofetilide    Patient is currently in sinus rhythm, QT of 440 ms and QTc of 480 ms, maintained on dofetilide 500 mcg twice daily and apixaban    Multiple risk factors putting him at a high risk of recurrence  Age 54  Morbid obesity with BMI 51  ASIM not treated as yet  Diabetes  Hypertension     At the current time my recommendation for this patient    -Patient supposed to get CPAP on 7 January, 2025    - If remains in sinus rhythm, proceed with aggressive weight loss regimen  Ozempic  Dietary change  Aim for a BMI less than 40    -Once risk factors better controlled we will plan for comprehensive ablation of atrial fibrillation and flutter    -If patient is back in A-fib/a flutter  We will need to discontinue dofetilide  Start the patient on amiodarone load 400 mg twice daily for 2 weeks and then 200 mg daily  And then plan repeat cardioversion    -If none of these work then we will have to proceed with   further rate control - higher dose of metoprolol and possible  digoxin  Then proceed with ablation once there is some weight loss    -f/u with PA/ me in 9 months     Stressed numerous times the importance of losing weight-without that survival is going to be cut short          Morbid obesity, BMI 51  And has been started on Ozempic/semaglutide  Dietary recommendations made  Aggressive weight management is needed for success with ablation        Obstructive sleep apnea  Using it      Diabetes mellitus  Patient is on metformin and semaglutide      Right knee pain  Follows up with orthopedics      Emphysema  Chronic and longstanding      History of Present Illness   HPI: Luis Fernando Jay is a 55 y.o. year old male has been referred to me by Dr Pantoja for the evaluation and management of atrial fibrillation and flutter with RVR    Patient informs that it was Easter, he did not keep his dietary restrictions and his  BMI continues to be 51    The patient has significant medical illnesses which include  A-fib with RVR  Long-term anticoagulation  Recent cardioversion on December 9, back in A-fib  Skin soreness from 4 DC shocks applied  Morbid obesity, BMI 51  Obstructive sleep apnea  Diabetes mellitus  Right knee pain  Emphysema    Prior to first hospital visit  Mr Luis Fernando Jay was admitted to [facility] on 11/05 - 11/07/24 with syncope and collapse.  He presented to [facility] emergency room with an episode of syncope while having a bowel movement.  He struck his head on the sink and fell to the side of the shower.  Reported a headache shortness of breath and left-sided rib pain as well as lower abdominal pain.  Arrival in the emergency room found to be tachycardic heart rate 120s.  EKG showed atrial fibrillation with RVR.  CT of the chest abdomen pelvis showed no acute fracture,  CT showed mastoiditis.  He was started on metoprolol succinate 25 mg twice daily, Eliquis 5 mg twice daily for stroke prevention.  It was recommended he undergo outpatient sleep study to  rule out ASIM.  Discussed also cardioversion versus rhythm control.       Hospital visit and follow-up  This patient was in persistent atrial fibrillation - DCCV failed  On dofetilide 500 mcg twice daily  Currently in rhythm      Chest pain or pressure, worsening or apnea or PND  Does have chronic leg swelling  He does have occasional palpitation  He is not complaining of presyncope or syncope  He does have orthostatic intolerance  He also has exertional intolerance    He has a history of snoring, morning fatigue and daytime sleepiness    He is not abusing tobacco alcohol or energy drinks            Historical Information   Past Medical History:   Diagnosis Date   • COPD (chronic obstructive pulmonary disease) ([facility])    • CPAP (continuous positive airway pressure) dependence    • Diabetes mellitus (HCC)    • GERD (gastroesophageal reflux disease)    • Lung mass    • Obesity    • Sleep apnea      Past Surgical History:   Procedure Laterality Date   • CARDIOVERSION     • HI ARTHRS KNE SURG W/MENISCECTOMY MED/LAT W/SHVG Right 3/19/2025    Procedure: MENISCECTOMY LATERAL /MEDIAL;  Surgeon: Ruzi Juarez MD;  Location:  MAIN OR;  Service: Orthopedics     Social History     Substance and Sexual Activity   Alcohol Use Not Currently     Social History     Substance and Sexual Activity   Drug Use Never     Social History     Tobacco Use   Smoking Status Former   • Current packs/day: 0.00   • Average packs/day: 3.0 packs/day for 40.3 years (121.0 ttl pk-yrs)   • Types: Cigarettes   • Start date:    • Quit date: 2019   • Years since quittin.9   Smokeless Tobacco Never     Social History     Socioeconomic History   • Marital status: /Civil Union     Spouse name: Not on file   • Number of children: Not on file   • Years of education: Not on file   • Highest education level: Not on file   Occupational History   • Not on file   Tobacco Use   • Smoking status: Former     Current packs/day: 0.00     Average  packs/day: 3.0 packs/day for 40.3 years (121.0 ttl pk-yrs)     Types: Cigarettes     Start date:      Quit date: 2019     Years since quittin.9   • Smokeless tobacco: Never   Vaping Use   • Vaping status: Never Used   Substance and Sexual Activity   • Alcohol use: Not Currently   • Drug use: Never   • Sexual activity: Not Currently   Other Topics Concern   • Not on file   Social History Narrative    Drinks coffee 1 cup per day         Works in [place] - works at the [employer]     Social Drivers of Health     Financial Resource Strain: Not on file   Food Insecurity: No Food Insecurity (2024)    Nursing - Inadequate Food Risk Classification    • Worried About Running Out of Food in the Last Year: Not on file    • Ran Out of Food in the Last Year: Not on file    • Ran Out of Food in the Last Year: Never true   Transportation Needs: No Transportation Needs (2024)    Nursing - Transportation Risk Classification    • Lack of Transportation: Not on file    • Lack of Transportation: No   Physical Activity: Not on file   Stress: Not on file   Social Connections: Not on file   Intimate Partner Violence: Unknown (2024)    Nursing IPS    • Feels Physically and Emotionally Safe: Not on file    • Physically Hurt by Someone: Not on file    • Humiliated or Emotionally Abused by Someone: Not on file    • Physically Hurt by Someone: No    • Hurt or Threatened by Someone: No   Housing Stability: Unknown (2024)    Nursing: Inadequate Housing Risk Classification    • Has Housing: Not on file    • Worried About Losing Housing: Not on file    • Unable to Get Utilities: Not on file    • Unable to Pay for Housing in the Last Year: No    • Has Housin     .  Family History:  Family History   Problem Relation Age of Onset   • No Known Problems Mother    • Tuberculosis Father    • Atrial fibrillation Father 65   • Lung cancer Paternal Uncle    • Alcohol abuse Neg Hx    • Substance Abuse  "Neg Hx    • Mental illness Neg Hx    • Depression Neg Hx          Meds/Allergies    No current facility-administered medications for this visit.     Not in a hospital admission.    No Known Allergies        Objective   Vitals: Visit Vitals  /62 (Patient Position: Sitting, Cuff Size: Standard)   Pulse 72   Ht 6' 2\" (1.88 m)   Wt (!) 178 kg (393 lb 8 oz)   BMI 50.52 kg/m²   Smoking Status Former   BSA 2.89 m²      Vitals:    04/22/25 1423   Weight: (!) 178 kg (393 lb 8 oz)   [unfilled]    Invasive Devices       None                     ROS  Review of Systems   All other systems reviewed and are negative.  As described in my history of present illness        PHYSICAL EXAM  Physical Exam  Vitals reviewed.   Constitutional:       General: He is not in acute distress.     Appearance: Normal appearance. He is obese. He is not ill-appearing.      Comments: Morbid obesity  BMI is 51   HENT:      Head: Normocephalic and atraumatic.      Right Ear: External ear normal.      Left Ear: External ear normal.      Nose: Nose normal.      Mouth/Throat:      Comments: Posterior pharynx is crowded  Eyes:      General: No scleral icterus.     Extraocular Movements: Extraocular movements intact.      Conjunctiva/sclera: Conjunctivae normal.      Pupils: Pupils are equal, round, and reactive to light.   Neck:      Comments: Large thick neck  Cardiovascular:      Rate and Rhythm: Normal rate and regular rhythm.      Heart sounds: Normal heart sounds. No murmur heard.     No gallop.   Pulmonary:      Effort: No respiratory distress.      Breath sounds: Examination of the right-lower field reveals decreased breath sounds. Examination of the left-lower field reveals decreased breath sounds. Decreased breath sounds present. No wheezing.   Abdominal:      General: Bowel sounds are normal. There is no distension.      Palpations: Abdomen is soft.      Tenderness: There is no abdominal tenderness. There is no guarding.      Comments: And " General Sunscreen Counseling: I recommended a broad spectrum sunscreen with a SPF of 30 or higher.  I explained that SPF 30 sunscreens block approximately 97 percent of the sun's harmful rays.  Sunscreens should be applied at least 15 minutes prior to expected sun exposure and then every 2 hours after that as long as sun exposure continues. If swimming or exercising sunscreen should be reapplied every 45 minutes to an hour after getting wet or sweating.  One ounce, or the equivalent of a shot glass full of sunscreen, is adequate to protect the skin not covered by a bathing suit. I also recommended a lip balm with a sunscreen as well. Sun protective clothing can be used in lieu of sunscreen but must be worn the entire time you are exposed to the sun's rays. Detail Level: Detailed central obesity   Musculoskeletal:         General: Swelling present. No deformity.      Cervical back: Neck supple. No rigidity.      Right lower leg: Edema present.      Left lower leg: Edema present.   Skin:     Coloration: Skin is not jaundiced.      Findings: No bruising or lesion.   Neurological:      Mental Status: He is alert and oriented to person, place, and time. Mental status is at baseline.      Motor: No weakness.   Psychiatric:         Mood and Affect: Mood normal.         Behavior: Behavior normal.         Thought Content: Thought content normal.         Judgment: Judgment normal.               LAB RESULTS:    CBC:  WBC   Date Value Ref Range Status   02/07/2025 7.95 4.31 - 10.16 Thousand/uL Final     Hemoglobin   Date Value Ref Range Status   02/07/2025 15.1 12.0 - 17.0 g/dL Final     Hematocrit   Date Value Ref Range Status   02/07/2025 45.8 36.5 - 49.3 % Final     MCV   Date Value Ref Range Status   02/07/2025 94 82 - 98 fL Final     Platelets   Date Value Ref Range Status   02/07/2025 230 149 - 390 Thousands/uL Final     RBC   Date Value Ref Range Status   02/07/2025 4.89 3.88 - 5.62 Million/uL Final     MCH   Date Value Ref Range Status   02/07/2025 30.9 26.8 - 34.3 pg Final     MCHC   Date Value Ref Range Status   02/07/2025 33.0 31.4 - 37.4 g/dL Final     RDW   Date Value Ref Range Status   02/07/2025 13.4 11.6 - 15.1 % Final     MPV   Date Value Ref Range Status   02/07/2025 9.4 8.9 - 12.7 fL Final     nRBC   Date Value Ref Range Status   02/07/2025 0 /100 WBCs Final       CMP:  Potassium   Date Value Ref Range Status   03/14/2025 3.8 3.5 - 5.3 mmol/L Final     Chloride   Date Value Ref Range Status   03/14/2025 105 96 - 108 mmol/L Final     CO2   Date Value Ref Range Status   03/14/2025 26 21 - 32 mmol/L Final     CO2, i-STAT   Date Value Ref Range Status   04/22/2019 23 21 - 32 mmol/L Final     BUN   Date Value Ref Range Status   03/14/2025 15 5 - 25 mg/dL Final     Creatinine   Date Value  Products Recommended: Elta MD UV Clear and Brush on Block Ref Range Status   03/14/2025 0.68 0.60 - 1.30 mg/dL Final     Comment:     Standardized to IDMS reference method     Glucose, i-STAT   Date Value Ref Range Status   04/22/2019 163 (H) 65 - 140 mg/dl Final     Calcium   Date Value Ref Range Status   03/14/2025 9.2 8.4 - 10.2 mg/dL Final     AST   Date Value Ref Range Status   03/14/2025 34 13 - 39 U/L Final     ALT   Date Value Ref Range Status   03/14/2025 60 (H) 7 - 52 U/L Final     Comment:     Specimen collection should occur prior to Sulfasalazine administration due to the potential for falsely depressed results.      Alkaline Phosphatase   Date Value Ref Range Status   03/14/2025 103 34 - 104 U/L Final     eGFR   Date Value Ref Range Status   03/14/2025 106 ml/min/1.73sq m Final   04/22/2019 87 ml/min/1.73sq m Final        Magnesium:   Magnesium   Date Value Ref Range Status   01/13/2025 1.8 (L) 1.9 - 2.7 mg/dL Final        A1C:  Hemoglobin A1C   Date Value Ref Range Status   02/07/2025 5.3 Normal 4.0-5.6%; PreDiabetic 5.7-6.4%; Diabetic >=6.5%; Glycemic control for adults with diabetes <7.0% % Final        TSH:  TSH 3RD GENERATON   Date Value Ref Range Status   11/06/2024 2.224 0.450 - 4.500 uIU/mL Final     Comment:     Adult TSH (3rd generation) reference range follows the recommended guidelines of the American Thyroid Association, January, 2020.        PT/INR:  Protime   Date Value Ref Range Status   11/06/2024 15.2 (H) 12.3 - 15.0 seconds Final     INR   Date Value Ref Range Status   11/06/2024 1.17 0.85 - 1.19 Final       Lipid Panel:  Cholesterol   Date Value Ref Range Status   02/07/2025 170 See Comment mg/dL Final     Comment:     Cholesterol:         Pediatric <18 Years        Desirable          <170 mg/dL      Borderline High    170-199 mg/dL      High               >=200 mg/dL        Adult >=18 Years            Desirable        <200 mg/dL      Borderline High  200-239 mg/dL      High             >239 mg/dL       Triglycerides   Date Value Ref Range  Status   2025 135 See Comment mg/dL Final     Comment:     Triglyceride:     0-9Y            <75mg/dL     10Y-17Y         <90 mg/dL       >=18Y     Normal          <150 mg/dL     Borderline High 150-199 mg/dL     High            200-499 mg/dL        Very High       >499 mg/dL    Specimen collection should occur prior to Metamizole administration due to the potential for falsely depressed results.     HDL, Direct   Date Value Ref Range Status   2025 36 (L) >=40 mg/dL Final     Non-HDL-Chol (CHOL-HDL)   Date Value Ref Range Status   2025 134 mg/dl Final       Troponin:  Troponin I   Date Value Ref Range Status   2019 <0.02 <=0.04 ng/mL Final     Comment:       S[employer]ens Chemistry analyzer 99% cutoff is > 0.04 ng/mL in network labs     o cTnI 99% cutoff is useful only when applied to patients in the clinical setting of myocardial ischemia   o cTnI 99% cutoff should be interpreted in the context of clinical history, ECG findings and possibly cardiac imaging to establish correct diagnosis.   o cTnI 99% cutoff may be suggestive but clearly not indicative of a coronary event without the clinical setting of myocardial ischemia.           Imaging:    [id number] - Atrial fibrillation         MEI:  Results for orders placed during the hospital encounter of 24    MEI    Interpretation Summary  •  Left Ventricle: Left ventricular cavity size is normal. Wall thickness is normal. The left ventricular ejection fraction is 55%. Systolic function is normal. Wall motion is normal.  •  Right Ventricle: Right ventricular cavity size is dilated. Systolic function is normal.  •  Left Atrium: The atrium is dilated.  •  Right Atrium: The atrium is dilated.  •  Atrial Septum: No obvious patent foramen ovale detected, confirmed at rest using color doppler.  •  Left Atrial Appendage: Left atrial appendage is dilated. There is normal function. There is no thrombus.  •  Mitral Valve: There is mild  regurgitation.      Echocardiogram:  Results for orders placed during the hospital encounter of 11/05/24    Echo complete w/ contrast if indicated    Interpretation Summary  •  Left Ventricle: Left ventricular cavity size is normal. Wall thickness is mildly increased. There is mild concentric hypertrophy. The left ventricular ejection fraction is 60-65% by visual estimation. Systolic function is normal. Although no diagnostic regional wall motion abnormality was identified, this possibility cannot be completely excluded on the basis of this study. Unable to assess diastolic function due to atrial fibrillation.  •  Right Ventricle: Right ventricular cavity size is moderately dilated. Systolic function is normal.  •  Right Atrium: The atrium is moderately dilated.    Very technically difficult study.      Stress Test:   No results found for this or any previous visit.      Cardiac Catheterization:  No results found for this or any previous visit.      HOLTER MONITOR: 24 HOUR/48 HOUR MONITORS  No results found for this or any previous visit.      AMB Extended Holter Monitor: Zio XT/AT or BioTel  No results found for this or any previous visit.      Cardioversion  12/09/2024    Result Text  Electrical Cardioversion Note     Indication: atrial fibrillation  Anticoagulation: apixaban (patient missed a dose three days prior, MEI ordered)  Sedation provided by anesthesia team  MEI findings: no intracardiac thrombus, further findings in MEI report.   Pad placement: anteroposterior  Successful cardioversion after the fourth shock. First three shocks were unsuccessful and patient remained in atrial fibrillation.   Fist shock unsuccessful Anterolateral pad placement with 275 J  Second shock successful anterolateral pad placement with 360 J  Third shock unsuccessful anteroposterior pad placement with 360 J  Fourth shock successful anteroposterior pad placement with 360 J.   Sinus rhythm confirmed by EKG     Ashok Dominique MD,  cardiology fellow.      Pre Cardioversion EKG  12/09/2024      Post Cardioversion EKG  12/09/2024        Sleep Study:    Home Study  12/20/2024    IMPRESSION:  1.  Moderate obstructive sleep apnea with an GERSON of 25.1 events per hour.   2.  Baseline oxygenation adequate.  Respiratory event related hypoxemia with oxygen saturation less than 90% for 61.8 minutes. Oxygen tatyana of 74%.       RECOMMENDATION:  Recommend auto CPAP with pressure range of 5-20 cm H20.  Compliance check 1-2 months after starting CPAP.  Recommend consultation with sleep medicine.        DEVICE CHECK:     No results found for this or any previous visit.         Code Status: [unfilled]  Advance Directive and Living Will:      Power of :    POLST:      Counseling / Coordination of Care    Detailed discussion done about CPAP, cardioversion on dofetilide, need for urgent weight loss and then only there is a favorable prognosis      Raghu Mckinney MD

## 2025-04-24 ENCOUNTER — OFFICE VISIT (OUTPATIENT)
Dept: PHYSICAL THERAPY | Facility: REHABILITATION | Age: 55
End: 2025-04-24
Payer: OTHER MISCELLANEOUS

## 2025-04-24 DIAGNOSIS — S83.241D TEAR OF MEDIAL MENISCUS OF RIGHT KNEE, CURRENT, UNSPECIFIED TEAR TYPE, SUBSEQUENT ENCOUNTER: Primary | ICD-10-CM

## 2025-04-24 PROCEDURE — 97140 MANUAL THERAPY 1/> REGIONS: CPT | Performed by: PHYSICAL THERAPIST

## 2025-04-24 PROCEDURE — 97110 THERAPEUTIC EXERCISES: CPT | Performed by: PHYSICAL THERAPIST

## 2025-04-24 NOTE — PROGRESS NOTES
"Daily Note     Today's date: 2025  Patient name: Luis Fernando Jay  : 1970  MRN: [id number]  Referring provider: Paola Melo PA-C  Dx:   Encounter Diagnosis     ICD-10-CM    1. Tear of medial meniscus of right knee, current, unspecified tear type, subsequent encounter  S83.241D           Start Time: 1632  Stop Time: 1710  Total time in clinic (min): 38 minutes    Subjective: Patient reports that he feels a little more sore in his knee today. We did cupping which he reported less knee pain post.       Objective: See treatment diary below        Assessment: Tolerated treatment well. Patient demonstrated fatigue post treatment and exhibited good technique with therapeutic exercises      Plan: Continue per plan of care.      Precautions: HTN, DM, post op medial menisectomy 3/19/25      Manuals  3/31 4/3 4/7 4/9 4/14 4/17 4/21     STM/TPR  Distal quad Distal quad/hip add  AFB Distal quad/hip add, distal ITB- MB Distal quad/hip add  AFB  Quad-static and with knee ext prom Quad-AB        Thera roller distal quad  AFb Thera roller quad- MB                                   Neuro Re-Ed             WS to R with quad set 10x  5\"x10 Reviewed          SLS foam      5\"x10 5\"x15      Steps        FF  X2  HRx2                                                         Ther Ex             PROM      10' flexion       Heel slides 15x  5\"    2x10 5\"        VG  L6 5' L4   2x10 L4 2x10 L4 2x10   L5-3'     Bike  5' rocking np   Rocking  10' Rocking and ful rev (reverse)-10' Ful rev 5'     SLR             Leg press      40# SL  3x10 100#  DL  3x10 100#  DL  3x10     LAQ  2x10    Ext machine-20# DL  3x10 Ext machine 35#  DL  3x10 Ext machine 35#  DL  3x10     Quad sets  2x10 5\" 1x10x5\" 1x10x5\" 1x10x 5\"        HR      2x20       TKE       BJB-20x BJB-20x     Ther Activity                                       Gait Training                                       Modalities                Heat  5' post CP post sitting       "

## 2025-04-28 ENCOUNTER — OFFICE VISIT (OUTPATIENT)
Dept: PHYSICAL THERAPY | Facility: REHABILITATION | Age: 55
End: 2025-04-28
Payer: OTHER MISCELLANEOUS

## 2025-04-28 DIAGNOSIS — S83.241D TEAR OF MEDIAL MENISCUS OF RIGHT KNEE, CURRENT, UNSPECIFIED TEAR TYPE, SUBSEQUENT ENCOUNTER: Primary | ICD-10-CM

## 2025-04-28 PROCEDURE — 97110 THERAPEUTIC EXERCISES: CPT | Performed by: PHYSICAL THERAPIST

## 2025-04-28 NOTE — PROGRESS NOTES
"Daily Note     Today's date: 2025  Patient name: Luis Fernando Jay  : 1970  MRN: [id number]  Referring provider: Paola Melo PA-C  Dx:   Encounter Diagnosis     ICD-10-CM    1. Tear of medial meniscus of right knee, current, unspecified tear type, subsequent encounter  S83.241D           Start Time: 1530  Stop Time: 1600  Total time in clinic (min): 30 minutes    Subjective: Patient reports that his R knee has been feeling better. L knee more bothersome. Has been going to the gym 1-2x/day for exercise.      Objective: See treatment diary below      Assessment: Tolerated treatment well. Patient still with ERP knee flexion but performed steps, leg press and loaded knee ext pfree today. Was able to progress to SL ext on machine.       Plan: Continue per plan of care.      Precautions: HTN, DM, post op medial menisectomy 3/19/25      Manuals  3/31 4/3 4/7 4/9 4/14 4/17 4/21 4/28    STM/TPR  Distal quad Distal quad/hip add  AFB Distal quad/hip add, distal ITB- MB Distal quad/hip add  AFB  Quad-static and with knee ext prom Quad-AB        Thera roller distal quad  AFb Thera roller quad- MB         cupping         Lateral quad,joint line-10'                 Neuro Re-Ed             WS to R with quad set 10x  5\"x10 Reviewed          SLS foam      5\"x10 5\"x15     Steps        FF  X2  HRx2 FFx2                                                        Ther Ex             PROM      10' flexion       Heel slides 15x  5\"    2x10 5\"        VG  L6 5' L4   2x10 L4 2x10 L4 2x10   L5-3'     Bike  5' rocking np   Rocking  10' Rocking and ful rev (reverse)-10' Ful rev 5' 7'    SLR             Leg press      40# SL  3x10 100#  DL  3x10 100#  DL  3x10 80#  SL  3x10    LAQ  2x10    Ext machine-20# DL  3x10 Ext machine 35#  DL  3x10 Ext machine 35#  DL  3x10 Ext machine 20#  SL 3x10    Quad sets  2x10 5\" 1x10x5\" 1x10x5\" 1x10x 5\"        HR      2x20       TKE       BJB-20x BJB-20x     Ther Activity                   "                     Gait Training                                       Modalities                Heat  5' post CP post sitting

## 2025-05-01 ENCOUNTER — OFFICE VISIT (OUTPATIENT)
Dept: PHYSICAL THERAPY | Facility: REHABILITATION | Age: 55
End: 2025-05-01
Payer: OTHER MISCELLANEOUS

## 2025-05-01 DIAGNOSIS — S83.241D TEAR OF MEDIAL MENISCUS OF RIGHT KNEE, CURRENT, UNSPECIFIED TEAR TYPE, SUBSEQUENT ENCOUNTER: Primary | ICD-10-CM

## 2025-05-01 PROCEDURE — 97110 THERAPEUTIC EXERCISES: CPT | Performed by: PHYSICAL THERAPIST

## 2025-05-01 NOTE — PROGRESS NOTES
"Daily Note     Today's date: 2025  Patient name: Luis Fernando Jay  : 1970  MRN: [id number]  Referring provider: Paola Melo PA-C  Dx:   Encounter Diagnosis     ICD-10-CM    1. Tear of medial meniscus of right knee, current, unspecified tear type, subsequent encounter  S83.241D           Start Time: 1417  Stop Time: 1450  Total time in clinic (min): 33 minutes    Subjective: Patient reports that his right knee has some soreness but he has been pushing it. Has been going to the gym still 1-2x/day, going to his sons house to do steps. Has also tried kneeling. Has not attempted a ladder.       Objective: See treatment diary below    Hypomobility proximal tib/fib joint      Assessment: Tolerated treatment well. Patient demonstrated fatigue post treatment and exhibited good technique with therapeutic exercises      Plan: Continue per plan of care.      Precautions: HTN, DM, post op medial menisectomy 3/19/25      Manuals  3/31 4/3 4/7 4/9 4/14 4/17 4/21 4/28 5/1   STM/TPR  Distal quad Distal quad/hip add  AFB Distal quad/hip add, distal ITB- MB Distal quad/hip add  AFB  Quad-static and with knee ext prom Quad-AB        Thera roller distal quad  AFb Thera roller quad- MB         cupping         Lateral quad,joint line-10' Lateral quad,joint line-10'   Prox tib/fib joint PA mob          G3   Neuro Re-Ed             WS to R with quad set 10x  5\"x10 Reviewed          SLS foam      5\"x10 5\"x15    Steps        FF  X2  HRx2 FFx2                                                        Ther Ex             PROM      10' flexion       Heel slides 15x  5\"    2x10 5\"        VG  L6 5' L4   2x10 L4 2x10 L4 2x10   L5-3'     Bike  5' rocking np   Rocking  10' Rocking and ful rev (reverse)-10' Ful rev 5' 7' 7'   SLR             Leg press      40# SL  3x10 100#  DL  3x10 100#  DL  3x10 80#  SL  3x10 803 SL 3x10   LAQ  2x10    Ext machine-20# DL  3x10 Ext machine 35#  DL  3x10 Ext machine 35#  DL  3x10 Ext " "machine 20#  SL 3x10 Ext machine 20#  SL 3x10   Quad sets  2x10 5\" 1x10x5\" 1x10x5\" 1x10x 5\"        HR      2x20       TKE       BJB-20x BJB-20x     Ther Activity                                       Gait Training                                       Modalities                Heat  5' post CP post sitting                                           "

## 2025-05-06 ENCOUNTER — OFFICE VISIT (OUTPATIENT)
Age: 55
End: 2025-05-06

## 2025-05-06 VITALS — WEIGHT: 315 LBS | BODY MASS INDEX: 40.43 KG/M2 | HEIGHT: 74 IN

## 2025-05-06 DIAGNOSIS — S83.241D TEAR OF MEDIAL MENISCUS OF RIGHT KNEE, CURRENT, UNSPECIFIED TEAR TYPE, SUBSEQUENT ENCOUNTER: Primary | ICD-10-CM

## 2025-05-06 PROCEDURE — 99024 POSTOP FOLLOW-UP VISIT: CPT | Performed by: ORTHOPAEDIC SURGERY

## 2025-05-06 NOTE — PROGRESS NOTES
:  Assessment & Plan  Tear of medial meniscus of right knee, current, unspecified tear type, subsequent encounter             S/P Right knee arthroscopy, partial medial meniscectomy on 3/19/25    Continue PT  Continue home exercises  Continue icing  Avoid any heavy strengthening for now, this aggravated the knee  Follow up 4-6 weeks      S:  Was doing well until yesterday. Today pain worse. Did more strengthening recently, may have aggravated it more.       Exam:  Incision c/d/i   Small effusion  ROM 0-0-110  Stable to varus/valgus  SILT sp/dp/tib  5/5 DF/PF  Mild quad atrophy  SLR intact, weak  wwp  No calf pain

## 2025-05-06 NOTE — Clinical Note
May 6, 2025     Serenity Márquez MD  [address]  [address]  [place] [address]    Patient: Luis Fernando Jay   YOB: 1970   Date of Visit: 5/6/2025       Dear Dr. Serenity Márquez MD:    I am referring my patient, Luis Fernando Jay, to you for evaluation of ***.     I appreciate your assistance in his care and look forward to your findings and recommendations.         Sincerely,                           Ruiz Juarez MD        CC: No Recipients

## 2025-05-06 NOTE — LETTER
May 6, 2025     Patient: Luis Fernando Jay  YOB: 1970  Date of Visit: 5/6/2025      To Whom it May Concern:    Luis Fernando Jay is under my professional care. Luis Fernando was seen in my office on 5/6/2025. Luis Fernando should remain out of work through 6/5/25.     If you have any questions or concerns, please don't hesitate to call.         Sincerely,          Ruiz Juarez MD        CC: No Recipients

## 2025-05-08 ENCOUNTER — TELEPHONE (OUTPATIENT)
Age: 55
End: 2025-05-08

## 2025-05-08 ENCOUNTER — NURSE TRIAGE (OUTPATIENT)
Age: 55
End: 2025-05-08

## 2025-05-08 ENCOUNTER — OFFICE VISIT (OUTPATIENT)
Dept: PHYSICAL THERAPY | Facility: REHABILITATION | Age: 55
End: 2025-05-08
Payer: OTHER MISCELLANEOUS

## 2025-05-08 DIAGNOSIS — S83.241D TEAR OF MEDIAL MENISCUS OF RIGHT KNEE, CURRENT, UNSPECIFIED TEAR TYPE, SUBSEQUENT ENCOUNTER: Primary | ICD-10-CM

## 2025-05-08 PROCEDURE — 97140 MANUAL THERAPY 1/> REGIONS: CPT | Performed by: PHYSICAL THERAPIST

## 2025-05-08 PROCEDURE — 97110 THERAPEUTIC EXERCISES: CPT | Performed by: PHYSICAL THERAPIST

## 2025-05-08 NOTE — PROGRESS NOTES
"Daily Note     Today's date: 2025  Patient name: Luis Fernando Jay  : 1970  MRN: [id number]  Referring provider: Paola Melo PA-C  Dx:   Encounter Diagnosis     ICD-10-CM    1. Tear of medial meniscus of right knee, current, unspecified tear type, subsequent encounter  S83.241D           Start Time: 1445  Stop Time: 1515  Total time in clinic (min): 30 minutes    Subjective: Patient reports that he was sore after PT last session, did a lot on his own too and his knee was more swollen and painful. He is not returning to work (planned) until the first week of  now.       Objective: See treatment diary below      Assessment: Tolerated treatment well. Patient reported less pain and stiffness in knee after cupping. Will gradually reintroduce knee strain as in prior sessions, monitoring response closely.       Plan: Continue per plan of care.      Precautions: HTN, DM, post op medial menisectomy 3/19/25      Manuals 5/8 3/31 4/3 4/7 4/9 4/14 4/17 4/21 4/28 5/1   STM/TPR  Distal quad Distal quad/hip add  AFB Distal quad/hip add, distal ITB- MB Distal quad/hip add  AFB  Quad-static and with knee ext prom Quad-AB        Thera roller distal quad  AFb Thera roller quad- MB         cupping Lateral knee-10'        Lateral quad,joint line-10' Lateral quad,joint line-10'   Prox tib/fib joint PA mob          G3   Neuro Re-Ed             WS to R with quad set   5\"x10 Reviewed          SLS foam      5\"x10 5\"x15    Steps        FF  X2  HRx2 FFx2                                                        Ther Ex             QS 20x            PROM      10' flexion       Heel slides 30x    2x10 5\"        VG  L6 5' L4   2x10 L4 2x10 L4 2x10   L5-3'     Bike  5' rocking np   Rocking  10' Rocking and ful rev (reverse)-10' Ful rev 5' 7' 7'   SLR             Leg press      40# SL  3x10 100#  DL  3x10 100#  DL  3x10 80#  SL  3x10 803 SL 3x10   LAQ  2x10    Ext machine-20# DL  3x10 Ext machine 35#  DL  3x10 Ext " "machine 35#  DL  3x10 Ext machine 20#  SL 3x10 Ext machine 20#  SL 3x10   Quad sets  2x10 5\" 1x10x5\" 1x10x5\" 1x10x 5\"        HR      2x20       TKE       BJB-20x BJB-20x     Ther Activity                                       Gait Training                                       Modalities                Heat  5' post CP post sitting                                             "

## 2025-05-08 NOTE — TELEPHONE ENCOUNTER
Please call patient.  Tell him to stop taking evening dose of metformin, continue morning dose for now.  If you are still getting low readings, let me know then we can just stop metformin.

## 2025-05-08 NOTE — TELEPHONE ENCOUNTER
"Reason for Disposition   Morning (before breakfast) blood glucose < 80 mg/dL (4.4 mmol/L) and more than once in past week    Answer Assessment - Initial Assessment Questions  1. SYMPTOMS: \"What symptoms are you concerned about?\"        Went below 3 x today     2. ONSET:  \"When did the symptoms start?\"          Last 2 days below 50       3. BLOOD GLUCOSE: \"What is your blood glucose level?\"           63 right now       4. USUAL RANGE: \"What is your blood glucose level usually?\" (e.g., usual fasting morning value, usual evening value)          100 to 150      5. TYPE 1 or 2:  \"Do you know what type of diabetes you have?\"  (e.g., Type 1, Type 2, Gestational; doesn't know)             6. INSULIN: \"Do you take insulin?\" \"What type of insulin(s) do you use? What is the mode of delivery? (syringe, pen; injection or pump) \"When did you last give yourself an insulin dose?\" (i.e., time or hours/minutes ago) \"How much did you give?\" (i.e., how many units)        Shot once week     7. DIABETES PILLS: \"Do you take any pills for your diabetes?\" If Yes, ask: \"What is the name of the medicine(s) that you take for high blood sugar?\"         Yes   /BID    8. OTHER SYMPTOMS: \"Do you have any symptoms?\" (e.g., fever, frequent urination, difficulty breathing, vomiting)          Denies      9. LOW BLOOD GLUCOSE TREATMENT: \"What have you done so far to treat the low blood glucose level?\"          Ate candy       10. FOOD: \"When did you last eat or drink?\"           Just ate candy    Protocols used: Diabetes - Low Blood Sugar-Adult-OH      FOLLOW UP: Yes call patient back within the hour    REASON FOR CONVERSATION: hypoglycemia     SYMPTOMS: Low blood sugar no symptoms    OTHER:  Patient calls stating his BS started crashing the last 2 days.  States right now it is 62 . Patient eating some candy and peanut  butter crackers  now.  Denies any symptoms.  Today the machine went off 3 x alerting him his BS was low.  Patient takes Ozempic once " per week and Metformin BID. Denies any recent stress or illnesses.      Please review and call the patient back with next steps.      DISPOSITION: No disposition on file.

## 2025-05-08 NOTE — TELEPHONE ENCOUNTER
Caller: Sim from [facility]     Doctor: Dr. Juarez    Reason for call: electronically faxed 5/6 OVN and work status to [phone]     Call back#: [phone]

## 2025-05-12 ENCOUNTER — OFFICE VISIT (OUTPATIENT)
Dept: PHYSICAL THERAPY | Facility: REHABILITATION | Age: 55
End: 2025-05-12
Payer: OTHER MISCELLANEOUS

## 2025-05-12 DIAGNOSIS — S83.241D TEAR OF MEDIAL MENISCUS OF RIGHT KNEE, CURRENT, UNSPECIFIED TEAR TYPE, SUBSEQUENT ENCOUNTER: Primary | ICD-10-CM

## 2025-05-12 PROCEDURE — 97110 THERAPEUTIC EXERCISES: CPT | Performed by: PHYSICAL THERAPIST

## 2025-05-12 NOTE — PROGRESS NOTES
"Daily Note     Today's date: 2025  Patient name: Luis Fernando Jay  : 1970  MRN: [id number]  Referring provider: Paola Melo PA-C  Dx:   Encounter Diagnosis     ICD-10-CM    1. Tear of medial meniscus of right knee, current, unspecified tear type, subsequent encounter  S83.241D           Start Time: 1445  Stop Time: 1515  Total time in clinic (min): 30 minutes    Subjective: Patient reports having less soreness over the weekend then he did last week.       Objective: See treatment diary below      Assessment: Tolerated treatment well. Patient with improved exercise tolerance today as he was able to ride the bike for 7 min, perform leg press. Had min/no increases in pain post session.       Plan: Continue per plan of care.      Precautions: HTN, DM, post op medial menisectomy 3/19/25      Manuals 5/8 5/12 4/3 4/7 4/9 4/14 4/17 4/21 4/28 5/1   STM/TPR  AB Distal quad/hip add  AFB Distal quad/hip add, distal ITB- MB Distal quad/hip add  AFB  Quad-static and with knee ext prom Quad-AB        Thera roller distal quad  AFb Thera roller quad- MB         cupping Lateral knee-10' Lateral knee 10'       Lateral quad,joint line-10' Lateral quad,joint line-10'   Prox tib/fib joint PA mob          G3   Neuro Re-Ed             WS to R with quad set   5\"x10 Reviewed          SLS foam      5\"x10 5\"x15    Steps        FF  X2  HRx2 FFx2                                                        Ther Ex             QS 20x            PROM      10' flexion       Heel slides 30x    2x10 5\"        VG   L4   2x10 L4 2x10 L4 2x10   L5-3'     Bike  7' np   Rocking  10' Rocking and ful rev (reverse)-10' Ful rev 5' 7' 7'   SLR             Leg press  100#  DL  2x10    40# SL  3x10 100#  DL  3x10 100#  DL  3x10 80#  SL  3x10 803 SL 3x10   LAQ      Ext machine-20# DL  3x10 Ext machine 35#  DL  3x10 Ext machine 35#  DL  3x10 Ext machine 20#  SL 3x10 Ext machine 20#  SL 3x10   Quad sets   1x10x5\" 1x10x5\" 1x10x 5\"      "   HR      2x20       TKE       BJB-20x BJB-20x     Ther Activity                                       Gait Training                                       Modalities                Heat  5' post CP post sitting

## 2025-05-15 ENCOUNTER — OFFICE VISIT (OUTPATIENT)
Dept: PHYSICAL THERAPY | Facility: REHABILITATION | Age: 55
End: 2025-05-15
Payer: OTHER MISCELLANEOUS

## 2025-05-15 DIAGNOSIS — S83.241D TEAR OF MEDIAL MENISCUS OF RIGHT KNEE, CURRENT, UNSPECIFIED TEAR TYPE, SUBSEQUENT ENCOUNTER: Primary | ICD-10-CM

## 2025-05-15 PROCEDURE — 97110 THERAPEUTIC EXERCISES: CPT | Performed by: PHYSICAL THERAPIST

## 2025-05-15 PROCEDURE — 97140 MANUAL THERAPY 1/> REGIONS: CPT | Performed by: PHYSICAL THERAPIST

## 2025-05-15 NOTE — PROGRESS NOTES
"Daily Note     Today's date: 5/15/2025  Patient name: Luis Fernando Jay  : 1970  MRN: [id number]  Referring provider: Paola Melo PA-C  Dx:   Encounter Diagnosis     ICD-10-CM    1. Tear of medial meniscus of right knee, current, unspecified tear type, subsequent encounter  S83.241D           Start Time: 1430  Stop Time: 1500  Total time in clinic (min): 30 minutes    Subjective: Patient reports that his right medial knee feels sore today. OVerall better then last week.       Objective: See treatment diary below      Assessment: Tolerated treatment well. Patient demonstrated fatigue post treatment and exhibited good technique with therapeutic exercises. Patient with TTP and tone increase along adductors and medial HS that improved with STM. Improving exercises tolerance as we will progress as tolerated.       Plan: Continue per plan of care.      Precautions: HTN, DM, post op medial menisectomy 3/19/25      Manuals 5/8 5/12 5/15 4/7 4/9 4/14 4/17 4/21 4/28 5/1   STM/TPR  AB  Distal quad/hip add, distal ITB- MB Distal quad/hip add  AFB  Quad-static and with knee ext prom Quad-AB         Thera roller quad- MB         cupping Lateral knee-10' Lateral knee 10' Medial/lateral knee  15'      Lateral quad,joint line-10' Lateral quad,joint line-10'   Prox tib/fib joint PA mob          G3   Neuro Re-Ed             WS to R with quad set    Reviewed          SLS foam      5\"x10 5\"x15    Steps        FF  X2  HRx2 FFx2                                                        Ther Ex             QS             PROM      10' flexion       Heel slides     2x10 5\"        VG    L4 2x10 L4 2x10   L5-3'     Bike 7' 7'    Rocking  10' Rocking and ful rev (reverse)-10' Ful rev 5' 7' 7'   SLR             Leg press 100#  DL  30x 100#  DL  2x10    40# SL  3x10 100#  DL  3x10 100#  DL  3x10 80#  SL  3x10 803 SL 3x10   LAQ      Ext machine-20# DL  3x10 Ext machine 35#  DL  3x10 Ext machine 35#  DL  3x10 Ext machine " "20#  SL 3x10 Ext machine 20#  SL 3x10   Quad sets    1x10x5\" 1x10x 5\"        HR      2x20       TKE       BJB-20x BJB-20x     Ther Activity                                       Gait Training                                       Modalities                 CP post sitting                                                 "

## 2025-05-19 ENCOUNTER — OFFICE VISIT (OUTPATIENT)
Dept: PHYSICAL THERAPY | Facility: REHABILITATION | Age: 55
End: 2025-05-19
Payer: OTHER MISCELLANEOUS

## 2025-05-19 DIAGNOSIS — S83.241D TEAR OF MEDIAL MENISCUS OF RIGHT KNEE, CURRENT, UNSPECIFIED TEAR TYPE, SUBSEQUENT ENCOUNTER: Primary | ICD-10-CM

## 2025-05-19 PROCEDURE — 97110 THERAPEUTIC EXERCISES: CPT | Performed by: PHYSICAL THERAPIST

## 2025-05-19 PROCEDURE — 97140 MANUAL THERAPY 1/> REGIONS: CPT | Performed by: PHYSICAL THERAPIST

## 2025-05-19 NOTE — PROGRESS NOTES
"Daily Note     Today's date: 2025  Patient name: Luis Fernando Jay  : 1970  MRN: [id number]  Referring provider: Paola Melo PA-C  Dx:   Encounter Diagnosis     ICD-10-CM    1. Tear of medial meniscus of right knee, current, unspecified tear type, subsequent encounter  S83.241D           Start Time: 1430  Stop Time: 1515  Total time in clinic (min): 45 minutes    Subjective: Patient reports doing better, knee feeling less painful as cupping was helpful, STM helpful.       Objective: See treatment diary below      Assessment: Tolerated treatment well. Patient was able to resume exercises as he performed 2 weeks ago prior to his flare up. Sore in adductor/HS region but no c/o of knee pain with exercises.       Plan: Continue per plan of care.      Precautions: HTN, DM, post op medial menisectomy 3/19/25      Manuals 5/8 5/12 5/15 5/19 4/9 4/14 4/17 4/21 4/28 5/1   STM/TPR  AB  AB Distal quad/hip add  AFB  Quad-static and with knee ext prom Quad-AB                  cupping Lateral knee-10' Lateral knee 10' Medial/lateral knee  15' Medial/lateral knee  15'     Lateral quad,joint line-10' Lateral quad,joint line-10'   Prox tib/fib joint PA mob          G3   Neuro Re-Ed             WS to R with quad set             SLS foam      5\"x10 5\"x15    Steps        FF  X2  HRx2 FFx2                                                        Ther Ex             QS             PROM      10' flexion       Heel slides     2x10 5\"        VG     L4 2x10   L5-3'     Bike 7' 7'  5'  Rocking  10' Rocking and ful rev (reverse)-10' Ful rev 5' 7' 7'   SLR             Leg press 100#  DL  30x 100#  DL  2x10  100#  DL  3x10  40# SL  3x10 100#  DL  3x10 100#  DL  3x10 80#  SL  3x10 803 SL 3x10   LAQ    Ext machine 203 DL  3x10  Ext machine-20# DL  3x10 Ext machine 35#  DL  3x10 Ext machine 35#  DL  3x10 Ext machine 20#  SL 3x10 Ext machine 20#  SL 3x10   Quad sets     1x10x 5\"        HR      2x20       TKE       " BJB-20x BJB-20x     Ther Activity                                       Gait Training                                       Modalities

## 2025-05-22 ENCOUNTER — OFFICE VISIT (OUTPATIENT)
Dept: PHYSICAL THERAPY | Facility: REHABILITATION | Age: 55
End: 2025-05-22
Payer: OTHER MISCELLANEOUS

## 2025-05-22 DIAGNOSIS — S83.241D TEAR OF MEDIAL MENISCUS OF RIGHT KNEE, CURRENT, UNSPECIFIED TEAR TYPE, SUBSEQUENT ENCOUNTER: Primary | ICD-10-CM

## 2025-05-22 PROCEDURE — 97140 MANUAL THERAPY 1/> REGIONS: CPT | Performed by: PHYSICAL THERAPIST

## 2025-05-22 NOTE — PROGRESS NOTES
"Daily Note     Today's date: 2025  Patient name: Luis Fernando Jay  : 1970  MRN: [id number]  Referring provider: Paola Melo PA-C  Dx:   Encounter Diagnosis     ICD-10-CM    1. Tear of medial meniscus of right knee, current, unspecified tear type, subsequent encounter  S83.241D           Start Time: 1630  Stop Time: 1655  Total time in clinic (min): 25 minutes    Subjective: Patient reports that he was at the gym this morning and is a little sore in his inner thigh after sitting for a bit. Overall he has felt good progress this week with hopes to RTW first week of .       Objective: See treatment diary below      Assessment: Tolerated treatment well. Patient with mild to mod tone increased medial HS but tolerated STM well. No exercises performed since he had gone to the gym earlier today.       Plan: Continue per plan of care.      Precautions: HTN, DM, post op medial menisectomy 3/19/25      Manuals 5/8 5/12 5/15 5/19 5/22 4/14 4/17 4/21 4/28 5/1   STM/TPR  AB  AB 15'  Quad-static and with knee ext prom Quad-AB                  cupping Lateral knee-10' Lateral knee 10' Medial/lateral knee  15' Medial/lateral knee  15' Medial thigh: HS/add-10'    Lateral quad,joint line-10' Lateral quad,joint line-10'   Prox tib/fib joint PA mob          G3   Neuro Re-Ed             WS to R with quad set             SLS foam      5\"x10 5\"x15    Steps        FF  X2  HRx2 FFx2                                                        Ther Ex             QS             PROM      10' flexion       Heel slides             VG        L5-3'     Bike 7' 7'  5'  Rocking  10' Rocking and ful rev (reverse)-10' Ful rev 5' 7' 7'   SLR             Leg press 100#  DL  30x 100#  DL  2x10  100#  DL  3x10  40# SL  3x10 100#  DL  3x10 100#  DL  3x10 80#  SL  3x10 803 SL 3x10   LAQ    Ext machine 203 DL  3x10  Ext machine-20# DL  3x10 Ext machine 35#  DL  3x10 Ext machine 35#  DL  3x10 Ext machine 20#  SL 3x10 Ext " machine 20#  SL 3x10   Quad sets             HR      2x20       TKE       BJB-20x BJB-20x     Ther Activity                                       Gait Training                                       Modalities

## 2025-05-23 DIAGNOSIS — E11.9 TYPE 2 DIABETES MELLITUS WITHOUT COMPLICATION, WITHOUT LONG-TERM CURRENT USE OF INSULIN (HCC): ICD-10-CM

## 2025-05-25 RX ORDER — SEMAGLUTIDE 2.68 MG/ML
INJECTION, SOLUTION SUBCUTANEOUS
Qty: 9 ML | Refills: 0 | Status: SHIPPED | OUTPATIENT
Start: 2025-05-25

## 2025-05-27 ENCOUNTER — OFFICE VISIT (OUTPATIENT)
Dept: PHYSICAL THERAPY | Facility: REHABILITATION | Age: 55
End: 2025-05-27
Payer: OTHER MISCELLANEOUS

## 2025-05-27 DIAGNOSIS — S83.241D TEAR OF MEDIAL MENISCUS OF RIGHT KNEE, CURRENT, UNSPECIFIED TEAR TYPE, SUBSEQUENT ENCOUNTER: Primary | ICD-10-CM

## 2025-05-27 PROCEDURE — 97140 MANUAL THERAPY 1/> REGIONS: CPT | Performed by: PHYSICAL THERAPIST

## 2025-05-27 NOTE — PROGRESS NOTES
"Daily Note     Today's date: 2025  Patient name: Luis Fernando Jay  : 1970  MRN: [id number]  Referring provider: Paola Melo PA-C  Dx:   Encounter Diagnosis     ICD-10-CM    1. Tear of medial meniscus of right knee, current, unspecified tear type, subsequent encounter  S83.241D           Start Time: 1400  Stop Time: 1425  Total time in clinic (min): 25 minutes    Subjective: Patient reports doing well, only a very small spot of pain along his medial thigh/knee.      Objective: See treatment diary below      Assessment: Tolerated treatment well. Patient improving with tissue tone throughout his knee. Small spot along adductors TTP but overall much better. Held exercises today as he reports feeling good, has been walking more, up to 5000k steps, doing leg press and knee ext at the gym, steps pfree.       Plan: Continue per plan of care.      Precautions: HTN, DM, post op medial menisectomy 3/19/25      Manuals 5/8 5/12 5/15 5/19 5/22 5/27 4/17 4/21 4/28 5/1   STM/TPR  AB  AB 15' 10' Quad-static and with knee ext prom Quad-AB                  cupping Lateral knee-10' Lateral knee 10' Medial/lateral knee  15' Medial/lateral knee  15' Medial thigh: HS/add-10' Medial thigh-15'   Lateral quad,joint line-10' Lateral quad,joint line-10'   Prox tib/fib joint PA mob          G3   Neuro Re-Ed             WS to R with quad set             SLS foam       5\"x15    Steps        FF  X2  HRx2 FFx2                                                        Ther Ex             QS             PROM             Heel slides             VG        L5-3'     Bike 7' 7'  5'   Rocking and ful rev (reverse)-10' Ful rev 5' 7' 7'   SLR             Leg press 100#  DL  30x 100#  DL  2x10  100#  DL  3x10   100#  DL  3x10 100#  DL  3x10 80#  SL  3x10 803 SL 3x10   LAQ    Ext machine 203 DL  3x10   Ext machine 35#  DL  3x10 Ext machine 35#  DL  3x10 Ext machine 20#  SL 3x10 Ext machine 20#  SL 3x10   Quad sets             HR    "          TKE       BJB-20x BJB-20x     Ther Activity                                       Gait Training                                       Modalities

## 2025-05-29 ENCOUNTER — APPOINTMENT (OUTPATIENT)
Dept: PHYSICAL THERAPY | Facility: REHABILITATION | Age: 55
End: 2025-05-29
Payer: OTHER MISCELLANEOUS

## 2025-06-02 ENCOUNTER — OFFICE VISIT (OUTPATIENT)
Dept: PHYSICAL THERAPY | Facility: REHABILITATION | Age: 55
End: 2025-06-02
Payer: OTHER MISCELLANEOUS

## 2025-06-02 DIAGNOSIS — J43.8 OTHER EMPHYSEMA (HCC): ICD-10-CM

## 2025-06-02 DIAGNOSIS — S83.241D TEAR OF MEDIAL MENISCUS OF RIGHT KNEE, CURRENT, UNSPECIFIED TEAR TYPE, SUBSEQUENT ENCOUNTER: Primary | ICD-10-CM

## 2025-06-02 PROCEDURE — 97140 MANUAL THERAPY 1/> REGIONS: CPT | Performed by: PHYSICAL THERAPIST

## 2025-06-02 RX ORDER — ALBUTEROL SULFATE 90 UG/1
INHALANT RESPIRATORY (INHALATION)
Qty: 6.7 G | Refills: 5 | Status: SHIPPED | OUTPATIENT
Start: 2025-06-02 | End: 2025-06-04

## 2025-06-02 NOTE — PROGRESS NOTES
Daily Note     Today's date: 2025  Patient name: Luis Fernando Jay  : 1970  MRN: [id number]  Referring provider: Paola Melo PA-C  Dx:   Encounter Diagnosis     ICD-10-CM    1. Tear of medial meniscus of right knee, current, unspecified tear type, subsequent encounter  S83.241D           Start Time: 1450  Stop Time: 1515  Total time in clinic (min): 25 minutes    Subjective: Patient reports doing well overall as he reports that he has really been using his knee more and stressing it at the gym.       Objective: See treatment diary below      Assessment: Tolerated treatment well. Patient Improving with tissue tone with less tone and less TTP along medial thigh. Patient will continue with self STM with roller. Will continue with gym exercises. He worked in gym 2x today already as we did not perform exercises today.       Plan: Continue per plan of care.      Precautions: HTN, DM, post op medial menisectomy 3/19/25      Manuals 5/8 5/12 5/15 5/19 5/22 5/27 6/2 4/21 4/28 5/1   STM/TPR  AB  AB 15' 10' 10' Quad-AB                  cupping Lateral knee-10' Lateral knee 10' Medial/lateral knee  15' Medial/lateral knee  15' Medial thigh: HS/add-10' Medial thigh-15' Medial thigh-15'  Lateral quad,joint line-10' Lateral quad,joint line-10'   Prox tib/fib joint PA mob          G3   Neuro Re-Ed             WS to R with quad set             SLS foam           Steps        FF  X2  HRx2 FFx2                                                        Ther Ex             QS             PROM             Heel slides             VG        L5-3'     Bike 7' 7'  5'    Ful rev 5' 7' 7'   SLR             Leg press 100#  DL  30x 100#  DL  2x10  100#  DL  3x10    100#  DL  3x10 80#  SL  3x10 803 SL 3x10   LAQ    Ext machine 203 DL  3x10    Ext machine 35#  DL  3x10 Ext machine 20#  SL 3x10 Ext machine 20#  SL 3x10   Quad sets             HR             TKE        BJB-20x     Ther Activity                                        Gait Training                                       Modalities

## 2025-06-03 ENCOUNTER — OFFICE VISIT (OUTPATIENT)
Age: 55
End: 2025-06-03

## 2025-06-03 VITALS — BODY MASS INDEX: 40.43 KG/M2 | HEIGHT: 74 IN | WEIGHT: 315 LBS

## 2025-06-03 DIAGNOSIS — S83.241D TEAR OF MEDIAL MENISCUS OF RIGHT KNEE, CURRENT, UNSPECIFIED TEAR TYPE, SUBSEQUENT ENCOUNTER: Primary | ICD-10-CM

## 2025-06-03 PROCEDURE — 99024 POSTOP FOLLOW-UP VISIT: CPT | Performed by: ORTHOPAEDIC SURGERY

## 2025-06-03 NOTE — LETTER
Edwina 3, 2025     Patient: Luis Fernando Jay  YOB: 1970  Date of Visit: 6/3/2025      To Whom it May Concern:    Luis Fernando Jay is under my professional care. Luis Fernando was seen in my office on 6/3/2025. Luis Fernando can return to work on 6/30/25 without restrictions.     If you have any questions or concerns, please don't hesitate to call.         Sincerely,          Ruiz Juarez MD        CC: No Recipients

## 2025-06-03 NOTE — PROGRESS NOTES
:  Assessment & Plan  Tear of medial meniscus of right knee, current, unspecified tear type, subsequent encounter      S/P Right knee arthroscopy, partial medial meniscectomy on 3/19/25    Continue PT  Continue home exercises  Continue icing  Discussed activities   Follow up 2 months, will reassess the opposite side, left side, at that visit as well: he aggravated this during the injury as well      S:  Overall doing better today. Some mild medial soreness.       Exam:  Incision c/d/i   Small effusion  ROM 0-0-110  Stable to varus/valgus  SILT sp/dp/tib  5/5 DF/PF  Mild quad atrophy  SLR intact, weak  wwp  No calf pain

## 2025-06-03 NOTE — Clinical Note
Edwina 3, 2025     Patient: Luis Fernando Jay   YOB: 1970   Date of Visit: 6/3/2025       To Whom It May Concern:    Luis Fernando Jay was seen in my clinic on 6/3/2025 at 8:45 am. Please excuse Luis Fernando for his absence from work on this day to make the appointment.    If you have any questions or concerns, please don't hesitate to call.         Sincerely,         Ruiz Juarez MD        CC: No Recipients

## 2025-06-04 DIAGNOSIS — E11.69 TYPE 2 DIABETES MELLITUS WITH OTHER SPECIFIED COMPLICATION, UNSPECIFIED WHETHER LONG TERM INSULIN USE (HCC): Primary | ICD-10-CM

## 2025-06-04 DIAGNOSIS — E66.813 CLASS 3 SEVERE OBESITY DUE TO EXCESS CALORIES WITHOUT SERIOUS COMORBIDITY WITH BODY MASS INDEX (BMI) OF 50.0 TO 59.9 IN ADULT: ICD-10-CM

## 2025-06-04 DIAGNOSIS — E78.49 OTHER HYPERLIPIDEMIA: ICD-10-CM

## 2025-06-04 DIAGNOSIS — I48.91 ATRIAL FIBRILLATION WITH RVR (HCC): ICD-10-CM

## 2025-06-04 DIAGNOSIS — Z79.899 ENCOUNTER FOR LONG-TERM CURRENT USE OF MEDICATION: ICD-10-CM

## 2025-06-04 RX ORDER — LEVALBUTEROL TARTRATE 45 UG/1
1 AEROSOL, METERED ORAL EVERY 6 HOURS PRN
Qty: 45 G | Refills: 0 | Status: SHIPPED | OUTPATIENT
Start: 2025-06-04

## 2025-06-04 NOTE — TELEPHONE ENCOUNTER
[facility]     OV notes with Dr. Juarez 6/4   Faxed to [phone]    Next OV 8/4 given

## 2025-06-09 ENCOUNTER — APPOINTMENT (OUTPATIENT)
Dept: PHYSICAL THERAPY | Facility: REHABILITATION | Age: 55
End: 2025-06-09
Payer: OTHER MISCELLANEOUS

## 2025-06-12 ENCOUNTER — OFFICE VISIT (OUTPATIENT)
Dept: PHYSICAL THERAPY | Facility: REHABILITATION | Age: 55
End: 2025-06-12
Payer: OTHER MISCELLANEOUS

## 2025-06-12 DIAGNOSIS — S83.241D TEAR OF MEDIAL MENISCUS OF RIGHT KNEE, CURRENT, UNSPECIFIED TEAR TYPE, SUBSEQUENT ENCOUNTER: Primary | ICD-10-CM

## 2025-06-12 PROCEDURE — 97140 MANUAL THERAPY 1/> REGIONS: CPT

## 2025-06-12 NOTE — PROGRESS NOTES
Daily Note     Today's date: 2025  Patient name: Luis Fernando Jay  : 1970  MRN: [id number]  Referring provider: Paola Melo PA-C  Dx:   Encounter Diagnosis     ICD-10-CM    1. Tear of medial meniscus of right knee, current, unspecified tear type, subsequent encounter  S83.241D           Start Time: 1530  Stop Time: 1600  Total time in clinic (min): 30 minutes    Subjective: Pt reports continued pain along R medial joint line and distal adductors.  Has been very active, working out on his own and performing steps frequently.  Just cannot seem to get rid of this medial pain/soreness.        Objective: See treatment diary below      Assessment: Tolerated treatment well. Moderate soft tissue restriction and very TTP along R distal adductors.  Did begin to respond to both cupping followed by manual STM, but still some restriction and tenderness present by end of treatment.  Patient would benefit from continued PT to further reduce these restrictions and maximize function with reduced frequency/intensity of symptoms.        Plan: Continue per plan of care.      Precautions: HTN, DM, post op medial menisectomy 3/19/25      Manuals 5/8 5/12 5/15 5/19 5/22 5/27 6/2 6/12 4/28 5/1   STM/TPR  AB  AB 15' 10' 10' 10'                  cupping Lateral knee-10' Lateral knee 10' Medial/lateral knee  15' Medial/lateral knee  15' Medial thigh: HS/add-10' Medial thigh-15' Medial thigh-15' Medial thigh-15' Lateral quad,joint line-10' Lateral quad,joint line-10'   Prox tib/fib joint PA mob          G3   Neuro Re-Ed             WS to R with quad set             SLS foam            Steps         FFx2                                                        Ther Ex             QS             PROM             Heel slides             VG             Bike 7' 7'  5'     7' 7'   SLR             Leg press 100#  DL  30x 100#  DL  2x10  100#  DL  3x10     80#  SL  3x10 803 SL 3x10   LAQ    Ext machine 203 DL  3x10     Ext  machine 20#  SL 3x10 Ext machine 20#  SL 3x10   Quad sets             HR             TKE             Ther Activity                                       Gait Training                                       Modalities

## 2025-06-16 ENCOUNTER — OFFICE VISIT (OUTPATIENT)
Dept: PHYSICAL THERAPY | Facility: REHABILITATION | Age: 55
End: 2025-06-16
Payer: OTHER MISCELLANEOUS

## 2025-06-16 DIAGNOSIS — S83.241D TEAR OF MEDIAL MENISCUS OF RIGHT KNEE, CURRENT, UNSPECIFIED TEAR TYPE, SUBSEQUENT ENCOUNTER: Primary | ICD-10-CM

## 2025-06-16 PROCEDURE — 97140 MANUAL THERAPY 1/> REGIONS: CPT

## 2025-06-16 NOTE — PROGRESS NOTES
Daily Note     Today's date: 2025  Patient name: Luis Fernando Jay  : 1970  MRN: [id number]  Referring provider: Paola Melo PA-C  Dx:   Encounter Diagnosis     ICD-10-CM    1. Tear of medial meniscus of right knee, current, unspecified tear type, subsequent encounter  S83.241D                      Subjective: Pt reports his both knees are bothering him more today.  Thinks it is likely his arthritis and the rainy weather today.  Worked out at the gym earlier today and R knee a little swollen with his workout and the weather.      Potential return work .      Objective: See treatment diary below      Assessment: Tolerated treatment well. Continues to respond well to manual techniques with decrease in symptoms and improved ambulation by end of treatment.  Patient would benefit from continued PT to further reduce soft tissue restrictions and decrease pain.        Plan: Continue per plan of care.      Precautions: HTN, DM, post op medial menisectomy 3/19/25      Manuals 5/8 5/12 5/15 5/19 5/22 5/27 6/2 6/12 6/16    STM/TPR  AB  AB 15' 10' 10' 10' 10'                 cupping Lateral knee-10' Lateral knee 10' Medial/lateral knee  15' Medial/lateral knee  15' Medial thigh: HS/add-10' Medial thigh-15' Medial thigh-15' Medial thigh-15' Medial thigh-15'    Prox tib/fib joint PA mob             Neuro Re-Ed             WS to R with quad set             SLS foam             Steps                                                                 Ther Ex             QS             PROM             Heel slides             VG             Bike 7' 7'  5'         SLR             Leg press 100#  DL  30x 100#  DL  2x10  100#  DL  3x10         LAQ    Ext machine 203 DL  3x10         Quad sets             HR             TKE             Ther Activity                                       Gait Training                                       Modalities

## 2025-06-19 ENCOUNTER — OFFICE VISIT (OUTPATIENT)
Dept: PHYSICAL THERAPY | Facility: REHABILITATION | Age: 55
End: 2025-06-19
Payer: OTHER MISCELLANEOUS

## 2025-06-19 DIAGNOSIS — S83.241D TEAR OF MEDIAL MENISCUS OF RIGHT KNEE, CURRENT, UNSPECIFIED TEAR TYPE, SUBSEQUENT ENCOUNTER: Primary | ICD-10-CM

## 2025-06-19 PROCEDURE — 97140 MANUAL THERAPY 1/> REGIONS: CPT

## 2025-06-19 NOTE — PROGRESS NOTES
Daily Note     Today's date: 2025  Patient name: Luis Fernando Jay  : 1970  MRN: [id number]  Referring provider: Paola Melo PA-C  Dx:   Encounter Diagnosis     ICD-10-CM    1. Tear of medial meniscus of right knee, current, unspecified tear type, subsequent encounter  S83.241D           Start Time: 1530  Stop Time: 1603  Total time in clinic (min): 33 minutes    Subjective: Pt reports he thinks maybe the twisting oblique machine he uses at the gym maybe be causing this R medial knee pain.  Notes he must kneel on both knees to perform this exercise.        Objective: See treatment diary below      Assessment: Tolerated treatment well. Continues to present with increased tenderness and soft tissue restriction along R distal adductors/pes anserine.  Restrictions do seem to be slowly improving each visit.  Patient would benefit from continued PT.      Plan: Continue per plan of care.      Precautions: HTN, DM, post op medial menisectomy 3/19/25      Manuals 5/8 5/12 5/15 5/19 5/22 5/27 6/2 6/12 6/16    STM/TPR  AB  AB 15' 10' 10' 10' 10'                 cupping Lateral knee-10' Lateral knee 10' Medial/lateral knee  15' Medial/lateral knee  15' Medial thigh: HS/add-10' Medial thigh-15' Medial thigh-15' Medial thigh-15' Medial thigh-15'    Prox tib/fib joint PA mob             Neuro Re-Ed             WS to R with quad set             SLS foam             Steps                                                                 Ther Ex             QS             PROM             Heel slides             VG             Bike 7' 7'  5'         SLR             Leg press 100#  DL  30x 100#  DL  2x10  100#  DL  3x10         LAQ    Ext machine 203 DL  3x10         Quad sets             HR             TKE             Ther Activity                                       Gait Training                                       Modalities

## 2025-06-23 ENCOUNTER — OFFICE VISIT (OUTPATIENT)
Dept: PHYSICAL THERAPY | Facility: REHABILITATION | Age: 55
End: 2025-06-23
Payer: OTHER MISCELLANEOUS

## 2025-06-23 DIAGNOSIS — S83.241D TEAR OF MEDIAL MENISCUS OF RIGHT KNEE, CURRENT, UNSPECIFIED TEAR TYPE, SUBSEQUENT ENCOUNTER: Primary | ICD-10-CM

## 2025-06-23 PROCEDURE — 97110 THERAPEUTIC EXERCISES: CPT | Performed by: PHYSICAL THERAPIST

## 2025-06-23 PROCEDURE — 97140 MANUAL THERAPY 1/> REGIONS: CPT | Performed by: PHYSICAL THERAPIST

## 2025-06-23 NOTE — PROGRESS NOTES
Daily Note /reassessment    Today's date: 2025  Patient name: Luis Fernando Jay  : 1970  MRN: [id number]  Referring provider: Paola Melo PA-C  Dx:   Encounter Diagnosis     ICD-10-CM    1. Tear of medial meniscus of right knee, current, unspecified tear type, subsequent encounter  S83.241D           Start Time: 1525  Stop Time: 1600  Total time in clinic (min): 35 minutes    Subjective: Patient reports that aside form a small area of pain along his inner knee, he is feeling good. Has been working out regularly at the gym with leg press, knee extension machine and stair master.       Objective: See treatment diary below    Knee ROM: 0-110 deg  Pain: 5/10 at worst    Short Term Goals:   1. Patient will be Independent with hep  2. Patient will improve pain with activity by 50%  3. Patient will report GROC 50% or greater    GOALS MET      Long Term Goals:   1. Patient will improve FOTO to greater then goal - MET  2. Patient will improve pain with activity to 2/10 or less- NOT MET  3. Patient will continue with HEP independence to allow for decreased future reoccurrence of pain and loss in function - MET  4. Patient will report GROC 75% or greater - MET  5. Patient will climb ladders pfree - NT  6. Patient will stand on feet for 8-10 hours with pain 2/10 or less - NT  7. Patient will kneeling,squat pfree - MET          Assessment: Patient is a 55 y.o. year old male who attended physical therapy for 22treatment sessions regarding R knee pain after menisectomy. Patient reports 75-80% improvement at this time which correlates to improved impairments and functionality.  Patient has shown improvement throughout PT by demonstrating decreased pain, increased range of motion, increased strength, and improved gait/balance.      Patient is planning to go back to work in a week, feel ready physically to perform his job tasks.         Plan: Continue per plan of care. Likely DC after NV as he is returning to work  next week.      Precautions: HTN, DM, post op medial menisectomy 3/19/25      Manuals 5/8 5/12 5/15 5/19 5/22 5/27 6/2 6/12 6/16 6/23   STM/TPR  AB  AB 15' 10' 10' 10' 10'                 cupping Lateral knee-10' Lateral knee 10' Medial/lateral knee  15' Medial/lateral knee  15' Medial thigh: HS/add-10' Medial thigh-15' Medial thigh-15' Medial thigh-15' Medial thigh-15' Medial thigh-15'   Prox tib/fib joint PA mob             Neuro Re-Ed             WS to R with quad set             SLS foam             Steps                                                                 Ther Ex             assessment          10'   QS             PROM             Heel slides             VG             Bike 7' 7'  5'         SLR             Leg press 100#  DL  30x 100#  DL  2x10  100#  DL  3x10         LAQ    Ext machine 203 DL  3x10         Quad sets             HR             TKE             Ther Activity                                       Gait Training                                       Modalities

## 2025-07-10 DIAGNOSIS — J43.8 OTHER EMPHYSEMA (HCC): ICD-10-CM

## 2025-07-10 DIAGNOSIS — I48.91 ATRIAL FIBRILLATION WITH RVR (HCC): ICD-10-CM

## 2025-07-11 DIAGNOSIS — E11.69 TYPE 2 DIABETES MELLITUS WITH OTHER SPECIFIED COMPLICATION, UNSPECIFIED WHETHER LONG TERM INSULIN USE (HCC): Primary | ICD-10-CM

## 2025-07-11 RX ORDER — METOPROLOL SUCCINATE 50 MG/1
50 TABLET, EXTENDED RELEASE ORAL 2 TIMES DAILY
Qty: 180 TABLET | Refills: 1 | Status: SHIPPED | OUTPATIENT
Start: 2025-07-11

## 2025-07-11 RX ORDER — ALBUTEROL SULFATE 0.83 MG/ML
SOLUTION RESPIRATORY (INHALATION)
Qty: 300 ML | Refills: 2 | Status: SHIPPED | OUTPATIENT
Start: 2025-07-11

## 2025-07-12 DIAGNOSIS — K21.9 GASTROESOPHAGEAL REFLUX DISEASE, UNSPECIFIED WHETHER ESOPHAGITIS PRESENT: ICD-10-CM

## 2025-07-14 RX ORDER — HYDROCHLOROTHIAZIDE 12.5 MG/1
1 CAPSULE ORAL
Qty: 3 EACH | Refills: 3 | Status: SHIPPED | OUTPATIENT
Start: 2025-07-14

## 2025-08-04 ENCOUNTER — OFFICE VISIT (OUTPATIENT)
Age: 55
End: 2025-08-04
Payer: OTHER MISCELLANEOUS

## 2025-08-04 VITALS — WEIGHT: 315 LBS | BODY MASS INDEX: 40.43 KG/M2 | HEIGHT: 74 IN

## 2025-08-04 DIAGNOSIS — S83.241D TEAR OF MEDIAL MENISCUS OF RIGHT KNEE, CURRENT, UNSPECIFIED TEAR TYPE, SUBSEQUENT ENCOUNTER: Primary | ICD-10-CM

## 2025-08-04 PROCEDURE — 99213 OFFICE O/P EST LOW 20 MIN: CPT | Performed by: ORTHOPAEDIC SURGERY

## 2025-08-05 ENCOUNTER — OFFICE VISIT (OUTPATIENT)
Dept: INTERNAL MEDICINE CLINIC | Facility: CLINIC | Age: 55
End: 2025-08-05
Payer: COMMERCIAL

## 2025-08-05 VITALS
HEART RATE: 80 BPM | WEIGHT: 315 LBS | DIASTOLIC BLOOD PRESSURE: 82 MMHG | RESPIRATION RATE: 14 BRPM | BODY MASS INDEX: 40.43 KG/M2 | OXYGEN SATURATION: 97 % | SYSTOLIC BLOOD PRESSURE: 140 MMHG | TEMPERATURE: 98.5 F | HEIGHT: 74 IN

## 2025-08-05 DIAGNOSIS — M10.071 ACUTE IDIOPATHIC GOUT OF RIGHT FOOT: ICD-10-CM

## 2025-08-05 DIAGNOSIS — I48.91 ATRIAL FIBRILLATION WITH RVR (HCC): ICD-10-CM

## 2025-08-05 DIAGNOSIS — K76.0 FATTY LIVER: ICD-10-CM

## 2025-08-05 DIAGNOSIS — G47.33 OSA (OBSTRUCTIVE SLEEP APNEA): ICD-10-CM

## 2025-08-05 DIAGNOSIS — E78.49 OTHER HYPERLIPIDEMIA: ICD-10-CM

## 2025-08-05 DIAGNOSIS — E11.69 TYPE 2 DIABETES MELLITUS WITH OTHER SPECIFIED COMPLICATION, UNSPECIFIED WHETHER LONG TERM INSULIN USE (HCC): ICD-10-CM

## 2025-08-05 DIAGNOSIS — E66.813 CLASS 3 SEVERE OBESITY DUE TO EXCESS CALORIES WITHOUT SERIOUS COMORBIDITY WITH BODY MASS INDEX (BMI) OF 50.0 TO 59.9 IN ADULT: ICD-10-CM

## 2025-08-05 DIAGNOSIS — Z00.00 ANNUAL PHYSICAL EXAM: ICD-10-CM

## 2025-08-05 DIAGNOSIS — J43.8 OTHER EMPHYSEMA (HCC): ICD-10-CM

## 2025-08-05 DIAGNOSIS — M25.561 RIGHT KNEE PAIN, UNSPECIFIED CHRONICITY: Primary | ICD-10-CM

## 2025-08-05 DIAGNOSIS — K21.9 GASTROESOPHAGEAL REFLUX DISEASE, UNSPECIFIED WHETHER ESOPHAGITIS PRESENT: ICD-10-CM

## 2025-08-05 PROCEDURE — 99396 PREV VISIT EST AGE 40-64: CPT | Performed by: INTERNAL MEDICINE

## 2025-08-05 PROCEDURE — 99214 OFFICE O/P EST MOD 30 MIN: CPT | Performed by: INTERNAL MEDICINE

## 2025-08-07 ENCOUNTER — TELEPHONE (OUTPATIENT)
Age: 55
End: 2025-08-07

## (undated) DEVICE — CUFF TOURNIQUET 30 X 4 IN QUICK CONNECT DISP 1BLA

## (undated) DEVICE — 3M™ STERI-DRAPE™ U-DRAPE 1015: Brand: STERI-DRAPE™

## (undated) DEVICE — PUDDLE VAC

## (undated) DEVICE — GLOVE INDICATOR PI UNDERGLOVE SZ 8 BLUE

## (undated) DEVICE — INTENDED FOR TISSUE SEPARATION, AND OTHER PROCEDURES THAT REQUIRE A SHARP SURGICAL BLADE TO PUNCTURE OR CUT.: Brand: BARD-PARKER ® CARBON RIB-BACK BLADES

## (undated) DEVICE — GLOVE INDICATOR PI UNDERGLOVE SZ 6.5 BLUE

## (undated) DEVICE — BLADE SHAVER DISSECTOR 4MM 13CM COOLCUT

## (undated) DEVICE — WEBRIL 6 IN UNSTERILE

## (undated) DEVICE — TUBING ARTHROSCOPIC WAVE  MAIN PUMP

## (undated) DEVICE — GLOVE SRG BIOGEL 6.5

## (undated) DEVICE — MAT ABSORBANT ARTHROSCOPY FLOOR 46 X 40 IN

## (undated) DEVICE — SUT MONOCRYL 3-0 PS-2 27 IN Y427H

## (undated) DEVICE — IMPERVIOUS STOCKINETTE: Brand: DEROYAL

## (undated) DEVICE — STIRRUP STRAP ADULT DISP

## (undated) DEVICE — DRAPE SHEET THREE QUARTER

## (undated) DEVICE — GLOVE SRG BIOGEL 8

## (undated) DEVICE — BETHLEHEM UNIVERSAL  ARTHRO PK: Brand: CARDINAL HEALTH